# Patient Record
Sex: MALE | Race: WHITE | Employment: UNEMPLOYED | URBAN - METROPOLITAN AREA
[De-identification: names, ages, dates, MRNs, and addresses within clinical notes are randomized per-mention and may not be internally consistent; named-entity substitution may affect disease eponyms.]

---

## 2018-09-12 ENCOUNTER — HOSPITAL ENCOUNTER (INPATIENT)
Age: 53
LOS: 9 days | Discharge: HOME HEALTH | DRG: 140 | End: 2018-09-21
Attending: EMERGENCY MEDICINE | Admitting: FAMILY MEDICINE
Payer: MEDICARE

## 2018-09-12 ENCOUNTER — APPOINTMENT (OUTPATIENT)
Dept: GENERAL RADIOLOGY | Age: 53
DRG: 140 | End: 2018-09-12
Payer: MEDICARE

## 2018-09-12 DIAGNOSIS — J44.1 COPD EXACERBATION (HCC): Primary | ICD-10-CM

## 2018-09-12 PROBLEM — J96.90 RESPIRATORY FAILURE (HCC): Status: ACTIVE | Noted: 2018-09-12

## 2018-09-12 LAB
-: NORMAL
ABSOLUTE EOS #: 0.1 K/UL (ref 0–0.4)
ABSOLUTE IMMATURE GRANULOCYTE: ABNORMAL K/UL (ref 0–0.3)
ABSOLUTE LYMPH #: 1.9 K/UL (ref 1–4.8)
ABSOLUTE MONO #: 1.6 K/UL (ref 0.2–0.8)
ANION GAP SERPL CALCULATED.3IONS-SCNC: 17 MMOL/L (ref 9–17)
BASOPHILS # BLD: 0 % (ref 0–2)
BASOPHILS ABSOLUTE: 0 K/UL (ref 0–0.2)
BUN BLDV-MCNC: 10 MG/DL (ref 6–20)
BUN/CREAT BLD: 9 (ref 9–20)
CALCIUM SERPL-MCNC: 9.5 MG/DL (ref 8.6–10.4)
CHLORIDE BLD-SCNC: 96 MMOL/L (ref 98–107)
CO2: 25 MMOL/L (ref 20–31)
CREAT SERPL-MCNC: 1.07 MG/DL (ref 0.7–1.2)
D-DIMER QUANTITATIVE: 0.44 MG/L FEU
DIFFERENTIAL TYPE: ABNORMAL
EOSINOPHILS RELATIVE PERCENT: 1 % (ref 1–4)
GFR AFRICAN AMERICAN: >60 ML/MIN
GFR NON-AFRICAN AMERICAN: >60 ML/MIN
GFR SERPL CREATININE-BSD FRML MDRD: ABNORMAL ML/MIN/{1.73_M2}
GFR SERPL CREATININE-BSD FRML MDRD: ABNORMAL ML/MIN/{1.73_M2}
GLUCOSE BLD-MCNC: 143 MG/DL (ref 70–99)
HCT VFR BLD CALC: 44.9 % (ref 41–53)
HEMOGLOBIN: 15.2 G/DL (ref 13.5–17.5)
IMMATURE GRANULOCYTES: ABNORMAL %
LYMPHOCYTES # BLD: 16 % (ref 24–44)
MCH RBC QN AUTO: 31 PG (ref 26–34)
MCHC RBC AUTO-ENTMCNC: 33.8 G/DL (ref 31–37)
MCV RBC AUTO: 91.7 FL (ref 80–100)
MONOCYTES # BLD: 14 % (ref 1–7)
MYOGLOBIN: 45 NG/ML (ref 28–72)
NRBC AUTOMATED: ABNORMAL PER 100 WBC
PDW BLD-RTO: 13.8 % (ref 11.5–14.5)
PLATELET # BLD: 336 K/UL (ref 130–400)
PLATELET ESTIMATE: ABNORMAL
PMV BLD AUTO: 8.6 FL (ref 6–12)
POTASSIUM SERPL-SCNC: 3.4 MMOL/L (ref 3.7–5.3)
RBC # BLD: 4.89 M/UL (ref 4.5–5.9)
RBC # BLD: ABNORMAL 10*6/UL
REASON FOR REJECTION: NORMAL
SEG NEUTROPHILS: 69 % (ref 36–66)
SEGMENTED NEUTROPHILS ABSOLUTE COUNT: 8.3 K/UL (ref 1.8–7.7)
SODIUM BLD-SCNC: 138 MMOL/L (ref 135–144)
TROPONIN INTERP: NORMAL
TROPONIN T: <0.03 NG/ML
WBC # BLD: 12.1 K/UL (ref 3.5–11)
WBC # BLD: ABNORMAL 10*3/UL
ZZ NTE CLEAN UP: ORDERED TEST: NORMAL
ZZ NTE WITH NAME CLEAN UP: SPECIMEN SOURCE: NORMAL

## 2018-09-12 PROCEDURE — 85379 FIBRIN DEGRADATION QUANT: CPT

## 2018-09-12 PROCEDURE — 83874 ASSAY OF MYOGLOBIN: CPT

## 2018-09-12 PROCEDURE — 85025 COMPLETE CBC W/AUTO DIFF WBC: CPT

## 2018-09-12 PROCEDURE — 99285 EMERGENCY DEPT VISIT HI MDM: CPT

## 2018-09-12 PROCEDURE — 6370000000 HC RX 637 (ALT 250 FOR IP): Performed by: INTERNAL MEDICINE

## 2018-09-12 PROCEDURE — 96375 TX/PRO/DX INJ NEW DRUG ADDON: CPT

## 2018-09-12 PROCEDURE — 94640 AIRWAY INHALATION TREATMENT: CPT

## 2018-09-12 PROCEDURE — 6360000002 HC RX W HCPCS: Performed by: NURSE PRACTITIONER

## 2018-09-12 PROCEDURE — 71045 X-RAY EXAM CHEST 1 VIEW: CPT

## 2018-09-12 PROCEDURE — 93005 ELECTROCARDIOGRAM TRACING: CPT

## 2018-09-12 PROCEDURE — 2580000003 HC RX 258: Performed by: FAMILY MEDICINE

## 2018-09-12 PROCEDURE — 2000000000 HC ICU R&B

## 2018-09-12 PROCEDURE — 84484 ASSAY OF TROPONIN QUANT: CPT

## 2018-09-12 PROCEDURE — 80048 BASIC METABOLIC PNL TOTAL CA: CPT

## 2018-09-12 PROCEDURE — 94150 VITAL CAPACITY TEST: CPT

## 2018-09-12 PROCEDURE — 6360000002 HC RX W HCPCS: Performed by: FAMILY MEDICINE

## 2018-09-12 PROCEDURE — 94761 N-INVAS EAR/PLS OXIMETRY MLT: CPT

## 2018-09-12 PROCEDURE — 6370000000 HC RX 637 (ALT 250 FOR IP): Performed by: FAMILY MEDICINE

## 2018-09-12 PROCEDURE — 36415 COLL VENOUS BLD VENIPUNCTURE: CPT

## 2018-09-12 PROCEDURE — 96366 THER/PROPH/DIAG IV INF ADDON: CPT

## 2018-09-12 PROCEDURE — 94664 DEMO&/EVAL PT USE INHALER: CPT

## 2018-09-12 PROCEDURE — 96365 THER/PROPH/DIAG IV INF INIT: CPT

## 2018-09-12 PROCEDURE — 6360000002 HC RX W HCPCS: Performed by: INTERNAL MEDICINE

## 2018-09-12 PROCEDURE — 2580000003 HC RX 258: Performed by: NURSE PRACTITIONER

## 2018-09-12 PROCEDURE — 2700000000 HC OXYGEN THERAPY PER DAY

## 2018-09-12 RX ORDER — SODIUM CHLORIDE 0.9 % (FLUSH) 0.9 %
10 SYRINGE (ML) INJECTION EVERY 12 HOURS SCHEDULED
Status: DISCONTINUED | OUTPATIENT
Start: 2018-09-12 | End: 2018-09-21 | Stop reason: HOSPADM

## 2018-09-12 RX ORDER — AZITHROMYCIN 250 MG/1
250 TABLET, FILM COATED ORAL DAILY
Status: DISCONTINUED | OUTPATIENT
Start: 2018-09-13 | End: 2018-09-20

## 2018-09-12 RX ORDER — LEVETIRACETAM 500 MG/1
500 TABLET ORAL 2 TIMES DAILY
Status: ON HOLD | COMMUNITY
End: 2021-06-10

## 2018-09-12 RX ORDER — ONDANSETRON 2 MG/ML
4 INJECTION INTRAMUSCULAR; INTRAVENOUS EVERY 6 HOURS PRN
Status: DISCONTINUED | OUTPATIENT
Start: 2018-09-12 | End: 2018-09-21 | Stop reason: HOSPADM

## 2018-09-12 RX ORDER — ALBUTEROL SULFATE 90 UG/1
2 AEROSOL, METERED RESPIRATORY (INHALATION)
COMMUNITY

## 2018-09-12 RX ORDER — ZOLPIDEM TARTRATE 5 MG/1
5 TABLET ORAL ONCE
Status: COMPLETED | OUTPATIENT
Start: 2018-09-12 | End: 2018-09-12

## 2018-09-12 RX ORDER — MIRTAZAPINE 45 MG/1
45 TABLET, FILM COATED ORAL NIGHTLY
COMMUNITY
End: 2018-10-03 | Stop reason: ALTCHOICE

## 2018-09-12 RX ORDER — ALBUTEROL SULFATE 90 UG/1
2 AEROSOL, METERED RESPIRATORY (INHALATION)
Status: DISCONTINUED | OUTPATIENT
Start: 2018-09-12 | End: 2018-09-12

## 2018-09-12 RX ORDER — PANTOPRAZOLE SODIUM 40 MG/1
40 TABLET, DELAYED RELEASE ORAL DAILY
COMMUNITY

## 2018-09-12 RX ORDER — POTASSIUM CHLORIDE 20MEQ/15ML
40 LIQUID (ML) ORAL PRN
Status: DISCONTINUED | OUTPATIENT
Start: 2018-09-12 | End: 2018-09-21 | Stop reason: HOSPADM

## 2018-09-12 RX ORDER — BUDESONIDE AND FORMOTEROL FUMARATE DIHYDRATE 160; 4.5 UG/1; UG/1
2 AEROSOL RESPIRATORY (INHALATION) 2 TIMES DAILY
Status: ON HOLD | COMMUNITY
End: 2022-08-24

## 2018-09-12 RX ORDER — METHYLPREDNISOLONE SODIUM SUCCINATE 125 MG/2ML
60 INJECTION, POWDER, LYOPHILIZED, FOR SOLUTION INTRAMUSCULAR; INTRAVENOUS EVERY 6 HOURS
Status: DISCONTINUED | OUTPATIENT
Start: 2018-09-12 | End: 2018-09-14

## 2018-09-12 RX ORDER — THIAMINE MONONITRATE (VIT B1) 100 MG
100 TABLET ORAL DAILY
COMMUNITY

## 2018-09-12 RX ORDER — QUETIAPINE FUMARATE 400 MG/1
200 TABLET, FILM COATED ORAL NIGHTLY
COMMUNITY

## 2018-09-12 RX ORDER — LOSARTAN POTASSIUM AND HYDROCHLOROTHIAZIDE 25; 100 MG/1; MG/1
1 TABLET ORAL DAILY
Status: ON HOLD | COMMUNITY
End: 2019-03-11 | Stop reason: HOSPADM

## 2018-09-12 RX ORDER — ACETAMINOPHEN 160 MG
1 TABLET,DISINTEGRATING ORAL DAILY
COMMUNITY

## 2018-09-12 RX ORDER — HYDROXYZINE PAMOATE 50 MG/1
50 CAPSULE ORAL DAILY
COMMUNITY
End: 2021-07-12

## 2018-09-12 RX ORDER — SODIUM CHLORIDE 0.9 % (FLUSH) 0.9 %
10 SYRINGE (ML) INJECTION PRN
Status: DISCONTINUED | OUTPATIENT
Start: 2018-09-12 | End: 2018-09-21 | Stop reason: HOSPADM

## 2018-09-12 RX ORDER — POTASSIUM CHLORIDE 7.45 MG/ML
10 INJECTION INTRAVENOUS PRN
Status: DISCONTINUED | OUTPATIENT
Start: 2018-09-12 | End: 2018-09-21 | Stop reason: HOSPADM

## 2018-09-12 RX ORDER — MAGNESIUM SULFATE 1 G/100ML
1 INJECTION INTRAVENOUS
Status: DISPENSED | OUTPATIENT
Start: 2018-09-12 | End: 2018-09-12

## 2018-09-12 RX ORDER — IPRATROPIUM BROMIDE AND ALBUTEROL SULFATE 2.5; .5 MG/3ML; MG/3ML
1 SOLUTION RESPIRATORY (INHALATION) 4 TIMES DAILY
Status: ON HOLD | COMMUNITY
End: 2018-09-19 | Stop reason: HOSPADM

## 2018-09-12 RX ORDER — ONDANSETRON 2 MG/ML
4 INJECTION INTRAMUSCULAR; INTRAVENOUS EVERY 6 HOURS PRN
Status: DISCONTINUED | OUTPATIENT
Start: 2018-09-12 | End: 2018-09-12 | Stop reason: CLARIF

## 2018-09-12 RX ORDER — PREDNISONE 10 MG/1
10 TABLET ORAL DAILY
COMMUNITY
End: 2018-10-03 | Stop reason: ALTCHOICE

## 2018-09-12 RX ORDER — IPRATROPIUM BROMIDE AND ALBUTEROL SULFATE 2.5; .5 MG/3ML; MG/3ML
1 SOLUTION RESPIRATORY (INHALATION)
Status: DISCONTINUED | OUTPATIENT
Start: 2018-09-12 | End: 2018-09-21 | Stop reason: HOSPADM

## 2018-09-12 RX ORDER — AMLODIPINE BESYLATE 10 MG/1
10 TABLET ORAL DAILY
Status: ON HOLD | COMMUNITY
End: 2018-09-21 | Stop reason: HOSPADM

## 2018-09-12 RX ORDER — METOPROLOL SUCCINATE 50 MG/1
50 TABLET, EXTENDED RELEASE ORAL DAILY
COMMUNITY

## 2018-09-12 RX ORDER — IPRATROPIUM BROMIDE AND ALBUTEROL SULFATE 2.5; .5 MG/3ML; MG/3ML
1 SOLUTION RESPIRATORY (INHALATION)
Status: DISCONTINUED | OUTPATIENT
Start: 2018-09-12 | End: 2018-09-12

## 2018-09-12 RX ORDER — HYDRALAZINE HYDROCHLORIDE 50 MG/1
50 TABLET, FILM COATED ORAL NIGHTLY
Status: ON HOLD | COMMUNITY
End: 2018-09-21 | Stop reason: HOSPADM

## 2018-09-12 RX ORDER — METHYLPREDNISOLONE SODIUM SUCCINATE 125 MG/2ML
125 INJECTION, POWDER, LYOPHILIZED, FOR SOLUTION INTRAMUSCULAR; INTRAVENOUS ONCE
Status: COMPLETED | OUTPATIENT
Start: 2018-09-12 | End: 2018-09-12

## 2018-09-12 RX ORDER — ALBUTEROL SULFATE 2.5 MG/3ML
5 SOLUTION RESPIRATORY (INHALATION)
Status: DISCONTINUED | OUTPATIENT
Start: 2018-09-12 | End: 2018-09-12

## 2018-09-12 RX ORDER — POTASSIUM CHLORIDE 20 MEQ/1
40 TABLET, EXTENDED RELEASE ORAL PRN
Status: DISCONTINUED | OUTPATIENT
Start: 2018-09-12 | End: 2018-09-21 | Stop reason: HOSPADM

## 2018-09-12 RX ORDER — ONDANSETRON 4 MG/1
4 TABLET, ORALLY DISINTEGRATING ORAL EVERY 6 HOURS PRN
Status: DISCONTINUED | OUTPATIENT
Start: 2018-09-12 | End: 2018-09-21 | Stop reason: HOSPADM

## 2018-09-12 RX ORDER — FOLIC ACID 1 MG/1
1 TABLET ORAL DAILY
COMMUNITY

## 2018-09-12 RX ADMIN — POTASSIUM CHLORIDE 40 MEQ: 20 TABLET, EXTENDED RELEASE ORAL at 21:24

## 2018-09-12 RX ADMIN — METHYLPREDNISOLONE SODIUM SUCCINATE 125 MG: 125 INJECTION, POWDER, FOR SOLUTION INTRAMUSCULAR; INTRAVENOUS at 14:28

## 2018-09-12 RX ADMIN — Medication 10 ML: at 21:25

## 2018-09-12 RX ADMIN — CEFTRIAXONE 1 G: 1 INJECTION, POWDER, FOR SOLUTION INTRAMUSCULAR; INTRAVENOUS at 16:25

## 2018-09-12 RX ADMIN — IPRATROPIUM BROMIDE AND ALBUTEROL SULFATE 1 AMPULE: .5; 3 SOLUTION RESPIRATORY (INHALATION) at 19:57

## 2018-09-12 RX ADMIN — ENOXAPARIN SODIUM 40 MG: 40 INJECTION SUBCUTANEOUS at 18:43

## 2018-09-12 RX ADMIN — ALBUTEROL SULFATE 5 MG: 5 SOLUTION RESPIRATORY (INHALATION) at 14:42

## 2018-09-12 RX ADMIN — METHYLPREDNISOLONE SODIUM SUCCINATE 60 MG: 125 INJECTION, POWDER, FOR SOLUTION INTRAMUSCULAR; INTRAVENOUS at 22:03

## 2018-09-12 RX ADMIN — AZITHROMYCIN MONOHYDRATE 500 MG: 500 INJECTION, POWDER, LYOPHILIZED, FOR SOLUTION INTRAVENOUS at 16:59

## 2018-09-12 RX ADMIN — MAGNESIUM SULFATE IN DEXTROSE 1 G: 10 INJECTION, SOLUTION INTRAVENOUS at 14:29

## 2018-09-12 RX ADMIN — ZOLPIDEM TARTRATE 5 MG: 5 TABLET ORAL at 21:25

## 2018-09-12 RX ADMIN — ALBUTEROL SULFATE 5 MG: 5 SOLUTION RESPIRATORY (INHALATION) at 14:36

## 2018-09-12 ASSESSMENT — ENCOUNTER SYMPTOMS
NAUSEA: 0
CONSTIPATION: 0
WHEEZING: 0
COUGH: 1
VOMITING: 0
SINUS PRESSURE: 0
COLOR CHANGE: 0
RHINORRHEA: 0
DIARRHEA: 0
SORE THROAT: 0
ABDOMINAL PAIN: 0
SHORTNESS OF BREATH: 1

## 2018-09-12 ASSESSMENT — PAIN SCALES - GENERAL
PAINLEVEL_OUTOF10: 0
PAINLEVEL_OUTOF10: 0

## 2018-09-12 NOTE — ED PROVIDER NOTES
[address] [place] [address] ED  eMERGENCY dEPARTMENT eNCOUnter      Pt Name: Shahzad Kennedy  MRN: [id number]  [place] 1965  Date of evaluation: 9/12/2018  Provider: Clayton Ladd NP, GONZÁLEZ - Mercedes 6698       Chief Complaint   Patient presents with    Shortness of Breath         HISTORY OF PRESENT ILLNESS  (Location/Symptom, Timing/Onset, Context/Setting, Quality, Duration, Modifying Factors, Severity.)   Shahzad Kennedy is a 52 y.o. male who presents to the emergency department today by private automobile for evaluation of shortness of breath. The sheet he has had shortness of breath on Saturday. He does have history of COPD and wears a CPAP at night. He states his current been compliant with wearing his CPAP. He states he has been short of breath with rest and exertion. He has also had a dry harsh nonproductive cough. He was seen by his primary care physician today who sent him to the emergency room for evaluation and admission to the hospital.  Denies fevers or chills. Nursing Notes were reviewed. ALLERGIES     Patient has no known allergies.     CURRENT MEDICATIONS       Previous Medications    ALBUTEROL SULFATE  (90 BASE) MCG/ACT INHALER    Inhale 2 puffs into the lungs every 4-6 hours as needed for Wheezing    AMLODIPINE (NORVASC) 10 MG TABLET    Take 10 mg by mouth daily    ASPIRIN 81 MG TABLET    Take 81 mg by mouth daily    BUDESONIDE-FORMOTEROL (SYMBICORT) 160-4.5 MCG/ACT AERO    Inhale 2 puffs into the lungs 2 times daily    CHOLECALCIFEROL (VITAMIN D3) 2000 UNITS CAPS    Take 1 capsule by mouth daily    FOLIC ACID (FOLVITE) 1 MG TABLET    Take 1 mg by mouth daily    HYDRALAZINE (APRESOLINE) 50 MG TABLET    Take 50 mg by mouth nightly    HYDROXYZINE (VISTARIL) 50 MG CAPSULE    Take 50 mg by mouth daily    IPRATROPIUM-ALBUTEROL (DUONEB) 0.5-2.5 (3) MG/3ML SOLN NEBULIZER SOLUTION    Inhale 1 vial into the lungs 4 times daily    LEVETIRACETAM (KEPPRA) 500 MG TABLET    Take 500

## 2018-09-12 NOTE — CONSULTS
Intravenous Once     albuterol, ipratropium-albuterol  IV Drips/Infusions    Home Medications  Not in a hospital admission. Allergies    Patient has no known allergies. Social History     Social History   Substance Use Topics    Smoking status: Current Every Day Smoker    Smokeless tobacco: Never Used    Alcohol use Yes     Family History    History reviewed. No pertinent family history. ROS - 11 systems   General Denies any fever or chills  HEENT Denies any diplopia, tinnitus or vertigo  Resp positive for  dry cough and dyspnea  Cardiac Denies any chest pain, palpitations, claudication or edema  GI Denies any melena, hematochezia, hematemesis or pyrosis   Denies any frequency, urgency, hesitancy or incontinence  Heme Denies bruising or bleeding easily  Endocrine Denies any history of diabetes or thyroid disease  Neuro Denies any focal motor or sensory deficits  Psychiatric Denies anxiety, depression, suicidal ideation  Skin Denies rashes, itching, open sores    Vitals     height is 5' 5\" (1.651 m) and weight is 183 lb (83 kg). His temperature is 97.4 °F (36.3 °C). His blood pressure is 118/79 and his pulse is 100. His respiration is 25 and oxygen saturation is 92%. Body mass index is 30.45 kg/m². I/O    No intake or output data in the 24 hours ending 09/12/18 1730  No intake/output data recorded. Patient Vitals for the past 96 hrs (Last 3 readings):   Weight   09/12/18 1345 183 lb (83 kg)     Exam   General Appearance  Awake, alert, oriented, in no acute distress  HEENT - Head is normocephalic, atraumatic. Pupil reactive to light  Neck - Supple, symmetrical, trachea midline and Soft, trachea midline and straight  Lungs - positive findings: prolonged expiration, wheezing bibasilar  Cardiovascular - Heart sounds are normal.  Regular rhythm normal rate without murmur, gallop or rub. Abdomen - Soft, nontender, nondistended, no masses or organomegaly  Neurologic - CN II-XII are grossly intact.  There are no focal motor or sensory deficits  Skin - No bruising or bleeding  Extremities - No cyanosis, clubbing or edema    Labs  - Old records and notes have been reviewed in [facility]   CBC   Lab Results   Component Value Date    WBC 12.1 09/12/2018    RBC 4.89 09/12/2018    RBC 4.83 04/18/2012    HGB 15.2 09/12/2018    HCT 44.9 09/12/2018     09/12/2018     04/18/2012    MCV 91.7 09/12/2018    MCH 31.0 09/12/2018    MCHC 33.8 09/12/2018    RDW 13.8 09/12/2018    LYMPHOPCT 16 09/12/2018    MONOPCT 14 09/12/2018    BASOPCT 0 09/12/2018    MONOSABS 1.60 09/12/2018    LYMPHSABS 1.90 09/12/2018    EOSABS 0.10 09/12/2018    BASOSABS 0.00 09/12/2018    DIFFTYPE NOT REPORTED 09/12/2018     BMP Lab Results   Component Value Date     09/12/2018    K 3.4 09/12/2018    CL 96 09/12/2018    CO2 25 09/12/2018    BUN 10 09/12/2018    CREATININE 1.07 09/12/2018    GLUCOSE 143 09/12/2018    GLUCOSE 87 04/18/2012    CALCIUM 9.5 09/12/2018    MG 2.2 01/14/2016     LFTS  Lab Results   Component Value Date    ALKPHOS 77 05/14/2016    ALT 91 05/14/2016    AST 71 05/14/2016    PROT 7.9 05/14/2016    PROT 7.8 05/14/2016    BILITOT 0.54 05/14/2016    BILIDIR <0.08 05/14/2016    IBILI CANNOT BE CALCULATED 05/14/2016    LABALBU 4.5 05/14/2016    LABALBU 3.9 04/18/2012     Radiology    CXR  9/12/18     (See actual reports for details)    \"Thank you for asking us to see this patient\"    Case discussed with nurse and patient/family. Questions and concerns addressed.     Electronically signed by     Quinn Lee MD on 9/12/2018 at 5:30 PM  Pulmonary Critical Care and Sleep Medicine,  [facility]  Cell: [phone]  Office: [phone]

## 2018-09-12 NOTE — PROGRESS NOTES
Transitions of Care Pharmacy Service   Medication Review    The patient's list of current home medications has been reviewed. Source(s) of information: [facility]res[facility]pt, [facility]    Based on information provided by the above source(s), I have updated the patient's home med list as described below. Please review the ACTION REQUESTED BY PHYSICIAN section of this note for any discrepancies on current hospital orders. I changed or updated the following medications on the patient's home medication list:  Discontinued · Lisinopril 20mg (no longer on)  · Metoprolol XL 25mg (no longer on)   Added · Ventolin HFA 90mcg 2 puffs every 4-6 hours as needed  · Amlodipine 10mg once daily  · Aspirin 81mg once daily  · Symbicort 160-4.5mg 2 puffs twice a day  · Folic acid 1mg once daily  · Hydralazine 50mg once nightly  · Hydroxyzine pamoate once daily  · Duo-Neb 0.25-3 (3mL) solution 1 vial four times a day  · Keppra 500mg twice daily  · Losartan-HCTZ 100-25mg once daily  · Metoprolol succinate XL 50mg daily  · Pantoprazole 40mg daily  · Prednisone 10mg daily  · Quetiapine 200mg nightly  · Vitamin B1 100mg daily  · Vitamin D3 2000IU daily  · Spiriva respimat 2.5mg 2 puffs daily  · Mirtazapine 45mg nightly   Adjusted   · Lurasidone 40mg switched to 120mg daily per [address] REQUESTED  Discrepancies on current hospital orders that need to be addressed by a physician:    Medication Action Requested        Medication list ready for view by physician         Please feel free to call me with any questions about this encounter. Thank you. This note will be reviewed and co-signed by the Transitions of Care Pharmacist.    Vertell Heimlich Ramadan, Juan ManuelD student  Transitions of Care Pharmacy Service  Phone:  [phone]  Fax: [phone]      Electronically signed by Vertell Heimlich Ramadan on 9/12/2018 at 4:53 PM     Prior to Admission medications    Medication Sig Start Date End Date Taking? Provider, MD   mirtazapine (REMERON) 45 MG tablet Take 45 mg by mouth nightly   Yes Historical Provider, MD   omeprazole (PRILOSEC) 10 MG capsule Take 10 mg by mouth daily    Historical Provider, MD

## 2018-09-13 ENCOUNTER — APPOINTMENT (OUTPATIENT)
Dept: GENERAL RADIOLOGY | Age: 53
DRG: 140 | End: 2018-09-13
Payer: MEDICARE

## 2018-09-13 LAB
ABSOLUTE EOS #: 0 K/UL (ref 0–0.4)
ABSOLUTE IMMATURE GRANULOCYTE: ABNORMAL K/UL (ref 0–0.3)
ABSOLUTE LYMPH #: 0.8 K/UL (ref 1–4.8)
ABSOLUTE MONO #: 0.5 K/UL (ref 0.2–0.8)
ANION GAP SERPL CALCULATED.3IONS-SCNC: 15 MMOL/L (ref 9–17)
BASOPHILS # BLD: 0 % (ref 0–2)
BASOPHILS ABSOLUTE: 0 K/UL (ref 0–0.2)
BUN BLDV-MCNC: 15 MG/DL (ref 6–20)
BUN/CREAT BLD: 13 (ref 9–20)
CALCIUM SERPL-MCNC: 9.5 MG/DL (ref 8.6–10.4)
CHLORIDE BLD-SCNC: 99 MMOL/L (ref 98–107)
CO2: 23 MMOL/L (ref 20–31)
CREAT SERPL-MCNC: 1.13 MG/DL (ref 0.7–1.2)
DIFFERENTIAL TYPE: ABNORMAL
EKG ATRIAL RATE: 106 BPM
EKG ATRIAL RATE: 95 BPM
EKG ATRIAL RATE: 95 BPM
EKG P AXIS: 45 DEGREES
EKG P AXIS: 63 DEGREES
EKG P AXIS: 72 DEGREES
EKG P-R INTERVAL: 132 MS
EKG P-R INTERVAL: 142 MS
EKG P-R INTERVAL: 146 MS
EKG Q-T INTERVAL: 376 MS
EKG Q-T INTERVAL: 384 MS
EKG Q-T INTERVAL: 390 MS
EKG QRS DURATION: 132 MS
EKG QRS DURATION: 134 MS
EKG QRS DURATION: 138 MS
EKG QTC CALCULATION (BAZETT): 482 MS
EKG QTC CALCULATION (BAZETT): 490 MS
EKG QTC CALCULATION (BAZETT): 499 MS
EKG R AXIS: 79 DEGREES
EKG R AXIS: 84 DEGREES
EKG R AXIS: 99 DEGREES
EKG T AXIS: -2 DEGREES
EKG T AXIS: -4 DEGREES
EKG T AXIS: 49 DEGREES
EKG VENTRICULAR RATE: 106 BPM
EKG VENTRICULAR RATE: 95 BPM
EKG VENTRICULAR RATE: 95 BPM
EOSINOPHILS RELATIVE PERCENT: 0 % (ref 1–4)
GFR AFRICAN AMERICAN: >60 ML/MIN
GFR NON-AFRICAN AMERICAN: >60 ML/MIN
GFR SERPL CREATININE-BSD FRML MDRD: ABNORMAL ML/MIN/{1.73_M2}
GFR SERPL CREATININE-BSD FRML MDRD: ABNORMAL ML/MIN/{1.73_M2}
GLUCOSE BLD-MCNC: 144 MG/DL (ref 70–99)
HCT VFR BLD CALC: 44 % (ref 41–53)
HEMOGLOBIN: 14.8 G/DL (ref 13.5–17.5)
IMMATURE GRANULOCYTES: ABNORMAL %
LYMPHOCYTES # BLD: 7 % (ref 24–44)
MCH RBC QN AUTO: 31 PG (ref 26–34)
MCHC RBC AUTO-ENTMCNC: 33.7 G/DL (ref 31–37)
MCV RBC AUTO: 92.1 FL (ref 80–100)
MONOCYTES # BLD: 4 % (ref 1–7)
NRBC AUTOMATED: ABNORMAL PER 100 WBC
PDW BLD-RTO: 13.9 % (ref 11.5–14.5)
PLATELET # BLD: 343 K/UL (ref 130–400)
PLATELET ESTIMATE: ABNORMAL
PMV BLD AUTO: 8.7 FL (ref 6–12)
POTASSIUM SERPL-SCNC: 4.5 MMOL/L (ref 3.7–5.3)
PROCALCITONIN: 0.12 NG/ML
RBC # BLD: 4.78 M/UL (ref 4.5–5.9)
RBC # BLD: ABNORMAL 10*6/UL
SEG NEUTROPHILS: 89 % (ref 36–66)
SEGMENTED NEUTROPHILS ABSOLUTE COUNT: 10.4 K/UL (ref 1.8–7.7)
SODIUM BLD-SCNC: 137 MMOL/L (ref 135–144)
WBC # BLD: 11.7 K/UL (ref 3.5–11)
WBC # BLD: ABNORMAL 10*3/UL

## 2018-09-13 PROCEDURE — 2700000000 HC OXYGEN THERAPY PER DAY

## 2018-09-13 PROCEDURE — 6370000000 HC RX 637 (ALT 250 FOR IP): Performed by: FAMILY MEDICINE

## 2018-09-13 PROCEDURE — 94640 AIRWAY INHALATION TREATMENT: CPT

## 2018-09-13 PROCEDURE — 6360000002 HC RX W HCPCS: Performed by: FAMILY MEDICINE

## 2018-09-13 PROCEDURE — 93306 TTE W/DOPPLER COMPLETE: CPT

## 2018-09-13 PROCEDURE — 85025 COMPLETE CBC W/AUTO DIFF WBC: CPT

## 2018-09-13 PROCEDURE — 6360000002 HC RX W HCPCS: Performed by: INTERNAL MEDICINE

## 2018-09-13 PROCEDURE — 6370000000 HC RX 637 (ALT 250 FOR IP): Performed by: NURSE PRACTITIONER

## 2018-09-13 PROCEDURE — 36415 COLL VENOUS BLD VENIPUNCTURE: CPT

## 2018-09-13 PROCEDURE — 2580000003 HC RX 258: Performed by: FAMILY MEDICINE

## 2018-09-13 PROCEDURE — 2060000000 HC ICU INTERMEDIATE R&B

## 2018-09-13 PROCEDURE — 71045 X-RAY EXAM CHEST 1 VIEW: CPT

## 2018-09-13 PROCEDURE — 94660 CPAP INITIATION&MGMT: CPT

## 2018-09-13 PROCEDURE — 84145 PROCALCITONIN (PCT): CPT

## 2018-09-13 PROCEDURE — 80048 BASIC METABOLIC PNL TOTAL CA: CPT

## 2018-09-13 PROCEDURE — 6370000000 HC RX 637 (ALT 250 FOR IP): Performed by: INTERNAL MEDICINE

## 2018-09-13 PROCEDURE — 94761 N-INVAS EAR/PLS OXIMETRY MLT: CPT

## 2018-09-13 RX ORDER — METOPROLOL TARTRATE 50 MG/1
TABLET, FILM COATED ORAL
Status: ON HOLD | COMMUNITY
End: 2018-09-21 | Stop reason: HOSPADM

## 2018-09-13 RX ORDER — LOSARTAN POTASSIUM 100 MG/1
100 TABLET ORAL DAILY
Status: DISCONTINUED | OUTPATIENT
Start: 2018-09-14 | End: 2018-09-21 | Stop reason: HOSPADM

## 2018-09-13 RX ORDER — MIRTAZAPINE 15 MG/1
45 TABLET, FILM COATED ORAL NIGHTLY
Status: DISCONTINUED | OUTPATIENT
Start: 2018-09-13 | End: 2018-09-13

## 2018-09-13 RX ORDER — GUAIFENESIN 600 MG/1
600 TABLET, EXTENDED RELEASE ORAL 2 TIMES DAILY
Status: COMPLETED | OUTPATIENT
Start: 2018-09-13 | End: 2018-09-19

## 2018-09-13 RX ORDER — THIAMINE MONONITRATE (VIT B1) 100 MG
100 TABLET ORAL DAILY
Status: DISCONTINUED | OUTPATIENT
Start: 2018-09-14 | End: 2018-09-21 | Stop reason: HOSPADM

## 2018-09-13 RX ORDER — LEVETIRACETAM 500 MG/1
500 TABLET ORAL DAILY
Status: DISCONTINUED | OUTPATIENT
Start: 2018-09-14 | End: 2018-09-21 | Stop reason: HOSPADM

## 2018-09-13 RX ORDER — ASPIRIN 81 MG/1
81 TABLET, CHEWABLE ORAL DAILY
Status: DISCONTINUED | OUTPATIENT
Start: 2018-09-13 | End: 2018-09-13

## 2018-09-13 RX ORDER — METOPROLOL SUCCINATE 50 MG/1
50 TABLET, EXTENDED RELEASE ORAL DAILY
Status: DISCONTINUED | OUTPATIENT
Start: 2018-09-13 | End: 2018-09-21 | Stop reason: HOSPADM

## 2018-09-13 RX ORDER — HYDRALAZINE HYDROCHLORIDE 50 MG/1
50 TABLET, FILM COATED ORAL DAILY
Status: DISCONTINUED | OUTPATIENT
Start: 2018-09-13 | End: 2018-09-13

## 2018-09-13 RX ORDER — FOLIC ACID 1 MG/1
1 TABLET ORAL DAILY
Status: DISCONTINUED | OUTPATIENT
Start: 2018-09-13 | End: 2018-09-21 | Stop reason: HOSPADM

## 2018-09-13 RX ORDER — HYDROCHLOROTHIAZIDE 25 MG/1
25 TABLET ORAL DAILY
Status: DISCONTINUED | OUTPATIENT
Start: 2018-09-14 | End: 2018-09-21 | Stop reason: HOSPADM

## 2018-09-13 RX ORDER — QUETIAPINE FUMARATE 200 MG/1
200 TABLET, FILM COATED ORAL NIGHTLY
Status: DISCONTINUED | OUTPATIENT
Start: 2018-09-13 | End: 2018-09-21 | Stop reason: HOSPADM

## 2018-09-13 RX ORDER — BENZONATATE 100 MG/1
200 CAPSULE ORAL 3 TIMES DAILY PRN
Status: DISCONTINUED | OUTPATIENT
Start: 2018-09-13 | End: 2018-09-21 | Stop reason: HOSPADM

## 2018-09-13 RX ORDER — HYDRALAZINE HYDROCHLORIDE 50 MG/1
50 TABLET, FILM COATED ORAL DAILY
Status: DISCONTINUED | OUTPATIENT
Start: 2018-09-13 | End: 2018-09-21 | Stop reason: HOSPADM

## 2018-09-13 RX ORDER — LEVETIRACETAM 500 MG/1
500 TABLET ORAL 2 TIMES DAILY
Status: DISCONTINUED | OUTPATIENT
Start: 2018-09-13 | End: 2018-09-13

## 2018-09-13 RX ADMIN — HYDRALAZINE HYDROCHLORIDE 50 MG: 50 TABLET, FILM COATED ORAL at 20:04

## 2018-09-13 RX ADMIN — AZITHROMYCIN MONOHYDRATE 250 MG: 250 TABLET ORAL at 09:13

## 2018-09-13 RX ADMIN — METHYLPREDNISOLONE SODIUM SUCCINATE 60 MG: 125 INJECTION, POWDER, FOR SOLUTION INTRAMUSCULAR; INTRAVENOUS at 09:14

## 2018-09-13 RX ADMIN — IPRATROPIUM BROMIDE AND ALBUTEROL SULFATE 1 AMPULE: .5; 3 SOLUTION RESPIRATORY (INHALATION) at 11:28

## 2018-09-13 RX ADMIN — METHYLPREDNISOLONE SODIUM SUCCINATE 60 MG: 125 INJECTION, POWDER, FOR SOLUTION INTRAMUSCULAR; INTRAVENOUS at 21:16

## 2018-09-13 RX ADMIN — IPRATROPIUM BROMIDE AND ALBUTEROL SULFATE 1 AMPULE: .5; 3 SOLUTION RESPIRATORY (INHALATION) at 19:42

## 2018-09-13 RX ADMIN — IPRATROPIUM BROMIDE AND ALBUTEROL SULFATE 1 AMPULE: .5; 3 SOLUTION RESPIRATORY (INHALATION) at 15:21

## 2018-09-13 RX ADMIN — ENOXAPARIN SODIUM 40 MG: 40 INJECTION SUBCUTANEOUS at 09:13

## 2018-09-13 RX ADMIN — IPRATROPIUM BROMIDE AND ALBUTEROL SULFATE 1 AMPULE: .5; 3 SOLUTION RESPIRATORY (INHALATION) at 07:29

## 2018-09-13 RX ADMIN — METHYLPREDNISOLONE SODIUM SUCCINATE 60 MG: 125 INJECTION, POWDER, FOR SOLUTION INTRAMUSCULAR; INTRAVENOUS at 03:08

## 2018-09-13 RX ADMIN — GUAIFENESIN 600 MG: 600 TABLET, EXTENDED RELEASE ORAL at 20:01

## 2018-09-13 RX ADMIN — Medication 10 ML: at 09:13

## 2018-09-13 RX ADMIN — MOMETASONE FUROATE AND FORMOTEROL FUMARATE DIHYDRATE 2 PUFF: 200; 5 AEROSOL RESPIRATORY (INHALATION) at 07:33

## 2018-09-13 RX ADMIN — METHYLPREDNISOLONE SODIUM SUCCINATE 60 MG: 125 INJECTION, POWDER, FOR SOLUTION INTRAMUSCULAR; INTRAVENOUS at 17:11

## 2018-09-13 RX ADMIN — Medication 21 G: at 20:01

## 2018-09-13 RX ADMIN — GUAIFENESIN 600 MG: 600 TABLET, EXTENDED RELEASE ORAL at 14:18

## 2018-09-13 RX ADMIN — METOPROLOL SUCCINATE 50 MG: 50 TABLET, FILM COATED, EXTENDED RELEASE ORAL at 14:18

## 2018-09-13 RX ADMIN — FOLIC ACID 1 MG: 1 TABLET ORAL at 14:18

## 2018-09-13 RX ADMIN — Medication 10 ML: at 20:02

## 2018-09-13 RX ADMIN — LURASIDONE HYDROCHLORIDE 120 MG: 40 TABLET, FILM COATED ORAL at 20:01

## 2018-09-13 RX ADMIN — MOMETASONE FUROATE AND FORMOTEROL FUMARATE DIHYDRATE 2 PUFF: 200; 5 AEROSOL RESPIRATORY (INHALATION) at 19:42

## 2018-09-13 RX ADMIN — QUETIAPINE FUMARATE 200 MG: 200 TABLET ORAL at 20:01

## 2018-09-13 ASSESSMENT — PAIN SCALES - GENERAL
PAINLEVEL_OUTOF10: 0

## 2018-09-13 NOTE — H&P
negative  ALLERGIC/IMMUNOLOGIC:  negative  ENDOCRINE:  negative  MUSCULOSKELETAL:  negative  NEUROLOGICAL:  negative  BEHAVIOR/PSYCH:  negative    PHYSICAL EXAM:  Vitals:  BP (!) 101/56   Pulse 89   Temp 98.3 °F (36.8 °C) (Infrared)   Resp 26   Ht 5' 5\" (1.651 m)   Wt 183 lb (83 kg)   SpO2 93%   BMI 30.45 kg/m²     CONSTITUTIONAL:  awake, alert, cooperative, no apparent distress, and appears stated age  EYES:  Lids and lashes normal, pupils equal, round and reactive to light, extra ocular muscles intact, sclera clear, conjunctiva normal  ENT:  Normocephalic, without obvious abnormality, atraumatic, sinuses nontender on palpation, external ears without lesions, oral pharynx with moist mucus membranes, tonsils without erythema or exudates, gums normal and good dentition. NECK:  Supple, symmetrical, trachea midline, no adenopathy, thyroid symmetric, not enlarged and no tenderness, skin normal  HEMATOLOGIC/LYMPHATICS:  no cervical lymphadenopathy and no supraclavicular lymphadenopathy  BACK:  Symmetric, no curvature, spinous processes are non-tender on palpation, paraspinous muscles are non-tender on palpation, no costal vertebral tenderness  LUNGS:  Decrease air movements,bilaterally. CARDIOVASCULAR:  Normal apical impulse, regular rate and rhythm, normal S1 and S2, no S3 or S4, and no murmur noted  ABDOMEN:  No scars, normal bowel sounds, soft, non-distended, non-tender, no masses palpated, no hepatosplenomegally  CHEST/BREASTS:  deferred  GENITAL/URINARY:  deferred  MUSCULOSKELETAL:  There is no redness, warmth, or swelling of the joints. Full range of motion noted. Motor strength is 5 out of 5 all extremities bilaterally. Tone is normal.  NEUROLOGIC:  Awake, alert, oriented to name, place and time. Cranial nerves II-XII are grossly intact. Motor is 5 out of 5 bilaterally. Cerebellar finger to nose, heel to shin intact. Sensory is intact.   Babinski down going, Romberg negative, and gait is normal.  SKIN:  normal skin color, texture, turgor  RECTAL: deferred    Labs:  Hematology:  Recent Labs      09/12/18   1416  09/13/18   0438   WBC  12.1*  11.7*   HGB  15.2  14.8   HCT  44.9  44.0   PLT  336  343     Chemistry:  Recent Labs      09/12/18   1516  09/13/18   0438   NA  138  137   K  3.4*  4.5   CL  96*  99   CO2  25  23   GLUCOSE  143*  144*   BUN  10  15   CREATININE  1.07  1.13   CALCIUM  9.5  9.5     No results for input(s): PROT, LABALBU, LABA1C, S1AQJIG, R7OPTCF, FT4, TSH, AST, ALT, LDH, GGT, ALKPHOS, BILITOT, BILIDIR, AMMONIA, AMYLASE, LIPASE, LACTATE, CHOL, HDL, LDLCHOLESTEROL, CHOLHDLRATIO, TRIG, VLDL, BNP, TROPONINI, CKTOTAL, CKMB, CKMBINDEX, RF, PAIGE in the last 72 hours. ASSESSMENT:    Active Hospital Problems    Diagnosis Date Noted    [facility] [J96.90] 09/12/2018         COPD w/exacerbation        Tracheobronchitis         HORTENSIA          Hypokalemia         Active       PLAN:  1. Pulmonary consult,continue present management.       Electronically signed by Yinka Jones MD on 9/13/2018 at 8:40 AM     Copy sent to Jermaine Kim MD

## 2018-09-13 NOTE — PROGRESS NOTES
calcitonin  · Labs: CBC and BMP in am  · Smoking cessation education  · DVT prophylaxis with low molecular weight heparin  · Will follow with you    Electronically signed by     GONZÁLEZ Gilman CNP on 9/13/2018 at 11:08 AM  Patient was seen under the supervision of Dr. Juan Jose Stein and Sleep Medicine,    Patient seen and evaluated by me. Patient had uneventful night. He was on BiPAP last night. This morning he claims his shortness of breath is not much better. He still has occasional cough. Denies any chest pain. Lung exam reveals a increased air exchange bilaterally. I do not hear any wheezing at this time. Plan is IV Solu-Medrol, DuoNeb, Dulera. Continue BiPAP while asleep. No objection to move patient to progressive unit from pulmonary standpoint. Above was reviewed and discussed with Ha Pink CNP. And I agree with assessment and plan of care.   Electronically signed by     Darien Silverio MD on 9/13/2018 at 11:29 AM  Pulmonary Critical Care and Sleep Medicine,  [facility]  Cell: [phone]  Office: [phone]

## 2018-09-13 NOTE — FLOWSHEET NOTE
Patient sitting up in bed and eating breakfast. He engages briefly, and expresses no needs. Writer encourages patient o have staff page Spiritual Care, should he wish to speak with a . Patient expresses understanding and thanks.      09/13/18 0919   Encounter Summary   Services provided to: Patient   Referral/Consult From: Trevor   Continue Visiting (9/13/18)   Complexity of Encounter Low   Length of Encounter 15 minutes   Spiritual Assessment Completed Yes   Routine   Type Initial   Assessment Approachable   Intervention Active listening   Outcome Engaged in conversation

## 2018-09-14 LAB
ABSOLUTE EOS #: 0 K/UL (ref 0–0.4)
ABSOLUTE IMMATURE GRANULOCYTE: ABNORMAL K/UL (ref 0–0.3)
ABSOLUTE LYMPH #: 1.3 K/UL (ref 1–4.8)
ABSOLUTE MONO #: 0.32 K/UL (ref 0.2–0.8)
ANION GAP SERPL CALCULATED.3IONS-SCNC: 13 MMOL/L (ref 9–17)
BASOPHILS # BLD: 0 %
BASOPHILS ABSOLUTE: 0 K/UL (ref 0–0.2)
BUN BLDV-MCNC: 23 MG/DL (ref 6–20)
BUN/CREAT BLD: 24 (ref 9–20)
CALCIUM SERPL-MCNC: 9.4 MG/DL (ref 8.6–10.4)
CHLORIDE BLD-SCNC: 101 MMOL/L (ref 98–107)
CO2: 25 MMOL/L (ref 20–31)
CREAT SERPL-MCNC: 0.95 MG/DL (ref 0.7–1.2)
DIFFERENTIAL TYPE: ABNORMAL
EOSINOPHILS RELATIVE PERCENT: 0 % (ref 1–4)
GFR AFRICAN AMERICAN: >60 ML/MIN
GFR NON-AFRICAN AMERICAN: >60 ML/MIN
GFR SERPL CREATININE-BSD FRML MDRD: ABNORMAL ML/MIN/{1.73_M2}
GFR SERPL CREATININE-BSD FRML MDRD: ABNORMAL ML/MIN/{1.73_M2}
GLUCOSE BLD-MCNC: 143 MG/DL (ref 70–99)
HCT VFR BLD CALC: 40.4 % (ref 41–53)
HEMOGLOBIN: 13.9 G/DL (ref 13.5–17.5)
IMMATURE GRANULOCYTES: ABNORMAL %
LYMPHOCYTES # BLD: 8 % (ref 24–44)
MCH RBC QN AUTO: 31.5 PG (ref 26–34)
MCHC RBC AUTO-ENTMCNC: 34.3 G/DL (ref 31–37)
MCV RBC AUTO: 91.9 FL (ref 80–100)
MONOCYTES # BLD: 2 % (ref 1–7)
NRBC AUTOMATED: ABNORMAL PER 100 WBC
PDW BLD-RTO: 13.4 % (ref 11.5–14.5)
PLATELET # BLD: 348 K/UL (ref 130–400)
PLATELET ESTIMATE: ABNORMAL
PMV BLD AUTO: ABNORMAL FL (ref 6–12)
POTASSIUM SERPL-SCNC: 4.2 MMOL/L (ref 3.7–5.3)
RBC # BLD: 4.4 M/UL (ref 4.5–5.9)
RBC # BLD: ABNORMAL 10*6/UL
SEG NEUTROPHILS: 90 % (ref 36–66)
SEGMENTED NEUTROPHILS ABSOLUTE COUNT: 14.58 K/UL (ref 1.8–7.7)
SODIUM BLD-SCNC: 139 MMOL/L (ref 135–144)
WBC # BLD: 16.2 K/UL (ref 3.5–11)
WBC # BLD: ABNORMAL 10*3/UL

## 2018-09-14 PROCEDURE — 85025 COMPLETE CBC W/AUTO DIFF WBC: CPT

## 2018-09-14 PROCEDURE — 6360000002 HC RX W HCPCS: Performed by: FAMILY MEDICINE

## 2018-09-14 PROCEDURE — 2580000003 HC RX 258: Performed by: FAMILY MEDICINE

## 2018-09-14 PROCEDURE — 94660 CPAP INITIATION&MGMT: CPT

## 2018-09-14 PROCEDURE — 6360000002 HC RX W HCPCS: Performed by: INTERNAL MEDICINE

## 2018-09-14 PROCEDURE — 6370000000 HC RX 637 (ALT 250 FOR IP): Performed by: FAMILY MEDICINE

## 2018-09-14 PROCEDURE — 80048 BASIC METABOLIC PNL TOTAL CA: CPT

## 2018-09-14 PROCEDURE — 2700000000 HC OXYGEN THERAPY PER DAY

## 2018-09-14 PROCEDURE — 94640 AIRWAY INHALATION TREATMENT: CPT

## 2018-09-14 PROCEDURE — 6370000000 HC RX 637 (ALT 250 FOR IP): Performed by: NURSE PRACTITIONER

## 2018-09-14 PROCEDURE — 6370000000 HC RX 637 (ALT 250 FOR IP): Performed by: INTERNAL MEDICINE

## 2018-09-14 PROCEDURE — 94760 N-INVAS EAR/PLS OXIMETRY 1: CPT

## 2018-09-14 PROCEDURE — 2060000000 HC ICU INTERMEDIATE R&B

## 2018-09-14 PROCEDURE — 36415 COLL VENOUS BLD VENIPUNCTURE: CPT

## 2018-09-14 RX ORDER — DEXTROMETHORPHAN POLISTIREX 30 MG/5ML
60 SUSPENSION ORAL EVERY 12 HOURS SCHEDULED
Status: DISCONTINUED | OUTPATIENT
Start: 2018-09-14 | End: 2018-09-21 | Stop reason: HOSPADM

## 2018-09-14 RX ORDER — NICOTINE 21 MG/24HR
1 PATCH, TRANSDERMAL 24 HOURS TRANSDERMAL DAILY
Status: DISCONTINUED | OUTPATIENT
Start: 2018-09-14 | End: 2018-09-21 | Stop reason: HOSPADM

## 2018-09-14 RX ORDER — METHYLPREDNISOLONE SODIUM SUCCINATE 40 MG/ML
40 INJECTION, POWDER, LYOPHILIZED, FOR SOLUTION INTRAMUSCULAR; INTRAVENOUS EVERY 8 HOURS
Status: DISCONTINUED | OUTPATIENT
Start: 2018-09-14 | End: 2018-09-17

## 2018-09-14 RX ADMIN — BENZONATATE 200 MG: 100 CAPSULE ORAL at 02:15

## 2018-09-14 RX ADMIN — METOPROLOL SUCCINATE 50 MG: 50 TABLET, FILM COATED, EXTENDED RELEASE ORAL at 08:01

## 2018-09-14 RX ADMIN — HYDROCHLOROTHIAZIDE 25 MG: 25 TABLET ORAL at 08:01

## 2018-09-14 RX ADMIN — Medication 10 ML: at 20:47

## 2018-09-14 RX ADMIN — GUAIFENESIN 600 MG: 600 TABLET, EXTENDED RELEASE ORAL at 20:47

## 2018-09-14 RX ADMIN — IPRATROPIUM BROMIDE AND ALBUTEROL SULFATE 1 AMPULE: .5; 3 SOLUTION RESPIRATORY (INHALATION) at 15:56

## 2018-09-14 RX ADMIN — HYDRALAZINE HYDROCHLORIDE 50 MG: 50 TABLET, FILM COATED ORAL at 08:01

## 2018-09-14 RX ADMIN — FOLIC ACID 1 MG: 1 TABLET ORAL at 08:01

## 2018-09-14 RX ADMIN — ENOXAPARIN SODIUM 40 MG: 40 INJECTION SUBCUTANEOUS at 08:02

## 2018-09-14 RX ADMIN — Medication 100 MG: at 08:01

## 2018-09-14 RX ADMIN — Medication 60 MG: at 08:00

## 2018-09-14 RX ADMIN — AZITHROMYCIN MONOHYDRATE 250 MG: 250 TABLET ORAL at 08:01

## 2018-09-14 RX ADMIN — METHYLPREDNISOLONE SODIUM SUCCINATE 40 MG: 40 INJECTION, POWDER, FOR SOLUTION INTRAMUSCULAR; INTRAVENOUS at 17:59

## 2018-09-14 RX ADMIN — IPRATROPIUM BROMIDE AND ALBUTEROL SULFATE 1 AMPULE: .5; 3 SOLUTION RESPIRATORY (INHALATION) at 19:25

## 2018-09-14 RX ADMIN — Medication 10 ML: at 10:01

## 2018-09-14 RX ADMIN — BENZONATATE 200 MG: 100 CAPSULE ORAL at 16:31

## 2018-09-14 RX ADMIN — METHYLPREDNISOLONE SODIUM SUCCINATE 60 MG: 125 INJECTION, POWDER, FOR SOLUTION INTRAMUSCULAR; INTRAVENOUS at 09:59

## 2018-09-14 RX ADMIN — MOMETASONE FUROATE AND FORMOTEROL FUMARATE DIHYDRATE 2 PUFF: 200; 5 AEROSOL RESPIRATORY (INHALATION) at 07:50

## 2018-09-14 RX ADMIN — QUETIAPINE FUMARATE 200 MG: 200 TABLET ORAL at 20:47

## 2018-09-14 RX ADMIN — LOSARTAN POTASSIUM 100 MG: 100 TABLET, FILM COATED ORAL at 08:01

## 2018-09-14 RX ADMIN — IPRATROPIUM BROMIDE AND ALBUTEROL SULFATE 1 AMPULE: .5; 3 SOLUTION RESPIRATORY (INHALATION) at 11:30

## 2018-09-14 RX ADMIN — MOMETASONE FUROATE AND FORMOTEROL FUMARATE DIHYDRATE 2 PUFF: 200; 5 AEROSOL RESPIRATORY (INHALATION) at 19:25

## 2018-09-14 RX ADMIN — IPRATROPIUM BROMIDE AND ALBUTEROL SULFATE 1 AMPULE: .5; 3 SOLUTION RESPIRATORY (INHALATION) at 07:50

## 2018-09-14 RX ADMIN — LURASIDONE HYDROCHLORIDE 120 MG: 40 TABLET, FILM COATED ORAL at 20:47

## 2018-09-14 RX ADMIN — GUAIFENESIN 600 MG: 600 TABLET, EXTENDED RELEASE ORAL at 08:01

## 2018-09-14 RX ADMIN — Medication 60 MG: at 21:00

## 2018-09-14 RX ADMIN — LEVETIRACETAM 500 MG: 500 TABLET ORAL at 08:01

## 2018-09-14 RX ADMIN — METHYLPREDNISOLONE SODIUM SUCCINATE 60 MG: 125 INJECTION, POWDER, FOR SOLUTION INTRAMUSCULAR; INTRAVENOUS at 05:10

## 2018-09-14 RX ADMIN — BENZONATATE 200 MG: 100 CAPSULE ORAL at 09:59

## 2018-09-14 NOTE — PROGRESS NOTES
Pulmonary Critical Care Progress Note  Ángel Calero M.D. Patient seen for the follow up of Acute respiratory failure, COPD exacerbation, acute tracheobronchitis, active smoking    Subjective:  He does not note much improvement in his shortness of breath, But looks comfortable. He continues to have a frequent cough, nonproductive. He denies significant chest pain at this time. He is not compliant with BiPAP. He is tolerating orals fairly well. Examination:  Vitals: /71   Pulse 102   Temp 98.2 °F (36.8 °C) (Oral)   Resp 20   Ht 5' 5\" (1.651 m)   Wt 183 lb (83 kg)   SpO2 90%   BMI 30.45 kg/m²     General appearance: alert and cooperative with exam  Neck: No JVD  Lungs: Decreased air exchange + wheezing  Heart: regular rate and rhythm, S1, S2 normal, no gallop  Abdomen: Soft, non tender, + BS  Extremities: no cyanosis or clubbing.  No significant edema    LABs:  CBC:   Recent Labs      09/13/18   0438  09/14/18   0432   WBC  11.7*  16.2*   HGB  14.8  13.9   HCT  44.0  40.4*   PLT  343  348     BMP:   Recent Labs      09/13/18   0438  09/14/18   0432   NA  137  139   K  4.5  4.2   CO2  23  25   BUN  15  23*   CREATININE  1.13  0.95   LABGLOM  >60  >60   GLUCOSE  144*  143*   Pro calcitonin: 0.12  Echo EF: 55%     Radiology:  9/13/18      Impression:  · Acute respiratory failure  · Acute exacerbation of COPD  · Acute Tracheo-bronchitis  · Active smoking, 30-pack-year smoking history  · Obesity/Suspected Obstructive sleep apnea  · History of alcohol abuse, quit drinking 2 years ago  · HTN/HLD/CAD/depression    Recommendations:  · 2 liters/min via nasal cannula  · Home O2 evaluation prior to discharge  · Continue Zithromax  · Decrease IV solu-medrol 40 mg every 8 hours  · Albuterol and Ipratropium Q 4 hours and prn  · Dulera 200  · Mucinex  · Labs: CBC and BMP in am  · Smoking cessation education  · Sleep studies as an outpatient  · Increase ambulation  · Patient encouraged to increase use of

## 2018-09-14 NOTE — PROGRESS NOTES
TROPONINI, CKTOTAL, CKMB, CKMBINDEX, RF, PAIGE in the last 72 hours. Plan:same management.     Electronically signed by José Miguel Thibodeaux MD on 9/14/2018 at 9:58 AM

## 2018-09-15 ENCOUNTER — APPOINTMENT (OUTPATIENT)
Dept: GENERAL RADIOLOGY | Age: 53
DRG: 140 | End: 2018-09-15
Payer: MEDICARE

## 2018-09-15 LAB
ABSOLUTE EOS #: 0 K/UL (ref 0–0.4)
ABSOLUTE IMMATURE GRANULOCYTE: ABNORMAL K/UL (ref 0–0.3)
ABSOLUTE LYMPH #: 1.6 K/UL (ref 1–4.8)
ABSOLUTE MONO #: 1.2 K/UL (ref 0.2–0.8)
ANION GAP SERPL CALCULATED.3IONS-SCNC: 14 MMOL/L (ref 9–17)
BASOPHILS # BLD: 0 % (ref 0–2)
BASOPHILS ABSOLUTE: 0 K/UL (ref 0–0.2)
BUN BLDV-MCNC: 28 MG/DL (ref 6–20)
BUN/CREAT BLD: 25 (ref 9–20)
CALCIUM SERPL-MCNC: 9.8 MG/DL (ref 8.6–10.4)
CHLORIDE BLD-SCNC: 103 MMOL/L (ref 98–107)
CO2: 26 MMOL/L (ref 20–31)
CREAT SERPL-MCNC: 1.12 MG/DL (ref 0.7–1.2)
DIFFERENTIAL TYPE: ABNORMAL
EOSINOPHILS RELATIVE PERCENT: 0 % (ref 1–4)
GFR AFRICAN AMERICAN: >60 ML/MIN
GFR NON-AFRICAN AMERICAN: >60 ML/MIN
GFR SERPL CREATININE-BSD FRML MDRD: ABNORMAL ML/MIN/{1.73_M2}
GFR SERPL CREATININE-BSD FRML MDRD: ABNORMAL ML/MIN/{1.73_M2}
GLUCOSE BLD-MCNC: 126 MG/DL (ref 70–99)
HCT VFR BLD CALC: 45.5 % (ref 41–53)
HEMOGLOBIN: 15 G/DL (ref 13.5–17.5)
IMMATURE GRANULOCYTES: ABNORMAL %
LYMPHOCYTES # BLD: 7 % (ref 24–44)
MCH RBC QN AUTO: 30.6 PG (ref 26–34)
MCHC RBC AUTO-ENTMCNC: 33 G/DL (ref 31–37)
MCV RBC AUTO: 92.7 FL (ref 80–100)
MONOCYTES # BLD: 6 % (ref 1–7)
NRBC AUTOMATED: ABNORMAL PER 100 WBC
PDW BLD-RTO: 14.1 % (ref 11.5–14.5)
PLATELET # BLD: 438 K/UL (ref 130–400)
PLATELET ESTIMATE: ABNORMAL
PMV BLD AUTO: 8.3 FL (ref 6–12)
POTASSIUM SERPL-SCNC: 4.3 MMOL/L (ref 3.7–5.3)
RBC # BLD: 4.91 M/UL (ref 4.5–5.9)
RBC # BLD: ABNORMAL 10*6/UL
SEG NEUTROPHILS: 87 % (ref 36–66)
SEGMENTED NEUTROPHILS ABSOLUTE COUNT: 18.3 K/UL (ref 1.8–7.7)
SODIUM BLD-SCNC: 143 MMOL/L (ref 135–144)
WBC # BLD: 21.1 K/UL (ref 3.5–11)
WBC # BLD: ABNORMAL 10*3/UL

## 2018-09-15 PROCEDURE — 94660 CPAP INITIATION&MGMT: CPT

## 2018-09-15 PROCEDURE — 6360000002 HC RX W HCPCS: Performed by: FAMILY MEDICINE

## 2018-09-15 PROCEDURE — 6370000000 HC RX 637 (ALT 250 FOR IP): Performed by: INTERNAL MEDICINE

## 2018-09-15 PROCEDURE — 6370000000 HC RX 637 (ALT 250 FOR IP): Performed by: NURSE PRACTITIONER

## 2018-09-15 PROCEDURE — 2060000000 HC ICU INTERMEDIATE R&B

## 2018-09-15 PROCEDURE — 6360000002 HC RX W HCPCS: Performed by: INTERNAL MEDICINE

## 2018-09-15 PROCEDURE — 2700000000 HC OXYGEN THERAPY PER DAY

## 2018-09-15 PROCEDURE — 36415 COLL VENOUS BLD VENIPUNCTURE: CPT

## 2018-09-15 PROCEDURE — 94760 N-INVAS EAR/PLS OXIMETRY 1: CPT

## 2018-09-15 PROCEDURE — 94664 DEMO&/EVAL PT USE INHALER: CPT

## 2018-09-15 PROCEDURE — 85025 COMPLETE CBC W/AUTO DIFF WBC: CPT

## 2018-09-15 PROCEDURE — 6370000000 HC RX 637 (ALT 250 FOR IP): Performed by: FAMILY MEDICINE

## 2018-09-15 PROCEDURE — 94640 AIRWAY INHALATION TREATMENT: CPT

## 2018-09-15 PROCEDURE — 80048 BASIC METABOLIC PNL TOTAL CA: CPT

## 2018-09-15 PROCEDURE — 2580000003 HC RX 258: Performed by: FAMILY MEDICINE

## 2018-09-15 PROCEDURE — 71046 X-RAY EXAM CHEST 2 VIEWS: CPT

## 2018-09-15 RX ORDER — FORMOTEROL FUMARATE 20 UG/2ML
20 SOLUTION RESPIRATORY (INHALATION) 2 TIMES DAILY
Status: DISCONTINUED | OUTPATIENT
Start: 2018-09-15 | End: 2018-09-18

## 2018-09-15 RX ORDER — BUDESONIDE 0.5 MG/2ML
0.5 INHALANT ORAL 2 TIMES DAILY
Status: DISCONTINUED | OUTPATIENT
Start: 2018-09-15 | End: 2018-09-18

## 2018-09-15 RX ADMIN — LOSARTAN POTASSIUM 100 MG: 100 TABLET, FILM COATED ORAL at 08:09

## 2018-09-15 RX ADMIN — BUDESONIDE 500 MCG: 0.5 SUSPENSION RESPIRATORY (INHALATION) at 19:27

## 2018-09-15 RX ADMIN — METHYLPREDNISOLONE SODIUM SUCCINATE 40 MG: 40 INJECTION, POWDER, FOR SOLUTION INTRAMUSCULAR; INTRAVENOUS at 02:41

## 2018-09-15 RX ADMIN — METHYLPREDNISOLONE SODIUM SUCCINATE 40 MG: 40 INJECTION, POWDER, FOR SOLUTION INTRAMUSCULAR; INTRAVENOUS at 19:40

## 2018-09-15 RX ADMIN — QUETIAPINE FUMARATE 200 MG: 200 TABLET ORAL at 21:43

## 2018-09-15 RX ADMIN — IPRATROPIUM BROMIDE AND ALBUTEROL SULFATE 1 AMPULE: .5; 3 SOLUTION RESPIRATORY (INHALATION) at 19:27

## 2018-09-15 RX ADMIN — Medication 10 ML: at 22:33

## 2018-09-15 RX ADMIN — FORMOTEROL FUMARATE DIHYDRATE 20 MCG: 20 SOLUTION RESPIRATORY (INHALATION) at 19:27

## 2018-09-15 RX ADMIN — Medication 10 ML: at 02:41

## 2018-09-15 RX ADMIN — HYDRALAZINE HYDROCHLORIDE 50 MG: 50 TABLET, FILM COATED ORAL at 08:09

## 2018-09-15 RX ADMIN — LEVETIRACETAM 500 MG: 500 TABLET ORAL at 08:09

## 2018-09-15 RX ADMIN — AZITHROMYCIN MONOHYDRATE 250 MG: 250 TABLET ORAL at 08:09

## 2018-09-15 RX ADMIN — ENOXAPARIN SODIUM 40 MG: 40 INJECTION SUBCUTANEOUS at 08:10

## 2018-09-15 RX ADMIN — LURASIDONE HYDROCHLORIDE 120 MG: 40 TABLET, FILM COATED ORAL at 21:43

## 2018-09-15 RX ADMIN — IPRATROPIUM BROMIDE AND ALBUTEROL SULFATE 1 AMPULE: .5; 3 SOLUTION RESPIRATORY (INHALATION) at 17:20

## 2018-09-15 RX ADMIN — HYDROCHLOROTHIAZIDE 25 MG: 25 TABLET ORAL at 08:09

## 2018-09-15 RX ADMIN — IPRATROPIUM BROMIDE AND ALBUTEROL SULFATE 1 AMPULE: .5; 3 SOLUTION RESPIRATORY (INHALATION) at 09:47

## 2018-09-15 RX ADMIN — Medication 60 MG: at 08:10

## 2018-09-15 RX ADMIN — METHYLPREDNISOLONE SODIUM SUCCINATE 40 MG: 40 INJECTION, POWDER, FOR SOLUTION INTRAMUSCULAR; INTRAVENOUS at 12:04

## 2018-09-15 RX ADMIN — MOMETASONE FUROATE AND FORMOTEROL FUMARATE DIHYDRATE 2 PUFF: 200; 5 AEROSOL RESPIRATORY (INHALATION) at 09:48

## 2018-09-15 RX ADMIN — Medication 10 ML: at 08:09

## 2018-09-15 RX ADMIN — METOPROLOL SUCCINATE 50 MG: 50 TABLET, FILM COATED, EXTENDED RELEASE ORAL at 08:09

## 2018-09-15 RX ADMIN — Medication 60 MG: at 21:48

## 2018-09-15 RX ADMIN — IPRATROPIUM BROMIDE AND ALBUTEROL SULFATE 1 AMPULE: .5; 3 SOLUTION RESPIRATORY (INHALATION) at 12:08

## 2018-09-15 RX ADMIN — GUAIFENESIN 600 MG: 600 TABLET, EXTENDED RELEASE ORAL at 08:10

## 2018-09-15 RX ADMIN — Medication 100 MG: at 08:09

## 2018-09-15 RX ADMIN — GUAIFENESIN 600 MG: 600 TABLET, EXTENDED RELEASE ORAL at 21:43

## 2018-09-15 RX ADMIN — FOLIC ACID 1 MG: 1 TABLET ORAL at 08:10

## 2018-09-15 ASSESSMENT — PAIN SCALES - GENERAL: PAINLEVEL_OUTOF10: 0

## 2018-09-15 NOTE — PROGRESS NOTES
Pulmonary Critical Care Progress Note       Patient seen for the follow up of Acute respiratory failure, COPD exacerbation, acute tracheobronchitis, active smoking    Subjective:    He still has shortness of breath He continues to have a frequent dry cough. He denies significant chest pain . He is not compliant with BiPAP. He is tolerating orals fairly well. Examination:  Vitals: /74   Pulse 92   Temp 97.9 °F (36.6 °C) (Oral)   Resp 20   Ht 5' 5\" (1.651 m)   Wt 183 lb (83 kg)   SpO2 91%   BMI 30.45 kg/m²     General appearance: alert and cooperative with exam  Neck: No JVD  Lungs: Decreased air exchange + wheezing  Heart: regular rate and rhythm, S1, S2 normal, no gallop  Abdomen: Soft, non tender, + BS  Extremities: no cyanosis or clubbing. No significant edema    LABs:  CBC:   Recent Labs      09/14/18   0432  09/15/18   0635   WBC  16.2*  21.1*   HGB  13.9  15.0   HCT  40.4*  45.5   PLT  348  438*     BMP:   Recent Labs      09/14/18   0432  09/15/18   0635   NA  139  143   K  4.2  4.3   CO2  25  26   BUN  23*  28*   CREATININE  0.95  1.12   LABGLOM  >60  >60   GLUCOSE  143*  126*   Pro calcitonin: 0.12  Echo EF: 55%     Radiology:    9/13/18 CXR  Cardiac and mediastinal shadows are normal.  No infiltrates.  No effusions. No pneumothorax.  No free air below the diaphragm.              Impression:    · Acute respiratory failure  · Acute exacerbation of COPD  · Acute Tracheo-bronchitis  · Active smoking, 30-pack-year smoking history  · Obesity/Suspected Obstructive sleep apnea  · History of alcohol abuse, quit drinking 2 years ago  · HTN/HLD/CAD/depression    Recommendations:    · O2 2  liters/min via nasal cannula  · Advised used BiPAP for sleep /as needed  · Home O2 evaluation prior to discharge  · Continue Zithromax  ·  IV solu-medrol 40 mg every 8 hours  · Albuterol and Ipratropium Q 4 hours and prn  · Stop Dulera 200  · Pulmicort and performed every 12 hours  · Repeat chest x-ray  · Mucinex  · Smoking cessation education  · Sleep studies as an outpatient  · Increase ambulation  · Weight loss  · DVT prophylaxis with low molecular weight heparin        Elham Sol MD on 9/15/2018 at 2:35 PM  Pulmonary Critical Care and Sleep Medicine,  [facility] [place]: [phone]

## 2018-09-15 NOTE — PROGRESS NOTES
Progress Notes        PCP: María Elena Rojas MD  9/15/2018  12:01 PM    Admitting Diagnosis:  COPD w / exacerbation    Problem List:  Active Hospital Problems    Diagnosis Date Noted    Respiratory failure ([facility] [J96.90] 09/12/2018         COPD w / exacerbation        Tracheobronchitis         HORTENSIA         Tobacco dependency     Subjective: slowly improving,less cough.   Allergies:  No Known Allergies    Current Medications:    dextromethorphan (DELSYM) 30 MG/5ML extended release liquid 60 mg 2 times per day   methylPREDNISolone sodium (SOLU-MEDROL) injection 40 mg Q8H   nicotine (NICODERM CQ) 14 MG/24HR 1 patch Daily   guaiFENesin (MUCINEX) extended release tablet 600 mg BID   benzonatate (TESSALON) capsule 960 mg TID PRN   folic acid (FOLVITE) tablet 1 mg Daily   losartan (COZAAR) tablet 100 mg Daily   And    hydrochlorothiazide (HYDRODIURIL) tablet 25 mg Daily   metoprolol succinate (TOPROL XL) extended release tablet 50 mg Daily   vitamin B-1 (THIAMINE) tablet 100 mg Daily   QUEtiapine (SEROQUEL) tablet 200 mg Nightly   hydrALAZINE (APRESOLINE) tablet 50 mg Daily   lurasidone (LATUDA) tablet 120 mg Nightly   levETIRAcetam (KEPPRA) tablet 500 mg Daily   Fixodent Denture Adhesive Cream PRN   sodium chloride flush 0.9 % injection 10 mL 2 times per day   sodium chloride flush 0.9 % injection 10 mL PRN   magnesium hydroxide (MILK OF MAGNESIA) 400 MG/5ML suspension 30 mL Daily PRN   enoxaparin (LOVENOX) injection 40 mg Daily   ipratropium-albuterol (DUONEB) nebulizer solution 1 ampule Q4H WA   ondansetron (ZOFRAN-ODT) disintegrating tablet 4 mg Q6H PRN   Or    ondansetron (ZOFRAN) injection 4 mg Q6H PRN   potassium chloride (KLOR-CON M) extended release tablet 40 mEq PRN   Or    potassium chloride 20 MEQ/15ML (10%) oral solution 40 mEq PRN   Or    potassium chloride 10 mEq/100 mL IVPB (Peripheral Line) PRN   mometasone-formoterol (DULERA) 200-5 MCG/ACT inhaler 2 puff BID   azithromycin (ZITHROMAX) tablet 250 mg Daily Physical Examination:  /74   Pulse 92   Temp 97.9 °F (36.6 °C) (Oral)   Resp 20   Ht 5' 5\" (1.651 m)   Wt 183 lb (83 kg)   SpO2 91%   BMI 30.45 kg/m²     General appearance - alert, well appearing, and in no distress  Mental status - alert, oriented to person, place, and time  Eyes - pupils equal and reactive, extraocular eye movements intact  Nose - normal and patent, no erythema, discharge or polyps  Mouth - mucous membranes moist, pharynx normal without lesions  Neck - supple, no significant adenopathy  Lymphatics - no palpable lymphadenopathy, no hepatosplenomegaly  Chest - clear to auscultation, no wheezes, rales or rhonchi, symmetric air entry  Heart - normal rate, regular rhythm, normal S1, S2, no murmurs, rubs, clicks or gallops  Abdomen - soft, nontender, nondistended, no masses or organomegaly  Back exam - full range of motion, no tenderness, palpable spasm or pain on motion  Neurological - alert, oriented, normal speech, no focal findings or movement disorder noted  Musculoskeletal - no joint tenderness, deformity or swelling  Extremities - peripheral pulses normal, no pedal edema, no clubbing or cyanosis  Skin - normal coloration and turgor, no rashes, no suspicious skin lesions noted   Gu - deferred  Rectal - deferred    Labs:  Hematology:  Recent Labs      09/13/18   0438  09/14/18   0432  09/15/18   0635   WBC  11.7*  16.2*  21.1*   HGB  14.8  13.9  15.0   HCT  44.0  40.4*  45.5   PLT  343  348  438*     Chemistry:  Recent Labs      09/13/18 0438 09/14/18 0432  09/15/18   0635   NA  137  139  143   K  4.5  4.2  4.3   CL  99  101  103   CO2  23  25  26   GLUCOSE  144*  143*  126*   BUN  15  23*  28*   CREATININE  1.13  0.95  1.12   CALCIUM  9.5  9.4  9.8     No results for input(s): PROT, LABALBU, LABA1C, I9JXRTN, F4FEUKV, FT4, TSH, AST, ALT, LDH, GGT, ALKPHOS, BILITOT, BILIDIR, AMMONIA, AMYLASE, LIPASE, LACTATE, CHOL, HDL, LDLCHOLESTEROL, CHOLHDLRATIO, TRIG, VLDL, BNP, TROPONINI, CKTOTAL, CKMB, CKMBINDEX, RF, PAIGE in the last 72 hours.       Plan:same management for now      Electronically signed by Pricilla Payan MD on 9/15/2018 at 12:01 PM

## 2018-09-15 NOTE — PLAN OF CARE
Problem: Falls - Risk of:  Goal: Will remain free from falls  Will remain free from falls   Outcome: Ongoing  Pt fall risk, fall band present, falling star, safety alarm activated and in use as needed. Hourly rounding performed. Pt encouraged to use call light. See Piotr Notice fall risk assessment. Goal: Absence of physical injury  Absence of physical injury   Outcome: Ongoing      Problem:  Activity Intolerance:  Goal: Ability to tolerate increased activity will improve  Ability to tolerate increased activity will improve   Outcome: Ongoing      Problem: Breathing Pattern - Ineffective:  Goal: Ability to achieve and maintain a regular respiratory rate will improve  Ability to achieve and maintain a regular respiratory rate will improve   Outcome: Ongoing

## 2018-09-16 PROCEDURE — 6370000000 HC RX 637 (ALT 250 FOR IP): Performed by: FAMILY MEDICINE

## 2018-09-16 PROCEDURE — 2700000000 HC OXYGEN THERAPY PER DAY

## 2018-09-16 PROCEDURE — 2580000003 HC RX 258: Performed by: FAMILY MEDICINE

## 2018-09-16 PROCEDURE — 6370000000 HC RX 637 (ALT 250 FOR IP): Performed by: NURSE PRACTITIONER

## 2018-09-16 PROCEDURE — 2060000000 HC ICU INTERMEDIATE R&B

## 2018-09-16 PROCEDURE — 6370000000 HC RX 637 (ALT 250 FOR IP): Performed by: INTERNAL MEDICINE

## 2018-09-16 PROCEDURE — 6360000002 HC RX W HCPCS: Performed by: FAMILY MEDICINE

## 2018-09-16 PROCEDURE — 94760 N-INVAS EAR/PLS OXIMETRY 1: CPT

## 2018-09-16 PROCEDURE — 6360000002 HC RX W HCPCS: Performed by: INTERNAL MEDICINE

## 2018-09-16 PROCEDURE — 94640 AIRWAY INHALATION TREATMENT: CPT

## 2018-09-16 RX ADMIN — FORMOTEROL FUMARATE DIHYDRATE 20 MCG: 20 SOLUTION RESPIRATORY (INHALATION) at 07:34

## 2018-09-16 RX ADMIN — IPRATROPIUM BROMIDE AND ALBUTEROL SULFATE 1 AMPULE: .5; 3 SOLUTION RESPIRATORY (INHALATION) at 07:34

## 2018-09-16 RX ADMIN — LEVETIRACETAM 500 MG: 500 TABLET ORAL at 08:17

## 2018-09-16 RX ADMIN — AZITHROMYCIN MONOHYDRATE 250 MG: 250 TABLET ORAL at 08:17

## 2018-09-16 RX ADMIN — ENOXAPARIN SODIUM 40 MG: 40 INJECTION SUBCUTANEOUS at 08:16

## 2018-09-16 RX ADMIN — FOLIC ACID 1 MG: 1 TABLET ORAL at 08:17

## 2018-09-16 RX ADMIN — LURASIDONE HYDROCHLORIDE 120 MG: 40 TABLET, FILM COATED ORAL at 20:21

## 2018-09-16 RX ADMIN — Medication 10 ML: at 08:16

## 2018-09-16 RX ADMIN — FORMOTEROL FUMARATE DIHYDRATE 20 MCG: 20 SOLUTION RESPIRATORY (INHALATION) at 19:40

## 2018-09-16 RX ADMIN — METHYLPREDNISOLONE SODIUM SUCCINATE 40 MG: 40 INJECTION, POWDER, FOR SOLUTION INTRAMUSCULAR; INTRAVENOUS at 08:17

## 2018-09-16 RX ADMIN — Medication 60 MG: at 20:40

## 2018-09-16 RX ADMIN — GUAIFENESIN 600 MG: 600 TABLET, EXTENDED RELEASE ORAL at 20:21

## 2018-09-16 RX ADMIN — IPRATROPIUM BROMIDE AND ALBUTEROL SULFATE 1 AMPULE: .5; 3 SOLUTION RESPIRATORY (INHALATION) at 15:11

## 2018-09-16 RX ADMIN — HYDROCHLOROTHIAZIDE 25 MG: 25 TABLET ORAL at 08:17

## 2018-09-16 RX ADMIN — QUETIAPINE FUMARATE 200 MG: 200 TABLET ORAL at 20:21

## 2018-09-16 RX ADMIN — HYDRALAZINE HYDROCHLORIDE 50 MG: 50 TABLET, FILM COATED ORAL at 08:17

## 2018-09-16 RX ADMIN — METOPROLOL SUCCINATE 50 MG: 50 TABLET, FILM COATED, EXTENDED RELEASE ORAL at 08:17

## 2018-09-16 RX ADMIN — IPRATROPIUM BROMIDE AND ALBUTEROL SULFATE 1 AMPULE: .5; 3 SOLUTION RESPIRATORY (INHALATION) at 11:38

## 2018-09-16 RX ADMIN — IPRATROPIUM BROMIDE AND ALBUTEROL SULFATE 1 AMPULE: .5; 3 SOLUTION RESPIRATORY (INHALATION) at 19:40

## 2018-09-16 RX ADMIN — Medication 60 MG: at 08:16

## 2018-09-16 RX ADMIN — LOSARTAN POTASSIUM 100 MG: 100 TABLET, FILM COATED ORAL at 08:17

## 2018-09-16 RX ADMIN — METHYLPREDNISOLONE SODIUM SUCCINATE 40 MG: 40 INJECTION, POWDER, FOR SOLUTION INTRAMUSCULAR; INTRAVENOUS at 02:32

## 2018-09-16 RX ADMIN — BUDESONIDE 500 MCG: 0.5 SUSPENSION RESPIRATORY (INHALATION) at 07:34

## 2018-09-16 RX ADMIN — BUDESONIDE 500 MCG: 0.5 SUSPENSION RESPIRATORY (INHALATION) at 19:40

## 2018-09-16 RX ADMIN — Medication 10 ML: at 20:43

## 2018-09-16 RX ADMIN — GUAIFENESIN 600 MG: 600 TABLET, EXTENDED RELEASE ORAL at 08:17

## 2018-09-16 RX ADMIN — Medication 100 MG: at 08:17

## 2018-09-16 RX ADMIN — METHYLPREDNISOLONE SODIUM SUCCINATE 40 MG: 40 INJECTION, POWDER, FOR SOLUTION INTRAMUSCULAR; INTRAVENOUS at 18:06

## 2018-09-16 ASSESSMENT — PAIN SCALES - GENERAL
PAINLEVEL_OUTOF10: 0
PAINLEVEL_OUTOF10: 0

## 2018-09-16 NOTE — PROGRESS NOTES
Progress Notes        PCP: Aleida Cho MD  9/16/2018  11:07 AM    Admitting Diagnosis:  COPD w / exacerbation    Problem List:  Active Hospital Problems    Diagnosis Date Noted    Respiratory failure ([facility] [address] [J96.90] 09/12/2018         COPD w / exacerbation        Tracheobronchitis         HORTENSIA         Tobacco dependency     Subjective: still c/o SOB w/trivial efforts.   Allergies:  No Known Allergies    Current Medications:    budesonide (PULMICORT) nebulizer suspension 500 mcg BID   formoterol (PERFOROMIST) nebulizer solution 20 mcg BID   dextromethorphan (DELSYM) 30 MG/5ML extended release liquid 60 mg 2 times per day   methylPREDNISolone sodium (SOLU-MEDROL) injection 40 mg Q8H   nicotine (NICODERM CQ) 14 MG/24HR 1 patch Daily   guaiFENesin (MUCINEX) extended release tablet 600 mg BID   benzonatate (TESSALON) capsule 015 mg TID PRN   folic acid (FOLVITE) tablet 1 mg Daily   losartan (COZAAR) tablet 100 mg Daily   And    hydrochlorothiazide (HYDRODIURIL) tablet 25 mg Daily   metoprolol succinate (TOPROL XL) extended release tablet 50 mg Daily   vitamin B-1 (THIAMINE) tablet 100 mg Daily   QUEtiapine (SEROQUEL) tablet 200 mg Nightly   hydrALAZINE (APRESOLINE) tablet 50 mg Daily   lurasidone (LATUDA) tablet 120 mg Nightly   levETIRAcetam (KEPPRA) tablet 500 mg Daily   Fixodent Denture Adhesive Cream PRN   sodium chloride flush 0.9 % injection 10 mL 2 times per day   sodium chloride flush 0.9 % injection 10 mL PRN   magnesium hydroxide (MILK OF MAGNESIA) 400 MG/5ML suspension 30 mL Daily PRN   enoxaparin (LOVENOX) injection 40 mg Daily   ipratropium-albuterol (DUONEB) nebulizer solution 1 ampule Q4H WA   ondansetron (ZOFRAN-ODT) disintegrating tablet 4 mg Q6H PRN   Or    ondansetron (ZOFRAN) injection 4 mg Q6H PRN   potassium chloride (KLOR-CON M) extended release tablet 40 mEq PRN   Or    potassium chloride 20 MEQ/15ML (10%) oral solution 40 mEq PRN   Or    potassium chloride 10 mEq/100 mL IVPB (Peripheral Line) PRN   azithromycin (ZITHROMAX) tablet 250 mg Daily       Physical Examination:  BP (!) 148/91   Pulse 81   Temp 97.5 °F (36.4 °C) (Oral)   Resp 20   Ht 5' 5\" (1.651 m)   Wt 183 lb (83 kg)   SpO2 92%   BMI 30.45 kg/m²     General appearance - alert, well appearing, and in no distress  Mental status - alert, oriented to person, place, and time  Eyes - pupils equal and reactive, extraocular eye movements intact  Nose - normal and patent, no erythema, discharge or polyps  Mouth - mucous membranes moist, pharynx normal without lesions  Neck - supple, no significant adenopathy  Lymphatics - no palpable lymphadenopathy, no hepatosplenomegaly  Chest - clear to auscultation, no wheezes, rales or rhonchi, symmetric air entry  Heart - normal rate, regular rhythm, normal S1, S2, no murmurs, rubs, clicks or gallops  Abdomen - soft, nontender, nondistended, no masses or organomegaly  Back exam - full range of motion, no tenderness, palpable spasm or pain on motion  Neurological - alert, oriented, normal speech, no focal findings or movement disorder noted  Musculoskeletal - no joint tenderness, deformity or swelling  Extremities - peripheral pulses normal, no pedal edema, no clubbing or cyanosis  Skin - normal coloration and turgor, no rashes, no suspicious skin lesions noted   Gu - deferred  Rectal - deferred    Labs:  Hematology:  Recent Labs      09/14/18   0432  09/15/18   0635   WBC  16.2*  21.1*   HGB  13.9  15.0   HCT  40.4*  45.5   PLT  348  438*     Chemistry:  Recent Labs      09/14/18   0432  09/15/18   0635   NA  139  143   K  4.2  4.3   CL  101  103   CO2  25  26   GLUCOSE  143*  126*   BUN  23*  28*   CREATININE  0.95  1.12   CALCIUM  9.4  9.8     No results for input(s): PROT, LABALBU, LABA1C, J5ESIAN, W1SDLHB, FT4, TSH, AST, ALT, LDH, GGT, ALKPHOS, BILITOT, BILIDIR, AMMONIA, AMYLASE, LIPASE, LACTATE, CHOL, HDL, LDLCHOLESTEROL, CHOLHDLRATIO, TRIG, VLDL, BNP, TROPONINI, CKTOTAL, CKMB, CKMBINDEX, RF, PAIGE in

## 2018-09-16 NOTE — PROGRESS NOTES
education  · Sleep studies as an outpatient  · Increase ambulation  · Weight loss  · DVT prophylaxis with low molecular weight heparin        Uziel Kennedy MD on 9/16/2018 at 11:47 AM  Pulmonary Critical Care and Sleep Medicine,  [facility] AT [place]: [phone]

## 2018-09-17 PROCEDURE — 94640 AIRWAY INHALATION TREATMENT: CPT

## 2018-09-17 PROCEDURE — 6370000000 HC RX 637 (ALT 250 FOR IP): Performed by: INTERNAL MEDICINE

## 2018-09-17 PROCEDURE — 2060000000 HC ICU INTERMEDIATE R&B

## 2018-09-17 PROCEDURE — 2580000003 HC RX 258: Performed by: FAMILY MEDICINE

## 2018-09-17 PROCEDURE — 6370000000 HC RX 637 (ALT 250 FOR IP): Performed by: FAMILY MEDICINE

## 2018-09-17 PROCEDURE — 6360000002 HC RX W HCPCS: Performed by: FAMILY MEDICINE

## 2018-09-17 PROCEDURE — 2700000000 HC OXYGEN THERAPY PER DAY

## 2018-09-17 PROCEDURE — 6360000002 HC RX W HCPCS: Performed by: INTERNAL MEDICINE

## 2018-09-17 PROCEDURE — G0008 ADMIN INFLUENZA VIRUS VAC: HCPCS | Performed by: FAMILY MEDICINE

## 2018-09-17 PROCEDURE — 6370000000 HC RX 637 (ALT 250 FOR IP): Performed by: NURSE PRACTITIONER

## 2018-09-17 PROCEDURE — 90686 IIV4 VACC NO PRSV 0.5 ML IM: CPT | Performed by: FAMILY MEDICINE

## 2018-09-17 RX ORDER — PREDNISONE 20 MG/1
40 TABLET ORAL DAILY
Status: DISCONTINUED | OUTPATIENT
Start: 2018-09-17 | End: 2018-09-20

## 2018-09-17 RX ADMIN — FORMOTEROL FUMARATE DIHYDRATE 20 MCG: 20 SOLUTION RESPIRATORY (INHALATION) at 06:16

## 2018-09-17 RX ADMIN — Medication 100 MG: at 10:51

## 2018-09-17 RX ADMIN — METHYLPREDNISOLONE SODIUM SUCCINATE 40 MG: 40 INJECTION, POWDER, FOR SOLUTION INTRAMUSCULAR; INTRAVENOUS at 03:33

## 2018-09-17 RX ADMIN — LURASIDONE HYDROCHLORIDE 120 MG: 40 TABLET, FILM COATED ORAL at 20:38

## 2018-09-17 RX ADMIN — FOLIC ACID 1 MG: 1 TABLET ORAL at 10:51

## 2018-09-17 RX ADMIN — Medication 10 ML: at 10:53

## 2018-09-17 RX ADMIN — IPRATROPIUM BROMIDE AND ALBUTEROL SULFATE 1 AMPULE: .5; 3 SOLUTION RESPIRATORY (INHALATION) at 19:29

## 2018-09-17 RX ADMIN — GUAIFENESIN 600 MG: 600 TABLET, EXTENDED RELEASE ORAL at 20:38

## 2018-09-17 RX ADMIN — AZITHROMYCIN MONOHYDRATE 250 MG: 250 TABLET ORAL at 10:51

## 2018-09-17 RX ADMIN — IPRATROPIUM BROMIDE AND ALBUTEROL SULFATE 1 AMPULE: .5; 3 SOLUTION RESPIRATORY (INHALATION) at 06:16

## 2018-09-17 RX ADMIN — LEVETIRACETAM 500 MG: 500 TABLET ORAL at 10:51

## 2018-09-17 RX ADMIN — QUETIAPINE FUMARATE 200 MG: 200 TABLET ORAL at 20:38

## 2018-09-17 RX ADMIN — IPRATROPIUM BROMIDE AND ALBUTEROL SULFATE 1 AMPULE: .5; 3 SOLUTION RESPIRATORY (INHALATION) at 10:10

## 2018-09-17 RX ADMIN — METOPROLOL SUCCINATE 50 MG: 50 TABLET, FILM COATED, EXTENDED RELEASE ORAL at 10:51

## 2018-09-17 RX ADMIN — INFLUENZA A VIRUS A/MICHIGAN/45/2015 X-275 (H1N1) ANTIGEN (FORMALDEHYDE INACTIVATED), INFLUENZA A VIRUS A/SINGAPORE/INFIMH-16-0019/2016 IVR-186 (H3N2) ANTIGEN (FORMALDEHYDE INACTIVATED), INFLUENZA B VIRUS B/PHUKET/3073/2013 ANTIGEN (FORMALDEHYDE INACTIVATED), AND INFLUENZA B VIRUS B/MARYLAND/15/2016 BX-69A ANTIGEN (FORMALDEHYDE INACTIVATED) 0.5 ML: 15; 15; 15; 15 INJECTION, SUSPENSION INTRAMUSCULAR at 10:52

## 2018-09-17 RX ADMIN — HYDRALAZINE HYDROCHLORIDE 50 MG: 50 TABLET, FILM COATED ORAL at 10:51

## 2018-09-17 RX ADMIN — HYDROCHLOROTHIAZIDE 25 MG: 25 TABLET ORAL at 10:51

## 2018-09-17 RX ADMIN — Medication 10 ML: at 20:40

## 2018-09-17 RX ADMIN — GUAIFENESIN 600 MG: 600 TABLET, EXTENDED RELEASE ORAL at 10:51

## 2018-09-17 RX ADMIN — BUDESONIDE 500 MCG: 0.5 SUSPENSION RESPIRATORY (INHALATION) at 19:29

## 2018-09-17 RX ADMIN — BUDESONIDE 500 MCG: 0.5 SUSPENSION RESPIRATORY (INHALATION) at 06:16

## 2018-09-17 RX ADMIN — METHYLPREDNISOLONE SODIUM SUCCINATE 40 MG: 40 INJECTION, POWDER, FOR SOLUTION INTRAMUSCULAR; INTRAVENOUS at 10:53

## 2018-09-17 RX ADMIN — LOSARTAN POTASSIUM 100 MG: 100 TABLET, FILM COATED ORAL at 10:51

## 2018-09-17 RX ADMIN — IPRATROPIUM BROMIDE AND ALBUTEROL SULFATE 1 AMPULE: .5; 3 SOLUTION RESPIRATORY (INHALATION) at 15:04

## 2018-09-17 RX ADMIN — PREDNISONE 40 MG: 20 TABLET ORAL at 18:00

## 2018-09-17 RX ADMIN — ENOXAPARIN SODIUM 40 MG: 40 INJECTION SUBCUTANEOUS at 10:51

## 2018-09-17 RX ADMIN — FORMOTEROL FUMARATE DIHYDRATE 20 MCG: 20 SOLUTION RESPIRATORY (INHALATION) at 19:29

## 2018-09-17 RX ADMIN — Medication 60 MG: at 20:38

## 2018-09-17 ASSESSMENT — PAIN SCALES - GENERAL
PAINLEVEL_OUTOF10: 0
PAINLEVEL_OUTOF10: 0

## 2018-09-17 NOTE — PROGRESS NOTES
low molecular weight heparin        Mary Rodriguez MD on 9/17/2018 at 5:28 PM  Pulmonary Critical Care and Sleep Medicine,  [facility] AT [place]: [phone]

## 2018-09-17 NOTE — PLAN OF CARE
Problem: Falls - Risk of:  Goal: Absence of physical injury  Absence of physical injury   Outcome: Ongoing  Pt assessed for fall risk. Call bell within reach. Non skid socks on. Clutter removed . Bed level lowered . Bed rail used 2/4. Room lights and bed side table near the bed. Will continue to monitor     Problem: Activity Intolerance:  Goal: Ability to tolerate increased activity will improve  Ability to tolerate increased activity will improve   Outcome: Ongoing  Pt  Tolerance to activity will improve , pt receiving medications on time as prescribed. Pt pO2 sat checked and pt encouraged to get out of bed . Will continue to  Encourage an d monitor pt.

## 2018-09-17 NOTE — PROGRESS NOTES
Progress Notes        PCP: Jermaine Kim MD  9/17/2018  10:15 AM    Admitting Diagnosis:  COPD w / exacerbation    Problem List:  Active Hospital Problems    Diagnosis Date Noted    [facility] [J96.90] 09/12/2018         COPD w / exacerbation        Tracheobronchitis         HORTENSIA         Tobacco dependency     Subjective: still c/o SOB w/trivial efforts.   Allergies:  No Known Allergies    Current Medications:    influenza quadrivalent split vaccine (FLUZONE;FLUARIX;FLULAVAL;AFLURIA) injection 0.5 mL Once   budesonide (PULMICORT) nebulizer suspension 500 mcg BID   formoterol (PERFOROMIST) nebulizer solution 20 mcg BID   dextromethorphan (DELSYM) 30 MG/5ML extended release liquid 60 mg 2 times per day   methylPREDNISolone sodium (SOLU-MEDROL) injection 40 mg Q8H   nicotine (NICODERM CQ) 14 MG/24HR 1 patch Daily   guaiFENesin (MUCINEX) extended release tablet 600 mg BID   benzonatate (TESSALON) capsule 154 mg TID PRN   folic acid (FOLVITE) tablet 1 mg Daily   losartan (COZAAR) tablet 100 mg Daily   And    hydrochlorothiazide (HYDRODIURIL) tablet 25 mg Daily   metoprolol succinate (TOPROL XL) extended release tablet 50 mg Daily   vitamin B-1 (THIAMINE) tablet 100 mg Daily   QUEtiapine (SEROQUEL) tablet 200 mg Nightly   hydrALAZINE (APRESOLINE) tablet 50 mg Daily   lurasidone (LATUDA) tablet 120 mg Nightly   levETIRAcetam (KEPPRA) tablet 500 mg Daily   Fixodent Denture Adhesive Cream PRN   sodium chloride flush 0.9 % injection 10 mL 2 times per day   sodium chloride flush 0.9 % injection 10 mL PRN   magnesium hydroxide (MILK OF MAGNESIA) 400 MG/5ML suspension 30 mL Daily PRN   enoxaparin (LOVENOX) injection 40 mg Daily   ipratropium-albuterol (DUONEB) nebulizer solution 1 ampule Q4H WA   ondansetron (ZOFRAN-ODT) disintegrating tablet 4 mg Q6H PRN   Or    ondansetron (ZOFRAN) injection 4 mg Q6H PRN   potassium chloride (KLOR-CON M) extended release tablet 40 mEq PRN   Or    potassium chloride 20 MEQ/15ML (10%) oral solution 40 mEq PRN   Or    potassium chloride 10 mEq/100 mL IVPB (Peripheral Line) PRN   azithromycin (ZITHROMAX) tablet 250 mg Daily       Physical Examination:  BP (!) 149/87   Pulse 81   Temp 97.9 °F (36.6 °C) (Oral)   Resp 18   Ht 5' 5\" (1.651 m)   Wt 183 lb (83 kg)   SpO2 91%   BMI 30.45 kg/m²     General appearance - alert, well appearing, and in no distress  Mental status - alert, oriented to person, place, and time  Eyes - pupils equal and reactive, extraocular eye movements intact  Nose - normal and patent, no erythema, discharge or polyps  Mouth - mucous membranes moist, pharynx normal without lesions  Neck - supple, no significant adenopathy  Lymphatics - no palpable lymphadenopathy, no hepatosplenomegaly  Chest - minimal air movements. Heart - normal rate, regular rhythm, normal S1, S2, no murmurs, rubs, clicks or gallops  Abdomen - soft, nontender, nondistended, no masses or organomegaly  Back exam - full range of motion, no tenderness, palpable spasm or pain on motion  Neurological - alert, oriented, normal speech, no focal findings or movement disorder noted  Musculoskeletal - no joint tenderness, deformity or swelling  Extremities - peripheral pulses normal, no pedal edema, no clubbing or cyanosis  Skin - normal coloration and turgor, no rashes, no suspicious skin lesions noted   Gu - deferred  Rectal - deferred    Labs:  Hematology:  Recent Labs      09/15/18   0635   WBC  21.1*   HGB  15.0   HCT  45.5   PLT  438*     Chemistry:  Recent Labs      09/15/18   0635   NA  143   K  4.3   CL  103   CO2  26   GLUCOSE  126*   BUN  28*   CREATININE  1.12   CALCIUM  9.8     No results for input(s): PROT, LABALBU, LABA1C, T0WQNMB, J5QPLNC, FT4, TSH, AST, ALT, LDH, GGT, ALKPHOS, BILITOT, BILIDIR, AMMONIA, AMYLASE, LIPASE, LACTATE, CHOL, HDL, LDLCHOLESTEROL, CHOLHDLRATIO, TRIG, VLDL, BNP, TROPONINI, CKTOTAL, CKMB, CKMBINDEX, RF, PAIGE in the last 72 hours.       Plan:same management for

## 2018-09-18 PROCEDURE — 94640 AIRWAY INHALATION TREATMENT: CPT

## 2018-09-18 PROCEDURE — 6370000000 HC RX 637 (ALT 250 FOR IP): Performed by: INTERNAL MEDICINE

## 2018-09-18 PROCEDURE — 94760 N-INVAS EAR/PLS OXIMETRY 1: CPT

## 2018-09-18 PROCEDURE — 6370000000 HC RX 637 (ALT 250 FOR IP): Performed by: NURSE PRACTITIONER

## 2018-09-18 PROCEDURE — 6370000000 HC RX 637 (ALT 250 FOR IP): Performed by: FAMILY MEDICINE

## 2018-09-18 PROCEDURE — 6360000002 HC RX W HCPCS: Performed by: INTERNAL MEDICINE

## 2018-09-18 PROCEDURE — 94618 PULMONARY STRESS TESTING: CPT

## 2018-09-18 PROCEDURE — 2700000000 HC OXYGEN THERAPY PER DAY

## 2018-09-18 PROCEDURE — 2060000000 HC ICU INTERMEDIATE R&B

## 2018-09-18 PROCEDURE — 2580000003 HC RX 258: Performed by: FAMILY MEDICINE

## 2018-09-18 PROCEDURE — 6360000002 HC RX W HCPCS: Performed by: FAMILY MEDICINE

## 2018-09-18 RX ADMIN — ENOXAPARIN SODIUM 40 MG: 40 INJECTION SUBCUTANEOUS at 08:45

## 2018-09-18 RX ADMIN — QUETIAPINE FUMARATE 200 MG: 200 TABLET ORAL at 21:44

## 2018-09-18 RX ADMIN — Medication 100 MG: at 08:45

## 2018-09-18 RX ADMIN — FORMOTEROL FUMARATE DIHYDRATE 20 MCG: 20 SOLUTION RESPIRATORY (INHALATION) at 07:21

## 2018-09-18 RX ADMIN — HYDROCHLOROTHIAZIDE 25 MG: 25 TABLET ORAL at 08:44

## 2018-09-18 RX ADMIN — IPRATROPIUM BROMIDE AND ALBUTEROL SULFATE 1 AMPULE: .5; 3 SOLUTION RESPIRATORY (INHALATION) at 19:16

## 2018-09-18 RX ADMIN — Medication 60 MG: at 08:51

## 2018-09-18 RX ADMIN — Medication 60 MG: at 20:54

## 2018-09-18 RX ADMIN — BUDESONIDE 500 MCG: 0.5 SUSPENSION RESPIRATORY (INHALATION) at 07:21

## 2018-09-18 RX ADMIN — GUAIFENESIN 600 MG: 600 TABLET, EXTENDED RELEASE ORAL at 20:53

## 2018-09-18 RX ADMIN — LEVETIRACETAM 500 MG: 500 TABLET ORAL at 08:45

## 2018-09-18 RX ADMIN — METOPROLOL SUCCINATE 50 MG: 50 TABLET, FILM COATED, EXTENDED RELEASE ORAL at 08:44

## 2018-09-18 RX ADMIN — IPRATROPIUM BROMIDE AND ALBUTEROL SULFATE 1 AMPULE: .5; 3 SOLUTION RESPIRATORY (INHALATION) at 07:21

## 2018-09-18 RX ADMIN — IPRATROPIUM BROMIDE AND ALBUTEROL SULFATE 1 AMPULE: .5; 3 SOLUTION RESPIRATORY (INHALATION) at 15:01

## 2018-09-18 RX ADMIN — HYDRALAZINE HYDROCHLORIDE 50 MG: 50 TABLET, FILM COATED ORAL at 08:44

## 2018-09-18 RX ADMIN — GUAIFENESIN 600 MG: 600 TABLET, EXTENDED RELEASE ORAL at 11:51

## 2018-09-18 RX ADMIN — MOMETASONE FUROATE AND FORMOTEROL FUMARATE DIHYDRATE 2 PUFF: 200; 5 AEROSOL RESPIRATORY (INHALATION) at 19:16

## 2018-09-18 RX ADMIN — IPRATROPIUM BROMIDE AND ALBUTEROL SULFATE 1 AMPULE: .5; 3 SOLUTION RESPIRATORY (INHALATION) at 10:59

## 2018-09-18 RX ADMIN — AZITHROMYCIN MONOHYDRATE 250 MG: 250 TABLET ORAL at 08:44

## 2018-09-18 RX ADMIN — LURASIDONE HYDROCHLORIDE 120 MG: 40 TABLET, FILM COATED ORAL at 21:44

## 2018-09-18 RX ADMIN — LOSARTAN POTASSIUM 100 MG: 100 TABLET, FILM COATED ORAL at 08:44

## 2018-09-18 RX ADMIN — Medication 10 ML: at 08:51

## 2018-09-18 RX ADMIN — PREDNISONE 40 MG: 20 TABLET ORAL at 08:44

## 2018-09-18 RX ADMIN — FOLIC ACID 1 MG: 1 TABLET ORAL at 08:44

## 2018-09-18 NOTE — PROGRESS NOTES
(Peripheral Line) PRN   azithromycin (ZITHROMAX) tablet 250 mg Daily       Physical Examination:  BP (!) 150/85 Comment: right upper arm  Pulse 68   Temp 97.4 °F (36.3 °C) (Oral)   Resp 18   Ht 5' 5\" (1.651 m)   Wt 183 lb (83 kg)   SpO2 94%   BMI 30.45 kg/m²     General appearance - alert, well appearing, and in no distress  Mental status - alert, oriented to person, place, and time  Eyes - pupils equal and reactive, extraocular eye movements intact  Nose - normal and patent, no erythema, discharge or polyps  Mouth - mucous membranes moist, pharynx normal without lesions  Neck - supple, no significant adenopathy  Lymphatics - no palpable lymphadenopathy, no hepatosplenomegaly  Chest - minimal air movements. Heart - normal rate, regular rhythm, normal S1, S2, no murmurs, rubs, clicks or gallops  Abdomen - soft, nontender, nondistended, no masses or organomegaly  Back exam - full range of motion, no tenderness, palpable spasm or pain on motion  Neurological - alert, oriented, normal speech, no focal findings or movement disorder noted  Musculoskeletal - no joint tenderness, deformity or swelling  Extremities - peripheral pulses normal, no pedal edema, no clubbing or cyanosis  Skin - normal coloration and turgor, no rashes, no suspicious skin lesions noted   Gu - deferred  Rectal - deferred    Labs:  Hematology:  No results for input(s): WBC, HGB, HCT, PLT, SEDRATE, INR in the last 72 hours. Invalid input(s): PT  Chemistry:  No results for input(s): NA, K, CL, CO2, GLUCOSE, BUN, CREATININE, MG, CALCIUM, CAION, PHOS, PSA in the last 72 hours. No results for input(s): PROT, LABALBU, LABA1C, C8OKAQY, A6CCBJG, FT4, TSH, AST, ALT, LDH, GGT, ALKPHOS, BILITOT, BILIDIR, AMMONIA, AMYLASE, LIPASE, LACTATE, CHOL, HDL, LDLCHOLESTEROL, CHOLHDLRATIO, TRIG, VLDL, BNP, TROPONINI, CKTOTAL, CKMB, CKMBINDEX, RF, PAIGE in the last 72 hours.       Plan:same management for now;solumedrol is discontinued;he qualify for home

## 2018-09-18 NOTE — PROGRESS NOTES
Home Oxygen Evaluation    Home Oxygen Evaluation completed. Patient is on 2 liters per minute via Nasal cannula. Resting SpO2 = 92%  Resting SpO2 on room air = 87%    Nocturnal Oximetry with patient on room air is recommended is SpO2 is between 89% and 95% (requires additional order).     Wandy Flor  8:26 AM

## 2018-09-19 ENCOUNTER — APPOINTMENT (OUTPATIENT)
Dept: GENERAL RADIOLOGY | Age: 53
DRG: 140 | End: 2018-09-19
Payer: MEDICARE

## 2018-09-19 LAB
ANION GAP SERPL CALCULATED.3IONS-SCNC: 13 MMOL/L (ref 9–17)
BUN BLDV-MCNC: 28 MG/DL (ref 6–20)
BUN/CREAT BLD: 26 (ref 9–20)
CALCIUM SERPL-MCNC: 9.4 MG/DL (ref 8.6–10.4)
CHLORIDE BLD-SCNC: 95 MMOL/L (ref 98–107)
CO2: 28 MMOL/L (ref 20–31)
CREAT SERPL-MCNC: 1.07 MG/DL (ref 0.7–1.2)
GFR AFRICAN AMERICAN: >60 ML/MIN
GFR NON-AFRICAN AMERICAN: >60 ML/MIN
GFR SERPL CREATININE-BSD FRML MDRD: ABNORMAL ML/MIN/{1.73_M2}
GFR SERPL CREATININE-BSD FRML MDRD: ABNORMAL ML/MIN/{1.73_M2}
GLUCOSE BLD-MCNC: 172 MG/DL (ref 70–99)
MAGNESIUM: 2.1 MG/DL (ref 1.6–2.6)
MYOGLOBIN: 34 NG/ML (ref 28–72)
POTASSIUM SERPL-SCNC: 4.3 MMOL/L (ref 3.7–5.3)
SODIUM BLD-SCNC: 136 MMOL/L (ref 135–144)
TROPONIN INTERP: NORMAL
TROPONIN T: <0.03 NG/ML

## 2018-09-19 PROCEDURE — 94640 AIRWAY INHALATION TREATMENT: CPT

## 2018-09-19 PROCEDURE — 6370000000 HC RX 637 (ALT 250 FOR IP): Performed by: FAMILY MEDICINE

## 2018-09-19 PROCEDURE — 6360000002 HC RX W HCPCS: Performed by: FAMILY MEDICINE

## 2018-09-19 PROCEDURE — 83874 ASSAY OF MYOGLOBIN: CPT

## 2018-09-19 PROCEDURE — 6370000000 HC RX 637 (ALT 250 FOR IP): Performed by: INTERNAL MEDICINE

## 2018-09-19 PROCEDURE — 80048 BASIC METABOLIC PNL TOTAL CA: CPT

## 2018-09-19 PROCEDURE — 2700000000 HC OXYGEN THERAPY PER DAY

## 2018-09-19 PROCEDURE — 2060000000 HC ICU INTERMEDIATE R&B

## 2018-09-19 PROCEDURE — 94760 N-INVAS EAR/PLS OXIMETRY 1: CPT

## 2018-09-19 PROCEDURE — 36415 COLL VENOUS BLD VENIPUNCTURE: CPT

## 2018-09-19 PROCEDURE — 71046 X-RAY EXAM CHEST 2 VIEWS: CPT

## 2018-09-19 PROCEDURE — 83735 ASSAY OF MAGNESIUM: CPT

## 2018-09-19 PROCEDURE — 84484 ASSAY OF TROPONIN QUANT: CPT

## 2018-09-19 PROCEDURE — 93005 ELECTROCARDIOGRAM TRACING: CPT

## 2018-09-19 PROCEDURE — 6370000000 HC RX 637 (ALT 250 FOR IP): Performed by: NURSE PRACTITIONER

## 2018-09-19 RX ORDER — NICOTINE 21 MG/24HR
1 PATCH, TRANSDERMAL 24 HOURS TRANSDERMAL DAILY
Qty: 30 PATCH | Refills: 3 | Status: ON HOLD | OUTPATIENT
Start: 2018-09-20 | End: 2021-06-10

## 2018-09-19 RX ORDER — PREDNISONE 10 MG/1
TABLET ORAL
Qty: 40 TABLET | Refills: 0 | Status: SHIPPED | OUTPATIENT
Start: 2018-09-19 | End: 2018-10-03 | Stop reason: ALTCHOICE

## 2018-09-19 RX ADMIN — HYDROCHLOROTHIAZIDE 25 MG: 25 TABLET ORAL at 08:12

## 2018-09-19 RX ADMIN — AZITHROMYCIN MONOHYDRATE 250 MG: 250 TABLET ORAL at 08:19

## 2018-09-19 RX ADMIN — GUAIFENESIN 600 MG: 600 TABLET, EXTENDED RELEASE ORAL at 20:58

## 2018-09-19 RX ADMIN — Medication 60 MG: at 20:58

## 2018-09-19 RX ADMIN — IPRATROPIUM BROMIDE AND ALBUTEROL SULFATE 1 AMPULE: .5; 3 SOLUTION RESPIRATORY (INHALATION) at 07:28

## 2018-09-19 RX ADMIN — BENZONATATE 200 MG: 100 CAPSULE ORAL at 08:19

## 2018-09-19 RX ADMIN — METOPROLOL SUCCINATE 50 MG: 50 TABLET, FILM COATED, EXTENDED RELEASE ORAL at 08:12

## 2018-09-19 RX ADMIN — MOMETASONE FUROATE AND FORMOTEROL FUMARATE DIHYDRATE 2 PUFF: 200; 5 AEROSOL RESPIRATORY (INHALATION) at 19:17

## 2018-09-19 RX ADMIN — LEVETIRACETAM 500 MG: 500 TABLET ORAL at 08:12

## 2018-09-19 RX ADMIN — MOMETASONE FUROATE AND FORMOTEROL FUMARATE DIHYDRATE 2 PUFF: 200; 5 AEROSOL RESPIRATORY (INHALATION) at 07:28

## 2018-09-19 RX ADMIN — IPRATROPIUM BROMIDE AND ALBUTEROL SULFATE 1 AMPULE: .5; 3 SOLUTION RESPIRATORY (INHALATION) at 19:17

## 2018-09-19 RX ADMIN — IPRATROPIUM BROMIDE AND ALBUTEROL SULFATE 1 AMPULE: .5; 3 SOLUTION RESPIRATORY (INHALATION) at 15:18

## 2018-09-19 RX ADMIN — FOLIC ACID 1 MG: 1 TABLET ORAL at 08:12

## 2018-09-19 RX ADMIN — PREDNISONE 40 MG: 20 TABLET ORAL at 08:12

## 2018-09-19 RX ADMIN — Medication 60 MG: at 11:52

## 2018-09-19 RX ADMIN — HYDRALAZINE HYDROCHLORIDE 50 MG: 50 TABLET, FILM COATED ORAL at 08:19

## 2018-09-19 RX ADMIN — GUAIFENESIN 600 MG: 600 TABLET, EXTENDED RELEASE ORAL at 08:12

## 2018-09-19 RX ADMIN — IPRATROPIUM BROMIDE AND ALBUTEROL SULFATE 1 AMPULE: .5; 3 SOLUTION RESPIRATORY (INHALATION) at 10:57

## 2018-09-19 RX ADMIN — Medication 100 MG: at 08:12

## 2018-09-19 RX ADMIN — QUETIAPINE FUMARATE 200 MG: 200 TABLET ORAL at 20:58

## 2018-09-19 RX ADMIN — LOSARTAN POTASSIUM 100 MG: 100 TABLET, FILM COATED ORAL at 08:12

## 2018-09-19 RX ADMIN — LURASIDONE HYDROCHLORIDE 120 MG: 40 TABLET, FILM COATED ORAL at 20:58

## 2018-09-19 RX ADMIN — ENOXAPARIN SODIUM 40 MG: 40 INJECTION SUBCUTANEOUS at 08:11

## 2018-09-19 NOTE — PLAN OF CARE
Problem: Falls - Risk of:  Goal: Will remain free from falls  Will remain free from falls   Outcome: Ongoing  Pt instructed to call out for assistance prior to getting up. Call light reviewed and is in reach. Pt verbalized understanding. Bed low and brakes on. Fall precautions in place and hourly rounds continued.

## 2018-09-19 NOTE — PROGRESS NOTES
Pulmonary Critical Care Progress Note  Liana Keenan CNP / Dr. Digna Duval M.D. Patient seen for the follow up of Acute respiratory failure, COPD exacerbation, acute tracheobronchitis, active smoking    Subjective:  His heart rate was elevated periodically this morning, staying around 140. Cardiology has been consulted, awaiting their input. He remains quite short of breath, mostly upon exertion. He has a occasional nonproductive cough. He denies any chest pain. He did not wear BiPAP last night. Examination:  Vitals: /69   Pulse 80   Temp 97.3 °F (36.3 °C) (Oral)   Resp 18   Ht 5' 5\" (1.651 m)   Wt 183 lb (83 kg)   SpO2 92%   BMI 30.45 kg/m²     General appearance: alert and cooperative with exam, no distress  Neck: No JVD  Lungs: Decreased air exchange, scattered rhonchi  Heart: regular rate and rhythm, S1, S2 normal, no gallop  Abdomen: Soft, non tender, + BS  Extremities: no cyanosis or clubbing.  No significant edema    Labs:   Glucose 70 - 99 mg/dL 172     BUN 6 - 20 mg/dL 28     CREATININE 0.70 - 1.20 mg/dL 1.07    Bun/Cre Ratio 9 - 20 26     Calcium 8.6 - 10.4 mg/dL 9.4    Sodium 135 - 144 mmol/L 136    Potassium 3.7 - 5.3 mmol/L 4.3    Chloride 98 - 107 mmol/L 95     CO2 20 - 31 mmol/L 28    Anion Gap 9 - 17 mmol/L 13    GFR Non- >60 mL/min >60    GFR African American >60 mL/min >60      Radiology:      Impression:  · Acute respiratory failure  · Acute exacerbation of COPD  · Acute Tracheo-bronchitis  · Active smoking, 30-pack-year smoking history  · Obesity/Suspected Obstructive sleep apnea  · History of alcohol abuse, quit drinking 2 years ago  · HTN/HLD/CAD/depression     Recommendations:  · 2 liters/min via nasal cannula  · Arrange for oxygen at time of discharge, patient's oxygen saturation is 87% while resting on room air  · BiPAP for sleep and as needed  · Continue Zithromax  · Prednisone taper  · Albuterol and Ipratropium Q 4 hours and prn  · Continue

## 2018-09-19 NOTE — PROGRESS NOTES
Pt resting in bed with HR elevating into the 140's at times. He states he can feel his heart racing a bit. EKG ordered and is NSR with R BBB. Awaiting CXR. Pt drinks several coffees daily. Dr. Veronica Barron is on the unit and is notified. He orders pt to change to decaffeinated coffee.  Shai bourgeois

## 2018-09-19 NOTE — PROGRESS NOTES
Progress Notes        PCP: Andreina Nguyen MD  9/19/2018  10:23 AM    Admitting Diagnosis:  COPD w / exacerbation    Problem List:  Active Hospital Problems    Diagnosis Date Noted    Respiratory failure ([facility] [address] [J96.90] 09/12/2018         COPD w / exacerbation        Tracheobronchitis         HORTENSIA         Tobacco dependency     Subjective: improving very slowly;still SOB w/ very short walks.;cough is better. H/O drinking a lot of regular coffee and having a heart rate of 140/min.   Allergies:  No Known Allergies    Current Medications:    mometasone-formoterol (DULERA) 200-5 MCG/ACT inhaler 2 puff BID   predniSONE (DELTASONE) tablet 40 mg Daily   dextromethorphan (DELSYM) 30 MG/5ML extended release liquid 60 mg 2 times per day   nicotine (NICODERM CQ) 14 MG/24HR 1 patch Daily   guaiFENesin (MUCINEX) extended release tablet 600 mg BID   benzonatate (TESSALON) capsule 043 mg TID PRN   folic acid (FOLVITE) tablet 1 mg Daily   losartan (COZAAR) tablet 100 mg Daily   And    hydrochlorothiazide (HYDRODIURIL) tablet 25 mg Daily   metoprolol succinate (TOPROL XL) extended release tablet 50 mg Daily   vitamin B-1 (THIAMINE) tablet 100 mg Daily   QUEtiapine (SEROQUEL) tablet 200 mg Nightly   hydrALAZINE (APRESOLINE) tablet 50 mg Daily   lurasidone (LATUDA) tablet 120 mg Nightly   levETIRAcetam (KEPPRA) tablet 500 mg Daily   Fixodent Denture Adhesive Cream PRN   sodium chloride flush 0.9 % injection 10 mL 2 times per day   sodium chloride flush 0.9 % injection 10 mL PRN   magnesium hydroxide (MILK OF MAGNESIA) 400 MG/5ML suspension 30 mL Daily PRN   enoxaparin (LOVENOX) injection 40 mg Daily   ipratropium-albuterol (DUONEB) nebulizer solution 1 ampule Q4H WA   ondansetron (ZOFRAN-ODT) disintegrating tablet 4 mg Q6H PRN   Or    ondansetron (ZOFRAN) injection 4 mg Q6H PRN   potassium chloride (KLOR-CON M) extended release tablet 40 mEq PRN   Or    potassium chloride 20 MEQ/15ML (10%) oral solution 40 mEq PRN   Or    potassium chloride 10 mEq/100 mL IVPB (Peripheral Line) PRN   azithromycin (ZITHROMAX) tablet 250 mg Daily       Physical Examination:  /78   Pulse 69   Temp (!) 80 °F (26.7 °C)   Resp 18   Ht 5' 5\" (1.651 m)   Wt 183 lb (83 kg)   SpO2 92%   BMI 30.45 kg/m²     General appearance - alert, well appearing, and in no distress  Mental status - alert, oriented to person, place, and time  Eyes - pupils equal and reactive, extraocular eye movements intact  Nose - normal and patent, no erythema, discharge or polyps  Mouth - mucous membranes moist, pharynx normal without lesions  Neck - supple, no significant adenopathy  Lymphatics - no palpable lymphadenopathy, no hepatosplenomegaly  Chest - inspiratory rales over the left lower lung ,posteriorly. Heart - normal rate, regular rhythm, normal S1, S2, no murmurs, rubs, clicks or gallops  Abdomen - soft, nontender, nondistended, no masses or organomegaly  Back exam - full range of motion, no tenderness, palpable spasm or pain on motion  Neurological - alert, oriented, normal speech, no focal findings or movement disorder noted  Musculoskeletal - no joint tenderness, deformity or swelling  Extremities - peripheral pulses normal, no pedal edema, no clubbing or cyanosis  Skin - normal coloration and turgor, no rashes, no suspicious skin lesions noted   Gu - deferred  Rectal - deferred    Labs:  Hematology:  No results for input(s): WBC, HGB, HCT, PLT, SEDRATE, INR in the last 72 hours. Invalid input(s): PT  Chemistry:  No results for input(s): NA, K, CL, CO2, GLUCOSE, BUN, CREATININE, MG, CALCIUM, CAION, PHOS, PSA in the last 72 hours. No results for input(s): PROT, LABALBU, LABA1C, M5XLRYJ, C4UTCFP, FT4, TSH, AST, ALT, LDH, GGT, ALKPHOS, BILITOT, BILIDIR, AMMONIA, AMYLASE, LIPASE, LACTATE, CHOL, HDL, LDLCHOLESTEROL, CHOLHDLRATIO, TRIG, VLDL, BNP, TROPONINI, CKTOTAL, CKMB, CKMBINDEX, RF, PAIGE in the last 72 hours.       Plan:same management ;CXR AP and lateral,ECG,replace regular with decaff coffee.       Electronically signed by Verona Valdez MD on 9/19/2018 at 10:23 AM

## 2018-09-19 NOTE — PROGRESS NOTES
Dr. Robbie Dave notified of CXR, EKG results and that the pt continues to show ventricular abnormalities.  Orders recvd for labs and cardiology consult

## 2018-09-20 LAB
ALBUMIN SERPL-MCNC: 3.2 G/DL (ref 3.5–5.2)
ALBUMIN/GLOBULIN RATIO: ABNORMAL (ref 1–2.5)
ALP BLD-CCNC: 60 U/L (ref 40–129)
ALT SERPL-CCNC: 87 U/L (ref 5–41)
AST SERPL-CCNC: 34 U/L
BILIRUB SERPL-MCNC: 0.19 MG/DL (ref 0.3–1.2)
BILIRUBIN DIRECT: <0.08 MG/DL
BILIRUBIN, INDIRECT: ABNORMAL MG/DL (ref 0–1)
EKG ATRIAL RATE: 78 BPM
EKG P AXIS: 33 DEGREES
EKG P-R INTERVAL: 134 MS
EKG Q-T INTERVAL: 408 MS
EKG QRS DURATION: 138 MS
EKG QTC CALCULATION (BAZETT): 465 MS
EKG R AXIS: 76 DEGREES
EKG T AXIS: 50 DEGREES
EKG VENTRICULAR RATE: 78 BPM
GLOBULIN: ABNORMAL G/DL (ref 1.5–3.8)
TOTAL PROTEIN: 5.8 G/DL (ref 6.4–8.3)

## 2018-09-20 PROCEDURE — 36415 COLL VENOUS BLD VENIPUNCTURE: CPT

## 2018-09-20 PROCEDURE — 6370000000 HC RX 637 (ALT 250 FOR IP): Performed by: FAMILY MEDICINE

## 2018-09-20 PROCEDURE — 6370000000 HC RX 637 (ALT 250 FOR IP): Performed by: NURSE PRACTITIONER

## 2018-09-20 PROCEDURE — 2060000000 HC ICU INTERMEDIATE R&B

## 2018-09-20 PROCEDURE — 2700000000 HC OXYGEN THERAPY PER DAY

## 2018-09-20 PROCEDURE — 94760 N-INVAS EAR/PLS OXIMETRY 1: CPT

## 2018-09-20 PROCEDURE — 6360000002 HC RX W HCPCS: Performed by: FAMILY MEDICINE

## 2018-09-20 PROCEDURE — 6370000000 HC RX 637 (ALT 250 FOR IP): Performed by: INTERNAL MEDICINE

## 2018-09-20 PROCEDURE — 80076 HEPATIC FUNCTION PANEL: CPT

## 2018-09-20 PROCEDURE — 94640 AIRWAY INHALATION TREATMENT: CPT

## 2018-09-20 RX ORDER — DILTIAZEM HYDROCHLORIDE 120 MG/1
120 CAPSULE, COATED, EXTENDED RELEASE ORAL DAILY
Status: DISCONTINUED | OUTPATIENT
Start: 2018-09-20 | End: 2018-09-21 | Stop reason: HOSPADM

## 2018-09-20 RX ADMIN — Medication 60 MG: at 20:53

## 2018-09-20 RX ADMIN — IPRATROPIUM BROMIDE AND ALBUTEROL SULFATE 1 AMPULE: .5; 3 SOLUTION RESPIRATORY (INHALATION) at 11:05

## 2018-09-20 RX ADMIN — LOSARTAN POTASSIUM 100 MG: 100 TABLET, FILM COATED ORAL at 08:58

## 2018-09-20 RX ADMIN — LURASIDONE HYDROCHLORIDE 120 MG: 40 TABLET, FILM COATED ORAL at 20:52

## 2018-09-20 RX ADMIN — ENOXAPARIN SODIUM 40 MG: 40 INJECTION SUBCUTANEOUS at 08:57

## 2018-09-20 RX ADMIN — IPRATROPIUM BROMIDE AND ALBUTEROL SULFATE 1 AMPULE: .5; 3 SOLUTION RESPIRATORY (INHALATION) at 14:57

## 2018-09-20 RX ADMIN — HYDROCHLOROTHIAZIDE 25 MG: 25 TABLET ORAL at 08:58

## 2018-09-20 RX ADMIN — Medication 60 MG: at 08:59

## 2018-09-20 RX ADMIN — LEVETIRACETAM 500 MG: 500 TABLET ORAL at 08:58

## 2018-09-20 RX ADMIN — HYDRALAZINE HYDROCHLORIDE 50 MG: 50 TABLET, FILM COATED ORAL at 08:58

## 2018-09-20 RX ADMIN — FOLIC ACID 1 MG: 1 TABLET ORAL at 08:58

## 2018-09-20 RX ADMIN — DILTIAZEM HYDROCHLORIDE 120 MG: 120 CAPSULE, COATED, EXTENDED RELEASE ORAL at 16:18

## 2018-09-20 RX ADMIN — MOMETASONE FUROATE AND FORMOTEROL FUMARATE DIHYDRATE 2 PUFF: 200; 5 AEROSOL RESPIRATORY (INHALATION) at 19:17

## 2018-09-20 RX ADMIN — IPRATROPIUM BROMIDE AND ALBUTEROL SULFATE 1 AMPULE: .5; 3 SOLUTION RESPIRATORY (INHALATION) at 07:19

## 2018-09-20 RX ADMIN — Medication 100 MG: at 08:58

## 2018-09-20 RX ADMIN — PREDNISONE 30 MG: 20 TABLET ORAL at 08:57

## 2018-09-20 RX ADMIN — QUETIAPINE FUMARATE 200 MG: 200 TABLET ORAL at 20:53

## 2018-09-20 RX ADMIN — MOMETASONE FUROATE AND FORMOTEROL FUMARATE DIHYDRATE 2 PUFF: 200; 5 AEROSOL RESPIRATORY (INHALATION) at 07:21

## 2018-09-20 RX ADMIN — METOPROLOL SUCCINATE 50 MG: 50 TABLET, FILM COATED, EXTENDED RELEASE ORAL at 08:57

## 2018-09-20 RX ADMIN — IPRATROPIUM BROMIDE AND ALBUTEROL SULFATE 1 AMPULE: .5; 3 SOLUTION RESPIRATORY (INHALATION) at 19:16

## 2018-09-20 NOTE — PROGRESS NOTES
Progress Notes        PCP: Wu Hernandez MD  9/20/2018  8:07 AM    Admitting Diagnosis:  COPD w / exacerbation    Problem List:  Active Hospital Problems    Diagnosis Date Noted    Respiratory failure ([address] [J96.90] 09/12/2018         COPD w / exacerbation        Tracheobronchitis         HORTENSIA         Tobacco dependency     Subjective: he slept well but still SOB w/ taking a shower. CXR is unchanged. Troponin was nl. ESG-NSR.   Allergies:  No Known Allergies    Current Medications:    mometasone-formoterol (DULERA) 200-5 MCG/ACT inhaler 2 puff BID   predniSONE (DELTASONE) tablet 40 mg Daily   dextromethorphan (DELSYM) 30 MG/5ML extended release liquid 60 mg 2 times per day   nicotine (NICODERM CQ) 14 MG/24HR 1 patch Daily   benzonatate (TESSALON) capsule 958 mg TID PRN   folic acid (FOLVITE) tablet 1 mg Daily   losartan (COZAAR) tablet 100 mg Daily   And    hydrochlorothiazide (HYDRODIURIL) tablet 25 mg Daily   metoprolol succinate (TOPROL XL) extended release tablet 50 mg Daily   vitamin B-1 (THIAMINE) tablet 100 mg Daily   QUEtiapine (SEROQUEL) tablet 200 mg Nightly   hydrALAZINE (APRESOLINE) tablet 50 mg Daily   lurasidone (LATUDA) tablet 120 mg Nightly   levETIRAcetam (KEPPRA) tablet 500 mg Daily   Fixodent Denture Adhesive Cream PRN   sodium chloride flush 0.9 % injection 10 mL 2 times per day   sodium chloride flush 0.9 % injection 10 mL PRN   magnesium hydroxide (MILK OF MAGNESIA) 400 MG/5ML suspension 30 mL Daily PRN   enoxaparin (LOVENOX) injection 40 mg Daily   ipratropium-albuterol (DUONEB) nebulizer solution 1 ampule Q4H WA   ondansetron (ZOFRAN-ODT) disintegrating tablet 4 mg Q6H PRN   Or    ondansetron (ZOFRAN) injection 4 mg Q6H PRN   potassium chloride (KLOR-CON M) extended release tablet 40 mEq PRN   Or    potassium chloride 20 MEQ/15ML (10%) oral solution 40 mEq PRN   Or    potassium chloride 10 mEq/100 mL IVPB (Peripheral Line) PRN   azithromycin (ZITHROMAX) tablet 250 mg Daily       Physical

## 2018-09-20 NOTE — CARE COORDINATION
Discharge planning    Patient has RT testing for home 02 on 9/18 at 0830. Call to [facility] to inquire how long testing is good for. Spoke with pharmacy counter and testing is good for today.  If patient does not go home today will need retested in am.

## 2018-09-20 NOTE — PROGRESS NOTES
ANTIMICROBIAL STEWARDSHIP  Upon review of the patient's chart, the committee has the following recommendation for your consideration:     Patient is currently on azithromycin (Day 9) for AECOPD. The recommended duration is 5-7 days. Initial procalcitonin that is associated with the severity of disease was low (=0.12). Please discontinue azithromycin. Temp Readings from Last 3 Encounters:   09/20/18 97.5 °F (36.4 °C) (Oral)   01/14/16 97.2 °F (36.2 °C) (Oral)   06/07/14 97.5 °F (36.4 °C)      No results for input(s): WBC in the last 72 hours. Thank you. Vanda Damon, PharmD  9/20/2018  8:33 AM    The Antimicrobial Stewardship Committee at [facility] is led by  Custer Ganser, MD, Infectious Diseases Section Chair.

## 2018-09-20 NOTE — CONSULTS
daily  metoprolol succinate (TOPROL XL) 50 MG extended release tablet, Take 50 mg by mouth daily  pantoprazole (PROTONIX) 40 MG tablet, Take 40 mg by mouth daily  predniSONE (DELTASONE) 10 MG tablet, Take 10 mg by mouth daily  QUEtiapine (SEROQUEL) 200 MG tablet, Take 200 mg by mouth nightly  vitamin B-1 (THIAMINE) 100 MG tablet, Take 100 mg by mouth daily  Cholecalciferol (VITAMIN D3) 2000 units CAPS, Take 1 capsule by mouth daily  tiotropium (SPIRIVA RESPIMAT) 2.5 MCG/ACT AERS inhaler, Inhale 2 puffs into the lungs daily  mirtazapine (REMERON) 45 MG tablet, Take 45 mg by mouth nightly  metoprolol tartrate (LOPRESSOR) 50 MG tablet, metoprolol tartrate 50 mg tablet  [DISCONTINUED] ipratropium-albuterol (DUONEB) 0.5-2.5 (3) MG/3ML SOLN nebulizer solution, Inhale 1 vial into the lungs 4 times daily  omeprazole (PRILOSEC) 10 MG capsule, Take 10 mg by mouth daily  Allergies:    Patient has no known allergies. Social History:     reports that he has been smoking. He has never used smokeless tobacco. He reports that he drinks alcohol. He reports that he does not use drugs. Family History:   family history is not on file.     REVIEW OF SYSTEMS:    CONSTITUTIONAL:  negative for  fevers and chills  HEENT:  negative  RESPIRATORY:  positive for  dyspnea and wheezing  CARDIOVASCULAR:  negative for  chest pain, dyspnea  GASTROINTESTINAL:  negative for nausea, vomiting and diarrhea  ENDOCRINE:  negative  MUSCULOSKELETAL:  negative for  myalgias and arthralgias  NEUROLOGICAL:  negative for dizziness and weakness  BEHAVIOR/PSYCH:  negative    PHYSICAL EXAM:      Vitals:    /64   Pulse 82   Temp 98.1 °F (36.7 °C) (Oral)   Resp 18   Ht 5' 5\" (1.651 m)   Wt 183 lb (83 kg)   SpO2 90%   BMI 30.45 kg/m²   CONSTITUTIONAL:  awake, alert, cooperative, no apparent distress, and appears stated age  ENT:  normocepalic, without obvious abnormality  LUNGS:  Diminished throughout  CARDIOVASCULAR:  Normal apical impulse, regular

## 2018-09-21 VITALS
RESPIRATION RATE: 16 BRPM | HEART RATE: 75 BPM | BODY MASS INDEX: 30.49 KG/M2 | OXYGEN SATURATION: 88 % | HEIGHT: 65 IN | SYSTOLIC BLOOD PRESSURE: 104 MMHG | WEIGHT: 183 LBS | DIASTOLIC BLOOD PRESSURE: 58 MMHG | TEMPERATURE: 98.1 F

## 2018-09-21 PROCEDURE — 94640 AIRWAY INHALATION TREATMENT: CPT

## 2018-09-21 PROCEDURE — 6370000000 HC RX 637 (ALT 250 FOR IP): Performed by: FAMILY MEDICINE

## 2018-09-21 PROCEDURE — 2580000003 HC RX 258: Performed by: FAMILY MEDICINE

## 2018-09-21 PROCEDURE — 6370000000 HC RX 637 (ALT 250 FOR IP): Performed by: NURSE PRACTITIONER

## 2018-09-21 PROCEDURE — 94760 N-INVAS EAR/PLS OXIMETRY 1: CPT

## 2018-09-21 PROCEDURE — 6360000002 HC RX W HCPCS: Performed by: FAMILY MEDICINE

## 2018-09-21 PROCEDURE — 6370000000 HC RX 637 (ALT 250 FOR IP): Performed by: INTERNAL MEDICINE

## 2018-09-21 PROCEDURE — 2700000000 HC OXYGEN THERAPY PER DAY

## 2018-09-21 RX ORDER — DILTIAZEM HYDROCHLORIDE 120 MG/1
120 CAPSULE, COATED, EXTENDED RELEASE ORAL DAILY
Qty: 30 CAPSULE | Refills: 3 | Status: SHIPPED | OUTPATIENT
Start: 2018-09-22 | End: 2021-07-12 | Stop reason: ALTCHOICE

## 2018-09-21 RX ADMIN — IPRATROPIUM BROMIDE AND ALBUTEROL SULFATE 1 AMPULE: .5; 3 SOLUTION RESPIRATORY (INHALATION) at 11:13

## 2018-09-21 RX ADMIN — FOLIC ACID 1 MG: 1 TABLET ORAL at 08:14

## 2018-09-21 RX ADMIN — HYDRALAZINE HYDROCHLORIDE 50 MG: 50 TABLET, FILM COATED ORAL at 08:13

## 2018-09-21 RX ADMIN — LOSARTAN POTASSIUM 100 MG: 100 TABLET, FILM COATED ORAL at 08:13

## 2018-09-21 RX ADMIN — MOMETASONE FUROATE AND FORMOTEROL FUMARATE DIHYDRATE 2 PUFF: 200; 5 AEROSOL RESPIRATORY (INHALATION) at 07:32

## 2018-09-21 RX ADMIN — PREDNISONE 30 MG: 20 TABLET ORAL at 08:13

## 2018-09-21 RX ADMIN — Medication 60 MG: at 08:14

## 2018-09-21 RX ADMIN — HYDROCHLOROTHIAZIDE 25 MG: 25 TABLET ORAL at 08:13

## 2018-09-21 RX ADMIN — METOPROLOL SUCCINATE 50 MG: 50 TABLET, FILM COATED, EXTENDED RELEASE ORAL at 08:13

## 2018-09-21 RX ADMIN — DILTIAZEM HYDROCHLORIDE 120 MG: 120 CAPSULE, COATED, EXTENDED RELEASE ORAL at 08:13

## 2018-09-21 RX ADMIN — Medication 100 MG: at 08:13

## 2018-09-21 RX ADMIN — IPRATROPIUM BROMIDE AND ALBUTEROL SULFATE 1 AMPULE: .5; 3 SOLUTION RESPIRATORY (INHALATION) at 07:32

## 2018-09-21 RX ADMIN — ENOXAPARIN SODIUM 40 MG: 40 INJECTION SUBCUTANEOUS at 08:14

## 2018-09-21 RX ADMIN — LEVETIRACETAM 500 MG: 500 TABLET ORAL at 08:13

## 2018-09-21 NOTE — PROGRESS NOTES
Home Oxygen Evaluation    Home Oxygen Evaluation completed. Patient is on 2 liters per minute via NC. Resting SpO2 = 94%  Resting SpO2 on room air = 89%    SpO2 on room air with exercise = 86%  SpO2 on oxygen as above with exercise = 92%    Nocturnal Oximetry with patient on room air is recommended is SpO2 is between 89% and 95% (requires additional order).     Rosana Vásquez  9:59 AM

## 2018-09-21 NOTE — DISCHARGE SUMMARY
hydrOXYzine (VISTARIL) 50 MG capsule  Take 50 mg by mouth daily             levETIRAcetam (KEPPRA) 500 MG tablet  Take 500 mg by mouth 2 times daily             losartan-hydrochlorothiazide (HYZAAR) 100-25 MG per tablet  Take 1 tablet by mouth daily             lurasidone (LATUDA) 120 MG tablet  Take 120 mg by mouth daily             metoprolol succinate (TOPROL XL) 50 MG extended release tablet  Take 50 mg by mouth daily             metoprolol tartrate (LOPRESSOR) 50 MG tablet  metoprolol tartrate 50 mg tablet             mirtazapine (REMERON) 45 MG tablet  Take 45 mg by mouth nightly             nicotine (NICODERM CQ) 14 MG/24HR  Place 1 patch onto the skin daily             omeprazole (PRILOSEC) 10 MG capsule  Take 10 mg by mouth daily             pantoprazole (PROTONIX) 40 MG tablet  Take 40 mg by mouth daily             predniSONE (DELTASONE) 10 MG tablet  Take 10 mg by mouth daily             predniSONE (DELTASONE) 10 MG tablet  Prednisone 40 mg oral daily ×4 days, followed by 30 mg oral daily ×4 days, followed by 20 mg oral daily ×4 days, followed by 10 mg oral daily dose             QUEtiapine (SEROQUEL) 200 MG tablet  Take 200 mg by mouth nightly             tiotropium (SPIRIVA RESPIMAT) 2.5 MCG/ACT AERS inhaler  Inhale 2 puffs into the lungs daily             vitamin B-1 (THIAMINE) 100 MG tablet  Take 100 mg by mouth daily                  Activity: activity as tolerated    Diet: regular    Time Spent on discharge is more than 30 minutes in the examination, evaluation, counseling and review of medications and discharge plan. Electronically signed by Leonardo Jennings MD on 9/21/2018 at 9:13 AM     Thank you Whitney Rahman MD for the opportunity to be involved in this patient's care.

## 2018-10-03 ENCOUNTER — HOSPITAL ENCOUNTER (OUTPATIENT)
Dept: CARDIAC CATH/INVASIVE PROCEDURES | Age: 53
Discharge: HOME | End: 2018-10-03
Payer: MEDICARE

## 2018-10-03 VITALS
WEIGHT: 182 LBS | HEART RATE: 66 BPM | TEMPERATURE: 99.2 F | OXYGEN SATURATION: 95 % | BODY MASS INDEX: 30.32 KG/M2 | DIASTOLIC BLOOD PRESSURE: 87 MMHG | HEIGHT: 65 IN | RESPIRATION RATE: 18 BRPM | SYSTOLIC BLOOD PRESSURE: 129 MMHG

## 2018-10-03 PROCEDURE — 7100000011 HC PHASE II RECOVERY - ADDTL 15 MIN

## 2018-10-03 PROCEDURE — 33282 HC IMPANTABLE LOOP RECORDER: CPT | Performed by: INTERNAL MEDICINE

## 2018-10-03 PROCEDURE — 2709999900 HC NON-CHARGEABLE SUPPLY

## 2018-10-03 PROCEDURE — 7100000010 HC PHASE II RECOVERY - FIRST 15 MIN

## 2018-10-03 PROCEDURE — C1764 EVENT RECORDER, CARDIAC: HCPCS

## 2018-10-03 ASSESSMENT — PAIN SCALES - GENERAL: PAINLEVEL_OUTOF10: 0

## 2019-03-08 ENCOUNTER — HOSPITAL ENCOUNTER (INPATIENT)
Age: 54
LOS: 2 days | Discharge: HOME | DRG: 139 | End: 2019-03-11
Attending: EMERGENCY MEDICINE | Admitting: INTERNAL MEDICINE
Payer: MEDICARE

## 2019-03-08 ENCOUNTER — APPOINTMENT (OUTPATIENT)
Dept: GENERAL RADIOLOGY | Age: 54
DRG: 139 | End: 2019-03-08
Payer: MEDICARE

## 2019-03-08 DIAGNOSIS — J18.9 PNEUMONIA DUE TO ORGANISM: Primary | ICD-10-CM

## 2019-03-08 DIAGNOSIS — K85.90 ACUTE PANCREATITIS, UNSPECIFIED COMPLICATION STATUS, UNSPECIFIED PANCREATITIS TYPE: ICD-10-CM

## 2019-03-08 LAB
ABSOLUTE EOS #: 0.03 K/UL (ref 0–0.44)
ABSOLUTE IMMATURE GRANULOCYTE: 0.09 K/UL (ref 0–0.3)
ABSOLUTE LYMPH #: 1.57 K/UL (ref 1.1–3.7)
ABSOLUTE MONO #: 1.03 K/UL (ref 0.1–1.2)
ALBUMIN SERPL-MCNC: 4.2 G/DL (ref 3.5–5.2)
ALBUMIN/GLOBULIN RATIO: 1.1 (ref 1–2.5)
ALP BLD-CCNC: 95 U/L (ref 40–129)
ALT SERPL-CCNC: 90 U/L (ref 5–41)
ANION GAP SERPL CALCULATED.3IONS-SCNC: 13 MMOL/L (ref 9–17)
AST SERPL-CCNC: 30 U/L
BASOPHILS # BLD: 0 % (ref 0–2)
BASOPHILS ABSOLUTE: 0.03 K/UL (ref 0–0.2)
BILIRUB SERPL-MCNC: 0.25 MG/DL (ref 0.3–1.2)
BUN BLDV-MCNC: 16 MG/DL (ref 6–20)
BUN/CREAT BLD: ABNORMAL (ref 9–20)
CALCIUM SERPL-MCNC: 9.2 MG/DL (ref 8.6–10.4)
CHLORIDE BLD-SCNC: 101 MMOL/L (ref 98–107)
CO2: 25 MMOL/L (ref 20–31)
CREAT SERPL-MCNC: 1.09 MG/DL (ref 0.7–1.2)
DIFFERENTIAL TYPE: ABNORMAL
EOSINOPHILS RELATIVE PERCENT: 0 % (ref 1–4)
GFR AFRICAN AMERICAN: >60 ML/MIN
GFR NON-AFRICAN AMERICAN: >60 ML/MIN
GFR SERPL CREATININE-BSD FRML MDRD: ABNORMAL ML/MIN/{1.73_M2}
GFR SERPL CREATININE-BSD FRML MDRD: ABNORMAL ML/MIN/{1.73_M2}
GLUCOSE BLD-MCNC: 168 MG/DL (ref 70–99)
HCT VFR BLD CALC: 47.4 % (ref 40.7–50.3)
HEMOGLOBIN: 15.7 G/DL (ref 13–17)
IMMATURE GRANULOCYTES: 1 %
LIPASE: 202 U/L (ref 13–60)
LYMPHOCYTES # BLD: 16 % (ref 24–43)
MCH RBC QN AUTO: 31 PG (ref 25.2–33.5)
MCHC RBC AUTO-ENTMCNC: 33.1 G/DL (ref 28.4–34.8)
MCV RBC AUTO: 93.5 FL (ref 82.6–102.9)
MONOCYTES # BLD: 11 % (ref 3–12)
NRBC AUTOMATED: 0 PER 100 WBC
PDW BLD-RTO: 12.4 % (ref 11.8–14.4)
PLATELET # BLD: 238 K/UL (ref 138–453)
PLATELET ESTIMATE: ABNORMAL
PMV BLD AUTO: 10.9 FL (ref 8.1–13.5)
POTASSIUM SERPL-SCNC: 4 MMOL/L (ref 3.7–5.3)
RBC # BLD: 5.07 M/UL (ref 4.21–5.77)
RBC # BLD: ABNORMAL 10*6/UL
SEG NEUTROPHILS: 72 % (ref 36–65)
SEGMENTED NEUTROPHILS ABSOLUTE COUNT: 7.08 K/UL (ref 1.5–8.1)
SODIUM BLD-SCNC: 139 MMOL/L (ref 135–144)
TOTAL PROTEIN: 7.9 G/DL (ref 6.4–8.3)
TROPONIN INTERP: NORMAL
TROPONIN T: NORMAL NG/ML
TROPONIN, HIGH SENSITIVITY: 11 NG/L (ref 0–22)
WBC # BLD: 9.8 K/UL (ref 3.5–11.3)
WBC # BLD: ABNORMAL 10*3/UL

## 2019-03-08 PROCEDURE — 96367 TX/PROPH/DG ADDL SEQ IV INF: CPT

## 2019-03-08 PROCEDURE — 93005 ELECTROCARDIOGRAM TRACING: CPT

## 2019-03-08 PROCEDURE — 83690 ASSAY OF LIPASE: CPT

## 2019-03-08 PROCEDURE — 99285 EMERGENCY DEPT VISIT HI MDM: CPT

## 2019-03-08 PROCEDURE — 87040 BLOOD CULTURE FOR BACTERIA: CPT

## 2019-03-08 PROCEDURE — 85025 COMPLETE CBC W/AUTO DIFF WBC: CPT

## 2019-03-08 PROCEDURE — 96365 THER/PROPH/DIAG IV INF INIT: CPT

## 2019-03-08 PROCEDURE — 2580000003 HC RX 258: Performed by: STUDENT IN AN ORGANIZED HEALTH CARE EDUCATION/TRAINING PROGRAM

## 2019-03-08 PROCEDURE — 83036 HEMOGLOBIN GLYCOSYLATED A1C: CPT

## 2019-03-08 PROCEDURE — 71046 X-RAY EXAM CHEST 2 VIEWS: CPT

## 2019-03-08 PROCEDURE — 85730 THROMBOPLASTIN TIME PARTIAL: CPT

## 2019-03-08 PROCEDURE — 80053 COMPREHEN METABOLIC PANEL: CPT

## 2019-03-08 PROCEDURE — 84484 ASSAY OF TROPONIN QUANT: CPT

## 2019-03-08 PROCEDURE — 6360000002 HC RX W HCPCS: Performed by: STUDENT IN AN ORGANIZED HEALTH CARE EDUCATION/TRAINING PROGRAM

## 2019-03-08 PROCEDURE — 87086 URINE CULTURE/COLONY COUNT: CPT

## 2019-03-08 PROCEDURE — 96372 THER/PROPH/DIAG INJ SC/IM: CPT

## 2019-03-08 RX ORDER — FENTANYL CITRATE 50 UG/ML
25 INJECTION, SOLUTION INTRAMUSCULAR; INTRAVENOUS ONCE
Status: COMPLETED | OUTPATIENT
Start: 2019-03-09 | End: 2019-03-09

## 2019-03-08 RX ORDER — MORPHINE SULFATE 4 MG/ML
4 INJECTION, SOLUTION INTRAMUSCULAR; INTRAVENOUS ONCE
Status: COMPLETED | OUTPATIENT
Start: 2019-03-08 | End: 2019-03-08

## 2019-03-08 RX ADMIN — CEFTRIAXONE SODIUM 1 G: 1 INJECTION, POWDER, FOR SOLUTION INTRAMUSCULAR; INTRAVENOUS at 23:08

## 2019-03-08 RX ADMIN — DEXTROSE MONOHYDRATE 500 MG: 50 INJECTION, SOLUTION INTRAVENOUS at 23:49

## 2019-03-08 RX ADMIN — MORPHINE SULFATE 4 MG: 4 INJECTION INTRAVENOUS at 21:54

## 2019-03-08 ASSESSMENT — PAIN DESCRIPTION - DESCRIPTORS: DESCRIPTORS: PRESSURE

## 2019-03-08 ASSESSMENT — PAIN DESCRIPTION - PAIN TYPE: TYPE: ACUTE PAIN

## 2019-03-08 ASSESSMENT — PAIN DESCRIPTION - LOCATION: LOCATION: CHEST

## 2019-03-08 ASSESSMENT — PAIN SCALES - GENERAL: PAINLEVEL_OUTOF10: 8

## 2019-03-08 ASSESSMENT — PAIN DESCRIPTION - ORIENTATION: ORIENTATION: LEFT

## 2019-03-09 PROBLEM — J18.9 RLL PNEUMONIA: Status: ACTIVE | Noted: 2019-03-09

## 2019-03-09 PROBLEM — B18.2 HEP C W/O COMA, CHRONIC (HCC): Status: ACTIVE | Noted: 2019-03-09

## 2019-03-09 PROBLEM — F17.200 SMOKER: Status: ACTIVE | Noted: 2019-03-09

## 2019-03-09 PROBLEM — Z86.79 H/O PAROXYSMAL SUPRAVENTRICULAR TACHYCARDIA: Status: ACTIVE | Noted: 2019-03-09

## 2019-03-09 PROBLEM — J18.9 PNEUMONIA: Status: ACTIVE | Noted: 2019-03-09

## 2019-03-09 PROBLEM — J44.9 COPD (CHRONIC OBSTRUCTIVE PULMONARY DISEASE) (HCC): Status: ACTIVE | Noted: 2019-03-09

## 2019-03-09 PROBLEM — I10 ESSENTIAL HYPERTENSION: Status: ACTIVE | Noted: 2019-03-09

## 2019-03-09 PROBLEM — R19.7 DIARRHEA: Status: ACTIVE | Noted: 2019-03-09

## 2019-03-09 PROBLEM — Z95.818 STATUS POST PLACEMENT OF IMPLANTABLE LOOP RECORDER: Status: ACTIVE | Noted: 2019-03-09

## 2019-03-09 LAB
-: ABNORMAL
ABSOLUTE EOS #: <0.03 K/UL (ref 0–0.44)
ABSOLUTE IMMATURE GRANULOCYTE: <0.03 K/UL (ref 0–0.3)
ABSOLUTE LYMPH #: 1.83 K/UL (ref 1.1–3.7)
ABSOLUTE MONO #: 0.83 K/UL (ref 0.1–1.2)
ADENOVIRUS PCR: NOT DETECTED
AMORPHOUS: ABNORMAL
AMPHETAMINE SCREEN URINE: NEGATIVE
ANION GAP SERPL CALCULATED.3IONS-SCNC: 11 MMOL/L (ref 9–17)
BACTERIA: ABNORMAL
BARBITURATE SCREEN URINE: NEGATIVE
BASOPHILS # BLD: 0 % (ref 0–2)
BASOPHILS ABSOLUTE: <0.03 K/UL (ref 0–0.2)
BENZODIAZEPINE SCREEN, URINE: NEGATIVE
BILIRUBIN URINE: NEGATIVE
BORDETELLA PERTUSSIS PCR: NOT DETECTED
BUN BLDV-MCNC: 13 MG/DL (ref 6–20)
BUN/CREAT BLD: ABNORMAL (ref 9–20)
BUPRENORPHINE URINE: ABNORMAL
CALCIUM SERPL-MCNC: 8.8 MG/DL (ref 8.6–10.4)
CANNABINOID SCREEN URINE: NEGATIVE
CASTS UA: ABNORMAL /LPF (ref 0–8)
CHLAMYDIA PNEUMONIAE BY PCR: NOT DETECTED
CHLORIDE BLD-SCNC: 100 MMOL/L (ref 98–107)
CO2: 27 MMOL/L (ref 20–31)
COCAINE METABOLITE, URINE: NEGATIVE
COLOR: YELLOW
CORONAVIRUS 229E PCR: DETECTED
CORONAVIRUS HKU1 PCR: NOT DETECTED
CORONAVIRUS NL63 PCR: NOT DETECTED
CORONAVIRUS OC43 PCR: NOT DETECTED
CREAT SERPL-MCNC: 0.82 MG/DL (ref 0.7–1.2)
CRYSTALS, UA: ABNORMAL /HPF
DIFFERENTIAL TYPE: ABNORMAL
DIRECT EXAM: NORMAL
EOSINOPHILS RELATIVE PERCENT: 0 % (ref 1–4)
EPITHELIAL CELLS UA: ABNORMAL /HPF (ref 0–5)
ESTIMATED AVERAGE GLUCOSE: 105 MG/DL
GFR AFRICAN AMERICAN: >60 ML/MIN
GFR NON-AFRICAN AMERICAN: >60 ML/MIN
GFR SERPL CREATININE-BSD FRML MDRD: ABNORMAL ML/MIN/{1.73_M2}
GFR SERPL CREATININE-BSD FRML MDRD: ABNORMAL ML/MIN/{1.73_M2}
GLUCOSE BLD-MCNC: 117 MG/DL (ref 70–99)
GLUCOSE URINE: NEGATIVE
HBA1C MFR BLD: 5.3 % (ref 4–6)
HCT VFR BLD CALC: 46.5 % (ref 40.7–50.3)
HEMOGLOBIN: 14.6 G/DL (ref 13–17)
HUMAN METAPNEUMOVIRUS PCR: NOT DETECTED
IMMATURE GRANULOCYTES: 0 %
INFLUENZA A BY PCR: DETECTED
INFLUENZA A H1 (2009) PCR: DETECTED
INFLUENZA A H1 PCR: NOT DETECTED
INFLUENZA A H3 PCR: NOT DETECTED
INFLUENZA B BY PCR: NOT DETECTED
KETONES, URINE: NEGATIVE
LEUKOCYTE ESTERASE, URINE: NEGATIVE
LYMPHOCYTES # BLD: 24 % (ref 24–43)
Lab: NORMAL
MCH RBC QN AUTO: 30.3 PG (ref 25.2–33.5)
MCHC RBC AUTO-ENTMCNC: 31.4 G/DL (ref 28.4–34.8)
MCV RBC AUTO: 96.5 FL (ref 82.6–102.9)
MDMA URINE: ABNORMAL
METHADONE SCREEN, URINE: NEGATIVE
METHAMPHETAMINE, URINE: ABNORMAL
MONOCYTES # BLD: 11 % (ref 3–12)
MUCUS: ABNORMAL
MYCOPLASMA PNEUMONIAE PCR: NOT DETECTED
NITRITE, URINE: NEGATIVE
NRBC AUTOMATED: 0 PER 100 WBC
OPIATES, URINE: POSITIVE
OTHER OBSERVATIONS UA: ABNORMAL
OXYCODONE SCREEN URINE: NEGATIVE
PARAINFLUENZA 1 PCR: NOT DETECTED
PARAINFLUENZA 2 PCR: NOT DETECTED
PARAINFLUENZA 3 PCR: NOT DETECTED
PARAINFLUENZA 4 PCR: NOT DETECTED
PARTIAL THROMBOPLASTIN TIME: 24.4 SEC (ref 20.5–30.5)
PDW BLD-RTO: 12.5 % (ref 11.8–14.4)
PH UA: 5.5 (ref 5–8)
PHENCYCLIDINE, URINE: NEGATIVE
PLATELET # BLD: 208 K/UL (ref 138–453)
PLATELET ESTIMATE: ABNORMAL
PMV BLD AUTO: 10.8 FL (ref 8.1–13.5)
POTASSIUM SERPL-SCNC: 3.6 MMOL/L (ref 3.7–5.3)
PROPOXYPHENE, URINE: ABNORMAL
PROTEIN UA: ABNORMAL
RBC # BLD: 4.82 M/UL (ref 4.21–5.77)
RBC # BLD: ABNORMAL 10*6/UL
RBC UA: ABNORMAL /HPF (ref 0–4)
RENAL EPITHELIAL, UA: ABNORMAL /HPF
RESP SYNCYTIAL VIRUS PCR: NOT DETECTED
RHINO/ENTEROVIRUS PCR: NOT DETECTED
SEG NEUTROPHILS: 65 % (ref 36–65)
SEGMENTED NEUTROPHILS ABSOLUTE COUNT: 4.95 K/UL (ref 1.5–8.1)
SODIUM BLD-SCNC: 138 MMOL/L (ref 135–144)
SPECIFIC GRAVITY UA: 1.03 (ref 1–1.03)
SPECIMEN DESCRIPTION: ABNORMAL
SPECIMEN DESCRIPTION: NORMAL
TEST INFORMATION: ABNORMAL
TRICHOMONAS: ABNORMAL
TRICYCLIC ANTIDEPRESSANTS, UR: ABNORMAL
TURBIDITY: CLEAR
URINE HGB: NEGATIVE
UROBILINOGEN, URINE: NORMAL
WBC # BLD: 7.7 K/UL (ref 3.5–11.3)
WBC # BLD: ABNORMAL 10*3/UL
WBC UA: ABNORMAL /HPF (ref 0–5)
YEAST: ABNORMAL

## 2019-03-09 PROCEDURE — 87449 NOS EACH ORGANISM AG IA: CPT

## 2019-03-09 PROCEDURE — 36415 COLL VENOUS BLD VENIPUNCTURE: CPT

## 2019-03-09 PROCEDURE — 96372 THER/PROPH/DIAG INJ SC/IM: CPT

## 2019-03-09 PROCEDURE — 6360000002 HC RX W HCPCS: Performed by: INTERNAL MEDICINE

## 2019-03-09 PROCEDURE — 87633 RESP VIRUS 12-25 TARGETS: CPT

## 2019-03-09 PROCEDURE — 87798 DETECT AGENT NOS DNA AMP: CPT

## 2019-03-09 PROCEDURE — 6360000002 HC RX W HCPCS: Performed by: STUDENT IN AN ORGANIZED HEALTH CARE EDUCATION/TRAINING PROGRAM

## 2019-03-09 PROCEDURE — 2700000000 HC OXYGEN THERAPY PER DAY

## 2019-03-09 PROCEDURE — 94640 AIRWAY INHALATION TREATMENT: CPT

## 2019-03-09 PROCEDURE — 1200000000 HC SEMI PRIVATE

## 2019-03-09 PROCEDURE — 81001 URINALYSIS AUTO W/SCOPE: CPT

## 2019-03-09 PROCEDURE — 85025 COMPLETE CBC W/AUTO DIFF WBC: CPT

## 2019-03-09 PROCEDURE — 87070 CULTURE OTHR SPECIMN AEROBIC: CPT

## 2019-03-09 PROCEDURE — 80048 BASIC METABOLIC PNL TOTAL CA: CPT

## 2019-03-09 PROCEDURE — 2580000003 HC RX 258: Performed by: STUDENT IN AN ORGANIZED HEALTH CARE EDUCATION/TRAINING PROGRAM

## 2019-03-09 PROCEDURE — 97530 THERAPEUTIC ACTIVITIES: CPT

## 2019-03-09 PROCEDURE — 87324 CLOSTRIDIUM AG IA: CPT

## 2019-03-09 PROCEDURE — 93005 ELECTROCARDIOGRAM TRACING: CPT

## 2019-03-09 PROCEDURE — 6370000000 HC RX 637 (ALT 250 FOR IP): Performed by: STUDENT IN AN ORGANIZED HEALTH CARE EDUCATION/TRAINING PROGRAM

## 2019-03-09 PROCEDURE — 97161 PT EVAL LOW COMPLEX 20 MIN: CPT

## 2019-03-09 PROCEDURE — 99223 1ST HOSP IP/OBS HIGH 75: CPT | Performed by: INTERNAL MEDICINE

## 2019-03-09 PROCEDURE — 83630 LACTOFERRIN FECAL (QUAL): CPT

## 2019-03-09 PROCEDURE — 94762 N-INVAS EAR/PLS OXIMTRY CONT: CPT

## 2019-03-09 PROCEDURE — 87486 CHLMYD PNEUM DNA AMP PROBE: CPT

## 2019-03-09 PROCEDURE — 87581 M.PNEUMON DNA AMP PROBE: CPT

## 2019-03-09 PROCEDURE — 94664 DEMO&/EVAL PT USE INHALER: CPT

## 2019-03-09 PROCEDURE — 87205 SMEAR GRAM STAIN: CPT

## 2019-03-09 PROCEDURE — 80307 DRUG TEST PRSMV CHEM ANLYZR: CPT

## 2019-03-09 PROCEDURE — 87252 VIRUS INOCULATION TISSUE: CPT

## 2019-03-09 RX ORDER — MAGNESIUM SULFATE 1 G/100ML
1 INJECTION INTRAVENOUS PRN
Status: DISCONTINUED | OUTPATIENT
Start: 2019-03-09 | End: 2019-03-11 | Stop reason: HOSPADM

## 2019-03-09 RX ORDER — TRAMADOL HYDROCHLORIDE 50 MG/1
25 TABLET ORAL EVERY 6 HOURS PRN
Status: DISCONTINUED | OUTPATIENT
Start: 2019-03-09 | End: 2019-03-11 | Stop reason: HOSPADM

## 2019-03-09 RX ORDER — SODIUM CHLORIDE 0.9 % (FLUSH) 0.9 %
10 SYRINGE (ML) INJECTION EVERY 12 HOURS SCHEDULED
Status: DISCONTINUED | OUTPATIENT
Start: 2019-03-09 | End: 2019-03-11 | Stop reason: HOSPADM

## 2019-03-09 RX ORDER — DILTIAZEM HYDROCHLORIDE 120 MG/1
120 CAPSULE, COATED, EXTENDED RELEASE ORAL DAILY
Status: DISCONTINUED | OUTPATIENT
Start: 2019-03-09 | End: 2019-03-11 | Stop reason: HOSPADM

## 2019-03-09 RX ORDER — SODIUM CHLORIDE 9 MG/ML
INJECTION, SOLUTION INTRAVENOUS CONTINUOUS
Status: DISCONTINUED | OUTPATIENT
Start: 2019-03-09 | End: 2019-03-11 | Stop reason: HOSPADM

## 2019-03-09 RX ORDER — ACETAMINOPHEN 325 MG/1
650 TABLET ORAL EVERY 4 HOURS PRN
Status: DISCONTINUED | OUTPATIENT
Start: 2019-03-09 | End: 2019-03-09 | Stop reason: SDUPTHER

## 2019-03-09 RX ORDER — NICOTINE 21 MG/24HR
1 PATCH, TRANSDERMAL 24 HOURS TRANSDERMAL DAILY
Status: DISCONTINUED | OUTPATIENT
Start: 2019-03-09 | End: 2019-03-11 | Stop reason: HOSPADM

## 2019-03-09 RX ORDER — FOLIC ACID 1 MG/1
1 TABLET ORAL DAILY
Status: DISCONTINUED | OUTPATIENT
Start: 2019-03-09 | End: 2019-03-11 | Stop reason: HOSPADM

## 2019-03-09 RX ORDER — ASPIRIN 81 MG/1
81 TABLET ORAL DAILY
Status: DISCONTINUED | OUTPATIENT
Start: 2019-03-09 | End: 2019-03-11 | Stop reason: HOSPADM

## 2019-03-09 RX ORDER — OSELTAMIVIR PHOSPHATE 75 MG/1
75 CAPSULE ORAL 2 TIMES DAILY
Status: DISCONTINUED | OUTPATIENT
Start: 2019-03-09 | End: 2019-03-11 | Stop reason: HOSPADM

## 2019-03-09 RX ORDER — PANTOPRAZOLE SODIUM 40 MG/1
40 TABLET, DELAYED RELEASE ORAL DAILY
Status: DISCONTINUED | OUTPATIENT
Start: 2019-03-09 | End: 2019-03-11 | Stop reason: HOSPADM

## 2019-03-09 RX ORDER — POTASSIUM CHLORIDE 7.45 MG/ML
10 INJECTION INTRAVENOUS PRN
Status: DISCONTINUED | OUTPATIENT
Start: 2019-03-09 | End: 2019-03-11 | Stop reason: HOSPADM

## 2019-03-09 RX ORDER — SODIUM CHLORIDE 0.9 % (FLUSH) 0.9 %
10 SYRINGE (ML) INJECTION PRN
Status: DISCONTINUED | OUTPATIENT
Start: 2019-03-09 | End: 2019-03-11 | Stop reason: HOSPADM

## 2019-03-09 RX ORDER — LEVETIRACETAM 500 MG/1
500 TABLET ORAL 2 TIMES DAILY
Status: DISCONTINUED | OUTPATIENT
Start: 2019-03-09 | End: 2019-03-11 | Stop reason: HOSPADM

## 2019-03-09 RX ORDER — MORPHINE SULFATE 4 MG/ML
4 INJECTION, SOLUTION INTRAMUSCULAR; INTRAVENOUS
Status: DISCONTINUED | OUTPATIENT
Start: 2019-03-09 | End: 2019-03-10

## 2019-03-09 RX ORDER — METOPROLOL SUCCINATE 50 MG/1
50 TABLET, EXTENDED RELEASE ORAL DAILY
Status: DISCONTINUED | OUTPATIENT
Start: 2019-03-09 | End: 2019-03-11 | Stop reason: HOSPADM

## 2019-03-09 RX ORDER — POTASSIUM CHLORIDE 20MEQ/15ML
40 LIQUID (ML) ORAL PRN
Status: DISCONTINUED | OUTPATIENT
Start: 2019-03-09 | End: 2019-03-11 | Stop reason: HOSPADM

## 2019-03-09 RX ORDER — MORPHINE SULFATE 4 MG/ML
2 INJECTION, SOLUTION INTRAMUSCULAR; INTRAVENOUS
Status: DISCONTINUED | OUTPATIENT
Start: 2019-03-09 | End: 2019-03-10

## 2019-03-09 RX ORDER — LEVOFLOXACIN 750 MG/1
750 TABLET ORAL DAILY
Status: DISCONTINUED | OUTPATIENT
Start: 2019-03-09 | End: 2019-03-11 | Stop reason: HOSPADM

## 2019-03-09 RX ORDER — POTASSIUM CHLORIDE 20 MEQ/1
40 TABLET, EXTENDED RELEASE ORAL PRN
Status: DISCONTINUED | OUTPATIENT
Start: 2019-03-09 | End: 2019-03-11 | Stop reason: HOSPADM

## 2019-03-09 RX ORDER — ONDANSETRON 2 MG/ML
4 INJECTION INTRAMUSCULAR; INTRAVENOUS EVERY 6 HOURS PRN
Status: DISCONTINUED | OUTPATIENT
Start: 2019-03-09 | End: 2019-03-11 | Stop reason: HOSPADM

## 2019-03-09 RX ORDER — QUETIAPINE FUMARATE 200 MG/1
200 TABLET, FILM COATED ORAL NIGHTLY
Status: DISCONTINUED | OUTPATIENT
Start: 2019-03-09 | End: 2019-03-11 | Stop reason: HOSPADM

## 2019-03-09 RX ORDER — THIAMINE MONONITRATE (VIT B1) 100 MG
100 TABLET ORAL DAILY
Status: DISCONTINUED | OUTPATIENT
Start: 2019-03-09 | End: 2019-03-11 | Stop reason: HOSPADM

## 2019-03-09 RX ORDER — ALBUTEROL SULFATE 2.5 MG/3ML
2.5 SOLUTION RESPIRATORY (INHALATION) EVERY 6 HOURS PRN
Status: DISCONTINUED | OUTPATIENT
Start: 2019-03-09 | End: 2019-03-11 | Stop reason: HOSPADM

## 2019-03-09 RX ORDER — ACETAMINOPHEN 325 MG/1
650 TABLET ORAL EVERY 4 HOURS PRN
Status: DISCONTINUED | OUTPATIENT
Start: 2019-03-09 | End: 2019-03-11 | Stop reason: HOSPADM

## 2019-03-09 RX ADMIN — SODIUM CHLORIDE: 9 INJECTION, SOLUTION INTRAVENOUS at 03:00

## 2019-03-09 RX ADMIN — ONDANSETRON 4 MG: 2 INJECTION INTRAMUSCULAR; INTRAVENOUS at 22:36

## 2019-03-09 RX ADMIN — Medication 100 MG: at 07:53

## 2019-03-09 RX ADMIN — OSELTAMIVIR PHOSPHATE 75 MG: 75 CAPSULE ORAL at 22:38

## 2019-03-09 RX ADMIN — ENOXAPARIN SODIUM 40 MG: 40 INJECTION SUBCUTANEOUS at 07:53

## 2019-03-09 RX ADMIN — VITAMIN D, TAB 1000IU (100/BT) 2000 UNITS: 25 TAB at 07:53

## 2019-03-09 RX ADMIN — MORPHINE SULFATE 4 MG: 4 INJECTION INTRAVENOUS at 05:12

## 2019-03-09 RX ADMIN — FOLIC ACID 1 MG: 1 TABLET ORAL at 07:53

## 2019-03-09 RX ADMIN — LEVETIRACETAM 500 MG: 500 TABLET, FILM COATED ORAL at 07:53

## 2019-03-09 RX ADMIN — SODIUM CHLORIDE: 9 INJECTION, SOLUTION INTRAVENOUS at 22:31

## 2019-03-09 RX ADMIN — METOPROLOL SUCCINATE 50 MG: 50 TABLET, FILM COATED, EXTENDED RELEASE ORAL at 07:53

## 2019-03-09 RX ADMIN — MOMETASONE FUROATE AND FORMOTEROL FUMARATE DIHYDRATE 2 PUFF: 200; 5 AEROSOL RESPIRATORY (INHALATION) at 08:55

## 2019-03-09 RX ADMIN — DILTIAZEM HYDROCHLORIDE 120 MG: 120 CAPSULE, COATED, EXTENDED RELEASE ORAL at 07:53

## 2019-03-09 RX ADMIN — FENTANYL CITRATE 25 MCG: 50 INJECTION, SOLUTION INTRAMUSCULAR; INTRAVENOUS at 00:02

## 2019-03-09 RX ADMIN — PANTOPRAZOLE SODIUM 40 MG: 40 TABLET, DELAYED RELEASE ORAL at 07:53

## 2019-03-09 RX ADMIN — MORPHINE SULFATE 4 MG: 4 INJECTION INTRAVENOUS at 14:10

## 2019-03-09 RX ADMIN — MORPHINE SULFATE 4 MG: 4 INJECTION INTRAVENOUS at 11:56

## 2019-03-09 RX ADMIN — MORPHINE SULFATE 4 MG: 4 INJECTION INTRAVENOUS at 08:02

## 2019-03-09 RX ADMIN — MORPHINE SULFATE 4 MG: 4 INJECTION INTRAVENOUS at 22:31

## 2019-03-09 RX ADMIN — LURASIDONE HYDROCHLORIDE 120 MG: 60 TABLET, FILM COATED ORAL at 07:53

## 2019-03-09 RX ADMIN — ASPIRIN 81 MG: 81 TABLET ORAL at 07:53

## 2019-03-09 RX ADMIN — LEVETIRACETAM 500 MG: 500 TABLET, FILM COATED ORAL at 04:22

## 2019-03-09 RX ADMIN — LEVOFLOXACIN 750 MG: 750 TABLET, FILM COATED ORAL at 07:53

## 2019-03-09 RX ADMIN — MORPHINE SULFATE 4 MG: 4 INJECTION INTRAVENOUS at 16:07

## 2019-03-09 RX ADMIN — SODIUM CHLORIDE: 9 INJECTION, SOLUTION INTRAVENOUS at 11:56

## 2019-03-09 RX ADMIN — OSELTAMIVIR PHOSPHATE 75 MG: 75 CAPSULE ORAL at 14:10

## 2019-03-09 RX ADMIN — MORPHINE SULFATE 4 MG: 4 INJECTION INTRAVENOUS at 03:06

## 2019-03-09 RX ADMIN — MOMETASONE FUROATE AND FORMOTEROL FUMARATE DIHYDRATE 2 PUFF: 200; 5 AEROSOL RESPIRATORY (INHALATION) at 20:51

## 2019-03-09 RX ADMIN — Medication 10 ML: at 22:38

## 2019-03-09 RX ADMIN — QUETIAPINE FUMARATE 200 MG: 200 TABLET ORAL at 22:38

## 2019-03-09 RX ADMIN — TIOTROPIUM BROMIDE 18 MCG: 18 CAPSULE ORAL; RESPIRATORY (INHALATION) at 08:55

## 2019-03-09 RX ADMIN — LEVETIRACETAM 500 MG: 500 TABLET, FILM COATED ORAL at 22:38

## 2019-03-09 ASSESSMENT — PAIN DESCRIPTION - LOCATION
LOCATION: BACK
LOCATION: BACK

## 2019-03-09 ASSESSMENT — ENCOUNTER SYMPTOMS
WHEEZING: 0
ABDOMINAL PAIN: 0
TROUBLE SWALLOWING: 0
SHORTNESS OF BREATH: 1
VOMITING: 0
VOICE CHANGE: 0
BACK PAIN: 0
NAUSEA: 0
COUGH: 1

## 2019-03-09 ASSESSMENT — PAIN SCALES - GENERAL
PAINLEVEL_OUTOF10: 8
PAINLEVEL_OUTOF10: 7
PAINLEVEL_OUTOF10: 10
PAINLEVEL_OUTOF10: 8
PAINLEVEL_OUTOF10: 10
PAINLEVEL_OUTOF10: 8
PAINLEVEL_OUTOF10: 7
PAINLEVEL_OUTOF10: 7
PAINLEVEL_OUTOF10: 8
PAINLEVEL_OUTOF10: 8

## 2019-03-09 ASSESSMENT — PAIN DESCRIPTION - PAIN TYPE: TYPE: CHRONIC PAIN

## 2019-03-09 ASSESSMENT — PAIN DESCRIPTION - FREQUENCY: FREQUENCY: CONTINUOUS

## 2019-03-10 LAB
ABSOLUTE EOS #: 0 K/UL (ref 0–0.4)
ABSOLUTE IMMATURE GRANULOCYTE: 0 K/UL (ref 0–0.3)
ABSOLUTE LYMPH #: 1.68 K/UL (ref 1–4.8)
ABSOLUTE MONO #: 0.74 K/UL (ref 0.1–0.8)
ANION GAP SERPL CALCULATED.3IONS-SCNC: 11 MMOL/L (ref 9–17)
BASOPHILS # BLD: 0 % (ref 0–2)
BASOPHILS ABSOLUTE: 0 K/UL (ref 0–0.2)
BUN BLDV-MCNC: 11 MG/DL (ref 6–20)
BUN/CREAT BLD: ABNORMAL (ref 9–20)
CALCIUM SERPL-MCNC: 8.8 MG/DL (ref 8.6–10.4)
CHLORIDE BLD-SCNC: 106 MMOL/L (ref 98–107)
CO2: 27 MMOL/L (ref 20–31)
CREAT SERPL-MCNC: 0.62 MG/DL (ref 0.7–1.2)
CULTURE: ABNORMAL
CULTURE: NORMAL
DIFFERENTIAL TYPE: ABNORMAL
DIRECT EXAM: ABNORMAL
EOSINOPHILS RELATIVE PERCENT: 0 % (ref 1–4)
GFR AFRICAN AMERICAN: >60 ML/MIN
GFR NON-AFRICAN AMERICAN: >60 ML/MIN
GFR SERPL CREATININE-BSD FRML MDRD: ABNORMAL ML/MIN/{1.73_M2}
GFR SERPL CREATININE-BSD FRML MDRD: ABNORMAL ML/MIN/{1.73_M2}
GLUCOSE BLD-MCNC: 101 MG/DL (ref 70–99)
HCT VFR BLD CALC: 43.1 % (ref 40.7–50.3)
HEMOGLOBIN: 13.4 G/DL (ref 13–17)
IMMATURE GRANULOCYTES: 0 %
LIPASE: 15 U/L (ref 13–60)
LYMPHOCYTES # BLD: 25 % (ref 24–44)
Lab: ABNORMAL
Lab: NORMAL
MAGNESIUM: 2.1 MG/DL (ref 1.6–2.6)
MCH RBC QN AUTO: 30.7 PG (ref 25.2–33.5)
MCHC RBC AUTO-ENTMCNC: 31.1 G/DL (ref 28.4–34.8)
MCV RBC AUTO: 98.6 FL (ref 82.6–102.9)
MONOCYTES # BLD: 11 % (ref 1–7)
MORPHOLOGY: NORMAL
NRBC AUTOMATED: 0 PER 100 WBC
PDW BLD-RTO: 12.5 % (ref 11.8–14.4)
PLATELET # BLD: 201 K/UL (ref 138–453)
PLATELET ESTIMATE: ABNORMAL
PMV BLD AUTO: 10.9 FL (ref 8.1–13.5)
POTASSIUM SERPL-SCNC: 3.8 MMOL/L (ref 3.7–5.3)
RBC # BLD: 4.37 M/UL (ref 4.21–5.77)
RBC # BLD: ABNORMAL 10*6/UL
SEG NEUTROPHILS: 64 % (ref 36–66)
SEGMENTED NEUTROPHILS ABSOLUTE COUNT: 4.28 K/UL (ref 1.8–7.7)
SODIUM BLD-SCNC: 144 MMOL/L (ref 135–144)
SPECIMEN DESCRIPTION: ABNORMAL
SPECIMEN DESCRIPTION: NORMAL
WBC # BLD: 6.7 K/UL (ref 3.5–11.3)
WBC # BLD: ABNORMAL 10*3/UL

## 2019-03-10 PROCEDURE — 6370000000 HC RX 637 (ALT 250 FOR IP): Performed by: STUDENT IN AN ORGANIZED HEALTH CARE EDUCATION/TRAINING PROGRAM

## 2019-03-10 PROCEDURE — 85025 COMPLETE CBC W/AUTO DIFF WBC: CPT

## 2019-03-10 PROCEDURE — 1200000000 HC SEMI PRIVATE

## 2019-03-10 PROCEDURE — 36415 COLL VENOUS BLD VENIPUNCTURE: CPT

## 2019-03-10 PROCEDURE — 99233 SBSQ HOSP IP/OBS HIGH 50: CPT | Performed by: INTERNAL MEDICINE

## 2019-03-10 PROCEDURE — 80048 BASIC METABOLIC PNL TOTAL CA: CPT

## 2019-03-10 PROCEDURE — 6360000002 HC RX W HCPCS: Performed by: STUDENT IN AN ORGANIZED HEALTH CARE EDUCATION/TRAINING PROGRAM

## 2019-03-10 PROCEDURE — 6360000002 HC RX W HCPCS: Performed by: INTERNAL MEDICINE

## 2019-03-10 PROCEDURE — 94762 N-INVAS EAR/PLS OXIMTRY CONT: CPT

## 2019-03-10 PROCEDURE — 83690 ASSAY OF LIPASE: CPT

## 2019-03-10 PROCEDURE — 97166 OT EVAL MOD COMPLEX 45 MIN: CPT

## 2019-03-10 PROCEDURE — 97530 THERAPEUTIC ACTIVITIES: CPT

## 2019-03-10 PROCEDURE — 83735 ASSAY OF MAGNESIUM: CPT

## 2019-03-10 PROCEDURE — 94640 AIRWAY INHALATION TREATMENT: CPT

## 2019-03-10 PROCEDURE — 2700000000 HC OXYGEN THERAPY PER DAY

## 2019-03-10 RX ORDER — MORPHINE SULFATE 4 MG/ML
2 INJECTION, SOLUTION INTRAMUSCULAR; INTRAVENOUS EVERY 4 HOURS PRN
Status: DISCONTINUED | OUTPATIENT
Start: 2019-03-10 | End: 2019-03-11

## 2019-03-10 RX ADMIN — OSELTAMIVIR PHOSPHATE 75 MG: 75 CAPSULE ORAL at 09:30

## 2019-03-10 RX ADMIN — MORPHINE SULFATE 4 MG: 4 INJECTION INTRAVENOUS at 06:25

## 2019-03-10 RX ADMIN — VITAMIN D, TAB 1000IU (100/BT) 2000 UNITS: 25 TAB at 09:30

## 2019-03-10 RX ADMIN — Medication 100 MG: at 09:30

## 2019-03-10 RX ADMIN — LEVETIRACETAM 500 MG: 500 TABLET, FILM COATED ORAL at 20:04

## 2019-03-10 RX ADMIN — ASPIRIN 81 MG: 81 TABLET ORAL at 09:31

## 2019-03-10 RX ADMIN — MOMETASONE FUROATE AND FORMOTEROL FUMARATE DIHYDRATE 2 PUFF: 200; 5 AEROSOL RESPIRATORY (INHALATION) at 20:59

## 2019-03-10 RX ADMIN — LEVETIRACETAM 500 MG: 500 TABLET, FILM COATED ORAL at 09:30

## 2019-03-10 RX ADMIN — QUETIAPINE FUMARATE 200 MG: 200 TABLET ORAL at 20:04

## 2019-03-10 RX ADMIN — METOPROLOL SUCCINATE 50 MG: 50 TABLET, FILM COATED, EXTENDED RELEASE ORAL at 09:30

## 2019-03-10 RX ADMIN — FOLIC ACID 1 MG: 1 TABLET ORAL at 09:31

## 2019-03-10 RX ADMIN — PANTOPRAZOLE SODIUM 40 MG: 40 TABLET, DELAYED RELEASE ORAL at 09:30

## 2019-03-10 RX ADMIN — OSELTAMIVIR PHOSPHATE 75 MG: 75 CAPSULE ORAL at 20:04

## 2019-03-10 RX ADMIN — DILTIAZEM HYDROCHLORIDE 120 MG: 120 CAPSULE, COATED, EXTENDED RELEASE ORAL at 09:31

## 2019-03-10 RX ADMIN — ENOXAPARIN SODIUM 40 MG: 40 INJECTION SUBCUTANEOUS at 09:31

## 2019-03-10 RX ADMIN — MOMETASONE FUROATE AND FORMOTEROL FUMARATE DIHYDRATE 2 PUFF: 200; 5 AEROSOL RESPIRATORY (INHALATION) at 07:49

## 2019-03-10 RX ADMIN — LEVOFLOXACIN 750 MG: 750 TABLET, FILM COATED ORAL at 09:30

## 2019-03-10 RX ADMIN — LURASIDONE HYDROCHLORIDE 120 MG: 60 TABLET, FILM COATED ORAL at 09:30

## 2019-03-10 RX ADMIN — TIOTROPIUM BROMIDE 18 MCG: 18 CAPSULE ORAL; RESPIRATORY (INHALATION) at 07:49

## 2019-03-10 ASSESSMENT — ENCOUNTER SYMPTOMS
SORE THROAT: 0
PHOTOPHOBIA: 0
RHINORRHEA: 0
VOMITING: 0
WHEEZING: 0
SHORTNESS OF BREATH: 1
ABDOMINAL PAIN: 0
SINUS PAIN: 0
NAUSEA: 1
COUGH: 1

## 2019-03-10 ASSESSMENT — PAIN DESCRIPTION - PAIN TYPE: TYPE: CHRONIC PAIN;ACUTE PAIN

## 2019-03-10 ASSESSMENT — PAIN SCALES - GENERAL
PAINLEVEL_OUTOF10: 7
PAINLEVEL_OUTOF10: 7
PAINLEVEL_OUTOF10: 0

## 2019-03-10 ASSESSMENT — PAIN DESCRIPTION - LOCATION: LOCATION: BACK

## 2019-03-11 VITALS
WEIGHT: 193.34 LBS | HEIGHT: 65 IN | HEART RATE: 81 BPM | OXYGEN SATURATION: 92 % | SYSTOLIC BLOOD PRESSURE: 144 MMHG | BODY MASS INDEX: 32.21 KG/M2 | TEMPERATURE: 98.4 F | RESPIRATION RATE: 27 BRPM | DIASTOLIC BLOOD PRESSURE: 91 MMHG

## 2019-03-11 LAB
EKG ATRIAL RATE: 101 BPM
EKG P AXIS: 77 DEGREES
EKG P-R INTERVAL: 138 MS
EKG Q-T INTERVAL: 362 MS
EKG QRS DURATION: 130 MS
EKG QTC CALCULATION (BAZETT): 469 MS
EKG R AXIS: 86 DEGREES
EKG T AXIS: 19 DEGREES
EKG VENTRICULAR RATE: 101 BPM

## 2019-03-11 PROCEDURE — 94762 N-INVAS EAR/PLS OXIMTRY CONT: CPT

## 2019-03-11 PROCEDURE — 2700000000 HC OXYGEN THERAPY PER DAY

## 2019-03-11 PROCEDURE — 97116 GAIT TRAINING THERAPY: CPT

## 2019-03-11 PROCEDURE — 6370000000 HC RX 637 (ALT 250 FOR IP): Performed by: STUDENT IN AN ORGANIZED HEALTH CARE EDUCATION/TRAINING PROGRAM

## 2019-03-11 PROCEDURE — 97110 THERAPEUTIC EXERCISES: CPT

## 2019-03-11 PROCEDURE — 99239 HOSP IP/OBS DSCHRG MGMT >30: CPT | Performed by: INTERNAL MEDICINE

## 2019-03-11 PROCEDURE — 76937 US GUIDE VASCULAR ACCESS: CPT

## 2019-03-11 PROCEDURE — 94640 AIRWAY INHALATION TREATMENT: CPT

## 2019-03-11 RX ORDER — LEVOFLOXACIN 750 MG/1
750 TABLET ORAL DAILY
Qty: 4 TABLET | Refills: 0 | Status: SHIPPED | OUTPATIENT
Start: 2019-03-12 | End: 2019-03-16

## 2019-03-11 RX ORDER — OSELTAMIVIR PHOSPHATE 75 MG/1
75 CAPSULE ORAL 2 TIMES DAILY
Qty: 4 CAPSULE | Refills: 0 | Status: SHIPPED | OUTPATIENT
Start: 2019-03-11 | End: 2019-03-13

## 2019-03-11 RX ADMIN — VITAMIN D, TAB 1000IU (100/BT) 2000 UNITS: 25 TAB at 10:55

## 2019-03-11 RX ADMIN — LURASIDONE HYDROCHLORIDE 120 MG: 60 TABLET, FILM COATED ORAL at 10:56

## 2019-03-11 RX ADMIN — Medication 100 MG: at 10:55

## 2019-03-11 RX ADMIN — OSELTAMIVIR PHOSPHATE 75 MG: 75 CAPSULE ORAL at 10:57

## 2019-03-11 RX ADMIN — TIOTROPIUM BROMIDE 18 MCG: 18 CAPSULE ORAL; RESPIRATORY (INHALATION) at 08:35

## 2019-03-11 RX ADMIN — DILTIAZEM HYDROCHLORIDE 120 MG: 120 CAPSULE, COATED, EXTENDED RELEASE ORAL at 10:54

## 2019-03-11 RX ADMIN — LEVOFLOXACIN 750 MG: 750 TABLET, FILM COATED ORAL at 10:55

## 2019-03-11 RX ADMIN — FOLIC ACID 1 MG: 1 TABLET ORAL at 10:57

## 2019-03-11 RX ADMIN — LEVETIRACETAM 500 MG: 500 TABLET, FILM COATED ORAL at 10:56

## 2019-03-11 RX ADMIN — MOMETASONE FUROATE AND FORMOTEROL FUMARATE DIHYDRATE 2 PUFF: 200; 5 AEROSOL RESPIRATORY (INHALATION) at 08:35

## 2019-03-11 RX ADMIN — METOPROLOL SUCCINATE 50 MG: 50 TABLET, FILM COATED, EXTENDED RELEASE ORAL at 10:56

## 2019-03-11 RX ADMIN — ASPIRIN 81 MG: 81 TABLET ORAL at 10:56

## 2019-03-11 RX ADMIN — PANTOPRAZOLE SODIUM 40 MG: 40 TABLET, DELAYED RELEASE ORAL at 10:54

## 2019-03-11 ASSESSMENT — ENCOUNTER SYMPTOMS
SHORTNESS OF BREATH: 1
ABDOMINAL PAIN: 0
VOMITING: 0
RHINORRHEA: 0
COUGH: 1
NAUSEA: 0
SINUS PAIN: 0
WHEEZING: 0
SORE THROAT: 0
PHOTOPHOBIA: 0

## 2019-03-12 LAB
EKG ATRIAL RATE: 84 BPM
EKG P AXIS: 47 DEGREES
EKG P-R INTERVAL: 150 MS
EKG Q-T INTERVAL: 438 MS
EKG QRS DURATION: 132 MS
EKG QTC CALCULATION (BAZETT): 517 MS
EKG R AXIS: 83 DEGREES
EKG T AXIS: 35 DEGREES
EKG VENTRICULAR RATE: 84 BPM

## 2019-03-14 LAB
CULTURE: NORMAL
Lab: NORMAL
SPECIMEN DESCRIPTION: NORMAL

## 2019-03-15 LAB
CULTURE: NORMAL
Lab: NORMAL
SPECIMEN DESCRIPTION: NORMAL

## 2019-06-03 ENCOUNTER — HOSPITAL ENCOUNTER (OUTPATIENT)
Dept: CARDIAC CATH/INVASIVE PROCEDURES | Age: 54
Discharge: HOME | End: 2019-06-03
Payer: MEDICARE

## 2019-06-03 VITALS
RESPIRATION RATE: 18 BRPM | HEIGHT: 65 IN | BODY MASS INDEX: 30.32 KG/M2 | WEIGHT: 182 LBS | TEMPERATURE: 97.5 F | OXYGEN SATURATION: 97 % | SYSTOLIC BLOOD PRESSURE: 111 MMHG | HEART RATE: 73 BPM | DIASTOLIC BLOOD PRESSURE: 73 MMHG

## 2019-06-03 LAB
EKG ATRIAL RATE: 66 BPM
EKG P AXIS: 72 DEGREES
EKG P-R INTERVAL: 172 MS
EKG Q-T INTERVAL: 448 MS
EKG QRS DURATION: 138 MS
EKG QTC CALCULATION (BAZETT): 469 MS
EKG R AXIS: 83 DEGREES
EKG T AXIS: 48 DEGREES
EKG VENTRICULAR RATE: 66 BPM
GFR NON-AFRICAN AMERICAN: 59 ML/MIN
GFR SERPL CREATININE-BSD FRML MDRD: >60 ML/MIN
GFR SERPL CREATININE-BSD FRML MDRD: ABNORMAL ML/MIN/{1.73_M2}
GLUCOSE BLD-MCNC: 102 MG/DL (ref 74–100)
PLATELET # BLD: 218 K/UL (ref 138–453)
POC CHLORIDE: 102 MMOL/L (ref 98–107)
POC CREATININE: 1.26 MG/DL (ref 0.51–1.19)
POC HEMATOCRIT: 46 % (ref 41–53)
POC HEMOGLOBIN: 15.7 G/DL (ref 13.5–17.5)
POC IONIZED CALCIUM: 1.22 MMOL/L (ref 1.15–1.33)
POC POTASSIUM: 4 MMOL/L (ref 3.5–4.5)
POC SODIUM: 142 MMOL/L (ref 138–146)

## 2019-06-03 PROCEDURE — 84132 ASSAY OF SERUM POTASSIUM: CPT

## 2019-06-03 PROCEDURE — 85014 HEMATOCRIT: CPT

## 2019-06-03 PROCEDURE — 2709999900 HC NON-CHARGEABLE SUPPLY

## 2019-06-03 PROCEDURE — C1730 CATH, EP, 19 OR FEW ELECT: HCPCS

## 2019-06-03 PROCEDURE — 93005 ELECTROCARDIOGRAM TRACING: CPT | Performed by: INTERNAL MEDICINE

## 2019-06-03 PROCEDURE — 82947 ASSAY GLUCOSE BLOOD QUANT: CPT

## 2019-06-03 PROCEDURE — 82435 ASSAY OF BLOOD CHLORIDE: CPT

## 2019-06-03 PROCEDURE — 7100000011 HC PHASE II RECOVERY - ADDTL 15 MIN

## 2019-06-03 PROCEDURE — 84295 ASSAY OF SERUM SODIUM: CPT

## 2019-06-03 PROCEDURE — C1894 INTRO/SHEATH, NON-LASER: HCPCS

## 2019-06-03 PROCEDURE — 82565 ASSAY OF CREATININE: CPT

## 2019-06-03 PROCEDURE — 85049 AUTOMATED PLATELET COUNT: CPT

## 2019-06-03 PROCEDURE — 7100000010 HC PHASE II RECOVERY - FIRST 15 MIN

## 2019-06-03 PROCEDURE — 93620 COMP EP EVL R AT VEN PAC&REC: CPT | Performed by: INTERNAL MEDICINE

## 2019-06-03 PROCEDURE — 6360000002 HC RX W HCPCS

## 2019-06-03 PROCEDURE — 2500000003 HC RX 250 WO HCPCS

## 2019-06-03 PROCEDURE — 82330 ASSAY OF CALCIUM: CPT

## 2019-06-03 PROCEDURE — 93623 PRGRMD STIMJ&PACG IV RX NFS: CPT | Performed by: INTERNAL MEDICINE

## 2019-06-03 RX ORDER — LOSARTAN POTASSIUM 50 MG/1
50 TABLET ORAL
COMMUNITY
End: 2021-07-12

## 2019-06-03 NOTE — H&P
Vladislav Logan is an 62 y.o.  male. He presents for Elective svt ablation. He has a history of cad and copd. He is chronically short of breath. Past Medical History:   Diagnosis Date    Arthritis     CAD (coronary artery disease)     Cancer ([facility]     prostate    Cirrhosis with alcoholism ([facility]     [facility]     Depression     BOURGEOIS (dyspnea on exertion)     H/O prostate cancer     Hyperlipidemia     Hypertension     [facility] infarction) [facility]     Seizures ([facility] 2016    last one over 2 years ago     SVT (supraventricular tachycardia) ([facility]     Tobacco abuse        Allergies: No Known Allergies    Active Problems:    * No active hospital problems. *  Resolved Problems:    * No resolved hospital problems. *    Blood pressure 124/76, pulse 71, temperature 97.5 °F (36.4 °C), temperature source Oral, resp. rate 19, height 5' 5\" (1.651 m), weight 182 lb (82.6 kg), SpO2 96 %. Review of Systems  None except for palpitations and shortness of breath. Physical Exam   Constitutional: He is oriented to person, place, and time. He appears well-developed. HENT:   Head: Normocephalic and atraumatic. Eyes: Pupils are equal, round, and reactive to light. Cardiovascular: Normal rate, regular rhythm, normal heart sounds and intact distal pulses. Pulmonary/Chest: Breath sounds normal.   Abdominal: Soft. Bowel sounds are normal.   Musculoskeletal: Normal range of motion. Neurological: He is alert and oriented to person, place, and time. Assessment:  Paroxysmal SVT    Plan:  Patient has paroxysmal supraventricular tachycardia which has failed medical therapy. Will proceed with EP study and ablation. The indication, risks, benefits and alternatives were discussed and he agreed. All his questions were answered to his satisfaction.     Chantel Richmond MD  6/3/2019

## 2019-06-03 NOTE — PROGRESS NOTES
All discharge instructions reviewed, questions answered, paper signed and given copy. Patient discharged per MD orders with family and all belongings.

## 2019-06-04 NOTE — OP NOTE
[facility]                             OPERATIVE REPORT    PATIENT NAME: Edith Moyer                     :         1965  MED REC NO:   [id number]                             ROOM:  ACCOUNT NO:   [de-identified]                           ADMIT DATE:  2019  PROVIDER:     Amy López    ELECTROPHYSIOLOGY STUDY    DATE OF PROCEDURE:  2019INDICATION:  Recurrent palpitations  and SVT. HISTORY OF PRESENT ILLNESS:  The patient is a pleasant 66-year-old  gentleman with past medical history of recurrent chest pain and  palpitations. He had a loop recorder implanted to evaluate for the  palpitations, and he had episodes of SVT. He, therefore, presents  for EP study and ablation. PROCEDURE NOTE:  The patient was brought to the electrophysiology  lab in a fasting state and hooked onto continuous hemodynamic  monitoring. This included continuous pulse oximetry, telemetry,  and intermittent blood pressure recording. He was prepped and  draped in the usual sterile fashion. Using ultrasound and modified  Seldinger technique, the right femoral vein was accessed 4 times,  and 4 sheaths were placed in the vein. The right femoral artery  was accessed inadvertently, and a 6-[place] sheath was placed in the  vein. A decapolar catheter was advanced in the coronary sinus. A  quadripolar catheter was advanced to His-Purkinje region, and 2  Janet catheters were advanced to the right atrium and right  ventricle respectively. At the beginning of the procedure, the  patient was in sinus rhythm. His HI interval was 174 milliseconds,  QRS duration of 135 milliseconds, right bundle-branch block  morphology, QT of 378 milliseconds, cycle length of 800  milliseconds, AH was 104 milliseconds, HV was 50 milliseconds. Electrophysiology study was started by pacing from the right  ventricle.   VA conduction was concentric and detrimental with VA  block at 460 milliseconds. There was no evidence of accessory  pathway conduction. Pacing from the atrium via the coronary sinus  showed an AV Wenckebach cycle length of 482 milliseconds. AV node  ERP was 370 milliseconds at the 600-millisecond drive train. There  was no evidence of dual AV node physiology. Tachycardia could not  be induced. Isuprel was then given up to 10 mcg, and aggressive pacing was made  to try and induce tachycardia; however, despite aggressive pacing,  tachycardia could not be induced. Isuprel was then discontinued  and allowed to wean off, and aggressive pacing was again done. No  tachycardia could be induced. Since no tachycardia could be  induced, the procedure was completed at this time. The Carto  mapping system was used to locate catheter positions during the  procedure. IMPRESSION:  1. Comprehensive electrophysiology study with left atrial pacing. 2.  Drug testing with isoproterenol. PLAN:  The patient to be observed for 2 hours and discharged home. He will follow up with me as an outpatient.         Deonte Camacho    D: 06/03/2019 12:58:03       T: 06/03/2019 13:37:45      [email or web address]  Job#: [id number]     Doc#: [id number]    CC:

## 2020-11-03 PROBLEM — J18.9 RLL PNEUMONIA: Status: RESOLVED | Noted: 2019-03-09 | Resolved: 2020-11-03

## 2021-03-22 ENCOUNTER — HOSPITAL ENCOUNTER (EMERGENCY)
Age: 56
Discharge: HOME | End: 2021-03-22
Attending: EMERGENCY MEDICINE
Payer: MEDICARE

## 2021-03-22 ENCOUNTER — APPOINTMENT (OUTPATIENT)
Dept: CT IMAGING | Age: 56
End: 2021-03-22
Payer: MEDICARE

## 2021-03-22 VITALS
HEART RATE: 100 BPM | OXYGEN SATURATION: 97 % | WEIGHT: 186 LBS | SYSTOLIC BLOOD PRESSURE: 155 MMHG | DIASTOLIC BLOOD PRESSURE: 99 MMHG | RESPIRATION RATE: 16 BRPM | BODY MASS INDEX: 30.99 KG/M2 | HEIGHT: 65 IN | TEMPERATURE: 97.9 F

## 2021-03-22 DIAGNOSIS — N62 GYNECOMASTIA: Primary | ICD-10-CM

## 2021-03-22 DIAGNOSIS — R16.0 HYPODENSE MASS OF LIVER: ICD-10-CM

## 2021-03-22 LAB
ABSOLUTE EOS #: 0.09 K/UL (ref 0–0.44)
ABSOLUTE IMMATURE GRANULOCYTE: 0.02 K/UL (ref 0–0.3)
ABSOLUTE LYMPH #: 2.05 K/UL (ref 1.1–3.7)
ABSOLUTE MONO #: 0.67 K/UL (ref 0.1–1.2)
ANION GAP SERPL CALCULATED.3IONS-SCNC: 13 MMOL/L (ref 9–17)
BASOPHILS # BLD: 1 % (ref 0–2)
BASOPHILS ABSOLUTE: 0.05 K/UL (ref 0–0.2)
BUN BLDV-MCNC: 12 MG/DL (ref 6–20)
BUN/CREAT BLD: 12 (ref 9–20)
CALCIUM SERPL-MCNC: 9.4 MG/DL (ref 8.6–10.4)
CHLORIDE BLD-SCNC: 97 MMOL/L (ref 98–107)
CO2: 28 MMOL/L (ref 20–31)
CREAT SERPL-MCNC: 0.97 MG/DL (ref 0.7–1.2)
DIFFERENTIAL TYPE: NORMAL
EOSINOPHILS RELATIVE PERCENT: 1 % (ref 1–4)
GFR AFRICAN AMERICAN: >60 ML/MIN
GFR NON-AFRICAN AMERICAN: >60 ML/MIN
GFR SERPL CREATININE-BSD FRML MDRD: ABNORMAL ML/MIN/{1.73_M2}
GFR SERPL CREATININE-BSD FRML MDRD: ABNORMAL ML/MIN/{1.73_M2}
GLUCOSE BLD-MCNC: 109 MG/DL (ref 70–99)
HCT VFR BLD CALC: 46.2 % (ref 40.7–50.3)
HEMOGLOBIN: 15.1 G/DL (ref 13–17)
IMMATURE GRANULOCYTES: 0 %
LYMPHOCYTES # BLD: 28 % (ref 24–43)
MCH RBC QN AUTO: 31.7 PG (ref 25.2–33.5)
MCHC RBC AUTO-ENTMCNC: 32.7 G/DL (ref 28.4–34.8)
MCV RBC AUTO: 96.9 FL (ref 82.6–102.9)
MONOCYTES # BLD: 9 % (ref 3–12)
NRBC AUTOMATED: 0 PER 100 WBC
PDW BLD-RTO: 12.9 % (ref 11.8–14.4)
PLATELET # BLD: 259 K/UL (ref 138–453)
PLATELET ESTIMATE: NORMAL
PMV BLD AUTO: 9.5 FL (ref 8.1–13.5)
POTASSIUM SERPL-SCNC: 3.8 MMOL/L (ref 3.7–5.3)
RBC # BLD: 4.77 M/UL (ref 4.21–5.77)
RBC # BLD: NORMAL 10*6/UL
SEG NEUTROPHILS: 61 % (ref 36–65)
SEGMENTED NEUTROPHILS ABSOLUTE COUNT: 4.45 K/UL (ref 1.5–8.1)
SODIUM BLD-SCNC: 138 MMOL/L (ref 135–144)
WBC # BLD: 7.3 K/UL (ref 3.5–11.3)
WBC # BLD: NORMAL 10*3/UL

## 2021-03-22 PROCEDURE — 93005 ELECTROCARDIOGRAM TRACING: CPT | Performed by: EMERGENCY MEDICINE

## 2021-03-22 PROCEDURE — 85025 COMPLETE CBC W/AUTO DIFF WBC: CPT

## 2021-03-22 PROCEDURE — 6360000004 HC RX CONTRAST MEDICATION: Performed by: EMERGENCY MEDICINE

## 2021-03-22 PROCEDURE — 80048 BASIC METABOLIC PNL TOTAL CA: CPT

## 2021-03-22 PROCEDURE — 6370000000 HC RX 637 (ALT 250 FOR IP): Performed by: EMERGENCY MEDICINE

## 2021-03-22 PROCEDURE — 99283 EMERGENCY DEPT VISIT LOW MDM: CPT

## 2021-03-22 PROCEDURE — 2580000003 HC RX 258: Performed by: EMERGENCY MEDICINE

## 2021-03-22 PROCEDURE — 71260 CT THORAX DX C+: CPT

## 2021-03-22 RX ORDER — OXYCODONE HYDROCHLORIDE AND ACETAMINOPHEN 5; 325 MG/1; MG/1
1 TABLET ORAL EVERY 6 HOURS PRN
Qty: 8 TABLET | Refills: 0 | Status: SHIPPED | OUTPATIENT
Start: 2021-03-22 | End: 2021-03-25

## 2021-03-22 RX ORDER — SODIUM CHLORIDE 0.9 % (FLUSH) 0.9 %
10 SYRINGE (ML) INJECTION PRN
Status: DISCONTINUED | OUTPATIENT
Start: 2021-03-22 | End: 2021-03-22 | Stop reason: HOSPADM

## 2021-03-22 RX ORDER — OXYCODONE HYDROCHLORIDE AND ACETAMINOPHEN 5; 325 MG/1; MG/1
1 TABLET ORAL ONCE
Status: COMPLETED | OUTPATIENT
Start: 2021-03-22 | End: 2021-03-22

## 2021-03-22 RX ORDER — 0.9 % SODIUM CHLORIDE 0.9 %
80 INTRAVENOUS SOLUTION INTRAVENOUS ONCE
Status: COMPLETED | OUTPATIENT
Start: 2021-03-22 | End: 2021-03-22

## 2021-03-22 RX ORDER — CEPHALEXIN 500 MG/1
500 CAPSULE ORAL 2 TIMES DAILY
Qty: 14 CAPSULE | Refills: 0 | Status: SHIPPED | OUTPATIENT
Start: 2021-03-22 | End: 2021-03-29

## 2021-03-22 RX ADMIN — IOPAMIDOL 75 ML: 755 INJECTION, SOLUTION INTRAVENOUS at 13:12

## 2021-03-22 RX ADMIN — SODIUM CHLORIDE 80 ML: 9 INJECTION, SOLUTION INTRAVENOUS at 13:12

## 2021-03-22 RX ADMIN — OXYCODONE AND ACETAMINOPHEN 1 TABLET: 5; 325 TABLET ORAL at 12:35

## 2021-03-22 RX ADMIN — Medication 10 ML: at 13:12

## 2021-03-22 ASSESSMENT — ENCOUNTER SYMPTOMS
EYE REDNESS: 0
TROUBLE SWALLOWING: 0
SHORTNESS OF BREATH: 0
VOMITING: 0

## 2021-03-22 NOTE — ED PROVIDER NOTES
EMERGENCY DEPARTMENT ENCOUNTER    Pt Name: Remigio Smith  MRN: [id number]  [place] 1965  Date of evaluation: 3/22/21  CHIEF COMPLAINT       Chief Complaint   Patient presents with    Breast Pain     left x 1 month     HISTORY OF PRESENT ILLNESS   Patient is a 69-year-old male here with swelling tenderness to the left breast.  He states he had it intermittently for about a month. He states its been getting larger. Denies history of any breast mass or cancer. He states he had a precancerous lesion removed from his tongue recently. He is a smoker. He is on oxygen at home up to 4 L. Denies any worsening chest pain or shortness of breath. No fevers or vomiting. Denies any injury. REVIEW OF SYSTEMS     Review of Systems   Constitutional: Negative for fever. HENT: Negative for trouble swallowing. Eyes: Negative for redness. Respiratory: Negative for shortness of breath. Cardiovascular: Positive for chest pain. Gastrointestinal: Negative for vomiting. Genitourinary: Negative for difficulty urinating. Musculoskeletal: Negative for neck stiffness. Skin: Negative for rash.        + left breast pain   Neurological: Negative for seizures. Psychiatric/Behavioral: Negative for confusion.      PASTMEDICAL HISTORY     Past Medical History:   Diagnosis Date    Arthritis     CAD (coronary artery disease)     Cancer ([address]     prostate    Cirrhosis with alcoholism ([address]     COPD (chronic obstructive pulmonary disease) ([address]     Depression     BOURGEOIS (dyspnea on exertion)     H/O prostate cancer     Hyperlipidemia     Hypertension     MI (myocardial infarction) ([address]     Seizures ([address] 2016    last one over 2 years ago     SVT (supraventricular tachycardia) ([address]     Tobacco abuse      SURGICAL HISTORY       Past Surgical History:   Procedure Laterality Date    CARDIAC CATHETERIZATION      INSERTABLE CARDIAC MONITOR  10/03/2018    INTERNAL LOOP RECORDER    PROSTATECTOMY      SINUS SURGERY      TONGUE SURGERY       CURRENT MEDICATIONS       Previous Medications    ALBUTEROL SULFATE  (90 BASE) MCG/ACT INHALER    Inhale 2 puffs into the lungs every 4-6 hours as needed for Wheezing    ASPIRIN 81 MG TABLET    Take 81 mg by mouth daily    BUDESONIDE-FORMOTEROL (SYMBICORT) 160-4.5 MCG/ACT AERO    Inhale 2 puffs into the lungs 2 times daily    CHOLECALCIFEROL (VITAMIN D3) 2000 UNITS CAPS    Take 1 capsule by mouth daily    DILTIAZEM (CARDIZEM CD) 120 MG EXTENDED RELEASE CAPSULE    Take 1 capsule by mouth daily    FOLIC ACID (FOLVITE) 1 MG TABLET    Take 1 mg by mouth daily    HYDROXYZINE (VISTARIL) 50 MG CAPSULE    Take 50 mg by mouth daily    LEVETIRACETAM (KEPPRA) 500 MG TABLET    Take 500 mg by mouth 2 times daily    LOSARTAN (COZAAR) 50 MG TABLET    Take 50 mg by mouth    LURASIDONE (LATUDA) 120 MG TABLET    Take 120 mg by mouth daily    METOPROLOL SUCCINATE (TOPROL XL) 50 MG EXTENDED RELEASE TABLET    Take 50 mg by mouth daily    NICOTINE (NICODERM CQ) 14 MG/24HR    Place 1 patch onto the skin daily    PANTOPRAZOLE (PROTONIX) 40 MG TABLET    Take 40 mg by mouth daily    QUETIAPINE (SEROQUEL) 200 MG TABLET    Take 200 mg by mouth nightly    TIOTROPIUM (SPIRIVA RESPIMAT) 2.5 MCG/ACT AERS INHALER    Inhale 2 puffs into the lungs daily    VITAMIN B-1 (THIAMINE) 100 MG TABLET    Take 100 mg by mouth daily     ALLERGIES     has No Known Allergies. FAMILY HISTORY     has no family status information on file. SOCIAL HISTORY       Social History     Tobacco Use    Smoking status: Current Every Day Smoker     Packs/day: 0.10     Types: Cigarettes    Smokeless tobacco: Never Used   Substance Use Topics    Alcohol use: Yes    Drug use: No     PHYSICAL EXAM     INITIAL VITALS: BP (!) 155/99   Pulse 100   Temp 97.9 °F (36.6 °C) (Oral)   Resp 16   Ht 5' 5\" (1.651 m)   Wt 186 lb (84.4 kg)   SpO2 97%   BMI 30.95 kg/m²    Physical Exam  Vitals signs and nursing note reviewed. Constitutional:       General: He is not in acute distress. Appearance: Normal appearance. He is not ill-appearing, toxic-appearing or diaphoretic. HENT:      Head: Normocephalic and atraumatic. Eyes:      General: No scleral icterus. Extraocular Movements: Extraocular movements intact. Pupils: Pupils are equal, round, and reactive to light. Neck:      Musculoskeletal: Normal range of motion and neck supple. No neck rigidity. Cardiovascular:      Rate and Rhythm: Normal rate and regular rhythm. Pulses: Normal pulses. Heart sounds: Normal heart sounds. Pulmonary:      Effort: Pulmonary effort is normal. No respiratory distress. Chest:       Abdominal:      Palpations: Abdomen is soft. Tenderness: There is no abdominal tenderness. Musculoskeletal: Normal range of motion. General: No deformity. Right lower leg: No edema. Left lower leg: No edema. Skin:     General: Skin is warm. Capillary Refill: Capillary refill takes less than 2 seconds. Neurological:      General: No focal deficit present. Mental Status: He is alert and oriented to person, place, and time. Psychiatric:         Thought Content: Thought content normal.         MEDICAL DECISION MAKING:          Please see ED Course below for MDM/ED course. DDx: Mass, abscess    All patient's question's and concerns were answered prior to disposition and patient and/or family expressed understanding and agreement of treatment plan.        NIH STROKE SCALE:            PROCEDURES:    Procedures    DIAGNOSTIC RESULTS   EKG:All EKG's are interpreted by the Emergency Department Physician who either signs or Co-signs this chart in the absence of a cardiologist.    Sinus rhythm with occasional PVCs rate of 91 IA is 156 QRS is prolonged at 142 right bundle branch block  normal axis no ST elevations or depressions nonspecific T wave changes, abnormal EKG; compared to prior on 6/3/2019 no following medications while in the emergency department:  Orders Placed This Encounter   Medications    oxyCODONE-acetaminophen (PERCOCET) 5-325 MG per tablet 1 tablet    0.9 % sodium chloride bolus    sodium chloride flush 0.9 % injection 10 mL    iopamidol (ISOVUE-370) 76 % injection 75 mL    cephALEXin (KEFLEX) 500 MG capsule     Sig: Take 1 capsule by mouth 2 times daily for 7 days     Dispense:  14 capsule     Refill:  0    oxyCODONE-acetaminophen (PERCOCET) 5-325 MG per tablet     Sig: Take 1 tablet by mouth every 6 hours as needed for Pain for up to 3 days. WARNING:  May cause drowsiness. May impair ability to operate vehicles or machinery. Do not use in combination with alcohol. Dispense:  8 tablet     Refill:  0     CONSULTS:  None    FINAL IMPRESSION      1. Gynecomastia    2. Hypodense mass of liver          DISPOSITION/PLAN   DISPOSITION Decision To Discharge 03/22/2021 01:59:23 PM      PATIENT REFERRED TO:  Margie Jasso MD  [address] [place] [address]  [phone]    Schedule an appointment as soon as possible for a visit       [facility] ED  [address] [facility]  [phone]    If symptoms worsen    DISCHARGE MEDICATIONS:  New Prescriptions    CEPHALEXIN (KEFLEX) 500 MG CAPSULE    Take 1 capsule by mouth 2 times daily for 7 days    OXYCODONE-ACETAMINOPHEN (PERCOCET) 5-325 MG PER TABLET    Take 1 tablet by mouth every 6 hours as needed for Pain for up to 3 days. WARNING:  May cause drowsiness. May impair ability to operate vehicles or machinery. Do not use in combination with alcohol. Tab Puente MD  Attending Emergency Physician    This note was created with the assistance of a speech-recognition program. While intending to generate a document that actually reflects the content of the visit, no guarantees can be provided that every mistake has been identified and corrected by editing.                     Tab Puente MD  03/22/21 1400

## 2021-03-23 LAB
EKG ATRIAL RATE: 91 BPM
EKG P AXIS: 41 DEGREES
EKG P-R INTERVAL: 156 MS
EKG Q-T INTERVAL: 384 MS
EKG QRS DURATION: 142 MS
EKG QTC CALCULATION (BAZETT): 472 MS
EKG R AXIS: 80 DEGREES
EKG T AXIS: 44 DEGREES
EKG VENTRICULAR RATE: 91 BPM

## 2021-03-23 PROCEDURE — 93010 ELECTROCARDIOGRAM REPORT: CPT | Performed by: INTERNAL MEDICINE

## 2021-06-06 ENCOUNTER — HOSPITAL ENCOUNTER (OUTPATIENT)
Dept: LAB | Age: 56
Setting detail: SPECIMEN
Discharge: HOME | End: 2021-06-06
Payer: MEDICARE

## 2021-06-06 DIAGNOSIS — Z01.818 PREOP TESTING: Primary | ICD-10-CM

## 2021-06-06 PROCEDURE — U0003 INFECTIOUS AGENT DETECTION BY NUCLEIC ACID (DNA OR RNA); SEVERE ACUTE RESPIRATORY SYNDROME CORONAVIRUS 2 (SARS-COV-2) (CORONAVIRUS DISEASE [COVID-19]), AMPLIFIED PROBE TECHNIQUE, MAKING USE OF HIGH THROUGHPUT TECHNOLOGIES AS DESCRIBED BY CMS-2020-01-R: HCPCS

## 2021-06-06 PROCEDURE — U0005 INFEC AGEN DETEC AMPLI PROBE: HCPCS

## 2021-06-07 LAB
SARS-COV-2: NORMAL
SARS-COV-2: NOT DETECTED
SOURCE: NORMAL

## 2021-06-10 ENCOUNTER — ANESTHESIA (OUTPATIENT)
Dept: OPERATING ROOM | Age: 56
End: 2021-06-10
Payer: MEDICARE

## 2021-06-10 ENCOUNTER — ANESTHESIA EVENT (OUTPATIENT)
Dept: OPERATING ROOM | Age: 56
End: 2021-06-10
Payer: MEDICARE

## 2021-06-10 ENCOUNTER — HOSPITAL ENCOUNTER (OUTPATIENT)
Age: 56
Setting detail: OUTPATIENT SURGERY
Discharge: HOME | End: 2021-06-10
Attending: INTERNAL MEDICINE | Admitting: INTERNAL MEDICINE
Payer: MEDICARE

## 2021-06-10 VITALS
TEMPERATURE: 97.5 F | WEIGHT: 183 LBS | HEIGHT: 65 IN | RESPIRATION RATE: 18 BRPM | SYSTOLIC BLOOD PRESSURE: 102 MMHG | OXYGEN SATURATION: 99 % | HEART RATE: 92 BPM | BODY MASS INDEX: 30.49 KG/M2 | DIASTOLIC BLOOD PRESSURE: 75 MMHG

## 2021-06-10 VITALS
DIASTOLIC BLOOD PRESSURE: 72 MMHG | RESPIRATION RATE: 30 BRPM | SYSTOLIC BLOOD PRESSURE: 115 MMHG | OXYGEN SATURATION: 96 %

## 2021-06-10 PROCEDURE — 6360000002 HC RX W HCPCS: Performed by: NURSE ANESTHETIST, CERTIFIED REGISTERED

## 2021-06-10 PROCEDURE — 3609012400 HC EGD TRANSORAL BIOPSY SINGLE/MULTIPLE: Performed by: INTERNAL MEDICINE

## 2021-06-10 PROCEDURE — 88312 SPECIAL STAINS GROUP 1: CPT

## 2021-06-10 PROCEDURE — 3700000000 HC ANESTHESIA ATTENDED CARE: Performed by: INTERNAL MEDICINE

## 2021-06-10 PROCEDURE — 7100000010 HC PHASE II RECOVERY - FIRST 15 MIN: Performed by: INTERNAL MEDICINE

## 2021-06-10 PROCEDURE — 7100000011 HC PHASE II RECOVERY - ADDTL 15 MIN: Performed by: INTERNAL MEDICINE

## 2021-06-10 PROCEDURE — 2709999900 HC NON-CHARGEABLE SUPPLY: Performed by: INTERNAL MEDICINE

## 2021-06-10 PROCEDURE — 88305 TISSUE EXAM BY PATHOLOGIST: CPT

## 2021-06-10 PROCEDURE — 88342 IMHCHEM/IMCYTCHM 1ST ANTB: CPT

## 2021-06-10 PROCEDURE — 2500000003 HC RX 250 WO HCPCS: Performed by: NURSE ANESTHETIST, CERTIFIED REGISTERED

## 2021-06-10 PROCEDURE — 3700000001 HC ADD 15 MINUTES (ANESTHESIA): Performed by: INTERNAL MEDICINE

## 2021-06-10 PROCEDURE — 2580000003 HC RX 258: Performed by: ANESTHESIOLOGY

## 2021-06-10 RX ORDER — SODIUM CHLORIDE 0.9 % (FLUSH) 0.9 %
10 SYRINGE (ML) INJECTION EVERY 12 HOURS SCHEDULED
Status: DISCONTINUED | OUTPATIENT
Start: 2021-06-10 | End: 2021-06-10 | Stop reason: HOSPADM

## 2021-06-10 RX ORDER — MIDAZOLAM HYDROCHLORIDE 1 MG/ML
INJECTION INTRAMUSCULAR; INTRAVENOUS PRN
Status: DISCONTINUED | OUTPATIENT
Start: 2021-06-10 | End: 2021-06-10 | Stop reason: SDUPTHER

## 2021-06-10 RX ORDER — SODIUM CHLORIDE 9 MG/ML
INJECTION, SOLUTION INTRAVENOUS CONTINUOUS
Status: DISCONTINUED | OUTPATIENT
Start: 2021-06-10 | End: 2021-06-10

## 2021-06-10 RX ORDER — SODIUM CHLORIDE, SODIUM LACTATE, POTASSIUM CHLORIDE, CALCIUM CHLORIDE 600; 310; 30; 20 MG/100ML; MG/100ML; MG/100ML; MG/100ML
INJECTION, SOLUTION INTRAVENOUS CONTINUOUS
Status: DISCONTINUED | OUTPATIENT
Start: 2021-06-10 | End: 2021-06-10 | Stop reason: HOSPADM

## 2021-06-10 RX ORDER — PROPOFOL 10 MG/ML
INJECTION, EMULSION INTRAVENOUS PRN
Status: DISCONTINUED | OUTPATIENT
Start: 2021-06-10 | End: 2021-06-10 | Stop reason: SDUPTHER

## 2021-06-10 RX ORDER — SODIUM CHLORIDE 0.9 % (FLUSH) 0.9 %
10 SYRINGE (ML) INJECTION PRN
Status: DISCONTINUED | OUTPATIENT
Start: 2021-06-10 | End: 2021-06-10 | Stop reason: HOSPADM

## 2021-06-10 RX ORDER — LIDOCAINE HYDROCHLORIDE 10 MG/ML
1 INJECTION, SOLUTION EPIDURAL; INFILTRATION; INTRACAUDAL; PERINEURAL
Status: DISCONTINUED | OUTPATIENT
Start: 2021-06-10 | End: 2021-06-10 | Stop reason: HOSPADM

## 2021-06-10 RX ORDER — ONDANSETRON 2 MG/ML
4 INJECTION INTRAMUSCULAR; INTRAVENOUS
Status: DISCONTINUED | OUTPATIENT
Start: 2021-06-10 | End: 2021-06-10 | Stop reason: HOSPADM

## 2021-06-10 RX ORDER — LIDOCAINE HYDROCHLORIDE 20 MG/ML
INJECTION, SOLUTION EPIDURAL; INFILTRATION; INTRACAUDAL; PERINEURAL PRN
Status: DISCONTINUED | OUTPATIENT
Start: 2021-06-10 | End: 2021-06-10 | Stop reason: SDUPTHER

## 2021-06-10 RX ORDER — SODIUM CHLORIDE 9 MG/ML
25 INJECTION, SOLUTION INTRAVENOUS PRN
Status: DISCONTINUED | OUTPATIENT
Start: 2021-06-10 | End: 2021-06-10 | Stop reason: HOSPADM

## 2021-06-10 RX ADMIN — MIDAZOLAM 0.5 MG: 1 INJECTION INTRAMUSCULAR; INTRAVENOUS at 07:15

## 2021-06-10 RX ADMIN — SODIUM CHLORIDE, POTASSIUM CHLORIDE, SODIUM LACTATE AND CALCIUM CHLORIDE: 600; 310; 30; 20 INJECTION, SOLUTION INTRAVENOUS at 06:38

## 2021-06-10 RX ADMIN — PROPOFOL 50 MG: 10 INJECTION, EMULSION INTRAVENOUS at 07:20

## 2021-06-10 RX ADMIN — PROPOFOL 50 MG: 10 INJECTION, EMULSION INTRAVENOUS at 07:23

## 2021-06-10 RX ADMIN — PROPOFOL 20 MG: 10 INJECTION, EMULSION INTRAVENOUS at 07:34

## 2021-06-10 RX ADMIN — PROPOFOL 50 MG: 10 INJECTION, EMULSION INTRAVENOUS at 07:25

## 2021-06-10 RX ADMIN — LIDOCAINE HYDROCHLORIDE 100 MG: 20 INJECTION, SOLUTION EPIDURAL; INFILTRATION; INTRACAUDAL; PERINEURAL at 07:21

## 2021-06-10 RX ADMIN — MIDAZOLAM 0.5 MG: 1 INJECTION INTRAMUSCULAR; INTRAVENOUS at 07:27

## 2021-06-10 RX ADMIN — PROPOFOL 50 MG: 10 INJECTION, EMULSION INTRAVENOUS at 07:29

## 2021-06-10 RX ADMIN — PROPOFOL 50 MG: 10 INJECTION, EMULSION INTRAVENOUS at 07:24

## 2021-06-10 RX ADMIN — PROPOFOL 50 MG: 10 INJECTION, EMULSION INTRAVENOUS at 07:30

## 2021-06-10 ASSESSMENT — PULMONARY FUNCTION TESTS
PIF_VALUE: 0
PIF_VALUE: 1
PIF_VALUE: 0
PIF_VALUE: 1
PIF_VALUE: 0

## 2021-06-10 ASSESSMENT — PAIN SCALES - GENERAL: PAINLEVEL_OUTOF10: 0

## 2021-06-10 ASSESSMENT — PAIN - FUNCTIONAL ASSESSMENT: PAIN_FUNCTIONAL_ASSESSMENT: 0-10

## 2021-06-10 ASSESSMENT — ENCOUNTER SYMPTOMS: SHORTNESS OF BREATH: 1

## 2021-06-10 NOTE — ANESTHESIA PRE PROCEDURE
Department of Anesthesiology  Preprocedure Note       Name:  Huy Perez   Age:  60 y.o.  :  1965                                          MRN:  [id number]         Date:  6/10/2021      Surgeon: Venice Alvarado): Sharon Dietrich MD    Procedure: Procedure(s):  EGD BIOPSY    Medications prior to admission:   Prior to Admission medications    Medication Sig Start Date End Date Taking?  Authorizing Provider   losartan (COZAAR) 50 MG tablet Take 50 mg by mouth   Yes Historical Provider, MD   albuterol sulfate  (90 Base) MCG/ACT inhaler Inhale 2 puffs into the lungs every 4-6 hours as needed for Wheezing   Yes Historical Provider, MD   budesonide-formoterol (SYMBICORT) 160-4.5 MCG/ACT AERO Inhale 2 puffs into the lungs 2 times daily   Yes Historical Provider, MD   metoprolol succinate (TOPROL XL) 50 MG extended release tablet Take 50 mg by mouth daily   Yes Historical Provider, MD   pantoprazole (PROTONIX) 40 MG tablet Take 40 mg by mouth daily   Yes Historical Provider, MD   QUEtiapine (SEROQUEL) 200 MG tablet Take 200 mg by mouth nightly   Yes Historical Provider, MD   vitamin B-1 (THIAMINE) 100 MG tablet Take 100 mg by mouth daily   Yes Historical Provider, MD   Cholecalciferol (VITAMIN D3) 2000 units CAPS Take 1 capsule by mouth daily   Yes Historical Provider, MD   tiotropium (SPIRIVA RESPIMAT) 2.5 MCG/ACT AERS inhaler Inhale 2 puffs into the lungs daily   Yes Historical Provider, MD   diltiazem (CARDIZEM CD) 120 MG extended release capsule Take 1 capsule by mouth daily 18   Nancy Ash MD   lurasidone (LATUDA) 120 MG tablet Take 120 mg by mouth daily    Historical Provider, MD   aspirin 81 MG tablet Take 81 mg by mouth daily    Historical Provider, MD   folic acid (FOLVITE) 1 MG tablet Take 1 mg by mouth daily    Historical Provider, MD   hydrOXYzine (VISTARIL) 50 MG capsule Take 50 mg by mouth daily    Historical Provider, MD       Current medications:    No current facility-administered medications for this encounter.      Current Outpatient Medications   Medication Sig Dispense Refill    losartan (COZAAR) 50 MG tablet Take 50 mg by mouth      albuterol sulfate  (90 Base) MCG/ACT inhaler Inhale 2 puffs into the lungs every 4-6 hours as needed for Wheezing      budesonide-formoterol (SYMBICORT) 160-4.5 MCG/ACT AERO Inhale 2 puffs into the lungs 2 times daily      metoprolol succinate (TOPROL XL) 50 MG extended release tablet Take 50 mg by mouth daily      pantoprazole (PROTONIX) 40 MG tablet Take 40 mg by mouth daily      QUEtiapine (SEROQUEL) 200 MG tablet Take 200 mg by mouth nightly      vitamin B-1 (THIAMINE) 100 MG tablet Take 100 mg by mouth daily      Cholecalciferol (VITAMIN D3) 2000 units CAPS Take 1 capsule by mouth daily      tiotropium (SPIRIVA RESPIMAT) 2.5 MCG/ACT AERS inhaler Inhale 2 puffs into the lungs daily      diltiazem (CARDIZEM CD) 120 MG extended release capsule Take 1 capsule by mouth daily 30 capsule 3    lurasidone (LATUDA) 120 MG tablet Take 120 mg by mouth daily      aspirin 81 MG tablet Take 81 mg by mouth daily      folic acid (FOLVITE) 1 MG tablet Take 1 mg by mouth daily      hydrOXYzine (VISTARIL) 50 MG capsule Take 50 mg by mouth daily         Allergies:  No Known Allergies    Problem List:    Patient Active Problem List   Diagnosis Code    Res[facility] failure [facility] J96.90    Pneumonia J18.9    Essential hypertension I10    Diarrhea R19.7    H/O paroxysmal supraventricular tachycardia Z86.79    Status post placement of implantable loop recorder Z95.818    Smoker F17.200    COPD (chronic obstructive pulmonary disease) ([facility] J44.9    Hep C w/o coma, chronic ([facility]) B18.2       Past Medical History:        Diagnosis Date    Arthritis     CAD (coronary artery disease)     Cancer ([facility])     prostate    [facility]     COPD (ch[facility] disease) [facility]     Depression     BOURGEOIS (dyspnea on exertion)     H/O prostate cancer     Hyperlipidemia     Hypertension     [facility]     Seizures ([facility] 2016    last one over 2 years ago     [facility]     Tobacco abuse        Past Surgical History:        Procedure Laterality Date    CARDIAC CATHETERIZATION      ablation    INSERTABLE CARDIAC MONITOR  10/03/2018    INTERNAL LOOP RECORDER    PROSTATECTOMY      SINUS SURGERY      TONGUE SURGERY      UPPER GASTROINTESTINAL ENDOSCOPY N/A 6/10/2021    EGD BIOPSY performed by Davidson Griffin MD at [place] [address] History:    Social History     Tobacco Use    Smoking status: Former Smoker     Packs/day: 0.10     Types: Cigarettes    Smokeless tobacco: Never Used    Tobacco comment: 5/2021 quit    Substance Use Topics    Alcohol use: Not Currently     Comment: rarely                                 Counseling given: Not Answered  Comment: 5/2021 quit       Vital Signs (Current):   Vitals:    06/10/21 0618 06/10/21 0736 06/10/21 0745 06/10/21 0800   BP: (!) 152/80 117/76 116/83 102/75   Pulse: 106 98 93 92   Resp: 18 23 19 18   Temp: 97.5 °F (36.4 °C) 97.5 °F (36.4 °C)  97.5 °F (36.4 °C)   TempSrc: Temporal Temporal  Temporal   SpO2: 94% 98%  99%   Weight:       Height:                                                  BP Readings from Last 3 Encounters:   06/10/21 102/75   06/10/21 115/72   03/22/21 (!) 155/99       NPO Status: Time of last liquid consumption: 2000                        Time of last solid consumption: 2000                        Date of last liquid consumption: 06/09/21                        Date of last solid food consumption: 06/09/21    BMI:   Wt Readings from Last 3 Encounters:   06/10/21 183 lb (83 kg)   03/22/21 186 lb (84.4 kg)   06/03/19 182 lb (82.6 kg)     Body mass index is 30.45 kg/m².     CBC:   Lab Results   Component Value Date    WBC 7.3 03/22/2021    RBC 4.77 03/22/2021    RBC 4.83 04/18/2012    HGB 15.1 03/22/2021    HCT 46.2 03/22/2021    MCV 96.9 03/22/2021    RDW 12.9 03/22/2021     03/22/2021     04/18/2012       CMP:   Lab Results   Component Value Date     03/22/2021    K 3.8 03/22/2021    CL 97 03/22/2021    CO2 28 03/22/2021    BUN 12 03/22/2021    CREATININE 0.97 03/22/2021    GFRAA >60 03/22/2021    LABGLOM >60 03/22/2021    GLUCOSE 109 03/22/2021    GLUCOSE 87 04/18/2012    PROT 7.9 03/08/2019    CALCIUM 9.4 03/22/2021    BILITOT 0.25 03/08/2019    ALKPHOS 95 03/08/2019    AST 30 03/08/2019    ALT 90 03/08/2019       POC Tests: No results for input(s): POCGLU, POCNA, POCK, POCCL, POCBUN, POCHEMO, POCHCT in the last 72 hours. Coags:   Lab Results   Component Value Date    APTT 24.4 03/08/2019       HCG (If Applicable): No results found for: PREGTESTUR, PREGSERUM, HCG, HCGQUANT     ABGs: No results found for: PHART, PO2ART, ZGI2EWH, PUQ2FFU, BEART, W7RASURW     Type & Screen (If Applicable):  No results found for: LABABO, LABRH    Drug/Infectious Status (If Applicable):  Lab Results   Component Value Date    HEPCAB REACTIVE 05/14/2016       COVID-19 Screening (If Applicable):   Lab Results   Component Value Date    COVID19 Not Detected 06/06/2021           Anesthesia Evaluation    Airway: Mallampati: I  TM distance: >3 FB   Neck ROM: full  Mouth opening: > = 3 FB Dental:          Pulmonary:   (+) COPD:  shortness of breath: chronic,                             Cardiovascular:    (+) CAD:,     (-)  angina                Neuro/Psych:               GI/Hepatic/Renal:             Endo/Other:                     Abdominal:           Vascular:                                        Anesthesia Plan      general     ASA 4             Anesthetic plan and risks discussed with patient.                       Dariusz Pugh MD   6/10/2021

## 2021-06-10 NOTE — OP NOTE
Operative Note      Patient: Duncan Goel  YOB: 1965  MRN: [id number]    Date of Procedure: 6/10/2021    Pre-Op Diagnosis: DX ESOPHAGITIS    Indication for the procedure: Patient is a pleasant 60years old male who recently had EGD and he was found to have reflux esophagitis and Candida esophagitis. Is scheduled for repeat EGD for further evaluation. Post-Op Diagnosis: Irregular Z-line, patchy hyperemia of the stomach. Questionable white plaque of the proximal middle esophagus. Biopsies were taken to rule out Candida. Procedure(s):  EGD ESOPHAGOGASTRODUODENOSCOPY    Surgeon(s): Haylee Roth MD    Assistant:   * No surgical staff found *    Informed consent: Risks, benefits, and alternatives of the procedure were explained to the patient prior to the procedure. Risks include but not limited to bleeding, perforation, aspiration, allergic reaction to medication or death. Patient was also informed about the risk of missing a lesion. Anesthesia: Monitor Anesthesia Care    Estimated Blood Loss (mL): Minimal    Complications: None    Specimens:   ID Type Source Tests Collected by Time Destination   A : stomach  Tissue Stomach SURGICAL PATHOLOGY Haylee Roth MD 6/10/2021 0781    B : distal esophagus Tissue Stomach SURGICAL PATHOLOGY Haylee Roth MD 6/10/2021 2838    C : promial esophagus r/o candida Tissue Stomach SURGICAL PATHOLOGY Haylee Roth MD 6/10/2021 0730        Implants:  * No implants in log *      Drains: * No LDAs found *    Findings:Irregular Z-line, patchy hyperemia of the stomach. Questionable white plaque of the proximal middle esophagus. Biopsies were taken to rule out Candida. Detailed Description of Procedure:   Patient was placed in the left lateral decubitus position. The well-lubricated Olympus EGD scope was passed through the bite-block was advanced into the esophagus. In the proximal and middle esophagus there was questionable whitish plaques.   Biopsies were taken to rule out Candida. The scope was then advanced into the distal esophagus. The Z-line was irregular. Biopsies were taken to rule out Zabala's esophagus. The scope was then advanced into the stomach. There was patchy hyperemia of the stomach. Biopsies were taken to rule out H. pylori. The scope was then advanced into the duodenum. Duodenal bulb and second portion duodenum were seen and appeared normal.  The scope was then brought back into the stomach. Retroflexion was done. The cardia from the stomach were seen and appeared normal.  The scope was then withdrawn outside the patient. Plan: 1. Follow up on results of pathology  2-follow-up in the office in 2 weeks.     Electronically signed by Pepe Vallejo MD on 6/10/2021 at 7:33 AM

## 2021-06-10 NOTE — ANESTHESIA POSTPROCEDURE EVALUATION
Department of Anesthesiology  Postprocedure Note    Patient: Anthony Nelson  MRN: [id number]  YOB: 1965  Date of evaluation: 6/10/2021  Time:  12:20 PM     Procedure Summary     Date: 06/10/21 Room / Location: [place] [address]    Anesthesia Start: 4374 Anesthesia Stop: 2228    Procedure: EGD BIOPSY (N/A ) Diagnosis: (DX ESOPHAGITIS)    Surgeons: Fadia Sanders MD Responsible Provider: Charlene Osuna MD    Anesthesia Type: Not recorded ASA Status: Not recorded          Anesthesia Type: No value filed. Donna Phase I:      Donna Phase II:      Last vitals: Reviewed and per EMR flowsheets.        Anesthesia Post Evaluation    Complications: no

## 2021-06-10 NOTE — ANESTHESIA PRE PROCEDURE
Department of Anesthesiology  Preprocedure Note       Name:  Juvenal Cuellar   Age:  60 y.o.  :  1965                                          MRN:  [id number]         Date:  6/10/2021      Surgeon: Joyce Alonso): Christian Montoya MD    Procedure: Procedure(s):  EGD ESOPHAGOGASTRODUODENOSCOPY    Medications prior to admission:   Prior to Admission medications    Medication Sig Start Date End Date Taking?  Authorizing Provider   losartan (COZAAR) 50 MG tablet Take 50 mg by mouth   Yes Historical Provider, MD   albuterol sulfate  (90 Base) MCG/ACT inhaler Inhale 2 puffs into the lungs every 4-6 hours as needed for Wheezing   Yes Historical Provider, MD   budesonide-formoterol (SYMBICORT) 160-4.5 MCG/ACT AERO Inhale 2 puffs into the lungs 2 times daily   Yes Historical Provider, MD   metoprolol succinate (TOPROL XL) 50 MG extended release tablet Take 50 mg by mouth daily   Yes Historical Provider, MD   pantoprazole (PROTONIX) 40 MG tablet Take 40 mg by mouth daily   Yes Historical Provider, MD   QUEtiapine (SEROQUEL) 200 MG tablet Take 200 mg by mouth nightly   Yes Historical Provider, MD   vitamin B-1 (THIAMINE) 100 MG tablet Take 100 mg by mouth daily   Yes Historical Provider, MD   Cholecalciferol (VITAMIN D3) 2000 units CAPS Take 1 capsule by mouth daily   Yes Historical Provider, MD   tiotropium (SPIRIVA RESPIMAT) 2.5 MCG/ACT AERS inhaler Inhale 2 puffs into the lungs daily   Yes Historical Provider, MD   diltiazem (CARDIZEM CD) 120 MG extended release capsule Take 1 capsule by mouth daily 18   Harry Mcgee MD   lurasidone (LATUDA) 120 MG tablet Take 120 mg by mouth daily    Historical Provider, MD   aspirin 81 MG tablet Take 81 mg by mouth daily    Historical Provider, MD   folic acid (FOLVITE) 1 MG tablet Take 1 mg by mouth daily    Historical Provider, MD   hydrOXYzine (VISTARIL) 50 MG capsule Take 50 mg by mouth daily    Historical Provider, MD       Current medications:    Current Facility-Administered Medications   Medication Dose Route Frequency Provider Last Rate Last Admin    lactated ringers infusion   Intravenous Continuous Fausto Loyola  mL/hr at 06/10/21 0638 New Bag at 06/10/21 2179    sodium chloride flush 0.9 % injection 10 mL  10 mL Intravenous 2 times per day Fausto Loyola MD        sodium chloride flush 0.9 % injection 10 mL  10 mL Intravenous PRN Fausto Loyola MD        0.9 % sodium chloride infusion  25 mL Intravenous PRN Fausto Loyola MD        lidocaine PF 1 % injection 1 mL  1 mL Intradermal Once PRN Fausto Loyola MD           Allergies:  No Known Allergies    Problem List:    Patient Active Problem List   Diagnosis Code    Respiratory failure ([facility] J96.90    Pneumonia J18.9    Essential hypertension I10    Diarrhea R19.7    H/O paroxysmal supraventricular tachycardia Z86.79    Status post placement of implantable loop recorder Z95.818    Smoker F17.200    COPD (chronic obstructive pulmonary disease) (HCC) J44.9    Hep C w/o coma, chronic ([facility] B18.2       Past Medical History:        Diagnosis Date    Arthritis     CAD (coronary artery disease)     Cancer ([facility]     prostate    Cirrhosis with alcoholism ([facility]     COPD (chronic obstructive pulmonary disease) ([facility]     Depression     BOURGEOIS (dyspnea on exertion)     H/O prostate cancer     Hyperlipidemia     Hypertension     MI (myocardial infarction) ([facility]     Seizures ([facility] 2016    last one over 2 years ago     SVT (supraventricular tachycardia) ([facility]     Tobacco abuse        Past Surgical History:        Procedure Laterality Date    CARDIAC CATHETERIZATION      ablation    INSERTABLE CARDIAC MONITOR  10/03/2018    INTERNAL LOOP RECORDER    PROSTATECTOMY      SINUS SURGERY      TONGUE SURGERY         Social History:    Social History     Tobacco Use    Smoking status: Former Smoker     Packs/day: 0.10     Types: Cigarettes    Smokeless tobacco: Never Used    Tobacco comment: 5/2021 quit    Substance Use Topics    Alcohol use: Not Currently     Comment: rarely                                 Counseling given: Not Answered  Comment: 5/2021 quit       Vital Signs (Current):   Vitals:    06/10/21 0614 06/10/21 0618   BP:  (!) 152/80   Pulse:  106   Resp:  18   Temp:  97.5 °F (36.4 °C)   TempSrc:  Temporal   SpO2:  94%   Weight: 183 lb (83 kg)    Height: 5' 5\" (1.651 m)                                               BP Readings from Last 3 Encounters:   06/10/21 (!) 152/80   03/22/21 (!) 155/99   06/03/19 111/73       NPO Status: Time of last liquid consumption: 2000                        Time of last solid consumption: 2000                        Date of last liquid consumption: 06/09/21                        Date of last solid food consumption: 06/09/21    BMI:   Wt Readings from Last 3 Encounters:   06/10/21 183 lb (83 kg)   03/22/21 186 lb (84.4 kg)   06/03/19 182 lb (82.6 kg)     Body mass index is 30.45 kg/m². CBC:   Lab Results   Component Value Date    WBC 7.3 03/22/2021    RBC 4.77 03/22/2021    RBC 4.83 04/18/2012    HGB 15.1 03/22/2021    HCT 46.2 03/22/2021    MCV 96.9 03/22/2021    RDW 12.9 03/22/2021     03/22/2021     04/18/2012       CMP:   Lab Results   Component Value Date     03/22/2021    K 3.8 03/22/2021    CL 97 03/22/2021    CO2 28 03/22/2021    BUN 12 03/22/2021    CREATININE 0.97 03/22/2021    GFRAA >60 03/22/2021    LABGLOM >60 03/22/2021    GLUCOSE 109 03/22/2021    GLUCOSE 87 04/18/2012    PROT 7.9 03/08/2019    CALCIUM 9.4 03/22/2021    BILITOT 0.25 03/08/2019    ALKPHOS 95 03/08/2019    AST 30 03/08/2019    ALT 90 03/08/2019       POC Tests: No results for input(s): POCGLU, POCNA, POCK, POCCL, POCBUN, POCHEMO, POCHCT in the last 72 hours.     Coags:   Lab Results   Component Value Date    APTT 24.4 03/08/2019       HCG (If Applicable): No results found for: PREGTESTUR, PREGSERUM, HCG, HCGQUANT     ABGs: No results found for: PHART, PO2ART,

## 2021-06-10 NOTE — H&P
Date    CARDIAC CATHETERIZATION      ablation    INSERTABLE CARDIAC MONITOR  10/03/2018    INTERNAL LOOP RECORDER    PROSTATECTOMY      SINUS SURGERY      TONGUE SURGERY          Medications Prior to Admission:     Prior to Admission medications    Medication Sig Start Date End Date Taking? Authorizing Provider   losartan (COZAAR) 50 MG tablet Take 50 mg by mouth   Yes Historical Provider, MD   albuterol sulfate  (90 Base) MCG/ACT inhaler Inhale 2 puffs into the lungs every 4-6 hours as needed for Wheezing   Yes Historical Provider, MD   budesonide-formoterol (SYMBICORT) 160-4.5 MCG/ACT AERO Inhale 2 puffs into the lungs 2 times daily   Yes Historical Provider, MD   metoprolol succinate (TOPROL XL) 50 MG extended release tablet Take 50 mg by mouth daily   Yes Historical Provider, MD   pantoprazole (PROTONIX) 40 MG tablet Take 40 mg by mouth daily   Yes Historical Provider, MD   QUEtiapine (SEROQUEL) 200 MG tablet Take 200 mg by mouth nightly   Yes Historical Provider, MD   vitamin B-1 (THIAMINE) 100 MG tablet Take 100 mg by mouth daily   Yes Historical Provider, MD   Cholecalciferol (VITAMIN D3) 2000 units CAPS Take 1 capsule by mouth daily   Yes Historical Provider, MD   tiotropium (SPIRIVA RESPIMAT) 2.5 MCG/ACT AERS inhaler Inhale 2 puffs into the lungs daily   Yes Historical Provider, MD   diltiazem (CARDIZEM CD) 120 MG extended release capsule Take 1 capsule by mouth daily 9/22/18   Chun Gary MD   lurasidone (LATUDA) 120 MG tablet Take 120 mg by mouth daily    Historical Provider, MD   aspirin 81 MG tablet Take 81 mg by mouth daily    Historical Provider, MD   folic acid (FOLVITE) 1 MG tablet Take 1 mg by mouth daily    Historical Provider, MD   hydrOXYzine (VISTARIL) 50 MG capsule Take 50 mg by mouth daily    Historical Provider, MD        Allergies:     Patient has no known allergies. Social History:     Tobacco:    reports that he has quit smoking. His smoking use included cigarettes.  He smoked 0.10 packs per day. He has never used smokeless tobacco.  Alcohol:      reports previous alcohol use. Drug Use:  reports no history of drug use. Family History:     No family history on file. Review of Systems:     Positive and Negative as described in HPI. CONSTITUTIONAL:  negative for fevers, chills, sweats, fatigue, weight loss  HEENT:  negative for vision, hearing changes, runny nose, throat pain  RESPIRATORY: +COPD on O2 @ 4L/nc +BOURGEOIS, cough and intermittent wheezing. negative forcongestion  CARDIOVASCULAR: See HPI  negative for chest pain, palpitations. GASTROINTESTINAL: See HPI  negative fordiarrhea, constipation, change in bowel habits, abdominal pain   GENITOURINARY:  negative for difficulty of urination, burning with urination, frequency   INTEGUMENT:  negative for rash, skin lesions, easy bruising   HEMATOLOGIC/LYMPHATIC:  negative for swelling/edema   ALLERGIC/IMMUNOLOGIC:  negative for urticaria , itching  ENDOCRINE:  negative increase in drinking, increase in urination, hot or cold intolerance  MUSCULOSKELETAL:  negative joint pains, muscle aches, swelling of joints  NEUROLOGICAL: + Hx seizures Has fine tremors  negative for headaches, dizziness, lightheadedness, numbness, pain, tingling extremities  BEHAVIOR/PSYCH:  negative for depression, anxiety    Physical Exam:   BP (!) 152/80   Pulse 106   Temp 97.5 °F (36.4 °C) (Temporal)   Resp 18   Ht 5' 5\" (1.651 m)   Wt 183 lb (83 kg)   SpO2 94%   BMI 30.45 kg/m²   No results for input(s): POCGLU in the last 72 hours. General Appearance:  alert, well appearing, and in no acute distress  Mental status: oriented to person, place, and time with normal affect  Head:  normocephalic, atraumatic.   Eye: no icterus, redness, pupils equal and reactive, extraocular eye movements intact, conjunctiva clear  Ear: normal external ear, no discharge, hearing intact  Nose:  no drainage noted  Mouth: mucous membranes moist  Neck: supple, no carotid bruits, thyroid not palpable  Lungs: SpO2 94% on room air  Wear O2 @ 4L/nc Diminished breath sounds throughout No course rales Scattered late expiratory wheeze Bilateral equal air entry, clear to ausculation, no wheezing, rales or rhonchi, normal effort  Cardiovascular:  tachycardic rate and regular rhythm, no murmur, gallop, rub. Abdomen: Soft, round  nontender, nondistended, normal bowel sounds  Neurologic:fine facial tremors  There are no new focal motor or sensory deficits, normal muscle tone and bulk, no abnormal sensation, normal speech, cranial nerves II through XII grossly intact  Skin: No gross lesions, rashes, bruising or bleeding on exposed skin area  Extremities: mild ankle edema bilaterally  peripheral pulses palpable, no pedal edema or calf pain with palpation  Psych: normal affect     Investigations:      Laboratory Testing:  No results found for this or any previous visit (from the past 24 hour(s)). No results for input(s): HGB, HCT, WBC, MCV, PLATELET, NA, K, CL, CO2, BUN, CREATININE, GLUCOSE, INR, PROTIME, APTT, AST, ALT, LABALBU, HCG in the last 720 hours. Recent Labs     06/06/21  1020   COVID19       Not Detected     Imaging/Diagnostics:    No results found. Diagnosis:      1. esophagitis    Plans:     1.  EGD      GONZÁLEZ Fraser CNP  6/10/2021  6:52 AM

## 2021-06-14 LAB — SURGICAL PATHOLOGY REPORT: NORMAL

## 2021-07-12 ENCOUNTER — HOSPITAL ENCOUNTER (INPATIENT)
Age: 56
LOS: 3 days | Discharge: HOME | DRG: 140 | End: 2021-07-15
Attending: EMERGENCY MEDICINE | Admitting: FAMILY MEDICINE
Payer: MEDICARE

## 2021-07-12 ENCOUNTER — APPOINTMENT (OUTPATIENT)
Dept: GENERAL RADIOLOGY | Age: 56
DRG: 140 | End: 2021-07-12
Payer: MEDICARE

## 2021-07-12 DIAGNOSIS — J44.1 COPD EXACERBATION (HCC): Primary | ICD-10-CM

## 2021-07-12 LAB
ABSOLUTE EOS #: 0 K/UL (ref 0–0.44)
ABSOLUTE IMMATURE GRANULOCYTE: 0.17 K/UL (ref 0–0.3)
ABSOLUTE LYMPH #: 1.9 K/UL (ref 1.1–3.7)
ABSOLUTE MONO #: 1.73 K/UL (ref 0.1–1.2)
ALBUMIN SERPL-MCNC: 4.1 G/DL (ref 3.5–5.2)
ALBUMIN/GLOBULIN RATIO: ABNORMAL (ref 1–2.5)
ALP BLD-CCNC: 78 U/L (ref 40–129)
ALT SERPL-CCNC: 11 U/L (ref 5–41)
ANION GAP SERPL CALCULATED.3IONS-SCNC: 11 MMOL/L (ref 9–17)
AST SERPL-CCNC: 10 U/L
BASOPHILS # BLD: 0 % (ref 0–2)
BASOPHILS ABSOLUTE: 0 K/UL (ref 0–0.2)
BILIRUB SERPL-MCNC: 0.23 MG/DL (ref 0.3–1.2)
BUN BLDV-MCNC: 9 MG/DL (ref 6–20)
BUN/CREAT BLD: 13 (ref 9–20)
CALCIUM SERPL-MCNC: 9.4 MG/DL (ref 8.6–10.4)
CHLORIDE BLD-SCNC: 87 MMOL/L (ref 98–107)
CO2: 29 MMOL/L (ref 20–31)
CREAT SERPL-MCNC: 0.72 MG/DL (ref 0.7–1.2)
DIFFERENTIAL TYPE: ABNORMAL
EKG ATRIAL RATE: 88 BPM
EKG P AXIS: 79 DEGREES
EKG P-R INTERVAL: 150 MS
EKG Q-T INTERVAL: 416 MS
EKG QRS DURATION: 148 MS
EKG QTC CALCULATION (BAZETT): 503 MS
EKG R AXIS: 85 DEGREES
EKG T AXIS: 49 DEGREES
EKG VENTRICULAR RATE: 88 BPM
EOSINOPHILS RELATIVE PERCENT: 0 % (ref 1–4)
GFR AFRICAN AMERICAN: >60 ML/MIN
GFR NON-AFRICAN AMERICAN: >60 ML/MIN
GFR SERPL CREATININE-BSD FRML MDRD: ABNORMAL ML/MIN/{1.73_M2}
GFR SERPL CREATININE-BSD FRML MDRD: ABNORMAL ML/MIN/{1.73_M2}
GLUCOSE BLD-MCNC: 147 MG/DL (ref 70–99)
HCT VFR BLD CALC: 41.7 % (ref 40.7–50.3)
HEMOGLOBIN: 14.1 G/DL (ref 13–17)
IMMATURE GRANULOCYTES: 1 %
LV EF: 65 %
LVEF MODALITY: NORMAL
LYMPHOCYTES # BLD: 11 % (ref 24–43)
MCH RBC QN AUTO: 31 PG (ref 25.2–33.5)
MCHC RBC AUTO-ENTMCNC: 33.8 G/DL (ref 28.4–34.8)
MCV RBC AUTO: 91.6 FL (ref 82.6–102.9)
MONOCYTES # BLD: 10 % (ref 3–12)
NRBC AUTOMATED: 0 PER 100 WBC
PDW BLD-RTO: 12.3 % (ref 11.8–14.4)
PLATELET # BLD: 335 K/UL (ref 138–453)
PLATELET ESTIMATE: ABNORMAL
PMV BLD AUTO: 9.6 FL (ref 8.1–13.5)
POTASSIUM SERPL-SCNC: 4 MMOL/L (ref 3.7–5.3)
RBC # BLD: 4.55 M/UL (ref 4.21–5.77)
RBC # BLD: ABNORMAL 10*6/UL
SARS-COV-2, RAPID: NOT DETECTED
SEG NEUTROPHILS: 78 % (ref 36–65)
SEGMENTED NEUTROPHILS ABSOLUTE COUNT: 13.5 K/UL (ref 1.5–8.1)
SODIUM BLD-SCNC: 127 MMOL/L (ref 135–144)
SPECIMEN DESCRIPTION: NORMAL
TOTAL PROTEIN: 7.6 G/DL (ref 6.4–8.3)
TROPONIN INTERP: NORMAL
TROPONIN INTERP: NORMAL
TROPONIN T: NORMAL NG/ML
TROPONIN T: NORMAL NG/ML
TROPONIN, HIGH SENSITIVITY: 11 NG/L (ref 0–22)
TROPONIN, HIGH SENSITIVITY: 12 NG/L (ref 0–22)
WBC # BLD: 17.3 K/UL (ref 3.5–11.3)
WBC # BLD: ABNORMAL 10*3/UL

## 2021-07-12 PROCEDURE — 2580000003 HC RX 258: Performed by: EMERGENCY MEDICINE

## 2021-07-12 PROCEDURE — 6370000000 HC RX 637 (ALT 250 FOR IP): Performed by: FAMILY MEDICINE

## 2021-07-12 PROCEDURE — 93005 ELECTROCARDIOGRAM TRACING: CPT | Performed by: EMERGENCY MEDICINE

## 2021-07-12 PROCEDURE — 96375 TX/PRO/DX INJ NEW DRUG ADDON: CPT

## 2021-07-12 PROCEDURE — 6370000000 HC RX 637 (ALT 250 FOR IP): Performed by: EMERGENCY MEDICINE

## 2021-07-12 PROCEDURE — 84484 ASSAY OF TROPONIN QUANT: CPT

## 2021-07-12 PROCEDURE — 99284 EMERGENCY DEPT VISIT MOD MDM: CPT

## 2021-07-12 PROCEDURE — 85025 COMPLETE CBC W/AUTO DIFF WBC: CPT

## 2021-07-12 PROCEDURE — 87040 BLOOD CULTURE FOR BACTERIA: CPT

## 2021-07-12 PROCEDURE — 80053 COMPREHEN METABOLIC PANEL: CPT

## 2021-07-12 PROCEDURE — 6360000002 HC RX W HCPCS: Performed by: EMERGENCY MEDICINE

## 2021-07-12 PROCEDURE — 36415 COLL VENOUS BLD VENIPUNCTURE: CPT

## 2021-07-12 PROCEDURE — 71045 X-RAY EXAM CHEST 1 VIEW: CPT

## 2021-07-12 PROCEDURE — 96365 THER/PROPH/DIAG IV INF INIT: CPT

## 2021-07-12 PROCEDURE — 6370000000 HC RX 637 (ALT 250 FOR IP): Performed by: NURSE PRACTITIONER

## 2021-07-12 PROCEDURE — 87635 SARS-COV-2 COVID-19 AMP PRB: CPT

## 2021-07-12 PROCEDURE — 93306 TTE W/DOPPLER COMPLETE: CPT

## 2021-07-12 PROCEDURE — 2580000003 HC RX 258: Performed by: FAMILY MEDICINE

## 2021-07-12 PROCEDURE — 2700000000 HC OXYGEN THERAPY PER DAY

## 2021-07-12 PROCEDURE — 6360000002 HC RX W HCPCS: Performed by: FAMILY MEDICINE

## 2021-07-12 PROCEDURE — 94761 N-INVAS EAR/PLS OXIMETRY MLT: CPT

## 2021-07-12 PROCEDURE — 1200000000 HC SEMI PRIVATE

## 2021-07-12 PROCEDURE — 94640 AIRWAY INHALATION TREATMENT: CPT

## 2021-07-12 PROCEDURE — 93010 ELECTROCARDIOGRAM REPORT: CPT | Performed by: INTERNAL MEDICINE

## 2021-07-12 RX ORDER — LOSARTAN POTASSIUM 100 MG/1
100 TABLET ORAL DAILY
Status: DISCONTINUED | OUTPATIENT
Start: 2021-07-12 | End: 2021-07-15 | Stop reason: HOSPADM

## 2021-07-12 RX ORDER — METOPROLOL SUCCINATE 50 MG/1
50 TABLET, EXTENDED RELEASE ORAL DAILY
Status: DISCONTINUED | OUTPATIENT
Start: 2021-07-12 | End: 2021-07-15 | Stop reason: HOSPADM

## 2021-07-12 RX ORDER — AMLODIPINE BESYLATE 5 MG/1
5 TABLET ORAL DAILY
Status: DISCONTINUED | OUTPATIENT
Start: 2021-07-12 | End: 2021-07-15 | Stop reason: HOSPADM

## 2021-07-12 RX ORDER — FAMOTIDINE 40 MG/1
40 TABLET, FILM COATED ORAL 2 TIMES DAILY PRN
COMMUNITY

## 2021-07-12 RX ORDER — ACETAMINOPHEN 325 MG/1
650 TABLET ORAL EVERY 6 HOURS PRN
Status: DISCONTINUED | OUTPATIENT
Start: 2021-07-12 | End: 2021-07-15 | Stop reason: HOSPADM

## 2021-07-12 RX ORDER — BUDESONIDE AND FORMOTEROL FUMARATE DIHYDRATE 160; 4.5 UG/1; UG/1
2 AEROSOL RESPIRATORY (INHALATION) 2 TIMES DAILY
Status: DISCONTINUED | OUTPATIENT
Start: 2021-07-12 | End: 2021-07-15 | Stop reason: HOSPADM

## 2021-07-12 RX ORDER — IPRATROPIUM BROMIDE AND ALBUTEROL SULFATE 2.5; .5 MG/3ML; MG/3ML
1 SOLUTION RESPIRATORY (INHALATION) ONCE
Status: DISCONTINUED | OUTPATIENT
Start: 2021-07-12 | End: 2021-07-12

## 2021-07-12 RX ORDER — MORPHINE SULFATE 4 MG/ML
4 INJECTION, SOLUTION INTRAMUSCULAR; INTRAVENOUS ONCE
Status: COMPLETED | OUTPATIENT
Start: 2021-07-12 | End: 2021-07-12

## 2021-07-12 RX ORDER — MONTELUKAST SODIUM 10 MG/1
10 TABLET ORAL NIGHTLY
Status: DISCONTINUED | OUTPATIENT
Start: 2021-07-12 | End: 2021-07-15 | Stop reason: HOSPADM

## 2021-07-12 RX ORDER — ESCITALOPRAM OXALATE 10 MG/1
10 TABLET ORAL DAILY
Status: ON HOLD | COMMUNITY
End: 2022-08-24

## 2021-07-12 RX ORDER — POTASSIUM CHLORIDE 20 MEQ/1
40 TABLET, EXTENDED RELEASE ORAL PRN
Status: DISCONTINUED | OUTPATIENT
Start: 2021-07-12 | End: 2021-07-15 | Stop reason: HOSPADM

## 2021-07-12 RX ORDER — ONDANSETRON 2 MG/ML
4 INJECTION INTRAMUSCULAR; INTRAVENOUS EVERY 6 HOURS PRN
Status: DISCONTINUED | OUTPATIENT
Start: 2021-07-12 | End: 2021-07-15 | Stop reason: HOSPADM

## 2021-07-12 RX ORDER — LOSARTAN POTASSIUM AND HYDROCHLOROTHIAZIDE 25; 100 MG/1; MG/1
1 TABLET ORAL DAILY
Status: DISCONTINUED | OUTPATIENT
Start: 2021-07-12 | End: 2021-07-12

## 2021-07-12 RX ORDER — IPRATROPIUM BROMIDE AND ALBUTEROL SULFATE 2.5; .5 MG/3ML; MG/3ML
1 SOLUTION RESPIRATORY (INHALATION) ONCE
Status: COMPLETED | OUTPATIENT
Start: 2021-07-12 | End: 2021-07-12

## 2021-07-12 RX ORDER — ACETAMINOPHEN 650 MG/1
650 SUPPOSITORY RECTAL EVERY 6 HOURS PRN
Status: DISCONTINUED | OUTPATIENT
Start: 2021-07-12 | End: 2021-07-15 | Stop reason: HOSPADM

## 2021-07-12 RX ORDER — LOSARTAN POTASSIUM AND HYDROCHLOROTHIAZIDE 25; 100 MG/1; MG/1
1 TABLET ORAL DAILY
Status: ON HOLD | COMMUNITY
End: 2021-07-15 | Stop reason: HOSPADM

## 2021-07-12 RX ORDER — MAGNESIUM SULFATE 1 G/100ML
1000 INJECTION INTRAVENOUS PRN
Status: DISCONTINUED | OUTPATIENT
Start: 2021-07-12 | End: 2021-07-15 | Stop reason: HOSPADM

## 2021-07-12 RX ORDER — QUETIAPINE FUMARATE 200 MG/1
400 TABLET, FILM COATED ORAL NIGHTLY
Status: DISCONTINUED | OUTPATIENT
Start: 2021-07-12 | End: 2021-07-15 | Stop reason: HOSPADM

## 2021-07-12 RX ORDER — VITAMIN B COMPLEX
2000 TABLET ORAL DAILY
Status: DISCONTINUED | OUTPATIENT
Start: 2021-07-12 | End: 2021-07-15 | Stop reason: HOSPADM

## 2021-07-12 RX ORDER — AMLODIPINE BESYLATE 10 MG/1
10 TABLET ORAL DAILY
COMMUNITY

## 2021-07-12 RX ORDER — DOXYCYCLINE HYCLATE 50 MG/1
324 CAPSULE, GELATIN COATED ORAL
COMMUNITY

## 2021-07-12 RX ORDER — KETOROLAC TROMETHAMINE 15 MG/ML
15 INJECTION, SOLUTION INTRAMUSCULAR; INTRAVENOUS ONCE
Status: COMPLETED | OUTPATIENT
Start: 2021-07-12 | End: 2021-07-12

## 2021-07-12 RX ORDER — ONDANSETRON 4 MG/1
4 TABLET, ORALLY DISINTEGRATING ORAL EVERY 8 HOURS PRN
Status: DISCONTINUED | OUTPATIENT
Start: 2021-07-12 | End: 2021-07-15 | Stop reason: HOSPADM

## 2021-07-12 RX ORDER — LIDOCAINE 4 G/G
1 PATCH TOPICAL DAILY
Status: DISCONTINUED | OUTPATIENT
Start: 2021-07-12 | End: 2021-07-15 | Stop reason: HOSPADM

## 2021-07-12 RX ORDER — POTASSIUM CHLORIDE 7.45 MG/ML
10 INJECTION INTRAVENOUS PRN
Status: DISCONTINUED | OUTPATIENT
Start: 2021-07-12 | End: 2021-07-15 | Stop reason: HOSPADM

## 2021-07-12 RX ORDER — SODIUM CHLORIDE 0.9 % (FLUSH) 0.9 %
10 SYRINGE (ML) INJECTION PRN
Status: DISCONTINUED | OUTPATIENT
Start: 2021-07-12 | End: 2021-07-15 | Stop reason: HOSPADM

## 2021-07-12 RX ORDER — MORPHINE SULFATE 2 MG/ML
2 INJECTION, SOLUTION INTRAMUSCULAR; INTRAVENOUS EVERY 4 HOURS PRN
Status: DISCONTINUED | OUTPATIENT
Start: 2021-07-12 | End: 2021-07-15 | Stop reason: HOSPADM

## 2021-07-12 RX ORDER — ASPIRIN 81 MG/1
81 TABLET ORAL DAILY
Status: DISCONTINUED | OUTPATIENT
Start: 2021-07-12 | End: 2021-07-15 | Stop reason: HOSPADM

## 2021-07-12 RX ORDER — 0.9 % SODIUM CHLORIDE 0.9 %
1000 INTRAVENOUS SOLUTION INTRAVENOUS ONCE
Status: COMPLETED | OUTPATIENT
Start: 2021-07-12 | End: 2021-07-12

## 2021-07-12 RX ORDER — METHYLPREDNISOLONE SODIUM SUCCINATE 40 MG/ML
40 INJECTION, POWDER, LYOPHILIZED, FOR SOLUTION INTRAMUSCULAR; INTRAVENOUS EVERY 6 HOURS
Status: DISCONTINUED | OUTPATIENT
Start: 2021-07-12 | End: 2021-07-14

## 2021-07-12 RX ORDER — FOLIC ACID 1 MG/1
1 TABLET ORAL DAILY
Status: DISCONTINUED | OUTPATIENT
Start: 2021-07-12 | End: 2021-07-15 | Stop reason: HOSPADM

## 2021-07-12 RX ORDER — SODIUM CHLORIDE 9 MG/ML
25 INJECTION, SOLUTION INTRAVENOUS PRN
Status: DISCONTINUED | OUTPATIENT
Start: 2021-07-12 | End: 2021-07-15 | Stop reason: HOSPADM

## 2021-07-12 RX ORDER — SODIUM CHLORIDE 0.9 % (FLUSH) 0.9 %
5-40 SYRINGE (ML) INJECTION EVERY 12 HOURS SCHEDULED
Status: DISCONTINUED | OUTPATIENT
Start: 2021-07-12 | End: 2021-07-15 | Stop reason: HOSPADM

## 2021-07-12 RX ORDER — IPRATROPIUM BROMIDE AND ALBUTEROL SULFATE 2.5; .5 MG/3ML; MG/3ML
1 SOLUTION RESPIRATORY (INHALATION)
Status: DISCONTINUED | OUTPATIENT
Start: 2021-07-12 | End: 2021-07-14

## 2021-07-12 RX ORDER — LANOLIN ALCOHOL/MO/W.PET/CERES
325 CREAM (GRAM) TOPICAL
Status: DISCONTINUED | OUTPATIENT
Start: 2021-07-13 | End: 2021-07-15 | Stop reason: HOSPADM

## 2021-07-12 RX ORDER — ALBUTEROL SULFATE 2.5 MG/3ML
2.5 SOLUTION RESPIRATORY (INHALATION) EVERY 6 HOURS PRN
COMMUNITY

## 2021-07-12 RX ORDER — ESCITALOPRAM OXALATE 10 MG/1
10 TABLET ORAL DAILY
Status: DISCONTINUED | OUTPATIENT
Start: 2021-07-12 | End: 2021-07-15 | Stop reason: HOSPADM

## 2021-07-12 RX ORDER — HYDROCHLOROTHIAZIDE 25 MG/1
25 TABLET ORAL DAILY
Status: DISCONTINUED | OUTPATIENT
Start: 2021-07-12 | End: 2021-07-15 | Stop reason: HOSPADM

## 2021-07-12 RX ADMIN — ROFLUMILAST 250 MCG: 500 TABLET ORAL at 12:59

## 2021-07-12 RX ADMIN — Medication 2000 UNITS: at 18:28

## 2021-07-12 RX ADMIN — QUETIAPINE FUMARATE 400 MG: 200 TABLET ORAL at 22:50

## 2021-07-12 RX ADMIN — AZITHROMYCIN MONOHYDRATE 500 MG: 500 INJECTION, POWDER, LYOPHILIZED, FOR SOLUTION INTRAVENOUS at 07:13

## 2021-07-12 RX ADMIN — MORPHINE SULFATE 2 MG: 2 INJECTION, SOLUTION INTRAMUSCULAR; INTRAVENOUS at 23:50

## 2021-07-12 RX ADMIN — KETOROLAC TROMETHAMINE 15 MG: 15 INJECTION, SOLUTION INTRAMUSCULAR; INTRAVENOUS at 06:27

## 2021-07-12 RX ADMIN — BUDESONIDE AND FORMOTEROL FUMARATE DIHYDRATE 2 PUFF: 160; 4.5 AEROSOL RESPIRATORY (INHALATION) at 21:08

## 2021-07-12 RX ADMIN — ENOXAPARIN SODIUM 40 MG: 40 INJECTION SUBCUTANEOUS at 12:05

## 2021-07-12 RX ADMIN — ESCITALOPRAM OXALATE 10 MG: 10 TABLET ORAL at 18:25

## 2021-07-12 RX ADMIN — MONTELUKAST 10 MG: 10 TABLET, FILM COATED ORAL at 22:50

## 2021-07-12 RX ADMIN — METHYLPREDNISOLONE SODIUM SUCCINATE 40 MG: 40 INJECTION, POWDER, FOR SOLUTION INTRAMUSCULAR; INTRAVENOUS at 22:53

## 2021-07-12 RX ADMIN — HYDROCHLOROTHIAZIDE 25 MG: 25 TABLET ORAL at 18:25

## 2021-07-12 RX ADMIN — AZITHROMYCIN MONOHYDRATE 500 MG: 500 INJECTION, POWDER, LYOPHILIZED, FOR SOLUTION INTRAVENOUS at 23:51

## 2021-07-12 RX ADMIN — MORPHINE SULFATE 2 MG: 2 INJECTION, SOLUTION INTRAMUSCULAR; INTRAVENOUS at 19:40

## 2021-07-12 RX ADMIN — METHYLPREDNISOLONE SODIUM SUCCINATE 40 MG: 40 INJECTION, POWDER, FOR SOLUTION INTRAMUSCULAR; INTRAVENOUS at 12:05

## 2021-07-12 RX ADMIN — IPRATROPIUM BROMIDE AND ALBUTEROL SULFATE 1 AMPULE: .5; 2.5 SOLUTION RESPIRATORY (INHALATION) at 21:08

## 2021-07-12 RX ADMIN — LOSARTAN POTASSIUM 100 MG: 100 TABLET, FILM COATED ORAL at 18:28

## 2021-07-12 RX ADMIN — SODIUM CHLORIDE, PRESERVATIVE FREE 10 ML: 5 INJECTION INTRAVENOUS at 22:53

## 2021-07-12 RX ADMIN — FOLIC ACID 1 MG: 1 TABLET ORAL at 18:25

## 2021-07-12 RX ADMIN — ASPIRIN 81 MG: 81 TABLET, COATED ORAL at 18:25

## 2021-07-12 RX ADMIN — BUDESONIDE AND FORMOTEROL FUMARATE DIHYDRATE 2 PUFF: 160; 4.5 AEROSOL RESPIRATORY (INHALATION) at 15:30

## 2021-07-12 RX ADMIN — IPRATROPIUM BROMIDE AND ALBUTEROL SULFATE 1 AMPULE: .5; 2.5 SOLUTION RESPIRATORY (INHALATION) at 11:50

## 2021-07-12 RX ADMIN — ACETAMINOPHEN 650 MG: 325 TABLET ORAL at 12:04

## 2021-07-12 RX ADMIN — SODIUM CHLORIDE 1000 ML: 9 INJECTION, SOLUTION INTRAVENOUS at 06:28

## 2021-07-12 RX ADMIN — IPRATROPIUM BROMIDE AND ALBUTEROL SULFATE 1 AMPULE: .5; 2.5 SOLUTION RESPIRATORY (INHALATION) at 15:30

## 2021-07-12 RX ADMIN — MORPHINE SULFATE 2 MG: 2 INJECTION, SOLUTION INTRAMUSCULAR; INTRAVENOUS at 15:20

## 2021-07-12 RX ADMIN — METHYLPREDNISOLONE SODIUM SUCCINATE 40 MG: 40 INJECTION, POWDER, FOR SOLUTION INTRAMUSCULAR; INTRAVENOUS at 16:48

## 2021-07-12 RX ADMIN — METOPROLOL SUCCINATE 50 MG: 50 TABLET, EXTENDED RELEASE ORAL at 18:28

## 2021-07-12 RX ADMIN — MORPHINE SULFATE 4 MG: 4 INJECTION, SOLUTION INTRAMUSCULAR; INTRAVENOUS at 07:52

## 2021-07-12 RX ADMIN — IPRATROPIUM BROMIDE AND ALBUTEROL SULFATE 1 AMPULE: .5; 2.5 SOLUTION RESPIRATORY (INHALATION) at 06:15

## 2021-07-12 RX ADMIN — AMLODIPINE BESYLATE 5 MG: 5 TABLET ORAL at 18:25

## 2021-07-12 ASSESSMENT — PAIN SCALES - GENERAL
PAINLEVEL_OUTOF10: 7
PAINLEVEL_OUTOF10: 7
PAINLEVEL_OUTOF10: 9
PAINLEVEL_OUTOF10: 7
PAINLEVEL_OUTOF10: 6
PAINLEVEL_OUTOF10: 5

## 2021-07-12 NOTE — ED PROVIDER NOTES
EMERGENCY DEPARTMENT ENCOUNTER    Pt Name: Naty Tee  MRN: [id number]  [place] 1965  Date of evaluation: 7/12/21  CHIEF COMPLAINT       Chief Complaint   Patient presents with    Shortness of Breath     HISTORY OF PRESENT ILLNESS   Patient is a 55-year-old male with PMH of CAD, COPD on 4L home O2, hypertension and hyperlipidemia who is brought in by EMS for shortness of breath. He states symptoms started gradually 5 days ago but became severe last night. He also reports persistent nonproductive cough causing chest pain. No fevers, abdominal pain, nausea, vomiting, changes in urine or stool. In route EMS gave DuoNeb x2, Solu-Medrol and mag. REVIEW OF SYSTEMS     Review of Systems   All other systems reviewed and are negative.     PASTMEDICAL HISTORY     Past Medical History:   Diagnosis Date    Arthritis     CAD (coronary artery disease)     Cancer ([address]     prostate    Cirrhosis with alcoholism ([address]     COPD (chronic obstructive pulmonary disease) ([address]     Depression     BOURGEOIS (dyspnea on exertion)     H/O prostate cancer     Hyperlipidemia     Hypertension     MI (myocardial infarction) ([address]     Seizures ([address] 2016    last one over 2 years ago     SVT (supraventricular tachycardia) ([address]     Tobacco abuse      SURGICAL HISTORY       Past Surgical History:   Procedure Laterality Date    CARDIAC CATHETERIZATION      ablation    INSERTABLE CARDIAC MONITOR  10/03/2018    INTERNAL LOOP RECORDER    PROSTATECTOMY      SINUS SURGERY      TONGUE SURGERY      UPPER GASTROINTESTINAL ENDOSCOPY N/A 6/10/2021    EGD BIOPSY performed by Petr Moya MD at [place] [address]       Previous Medications    ALBUTEROL SULFATE  (90 BASE) MCG/ACT INHALER    Inhale 2 puffs into the lungs every 4-6 hours as needed for Wheezing    AMLODIPINE (NORVASC) 5 MG TABLET    Take 5 mg by mouth daily    ASPIRIN 81 MG TABLET    Take 81 mg by mouth daily    BUDESONIDE-FORMOTEROL (SYMBICORT) 160-4.5 MCG/ACT AERO    Inhale 2 puffs into the lungs 2 times daily    CHOLECALCIFEROL (VITAMIN D3) 2000 UNITS CAPS    Take 1 capsule by mouth daily    DILTIAZEM (CARDIZEM CD) 120 MG EXTENDED RELEASE CAPSULE    Take 1 capsule by mouth daily    ESCITALOPRAM (LEXAPRO) 10 MG TABLET    Take 10 mg by mouth daily    FAMOTIDINE (PEPCID) 40 MG TABLET    Take 40 mg by mouth daily    FERROUS GLUCONATE (FERGON) 324 (38 FE) MG TABLET    Take 324 mg by mouth daily (with breakfast)    FOLIC ACID (FOLVITE) 1 MG TABLET    Take 1 mg by mouth daily    HYDROXYZINE (VISTARIL) 50 MG CAPSULE    Take 50 mg by mouth daily    LOSARTAN (COZAAR) 50 MG TABLET    Take 50 mg by mouth    LURASIDONE (LATUDA) 120 MG TABLET    Take 120 mg by mouth daily    METOPROLOL SUCCINATE (TOPROL XL) 50 MG EXTENDED RELEASE TABLET    Take 50 mg by mouth daily    PANTOPRAZOLE (PROTONIX) 40 MG TABLET    Take 40 mg by mouth daily    QUETIAPINE (SEROQUEL) 200 MG TABLET    Take 200 mg by mouth nightly    TIOTROPIUM (SPIRIVA RESPIMAT) 2.5 MCG/ACT AERS INHALER    Inhale 2 puffs into the lungs daily    VITAMIN B-1 (THIAMINE) 100 MG TABLET    Take 100 mg by mouth daily     ALLERGIES     has No Known Allergies. FAMILY HISTORY     has no family status information on file. SOCIAL HISTORY       Social History     Tobacco Use    Smoking status: Former Smoker     Packs/day: 0.10     Types: Cigarettes    Smokeless tobacco: Never Used    Tobacco comment: 5/2021 quit    Substance Use Topics    Alcohol use: Not Currently     Comment: rarely     Drug use: No     PHYSICAL EXAM     INITIAL VITALS: BP (!) 143/94   Pulse 86   Resp 24   Ht 5' 4\" (1.626 m)   Wt 185 lb (83.9 kg)   SpO2 97%   BMI 31.76 kg/m²    Physical Exam  HENT:      Head: Normocephalic. Right Ear: External ear normal.      Left Ear: External ear normal.      Nose: Nose normal.   Eyes:      Conjunctiva/sclera: Conjunctivae normal.   Cardiovascular:      Rate and Rhythm: Normal rate.    Pulmonary: Effort: Pulmonary effort is normal.      Breath sounds: Wheezing present. Abdominal:      General: Abdomen is flat. Skin:     General: Skin is dry. Neurological:      Mental Status: He is alert. Mental status is at baseline. Psychiatric:         Mood and Affect: Mood normal.         Behavior: Behavior normal.         MEDICAL DECISION MAKING:   The patient is hemodynamically stable, afebrile, nontoxic-appearing. Physical exam notable for wheezes bilaterally. Based on history and exam likely COPD exacerbation. ED plan for basic labs, chest x-ray, reassess. CRITICAL CARE:   Due to the high probability of sudden and clinically significant deterioration in the patient's condition, the patient required the highest level of my preparedness to intervene urgently. I provided critical care time including documentation time, medication orders and management, reevaluation, vital sign assessment, ordering and reviewing of lab tests, ordering and reviewing of x-ray studies, and admission orders. Aggregate critical care time is 30 minutes including only time during which I was engaged in work directly related to patient's care and did not include time spent treating of the patient simultaneously. PROCEDURES:    Procedures    DIAGNOSTIC RESULTS   EKG:All EKG's are interpreted by the Emergency Department Physician who either signs or Co-signs this chart in the absence of a cardiologist.        RADIOLOGY:All plain film, CT, MRI, and formal ultrasound images (except ED bedside ultrasound) are read by the radiologist, see reports below, unless otherwisenoted in MDM or here. XR CHEST PORTABLE    (Results Pending)     LABS: All lab results were reviewed by myself, and all abnormals are listed below.   Labs Reviewed   CULTURE, BLOOD 1   CULTURE, BLOOD 1   CBC WITH AUTO DIFFERENTIAL   COMPREHENSIVE METABOLIC PANEL W/ REFLEX TO MG FOR LOW K   TROPONIN   TROPONIN       EMERGENCY DEPARTMENTCOURSE:   Signout to  Nonda Luz, refer to his note for diagnosis and disposition. Vitals:    Vitals:    07/12/21 0612 07/12/21 0615 07/12/21 0616 07/12/21 0629   BP: (!) 154/93 (!) 143/94     Pulse:  93  86   Resp:   17 24   SpO2:   96% 97%   Weight:       Height:           The patient was given the following medications while in the emergency department:  Orders Placed This Encounter   Medications    0.9 % sodium chloride bolus    ketorolac (TORADOL) injection 15 mg    DISCONTD: ipratropium-albuterol (DUONEB) nebulizer solution 1 ampule     Order Specific Question:   Initiate RT Bronchodilator Protocol     Answer: Yes    ipratropium-albuterol (DUONEB) nebulizer solution 1 ampule     Order Specific Question:   Initiate RT Bronchodilator Protocol     Answer:   No    azithromycin (ZITHROMAX) 500 mg in D5W 250ml addavial     Order Specific Question:   Antimicrobial Indications     Answer:   Aspiration Pneumonia     CONSULTS:  None    FINAL IMPRESSION      1. COPD exacerbation ([facility]          DISPOSITION/PLAN   DISPOSITION        PATIENT REFERRED TO:  No follow-up provider specified.   DISCHARGE MEDICATIONS:  New Prescriptions    No medications on file     Erwin Dixon MD  Attending Emergency Physician                      Jorge Luis Cisneros MD  07/12/21 4922

## 2021-07-12 NOTE — CONSULTS
fever or chills. He is actively smoking approximately 0.5 packs of cigarettes per day. He tells me he is compliant with oxygen and his inhalers at home.     PMHx   Past Medical History      Diagnosis Date    Arthritis     CAD (coronary artery disease)     Cancer ([facility]     prostate    Cirrhosis with alcoholism ([facility]     COPD (chronic obstructive pulmonary disease) ([facility]     Depression     BOURGEOIS (dyspnea on exertion)     H/O prostate cancer     Hyperlipidemia     Hypertension     MI [facility]myocardial infarction) ([facility]     Seizures ([facility] 2016    last one over 2 years ago     SVT (supraventricular tachycardia) ([facility]     Tobacco abuse       Past Surgical History        Procedure Laterality Date    CARDIAC CATHETERIZATION      ablation    INSERTABLE CARDIAC MONITOR  10/03/2018    INTERNAL LOOP RECORDER    PROSTATECTOMY      SINUS SURGERY      TONGUE SURGERY      UPPER GASTROINTESTINAL ENDOSCOPY N/A 6/10/2021    EGD BIOPSY performed by Lulu Liu MD at [address] [facility]    Current Medications    sodium chloride flush  5-40 mL Intravenous 2 times per day    enoxaparin  40 mg Subcutaneous Daily    ipratropium-albuterol  1 ampule Inhalation Q4H WA    [START ON 7/13/2021] azithromycin  500 mg Intravenous Q24H    methylPREDNISolone  40 mg Intravenous Q6H     sodium chloride flush, sodium chloride, potassium chloride **OR** potassium alternative oral replacement **OR** potassium chloride, magnesium sulfate, ondansetron **OR** ondansetron, magnesium hydroxide, acetaminophen **OR** acetaminophen  IV Drips/Infusions   sodium chloride       Home Medications  Medications Prior to Admission: escitalopram (LEXAPRO) 10 MG tablet, Take 10 mg by mouth daily  ferrous gluconate (FERGON) 324 (38 Fe) MG tablet, Take 324 mg by mouth daily (with breakfast)  famotidine (PEPCID) 40 MG tablet, Take 40 mg by mouth 2 times daily as needed (heartburn)   amLODIPine (NORVASC) 5 MG tablet, Take 5 mg by mouth daily  losartan-hydroCHLOROthiazide (HYZAAR) 100-25 MG per tablet, Take 1 tablet by mouth daily  albuterol (PROVENTIL) (2.5 MG/3ML) 0.083% nebulizer solution, Take 2.5 mg by nebulization every 6 hours as needed for Wheezing  albuterol sulfate  (90 Base) MCG/ACT inhaler, Inhale 2 puffs into the lungs every 4-6 hours as needed for Wheezing  aspirin 81 MG tablet, Take 81 mg by mouth daily  budesonide-formoterol (SYMBICORT) 160-4.5 MCG/ACT AERO, Inhale 2 puffs into the lungs 2 times daily  folic acid (FOLVITE) 1 MG tablet, Take 1 mg by mouth daily  metoprolol succinate (TOPROL XL) 50 MG extended release tablet, Take 50 mg by mouth daily  pantoprazole (PROTONIX) 40 MG tablet, Take 40 mg by mouth daily  QUEtiapine (SEROQUEL) 400 MG tablet, Take 400 mg by mouth nightly   vitamin B-1 (THIAMINE) 100 MG tablet, Take 100 mg by mouth daily  Cholecalciferol (VITAMIN D3) 2000 units CAPS, Take 1 capsule by mouth daily  tiotropium (SPIRIVA RESPIMAT) 2.5 MCG/ACT AERS inhaler, Inhale 2 puffs into the lungs daily    Allergies    Patient has no known allergies.   Social History     Social History     Socioeconomic History    Marital status:      Spouse name: Not on file    Number of children: Not on file    Years of education: Not on file    Highest education level: Not on file   Occupational History    Not on file   Tobacco Use    Smoking status: Former Smoker     Packs/day: 0.10     Types: Cigarettes    Smokeless tobacco: Never Used    Tobacco comment: 5/2021 quit    Substance and Sexual Activity    Alcohol use: Not Currently     Comment: rarely     Drug use: No    Sexual activity: Not on file   Other Topics Concern    Not on file   Social History Narrative    Not on file     Social Determinants of Health     Financial Resource Strain:     Difficulty of Paying Living Expenses:    Food Insecurity:     Worried About Running Out of Food in the Last Year:     [address] in the Last Year: Transportation Needs:     Lack of Transportation (Medical):  Lack of Transportation (Non-Medical):    Physical Activity:     Days of Exercise per Week:     Minutes of Exercise per Session:    Stress:     Feeling of Stress :    Social Connections:     Frequency of Communication with Friends and Family:     Frequency of Social Gatherings with Friends and Family:     Attends [employer] Services:     Active Member of Clubs or Organizations:     Attends Club or Organization Meetings:     Marital Status:    Intimate Partner Violence:     Fear of Current or Ex-Partner:     Emotionally Abused:     Physically Abused:     Sexually Abused:      Family History    History reviewed. No pertinent family history. ROS - 11 systems   General Denies any fever or chills  HEENT Denies any diplopia, tinnitus or vertigo  Resp positive for  cough with sputum and dyspnea  Cardiac Denies any chest pain, palpitations, claudication or edema  GI Denies any melena, hematochezia, hematemesis or pyrosis   Denies any frequency, urgency, hesitancy or incontinence  Heme Denies bruising or bleeding easily  Endocrine Denies any history of diabetes or thyroid disease  Neuro Denies any focal motor or sensory deficits  Psychiatric Denies anxiety, depression, suicidal ideation  Skin Denies rashes, itching, open sores    Vitals    BP (!) 155/105   Pulse 91   Temp 97.7 °F (36.5 °C) (Oral)   Resp 20   Ht 5' 4\" (1.626 m)   Wt 185 lb (83.9 kg)   SpO2 92%   BMI 31.76 kg/m²   Body mass index is 31.76 kg/m². I/O    No intake or output data in the 24 hours ending 07/12/21 1154  No intake/output data recorded. Patient Vitals for the past 96 hrs (Last 3 readings):   Weight   07/12/21 0608 185 lb (83.9 kg)     Exam   General Appearance  Awake, alert, oriented  HEENT - Head is normocephalic, atraumatic.  Pupil reactive to light  Neck - Supple, symmetrical, trachea midline and Soft, trachea midline and straight  Lungs -decreased air Elise Lugo CNP on 7/12/2021 at 11:54 AM  Patient was seen under the supervision of Dr. Xu Ocampo  Above assessment and plan will be reviewed with Dr. Marvel Dancer.   Patient plan will bee finalized following review by Dr. Celeste Alexander and [id number] [address]  Office: [phone]

## 2021-07-12 NOTE — PROGRESS NOTES
Physical Therapy  DATE: 2021    NAME: Karen Hale  MRN: [id number]   : 1965    Patient not seen this date for Physical Therapy due to:  [] Blood transfusion in progress  [] Cancel by RN  [] Hemodialysis  []  Refusal by Patient   [] Spine Precautions   [] Strict Bedrest  [] Surgery  [x] Testing      [] Other        [] PT being discontinued at this time. Patient independent. No further needs. [] PT being discontinued at this time as the patient has been transferred to hospice care. No further needs.     [address], PT

## 2021-07-12 NOTE — H&P
History & Physical  [facility]    Adult Hospitalist      Name: Sofia Guerrier  MRN: [id number]     Acct: [de-identified]  Room: [address] [place], [place] [address]    Admit Date: 7/12/2021  6:07 AM  PCP: Kayla Elliott MD    Primary Problem  Active Problems:    COPD exacerbation [facility])  Resolved Problems:    * No resolved hospital problems.  *        Assesment:   Make respiratory failure  Acute COPD admission  Essential hypertension  Mixed hyperlipidemia  Coronary artery disease  Depression with anxiety  Tobacco abuse  Obesity, BMI 31.76      Plan:   Admit patient to progressive unit  Oxygen to keep SPO2 more than 90%  Telemetry  Check vital signs closely  CBC BMP daily  Sputum culture  Echocardiogram  IV Solu-Medrol  DuoNeb breathing treatment  Oral azithromycin  Continue amlodipine, losartan and hydrochlorothiazide, metoprolol  Continue aspirin  Continue Celexa and Seroquel  Pulmonology consult, patient started on Daliresp  Continue the medication as below      Scheduled Meds:   sodium chloride flush  5-40 mL Intravenous 2 times per day    enoxaparin  40 mg Subcutaneous Daily    ipratropium-albuterol  1 ampule Inhalation Q4H WA    [START ON 7/13/2021] azithromycin  500 mg Intravenous Q24H    methylPREDNISolone  40 mg Intravenous Q6H    budesonide-formoterol  2 puff Inhalation BID    montelukast  10 mg Oral Nightly    Roflumilast  250 mcg Oral Daily    lidocaine  1 patch Transdermal Daily    amLODIPine  5 mg Oral Daily    aspirin  81 mg Oral Daily    Vitamin D3  1 capsule Oral Daily    escitalopram  10 mg Oral Daily    [START ON 7/13/2021] ferrous gluconate  324 mg Oral Daily with breakfast    folic acid  1 mg Oral Daily    losartan-hydroCHLOROthiazide  1 tablet Oral Daily    metoprolol succinate  50 mg Oral Daily    QUEtiapine  400 mg Oral Nightly     Continuous Infusions:   sodium chloride       PRN Meds:  sodium chloride flush, 10 mL, PRN  sodium chloride, 25 mL, PRN  potassium chloride, 40 mEq, PRN Or  potassium alternative oral replacement, 40 mEq, PRN   Or  potassium chloride, 10 mEq, PRN  magnesium sulfate, 1,000 mg, PRN  ondansetron, 4 mg, Q8H PRN   Or  ondansetron, 4 mg, Q6H PRN  magnesium hydroxide, 30 mL, Daily PRN  acetaminophen, 650 mg, Q6H PRN   Or  acetaminophen, 650 mg, Q6H PRN  morphine, 2 mg, Q4H PRN        Chief Complaint:     Chief Complaint   Patient presents with    Shortness of Breath         History of Present Illness:      Genesis Grijalva is a 60 y.o.  male who presents with Shortness of Breath  This is a 52-year-old gentleman has been admitted via emergency room, patient came to the ER with a complaint of having shortness of breath, patient has past medical history significant for COPD on home oxygen for dinner, patient also history of hypertension hyperlipidemia, patient is been having shortness of breath for past 5 days, which has progressively gotten worse, is associated with persistent nonproductive cough, patient denies any fever or chills, denies any chest pain, patient initial testing in the emergency room is unremarkable, noticed to have COPD exacerbation admitted for further management    I have personally reviewed the past medical history, past surgical history, medications, social history, and family history, and summarized in the note. Review of Systems:     All 10 point system is reviewed and negative otherwise mentioned in HPI.       Past Medical History:     Past Medical History:   Diagnosis Date    Arthritis     CAD (coronary artery disease)     [facility]     prostate    Cirrhosis with alcoholism ([address]     COPD (chronic obstructive pulmonary disease) ([address]     Depression     BOURGEOIS (dyspnea on exertion)     H/O prostate cancer     Hyperlipidemia     Hypertension     MI (myocardial infarction) ([address]     Seizures ([address] 2016    last one over 2 years ago     SVT (supraventricular tachycardia) ([address]     Tobacco abuse         Past Surgical History:     Past Surgical History:   Procedure Laterality Date    CARDIAC CATHETERIZATION      ablation    INSERTABLE CARDIAC MONITOR  10/03/2018    INTERNAL LOOP RECORDER    PROSTATECTOMY      SINUS SURGERY      TONGUE SURGERY      UPPER GASTROINTESTINAL ENDOSCOPY N/A 6/10/2021    EGD BIOPSY performed by Bala Cochran MD at [address] [place] [address]        Medications Prior to Admission:       Prior to Admission medications    Medication Sig Start Date End Date Taking?  Authorizing Provider   escitalopram (LEXAPRO) 10 MG tablet Take 10 mg by mouth daily   Yes Historical Provider, MD   ferrous gluconate (FERGON) 324 (38 Fe) MG tablet Take 324 mg by mouth daily (with breakfast)   Yes Historical Provider, MD   famotidine (PEPCID) 40 MG tablet Take 40 mg by mouth 2 times daily as needed (heartburn)    Yes Historical Provider, MD   amLODIPine (NORVASC) 5 MG tablet Take 5 mg by mouth daily   Yes Historical Provider, MD   losartan-hydroCHLOROthiazide (HYZAAR) 100-25 MG per tablet Take 1 tablet by mouth daily   Yes Historical Provider, MD   albuterol (PROVENTIL) (2.5 MG/3ML) 0.083% nebulizer solution Take 2.5 mg by nebulization every 6 hours as needed for Wheezing   Yes Historical Provider, MD   albuterol sulfate  (90 Base) MCG/ACT inhaler Inhale 2 puffs into the lungs every 4-6 hours as needed for Wheezing   Yes Historical Provider, MD   aspirin 81 MG tablet Take 81 mg by mouth daily   Yes Historical Provider, MD   budesonide-formoterol (SYMBICORT) 160-4.5 MCG/ACT AERO Inhale 2 puffs into the lungs 2 times daily   Yes Historical Provider, MD   folic acid (FOLVITE) 1 MG tablet Take 1 mg by mouth daily   Yes Historical Provider, MD   metoprolol succinate (TOPROL XL) 50 MG extended release tablet Take 50 mg by mouth daily   Yes Historical Provider, MD   pantoprazole (PROTONIX) 40 MG tablet Take 40 mg by mouth daily   Yes Historical Provider, MD   QUEtiapine (SEROQUEL) 400 MG tablet Take 400 mg by mouth nightly    Yes Historical Provider, MD vitamin B-1 (THIAMINE) 100 MG tablet Take 100 mg by mouth daily   Yes Historical Provider, MD   Cholecalciferol (VITAMIN D3) 2000 units CAPS Take 1 capsule by mouth daily   Yes Historical Provider, MD   tiotropium (SPIRIVA RESPIMAT) 2.5 MCG/ACT AERS inhaler Inhale 2 puffs into the lungs daily   Yes Historical Provider, MD        Allergies:       Patient has no known allergies. Social History:     Tobacco:    reports that he has quit smoking. His smoking use included cigarettes. He smoked 0.10 packs per day. He has never used smokeless tobacco.  Alcohol:      reports previous alcohol use. Drug Use:  reports no history of drug use. Family History:     History reviewed. No pertinent family history.       Physical Exam:     Vitals:  BP (!) 147/90   Pulse 95   Temp 98.1 °F (36.7 °C) (Oral)   Resp 18   Ht 5' 4\" (1.626 m)   Wt 185 lb (83.9 kg)   SpO2 93%   BMI 31.76 kg/m²   Temp (24hrs), Av °F (36.7 °C), Min:97.7 °F (36.5 °C), Max:98.1 °F (36.7 °C)          General appearance - alert, well appearing, and in no acute distress  Mental status - oriented to person, place, and time with normal affect  Head - normocephalic and atraumatic  Eyes - pupils equal and reactive, extraocular eye movements intact, conjunctiva clear  Ears - hearing appears to be intact  Nose - no drainage noted  Mouth - mucous membranes moist  Neck - supple, no carotid bruits, thyroid not palpable  Chest -wheezing to auscultation, normal effort  Heart - normal rate, regular rhythm, no murmur  Abdomen - soft, nontender, nondistended, bowel sounds present all four quadrants, no masses, hepatomegaly or splenomegaly  Neurological - normal speech, no focal findings or movement disorder noted, cranial nerves II through XII grossly intact  Extremities - peripheral pulses palpable, no pedal edema or calf pain with palpation  Skin - no gross lesions, rashes, or induration noted        Data:     Labs:    Hematology:  Recent Labs 07/12/21 0625   WBC 17.3*   RBC 4.55   HGB 14.1   HCT 41.7   MCV 91.6   MCH 31.0   MCHC 33.8   RDW 12.3      MPV 9.6     Chemistry:  Recent Labs     07/12/21 0625 07/12/21  0813   *  --    K 4.0  --    CL 87*  --    CO2 29  --    GLUCOSE 147*  --    BUN 9  --    CREATININE 0.72  --    ANIONGAP 11  --    LABGLOM >60  --    GFRAA >60  --    CALCIUM 9.4  --    TROPHS 12 11     Recent Labs     07/12/21 0625   PROT 7.6   LABALBU 4.1   AST 10   ALT 11   ALKPHOS 78   BILITOT 0.23*       No results found for: INR, PROTIME    Lab Results   Component Value Date/Time    SPECIAL LAC 07/12/2021 06:53 AM     Lab Results   Component Value Date/Time    CULTURE NO GROWTH 8 HOURS 07/12/2021 06:53 AM       No results found for: POCPH, PHART, PH, POCPCO2, IDS5DYA, PCO2, POCPO2, PO2ART, PO2, POCHCO3, TDE9WOU, HCO3, NBEA, PBEA, BEART, BE, THGBART, THB, DPK6OVV, PAPB2FHP, K0KXRADF, O2SAT, FIO2    Radiology:    XR CHEST PORTABLE    Result Date: 7/12/2021  Changes of COPD but otherwise unremarkable exam with cardiac event recorder in place. All radiological studies reviewed                Code Status:  Full Code    Electronically signed by Sudhakar Edwards MD on 7/12/2021 at 5:01 PM     Copy sent to Dr. Orlando Albert MD    This note was created with the assistance of a speech-recognition program.  Although the intention is to generate a document that actually reflects the content of the visit, no guarantees can be provided that every mistake has been identified and corrected by editing. Note was updated later by me after  physical examination and  completion of the assessment.

## 2021-07-12 NOTE — CARE COORDINATION
Case Management Initial Discharge Plan  Leia Camarenaad,         Readmission Risk              Risk of Unplanned Readmission:  12             Met with:patient to discuss discharge plans. Information verified: address, contacts, phone number, , insurance Yes  PCP: Sheela Garcia MD  Date of last visit: last week    Insurance Provider: [employer]    Discharge Planning  Current Residence:   house  Living Arrangements:   daughter   Home has 1 stories/no stairs to climb  Support Systems:   daughter and sister  Supplier:  4L O2 via [facility]  Patient able to perform ADL's:Independent  DME used to aid ambulation prior to admission: 4L O2  During admission: none    Potential Assistance Needed:   tbd    Pharmacy: [facility] Medications:   no  Does patient want to participate in local refill/ meds to beds program?   no    Patient agreeable to home care: tbd  [place] of choice provided:  yes      Type of Home Care Services:     Patient expects to be discharged to:   home    Prior SNF/Rehab Placement and Facility: no  Agreeable to SNF/Rehab: No  [place] of choice provided: yes   Evaluation: yes    Expected Discharge date:   21  Follow Up Appointment: Best Day/ Time:      Transportation provider: family  Transportation arrangements needed for discharge: tbd    Discharge Plan:   Patient lives at home with daughter in 1 story home. Patient independent. Patient denied any drug or alcohol use. SBIRT completed. Patient stated either daughter or sister Dominik Cao [phone] are main contacts. Patient is admitted for COPD. Patient has Pulmonology consult. Discharge needs tbd.         Electronically signed by EPI Hammonds on 21 at 10:47 AM EDT

## 2021-07-12 NOTE — PROGRESS NOTES
Transitions of Care Pharmacy Service   Medication Review    The patient's list of current home medications was reviewed earlier today while in the ED. [facility] also reports filling Keppra 500mg for him; pt confirms he does not take this anymore but it still gets auto-refilled by the pharmacy. Source(s) of information: patient, [facility]      Please feel free to call me with any questions about this encounter. Thank you.     [facility] castaclip, [address] [place] [address]   Transitions  Care Pharmacy Service  Phone:  [phone]  Fax: [phone]      Electronically signed by Corry , [address] [place] [address] on 7/12/2021 at 5:54 PM           Medications Prior to Admission:   escitalopram (LEXAPRO) 10 MG tablet, Take 10 mg by mouth daily  ferrous gluconate (FERGON) 324 (38 Fe) MG tablet, Take 324 mg by mouth daily (with breakfast)  famotidine (PEPCID) 40 MG tablet, Take 40 mg by mouth 2 times daily as needed (heartburn)   amLODIPine (NORVASC) 5 MG tablet, Take 5 mg by mouth daily  losartan-hydroCHLOROthiazide (HYZAAR) 100-25 MG per tablet, Take 1 tablet by mouth daily  albuterol (PROVENTIL) (2.5 MG/3ML) 0.083% nebulizer solution, Take 2.5 mg by nebulization every 6 hours as needed for Wheezing  albuterol sulfate  (90 Base) MCG/ACT inhaler, Inhale 2 puffs into the lungs every 4-6 hours as needed for Wheezing  aspirin 81 MG tablet, Take 81 mg by mouth daily  budesonide-formoterol (SYMBICORT) 160-4.5 MCG/ACT AERO, Inhale 2 puffs into the lungs 2 times daily  folic acid (FOLVITE) 1 MG tablet, Take 1 mg by mouth daily  metoprolol succinate (TOPROL XL) 50 MG extended release tablet, Take 50 mg by mouth daily  pantoprazole (PROTONIX) 40 MG tablet, Take 40 mg by mouth daily  QUEtiapine (SEROQUEL) 400 MG tablet, Take 400 mg by mouth nightly   vitamin B-1 (THIAMINE) 100 MG tablet, Take 100 mg by mouth daily  Cholecalciferol (VITAMIN D3) 2000 units CAPS, Take 1 capsule by mouth daily  tiotropium (SPIRIVA RESPIMAT) 2.5 MCG/ACT AERS inhaler, Inhale 2 puffs into the lungs daily

## 2021-07-12 NOTE — RT PROTOCOL NOTE
RT Inhaler-Nebulizer Bronchodilator Protocol Note    There is a bronchodilator order in the chart from a provider indicating to follow the RT Bronchodilator Protocol and there is an Initiate RT Bronchodilator Protocol order as well (see protocol at bottom of note). The findings from the last RT Protocol Assessment were as follows:  Smoking: >15 Pack years  Surgical Status: No surgery  Xray: Chronic changes  Respiratory Pattern: RR <12 or 21-30  Mental Status: Alert and Oriented  Breath Sounds: Severe wheezing or poor air exchange  Cough: Weak, non-productive  Activity Level: Walking unassisted  Oxygen Requirement: Room Air - 2LNC/28% or home setting  Indication for Bronchodilator Therapy: Decreased or absent breath sounds, On home bronchodilators  Bronchodilator Assessment Score: 11    Aerosolized bronchodilator medication orders have been revised according to the RT Bronchodilator Protocol. RT Inhaler-Nebulizer Bronchodilator Protocol:    Respiratory Therapist to perform RT Therapy Protocol Assessment then follow the protocol. No Indications - adjust the frequency to every 6 hours PRN wheezing or bronchospasm, if no treatments needed after 48 hours then discontinue using Per Protocol order mode. If indication present, adjust the RT bronchodilator orders based on the Bronchodilator Assessment Score as follows:    0-6 - enter or revise RT bronchodilator order to Albuterol Inhaler order with frequency of every 2 hours PRN for wheezing or increased work of breathing using Per Protocol order mode. If Albuterol Inhaler not tolerated or not effective, then discontinue the Albuterol Inhaler order and enter Albuterol Nebulizer order with same frequency and PRN reasons. Repeat RT Therapy Protocol Assessment as needed.     7-10 - discontinue any other Inpatient aerosolized bronchodilator medication orders and enter or revise two Albuterol Inhaler orders, one with BID frequency and one with frequency of every 2 hours PRN wheezing or increased work of breathing using Per Protocol order mode. Repeat RT Therapy Protocol Assessment with second treatment then BID and as needed. If Albuterol Inhaler not tolerated or not effective, then discontinue the Albuterol Inhaler orders and enter two Albuterol Nebulizer orders with same frequencies and PRN reasons. 11-13 - discontinue any other Inpatient aerosolized bronchodilator medication orders and enter DuoNeb Nebulizer orders QID frequency and an Albuterol Nebulizer order every 2 hours PRN wheezing or increased work of breathing using Per Protocol order mode. Repeat RT Therapy Protocol Assessment with second treatment then QID and as needed. Greater than 13 - discontinue any other Inpatient bronchodilator aerosolized medication orders and enter DuoNeb Nebulizer order every 4 hours frequency and Albuterol Nebulizer every 2 hours PRN wheezing or increased work of breathing using Per Protocol order mode. Repeat RT Therapy Protocol Assessment with second treatment then every 4 hours and as needed. RT to enter RT Home Evaluation for COPD & MDI Assessment order using Per Protocol order mode.     Electronically signed by Mark Pfeiffer RCP on 7/12/2021 at 12:00 PM

## 2021-07-12 NOTE — ED PROVIDER NOTES
Medical Decision Making: The patient was initially seen by Dr. Annamaria Castillo and signed out to me after discussing the case with him thoroughly. The patient has not improved significantly and is being admitted. Treatment diagnosis and disposition were discussed with the patient. CONSULTS:  None    PROCEDURES:  None    FINAL IMPRESSION      1. COPD exacerbation (HCC)         DISPOSITION/PLAN   DISPOSITION Decision To Admit 07/12/2021 07:40:44 AM       PATIENT REFERRED TO:   No follow-up provider specified.     DISCHARGE MEDICATIONS:     New Prescriptions    No medications on file         (Please note that portions of this note were completed with a voice recognition program.  Efforts were made to edit the dictations but occasionally words are mis-transcribed.)    Verona Lima MD  Attending Emergency Physician         Verona Lima MD  07/12/21 3571

## 2021-07-12 NOTE — PROGRESS NOTES
Pt admitted from ER per cart, pt oriented to room and bed controls, pt placed on telemetry, assessment and vitals complete, pt complains of chest wall pain from coughing, tylenol ordered and pt requesting something stronger, Dr. Kellie Mack paged

## 2021-07-12 NOTE — FLOWSHEET NOTE
Pt sleeping upon attempted visit. Writer says prayer at door. Chaplains will remain available to offer spiritual and emotional support as needed.        07/12/21 1635   Encounter Summary   Services provided to: Patient   Referral/Consult From: Trevor   Continue Visiting   (7/12/21 sleeping)   Complexity of Encounter Low   Routine   Type Initial   Assessment Sleeping     Electronically signed by Annalise Espana on 7/12/2021 at 4:35 PM.  Alex  [phone]

## 2021-07-12 NOTE — ED NOTES
Pt presents to ED via EMS with c/o SOB, onset 5 days ago. Per EMS pt was given albuterol treatment and 2 grams of mag prior to arrival to ED. Pt wears 4L of home O2. Pt has productive cough with green mucus x7 days. Pt alert and oriented x4. Pt is a smoker. Pt afebrile, vitals stable. Skin PWD.  MM Hieu Salas, RN  07/12/21 [id number]

## 2021-07-13 LAB
ABSOLUTE EOS #: 0.16 K/UL (ref 0–0.44)
ABSOLUTE IMMATURE GRANULOCYTE: 0.32 K/UL (ref 0–0.3)
ABSOLUTE LYMPH #: 1.16 K/UL (ref 1.1–3.7)
ABSOLUTE MONO #: 0.71 K/UL (ref 0.1–1.2)
ANION GAP SERPL CALCULATED.3IONS-SCNC: 9 MMOL/L (ref 9–17)
BASOPHILS # BLD: 0 % (ref 0–2)
BASOPHILS ABSOLUTE: 0.04 K/UL (ref 0–0.2)
BUN BLDV-MCNC: 8 MG/DL (ref 6–20)
BUN/CREAT BLD: 11 (ref 9–20)
CALCIUM SERPL-MCNC: 9.5 MG/DL (ref 8.6–10.4)
CHLORIDE BLD-SCNC: 90 MMOL/L (ref 98–107)
CO2: 31 MMOL/L (ref 20–31)
CREAT SERPL-MCNC: 0.74 MG/DL (ref 0.7–1.2)
DIFFERENTIAL TYPE: ABNORMAL
EOSINOPHILS RELATIVE PERCENT: 1 % (ref 1–4)
GFR AFRICAN AMERICAN: >60 ML/MIN
GFR NON-AFRICAN AMERICAN: >60 ML/MIN
GFR SERPL CREATININE-BSD FRML MDRD: ABNORMAL ML/MIN/{1.73_M2}
GFR SERPL CREATININE-BSD FRML MDRD: ABNORMAL ML/MIN/{1.73_M2}
GLUCOSE BLD-MCNC: 150 MG/DL (ref 70–99)
HCT VFR BLD CALC: 40.7 % (ref 40.7–50.3)
HEMOGLOBIN: 13.5 G/DL (ref 13–17)
IMMATURE GRANULOCYTES: 2 %
INR BLD: 1.1
LYMPHOCYTES # BLD: 7 % (ref 24–43)
MCH RBC QN AUTO: 31.1 PG (ref 25.2–33.5)
MCHC RBC AUTO-ENTMCNC: 33.2 G/DL (ref 28.4–34.8)
MCV RBC AUTO: 93.8 FL (ref 82.6–102.9)
MONOCYTES # BLD: 4 % (ref 3–12)
NRBC AUTOMATED: 0 PER 100 WBC
PDW BLD-RTO: 12.3 % (ref 11.8–14.4)
PLATELET # BLD: 272 K/UL (ref 138–453)
PLATELET ESTIMATE: ABNORMAL
PMV BLD AUTO: 9.7 FL (ref 8.1–13.5)
POTASSIUM SERPL-SCNC: 4.2 MMOL/L (ref 3.7–5.3)
PROTHROMBIN TIME: 13.5 SEC (ref 11.5–14.2)
RBC # BLD: 4.34 M/UL (ref 4.21–5.77)
RBC # BLD: ABNORMAL 10*6/UL
SEG NEUTROPHILS: 87 % (ref 36–65)
SEGMENTED NEUTROPHILS ABSOLUTE COUNT: 15.46 K/UL (ref 1.5–8.1)
SODIUM BLD-SCNC: 130 MMOL/L (ref 135–144)
WBC # BLD: 17.9 K/UL (ref 3.5–11.3)
WBC # BLD: ABNORMAL 10*3/UL

## 2021-07-13 PROCEDURE — 1200000000 HC SEMI PRIVATE

## 2021-07-13 PROCEDURE — 6360000002 HC RX W HCPCS: Performed by: FAMILY MEDICINE

## 2021-07-13 PROCEDURE — 6370000000 HC RX 637 (ALT 250 FOR IP): Performed by: FAMILY MEDICINE

## 2021-07-13 PROCEDURE — 80048 BASIC METABOLIC PNL TOTAL CA: CPT

## 2021-07-13 PROCEDURE — 97530 THERAPEUTIC ACTIVITIES: CPT

## 2021-07-13 PROCEDURE — 6370000000 HC RX 637 (ALT 250 FOR IP): Performed by: NURSE PRACTITIONER

## 2021-07-13 PROCEDURE — 2580000003 HC RX 258: Performed by: FAMILY MEDICINE

## 2021-07-13 PROCEDURE — 97535 SELF CARE MNGMENT TRAINING: CPT

## 2021-07-13 PROCEDURE — 94640 AIRWAY INHALATION TREATMENT: CPT

## 2021-07-13 PROCEDURE — 85610 PROTHROMBIN TIME: CPT

## 2021-07-13 PROCEDURE — 85025 COMPLETE CBC W/AUTO DIFF WBC: CPT

## 2021-07-13 PROCEDURE — 36415 COLL VENOUS BLD VENIPUNCTURE: CPT

## 2021-07-13 PROCEDURE — 97166 OT EVAL MOD COMPLEX 45 MIN: CPT

## 2021-07-13 PROCEDURE — 94761 N-INVAS EAR/PLS OXIMETRY MLT: CPT

## 2021-07-13 RX ORDER — CEFUROXIME AXETIL 500 MG/1
500 TABLET ORAL EVERY 12 HOURS SCHEDULED
Status: DISCONTINUED | OUTPATIENT
Start: 2021-07-13 | End: 2021-07-15 | Stop reason: HOSPADM

## 2021-07-13 RX ADMIN — FOLIC ACID 1 MG: 1 TABLET ORAL at 08:16

## 2021-07-13 RX ADMIN — IPRATROPIUM BROMIDE AND ALBUTEROL SULFATE 1 AMPULE: .5; 2.5 SOLUTION RESPIRATORY (INHALATION) at 19:24

## 2021-07-13 RX ADMIN — IPRATROPIUM BROMIDE AND ALBUTEROL SULFATE 1 AMPULE: .5; 2.5 SOLUTION RESPIRATORY (INHALATION) at 06:01

## 2021-07-13 RX ADMIN — BUDESONIDE AND FORMOTEROL FUMARATE DIHYDRATE 2 PUFF: 160; 4.5 AEROSOL RESPIRATORY (INHALATION) at 19:24

## 2021-07-13 RX ADMIN — METHYLPREDNISOLONE SODIUM SUCCINATE 40 MG: 40 INJECTION, POWDER, FOR SOLUTION INTRAMUSCULAR; INTRAVENOUS at 03:46

## 2021-07-13 RX ADMIN — FERROUS SULFATE TAB EC 325 MG (65 MG FE EQUIVALENT) 325 MG: 325 (65 FE) TABLET DELAYED RESPONSE at 08:16

## 2021-07-13 RX ADMIN — MORPHINE SULFATE 2 MG: 2 INJECTION, SOLUTION INTRAMUSCULAR; INTRAVENOUS at 21:15

## 2021-07-13 RX ADMIN — METHYLPREDNISOLONE SODIUM SUCCINATE 40 MG: 40 INJECTION, POWDER, FOR SOLUTION INTRAMUSCULAR; INTRAVENOUS at 15:49

## 2021-07-13 RX ADMIN — MORPHINE SULFATE 2 MG: 2 INJECTION, SOLUTION INTRAMUSCULAR; INTRAVENOUS at 12:28

## 2021-07-13 RX ADMIN — MORPHINE SULFATE 2 MG: 2 INJECTION, SOLUTION INTRAMUSCULAR; INTRAVENOUS at 17:11

## 2021-07-13 RX ADMIN — MORPHINE SULFATE 2 MG: 2 INJECTION, SOLUTION INTRAMUSCULAR; INTRAVENOUS at 03:51

## 2021-07-13 RX ADMIN — METHYLPREDNISOLONE SODIUM SUCCINATE 40 MG: 40 INJECTION, POWDER, FOR SOLUTION INTRAMUSCULAR; INTRAVENOUS at 08:29

## 2021-07-13 RX ADMIN — AMLODIPINE BESYLATE 5 MG: 5 TABLET ORAL at 08:16

## 2021-07-13 RX ADMIN — ESCITALOPRAM OXALATE 10 MG: 10 TABLET ORAL at 08:16

## 2021-07-13 RX ADMIN — SODIUM CHLORIDE, PRESERVATIVE FREE 10 ML: 5 INJECTION INTRAVENOUS at 21:16

## 2021-07-13 RX ADMIN — BUDESONIDE AND FORMOTEROL FUMARATE DIHYDRATE 2 PUFF: 160; 4.5 AEROSOL RESPIRATORY (INHALATION) at 06:01

## 2021-07-13 RX ADMIN — MORPHINE SULFATE 2 MG: 2 INJECTION, SOLUTION INTRAMUSCULAR; INTRAVENOUS at 08:18

## 2021-07-13 RX ADMIN — QUETIAPINE FUMARATE 400 MG: 200 TABLET ORAL at 20:27

## 2021-07-13 RX ADMIN — SODIUM CHLORIDE, PRESERVATIVE FREE 10 ML: 5 INJECTION INTRAVENOUS at 20:28

## 2021-07-13 RX ADMIN — ASPIRIN 81 MG: 81 TABLET, COATED ORAL at 08:17

## 2021-07-13 RX ADMIN — CEFUROXIME AXETIL 500 MG: 500 TABLET ORAL at 11:51

## 2021-07-13 RX ADMIN — MONTELUKAST 10 MG: 10 TABLET, FILM COATED ORAL at 20:27

## 2021-07-13 RX ADMIN — SODIUM CHLORIDE, PRESERVATIVE FREE 10 ML: 5 INJECTION INTRAVENOUS at 08:17

## 2021-07-13 RX ADMIN — LOSARTAN POTASSIUM 100 MG: 100 TABLET, FILM COATED ORAL at 08:16

## 2021-07-13 RX ADMIN — IPRATROPIUM BROMIDE AND ALBUTEROL SULFATE 1 AMPULE: .5; 2.5 SOLUTION RESPIRATORY (INHALATION) at 14:25

## 2021-07-13 RX ADMIN — IPRATROPIUM BROMIDE AND ALBUTEROL SULFATE 1 AMPULE: .5; 2.5 SOLUTION RESPIRATORY (INHALATION) at 11:04

## 2021-07-13 RX ADMIN — HYDROCHLOROTHIAZIDE 25 MG: 25 TABLET ORAL at 08:16

## 2021-07-13 RX ADMIN — AZITHROMYCIN MONOHYDRATE 500 MG: 500 INJECTION, POWDER, LYOPHILIZED, FOR SOLUTION INTRAVENOUS at 23:45

## 2021-07-13 RX ADMIN — Medication 2000 UNITS: at 08:16

## 2021-07-13 RX ADMIN — ROFLUMILAST 250 MCG: 500 TABLET ORAL at 08:17

## 2021-07-13 RX ADMIN — CEFUROXIME AXETIL 500 MG: 500 TABLET ORAL at 20:27

## 2021-07-13 RX ADMIN — METHYLPREDNISOLONE SODIUM SUCCINATE 40 MG: 40 INJECTION, POWDER, FOR SOLUTION INTRAMUSCULAR; INTRAVENOUS at 20:28

## 2021-07-13 RX ADMIN — SODIUM CHLORIDE, PRESERVATIVE FREE 10 ML: 5 INJECTION INTRAVENOUS at 23:45

## 2021-07-13 RX ADMIN — ENOXAPARIN SODIUM 40 MG: 40 INJECTION SUBCUTANEOUS at 08:18

## 2021-07-13 RX ADMIN — METOPROLOL SUCCINATE 50 MG: 50 TABLET, EXTENDED RELEASE ORAL at 08:16

## 2021-07-13 ASSESSMENT — PAIN DESCRIPTION - PAIN TYPE: TYPE: ACUTE PAIN

## 2021-07-13 ASSESSMENT — PAIN DESCRIPTION - ORIENTATION: ORIENTATION: MID

## 2021-07-13 ASSESSMENT — PAIN SCALES - GENERAL
PAINLEVEL_OUTOF10: 7
PAINLEVEL_OUTOF10: 6
PAINLEVEL_OUTOF10: 4
PAINLEVEL_OUTOF10: 4
PAINLEVEL_OUTOF10: 7
PAINLEVEL_OUTOF10: 6
PAINLEVEL_OUTOF10: 6
PAINLEVEL_OUTOF10: 7
PAINLEVEL_OUTOF10: 0
PAINLEVEL_OUTOF10: 7

## 2021-07-13 ASSESSMENT — PAIN DESCRIPTION - PROGRESSION: CLINICAL_PROGRESSION: NOT CHANGED

## 2021-07-13 ASSESSMENT — PAIN DESCRIPTION - FREQUENCY: FREQUENCY: CONTINUOUS

## 2021-07-13 ASSESSMENT — PAIN DESCRIPTION - LOCATION: LOCATION: CHEST

## 2021-07-13 ASSESSMENT — PAIN DESCRIPTION - ONSET: ONSET: ON-GOING

## 2021-07-13 ASSESSMENT — PAIN DESCRIPTION - DESCRIPTORS: DESCRIPTORS: PRESSURE

## 2021-07-13 NOTE — PROGRESS NOTES
Occupational Therapy   Occupational Therapy Initial Assessment  Date: 2021   Patient Name: Alpa Cavazos  MRN: [id number]     : 1965    Date of Service: 2021    RAY Broussard reports patient is medically stable for therapy treatment this date. Chart reviewed prior to treatment and patient is agreeable for therapy. All lines intact and patient positioned comfortably at end of treatment. All patient needs addressed prior to ending therapy session. Assessment   Performance deficits / Impairments: Decreased functional mobility ; Decreased safe awareness;Decreased ADL status; Decreased strength;Decreased endurance;Decreased high-level IADLs;Decreased fine motor control;Decreased posture;Decreased balance  Assessment: Pt would benefit from continued skilled OT services to increase safety, endurance and I during functional tasks to return home at prior level of function as able. Prognosis: Good  Decision Making: Medium Complexity  OT Education: OT Role;Transfer Training;Energy Conservation;Plan of Care;ADL Adaptive Strategies  Patient Education: pursed lip breathing, safety in function, fall prevention/call light use, recommendations for continued therapy  REQUIRES OT FOLLOW UP: Yes  Activity Tolerance  Activity Tolerance: Patient Tolerated treatment well;Patient limited by fatigue  Activity Tolerance: fair -  Safety Devices  Safety Devices in place: Yes  Type of devices: Nurse notified;Gait belt;Bed alarm in place;Call light within reach; Patient at risk for falls; Left in bed; All fall risk precautions in place           Patient Diagnosis(es): The encounter diagnosis was COPD exacerbation ([facility] [address]      has a past medical history of Arthritis, CAD (coronary artery disease), Cancer ([facility] [address] Cirrhosis with alcoholism ([address] COPD (chronic obstructive pulmonary disease) ([address] Depression, BOURGEOIS (dyspnea on exertion), H/O prostate cancer, Hyperlipidemia, Hypertension, MI (myocardial infarction) ([address] Seizures ([address] SVT (supraventricular tachycardia) ([facility] [address] and Tobacco abuse.   has a past surgical history that includes Prostatectomy; Insertable Cardiac Monitor (10/03/2018); sinus surgery; Tongue surgery; Cardiac catheterization; and Upper gastrointestinal endoscopy (N/A, 6/10/2021). PER H&P: Patient is a 58-year-old male with PMH of CAD, COPD on 4L home O2, hypertension and hyperlipidemia who is brought in by EMS for shortness of breath. He states symptoms started gradually 5 days ago but became severe last night. He also reports persistent nonproductive cough causing chest pain. No fevers, abdominal pain, nausea, vomiting, changes in urine or stool. In route EMS gave DuoNeb x2, Solu-Medrol and mag. Restrictions  Restrictions/Precautions  Restrictions/Precautions: General Precautions, Fall Risk  Position Activity Restriction  Other position/activity restrictions: up with assist, telemetry, 4L O2 per nc, RUE IV    Subjective   General  Chart Reviewed: Yes  Patient assessed for rehabilitation services?: Yes  Family / Caregiver Present: No  Pt reports 6/10 Pt reports 6/10 pain in chest from coughingpain in chest from coughing    Social/Functional History  Social/Functional History  Lives With: Friend(s) (Pt reports he has been staying with friends)  Type of Home: House  Home Layout: Two level, Able to Live on Main level with bedroom/bathroom  Home Access: Level entry  Bathroom Shower/Tub: Tub/Shower unit  Bathroom Toilet: Standard  Home Equipment: Oxygen (4L O2 continously, Nebulizer)  ADL Assistance: Independent  Homemaking Assistance: Independent  Homemaking Responsibilities: Yes  Ambulation Assistance: Independent  Transfer Assistance: Independent  Active : No  Patient's  Info: friends can drive him  Occupation: On disability  Leisure & Hobbies: Watching TV  Additional Comments: Pt reports 3-4 falls in the last few days.        Objective   Vision: Impaired (Pt denies any recent visual changes)  Vision (No AD)  Stand to sit: Stand by assistance  Transfer Comments: Pt required Min verbal cues/tactile assist for upright posture, pacing self, line mgmt and controlled stand to sit all to increase safety and reduce risk of falls. Cognition  Overall Cognitive Status: Exceptions  Arousal/Alertness: Appropriate responses to stimuli  Following Commands: Follows multistep commands with increased time; Follows multistep commands with repitition  Attention Span: Appears intact  Memory: Appears intact  Safety Judgement: Decreased awareness of need for safety;Decreased awareness of need for assistance  Problem Solving: Decreased awareness of errors  Insights: Decreased awareness of deficits  Initiation: Does not require cues  Sequencing: Does not require cues        Sensation  Overall Sensation Status: Impaired (Pt reports intermittent numbness/tingling in B hands and feet)        LUE AROM (degrees)  LUE AROM : WFL  RUE AROM (degrees)  RUE AROM : WFL  LUE Strength  Gross LUE Strength: WFL  LUE Strength Comment: 4+/5  RUE Strength  Gross RUE Strength: WFL  RUE Strength Comment: 4+/5                   Plan   Plan  Times per week: 4-5x/wk 1x/day as lulú  Current Treatment Recommendations: Balance Training, Functional Mobility Training, Endurance Training, Safety Education & Training, Home Management Training, Self-Care / ADL, Equipment Evaluation, Education, & procurement             AM-PAC Score        AM-[facility] Inpatient Daily Activity Raw Score: 19 (07/13/21 1304)  AM-PAC Inpatient ADL T-Scale Score : 40.22 (07/13/21 1304)  ADL Inpatient CMS 0-100% Score: 42.8 (07/13/21 1304)  ADL Inpatient CMS G-Code Modifier : CK (07/13/21 1304)      Goals  Short term goals  Time Frame for Short term goals: By discharge, pt to demo  Short term goal 1: ADL transfers and functional mobility to Mod I with use of AD as needed. Short term goal 2: toileting to Mod I with use of AD/grab bars as needed.   Short term goal 3: UB/LB ADLs to Mod I with use of AD/AE as needed. Short term goal 4: increased standing lulú > 8 min with SBA using AD as needed to reduce risk during functional tasks. Short term goal 5: bed mobility to Mod I with use of bedrails as needed. Long term goals  Long term goal 1: Pt to be I with EC/WS tech, fall prevention education and healthy lifestyle mgmt education with use of handouts as needed. Patient Goals   Patient goals : To go home!        Therapy Time   Individual Concurrent Group Co-treatment   Time In 3275         Time Out 1054         Minutes 39          tx time: [facility], OT

## 2021-07-13 NOTE — PROGRESS NOTES
Progress note  [facility]    Adult Hospitalist      Name: Porsha Ryan  MRN: [id number]     Acct: [de-identified]  Room: [address] [place], [place] [address]    Admit Date: 7/12/2021  6:07 AM  PCP: Orlando Albert MD    Primary Problem  Active Problems:    COPD exacerbation [facility])  Resolved Problems:    * No resolved hospital problems.  *        Assesment:   Acute hypoxic respiratory failure  Acute COPD exacerbation  Essential hypertension  Mixed hyperlipidemia  Coronary artery disease  Depression with anxiety  Tobacco abuse  Obesity, BMI 31.76      Plan:   Admit patient to progressive unit  Oxygen to keep SPO2 more than 90%  Telemetry  Check vital signs closely  CBC BMP daily  Sputum culture  Echocardiogram shows ejection fraction 65%, no obvious wall motion abnormalities  IV Solu-Medrol  DuoNeb breathing treatment  Oral azithromycin  Continue amlodipine, losartan and hydrochlorothiazide, metoprolol  Continue aspirin  Continue Celexa and Seroquel  Pulmonology consult, patient started on Daliresp  Continue the medication as below      Scheduled Meds:   cefUROXime  500 mg Oral 2 times per day    sodium chloride flush  5-40 mL Intravenous 2 times per day    enoxaparin  40 mg Subcutaneous Daily    ipratropium-albuterol  1 ampule Inhalation Q4H WA    azithromycin  500 mg Intravenous Q24H    methylPREDNISolone  40 mg Intravenous Q6H    budesonide-formoterol  2 puff Inhalation BID    montelukast  10 mg Oral Nightly    Roflumilast  250 mcg Oral Daily    lidocaine  1 patch Transdermal Daily    amLODIPine  5 mg Oral Daily    aspirin  81 mg Oral Daily    Vitamin D  2,000 Units Oral Daily    escitalopram  10 mg Oral Daily    ferrous sulfate  325 mg Oral Daily with breakfast    folic acid  1 mg Oral Daily    metoprolol succinate  50 mg Oral Daily    QUEtiapine  400 mg Oral Nightly    losartan  100 mg Oral Daily    hydroCHLOROthiazide  25 mg Oral Daily     Continuous Infusions:   sodium chloride       PRN Meds:  sodium chloride flush, 10 mL, PRN  sodium chloride, 25 mL, PRN  potassium chloride, 40 mEq, PRN   Or  potassium alternative oral replacement, 40 mEq, PRN   Or  potassium chloride, 10 mEq, PRN  magnesium sulfate, 1,000 mg, PRN  ondansetron, 4 mg, Q8H PRN   Or  ondansetron, 4 mg, Q6H PRN  magnesium hydroxide, 30 mL, Daily PRN  acetaminophen, 650 mg, Q6H PRN   Or  acetaminophen, 650 mg, Q6H PRN  morphine, 2 mg, Q4H PRN        Chief Complaint:     Chief Complaint   Patient presents with    Shortness of Breath         History of Present Illness:      Gnee Guerrero is a 60 y.o.  male who presents with Shortness of Breath  Patient seen and examined at bedside and reviewed  last 24 hrs events with nursing staff  No acute events overnight. Patient denies any acute complaints. Afebrile  Patient denies any chest pain, palpitation, headache, dizziness, cough, nausea, vomiting, abdominal pain, changes in urination or bowel habit or rash. Patient continues to have some shortness of breath          HPI  This is a 68-year-old gentleman has been admitted via emergency room, patient came to the ER with a complaint of having shortness of breath, patient has past medical history significant for COPD on home oxygen for dinner, patient also history of hypertension hyperlipidemia, patient is been having shortness of breath for past 5 days, which has progressively gotten worse, is associated with persistent nonproductive cough, patient denies any fever or chills, denies any chest pain, patient initial testing in the emergency room is unremarkable, noticed to have COPD exacerbation admitted for further management    I have personally reviewed the past medical history, past surgical history, medications, social history, and family history, and summarized in the note. Review of Systems:     All 10 point system is reviewed and negative otherwise mentioned in HPI.       Past Medical History:     Past Medical History:   Diagnosis Date    Arthritis     CAD (coronary artery disease)     Cancer (HCC)     prostate    [facility]     COPD (chronic obstructive pulmonary disease) (HCC)     Depression     BOURGEOIS (dyspnea on exertion)     H/O prostate cancer     Hyperlipidemia     Hypertension     MI (myocardial infarction) ([facility] [facility]     Se[facility]ures ([facility] 2016    last one over 2 years ago     SVT ([facility]raventricular tachycardia) ([facility]     Tobacco abuse         Past Surgical History:     Past Surgical History:   Procedure Laterality Date    CARDIAC CATHETERIZATION      ablation    INSERTABLE CARDIAC MONITOR  10/03/2018    INTERNAL LOOP RECORDER    PROSTATECTOMY      SINUS SURGERY      TONGUE SURGERY      UPPER GASTROINTESTINAL ENDOSCOPY N/A 6/10/2021    EGD BIOPSY performed by Dayami Parra MD at [facility]        Medications Prior to Admission:       Prior to Admission medications    Medication Sig Start Date End Date Taking?  Authorizing Provider   escitalopram (LEXAPRO) 10 MG tablet Take 10 mg by mouth daily   Yes Historical Provider, MD   ferrous gluconate (FERGON) 324 (38 Fe) MG tablet Take 324 mg by mouth daily (with breakfast)   Yes Historical Provider, MD   famotidine (PEPCID) 40 MG tablet Take 40 mg by mouth 2 times daily as needed (heartburn)    Yes Historical Provider, MD   amLODIPine (NORVASC) 5 MG tablet Take 5 mg by mouth daily   Yes Historical Provider, MD   losartan-hydroCHLOROthiazide (HYZAAR) 100-25 MG per tablet Take 1 tablet by mouth daily   Yes Historical Provider, MD   albuterol (PROVENTIL) (2.5 MG/3ML) 0.083% nebulizer solution Take 2.5 mg by nebulization every 6 hours as needed for Wheezing   Yes Historical Provider, MD   albuterol sulfate  (90 Base) MCG/ACT inhaler Inhale 2 puffs into the lungs every 4-6 hours as needed for Wheezing   Yes Historical Provider, MD   aspirin 81 MG tablet Take 81 mg by mouth daily   Yes Historical Provider, MD   budesonide-formoterol (SYMBICORT) 160-4.5 MCG/ACT AERO Inhale 2 puffs into the lungs 2 times daily   Yes Historical Provider, MD   folic acid (FOLVITE) 1 MG tablet Take 1 mg by mouth daily   Yes Historical Provider, MD   metoprolol succinate (TOPROL XL) 50 MG extended release tablet Take 50 mg by mouth daily   Yes Historical Provider, MD   pantoprazole (PROTONIX) 40 MG tablet Take 40 mg by mouth daily   Yes Historical Provider, MD   QUEtiapine (SEROQUEL) 400 MG tablet Take 400 mg by mouth nightly    Yes Historical Provider, MD   vitamin B-1 (THIAMINE) 100 MG tablet Take 100 mg by mouth daily   Yes Historical Provider, MD   Cholecalciferol (VITAMIN D3) 2000 units CAPS Take 1 capsule by mouth daily   Yes Historical Provider, MD   tiotropium (SPIRIVA RESPIMAT) 2.5 MCG/ACT AERS inhaler Inhale 2 puffs into the lungs daily   Yes Historical Provider, MD        Allergies:       Patient has no known allergies. Social History:     Tobacco:    reports that he has quit smoking. His smoking use included cigarettes. He smoked 0.10 packs per day. He has never used smokeless tobacco.  Alcohol:      reports previous alcohol use. Drug Use:  reports no history of drug use. Family History:     History reviewed. No pertinent family history.       Physical Exam:     Vitals:  BP (!) 142/102   Pulse 78   Temp 97.9 °F (36.6 °C) (Oral)   Resp 18   Ht 5' 4\" (1.626 m)   Wt 185 lb 11.2 oz (84.2 kg)   SpO2 94%   BMI 31.88 kg/m²   Temp (24hrs), Av °F (36.7 °C), Min:97.7 °F (36.5 °C), Max:98.2 °F (36.8 °C)          General appearance - alert, well appearing, and in no acute distress  Mental status - oriented to person, place, and time with normal affect  Head - normocephalic and atraumatic  Eyes - pupils equal and reactive, extraocular eye movements intact, conjunctiva clear  Ears - hearing appears to be intact  Nose - no drainage noted  Mouth - mucous membranes moist  Neck - supple, no carotid bruits, thyroid not palpable  Chest -wheezing to auscultation, normal effort  Heart - normal rate, regular rhythm, no murmur  Abdomen - soft, nontender, nondistended, bowel sounds present all four quadrants, no masses, hepatomegaly or splenomegaly  Neurological - normal speech, no focal findings or movement disorder noted, cranial nerves II through XII grossly intact  Extremities - peripheral pulses palpable, no pedal edema or calf pain with palpation  Skin - no gross lesions, rashes, or induration noted        Data:     Labs:    Hematology:  Recent Labs     07/12/21  0625 07/13/21  0543   WBC 17.3* 17.9*   RBC 4.55 4.34   HGB 14.1 13.5   HCT 41.7 40.7   MCV 91.6 93.8   MCH 31.0 31.1   MCHC 33.8 33.2   RDW 12.3 12.3    272   MPV 9.6 9.7   INR  --  1.1     Chemistry:  Recent Labs     07/12/21  0625 07/12/21  0813 07/13/21  0543   *  --  130*   K 4.0  --  4.2   CL 87*  --  90*   CO2 29  --  31   GLUCOSE 147*  --  150*   BUN 9  --  8   CREATININE 0.72  --  0.74   ANIONGAP 11  --  9   LABGLOM >60  --  >60   GFRAA >60  --  >60   CALCIUM 9.4  --  9.5   TROPHS 12 11  --      Recent Labs     07/12/21 0625   PROT 7.6   LABALBU 4.1   AST 10   ALT 11   ALKPHOS 78   BILITOT 0.23*       Lab Results   Component Value Date    INR 1.1 07/13/2021    PROTIME 13.5 07/13/2021       Lab Results   Component Value Date/Time    SPECIAL LAC 07/12/2021 06:53 AM     Lab Results   Component Value Date/Time    CULTURE NO GROWTH 1 DAY 07/12/2021 06:53 AM       No results found for: POCPH, PHART, PH, POCPCO2, MJE7OBL, PCO2, POCPO2, PO2ART, PO2, POCHCO3, NSK2BZR, HCO3, NBEA, PBEA, BEART, BE, THGBART, THB, AAV2ILV, UCHH1QFS, W6UJXPXR, O2SAT, FIO2    Radiology:    XR CHEST PORTABLE    Result Date: 7/12/2021  Changes of COPD but otherwise unremarkable exam with cardiac event recorder in place.          All radiological studies reviewed                Code Status:  Full Code    Electronically signed by Fay Branham MD on 7/13/2021 at 4:19 PM     Copy sent to Dr. Sheela Garcia MD    This note was created with the assistance of a

## 2021-07-13 NOTE — RT PROTOCOL NOTE
RT Inhaler-Nebulizer Bronchodilator Protocol Note    There is a bronchodilator order in the chart from a provider indicating to follow the RT Bronchodilator Protocol and there is an Initiate RT Bronchodilator Protocol order as well (see protocol at bottom of note). The findings from the last RT Protocol Assessment were as follows:  Smoking: <15 Pack years  Surgical Status: No surgery  Xray: Chronic changes  Respiratory Pattern: Dyspnea with exertion  Mental Status: Alert and Oriented  Breath Sounds: Wheezing and/or rhonchi  Cough: Strong, productive  Activity Level: Walking unassisted  Oxygen Requirement: 29% or 3LNC - 5LNC/40%  Indication for Bronchodilator Therapy: On home bronchodilators, Decreased or absent breath sounds  Bronchodilator Assessment Score: 8    Aerosolized bronchodilator medication orders have been revised according to the RT Bronchodilator Protocol. RT Inhaler-Nebulizer Bronchodilator Protocol:    Respiratory Therapist to perform RT Therapy Protocol Assessment then follow the protocol. No Indications - adjust the frequency to every 6 hours PRN wheezing or bronchospasm, if no treatments needed after 48 hours then discontinue using Per Protocol order mode. If indication present, adjust the RT bronchodilator orders based on the Bronchodilator Assessment Score as follows:    0-6 - enter or revise RT bronchodilator order to Albuterol Inhaler order with frequency of every 2 hours PRN for wheezing or increased work of breathing using Per Protocol order mode. If Albuterol Inhaler not tolerated or not effective, then discontinue the Albuterol Inhaler order and enter Albuterol Nebulizer order with same frequency and PRN reasons. Repeat RT Therapy Protocol Assessment as needed.     7-10 - discontinue any other Inpatient aerosolized bronchodilator medication orders and enter or revise two Albuterol Inhaler orders, one with BID frequency and one with frequency of every 2 hours PRN wheezing or increased work of breathing using Per Protocol order mode. Repeat RT Therapy Protocol Assessment with second treatment then BID and as needed. If Albuterol Inhaler not tolerated or not effective, then discontinue the Albuterol Inhaler orders and enter two Albuterol Nebulizer orders with same frequencies and PRN reasons. 11-13 - discontinue any other Inpatient aerosolized bronchodilator medication orders and enter DuoNeb Nebulizer orders QID frequency and an Albuterol Nebulizer order every 2 hours PRN wheezing or increased work of breathing using Per Protocol order mode. Repeat RT Therapy Protocol Assessment with second treatment then QID and as needed. Greater than 13 - discontinue any other Inpatient bronchodilator aerosolized medication orders and enter DuoNeb Nebulizer order every 4 hours frequency and Albuterol Nebulizer every 2 hours PRN wheezing or increased work of breathing using Per Protocol order mode. Repeat RT Therapy Protocol Assessment with second treatment then every 4 hours and as needed. RT to enter RT Home Evaluation for COPD & MDI Assessment order using Per Protocol order mode.     Electronically signed by Kim Rosenbaum RCP on 7/13/2021 at 2:28 PM

## 2021-07-13 NOTE — CARE COORDINATION
Discharge planning    Patient chart reviewed. Appreciate prior ED SS initial  assessment. Per SS patient lives at home with daughter, independent. He has 02 at 4 liters thru [facility] for copd. Will need to f/u to see if has neb. Home medication list supports that he does as he is on proventil UD prn. Patient admitted with acute resp failure, OCPD> pulmonary is following. He is on IV atb and solumedrol. Will follow. Patient seen by Jane Shanks NP for pulmonary and requesting to have writer call in [address] [place] [address] 500 mcg daily po . Call to RA at [phone] and rx called in to assess if PA needed. Number for PA is for [phone]    Call to [facility] to obtain PA. Jared Beasley await form to be faxed over.

## 2021-07-13 NOTE — PLAN OF CARE
Problem: Falls - Risk of:  Goal: Will remain free from falls  Description: Will remain free from falls. Fall risk assessment completed. Patient instructed to use call light. Bed locked and in lowest position, side rails up 2/4, call light and bedside table within reach, clutter removed, and non-skid footwear on when pt out of bed. Hourly rounds will continue. 7/13/2021 0851 by Ethel Ghosh RN  Outcome: Ongoing     Problem: Activity Intolerance:  Goal: Ability to tolerate increased activity will improve  Description: Ability to tolerate increased activity will improve  7/13/2021 0851 by Ethel Ghosh RN  Outcome: Ongoing     Problem: Airway Clearance - Ineffective:  Goal: Ability to maintain a clear airway will improve  Description: Ability to maintain a clear airway will improve  7/13/2021 0851 by Ethel Ghosh RN  Outcome: Ongoing     Problem: Breathing Pattern - Ineffective:  Goal: Ability to achieve and maintain a regular respiratory rate will improve  Description: Ability to achieve and maintain a regular respiratory rate will improve  7/13/2021 0851 by Ethel Ghosh RN  Outcome: Ongoing     Problem: Gas Exchange - Impaired:  Goal: Levels of oxygenation will improve  Description: Levels of oxygenation will improve  7/13/2021 0851 by Ethel Ghosh RN  Outcome: Ongoing     Problem: Pain:  Goal: Control of acute pain  Description: Control of acute pain. Pain assessment completed. Patient demonstrates understanding of pain rating scale and interventions. At this time patient states pain is well controlled with current interventions.  Will continue to monitor and reassess with each rounding and PRN.     7/13/2021 0851 by Ethel Ghosh RN  Outcome: Ongoing

## 2021-07-13 NOTE — PLAN OF CARE
Problem: Falls - Risk of:  Goal: Will remain free from falls  Description: Will remain free from falls  Outcome: Ongoing  Note: Pt fall risk, fall band present, falling star, safety alarm activated and in use as needed. Hourly rounding performed. Pt encouraged to use call light. See Denise Alcantara fall risk assessment. Goal: Absence of physical injury  Description: Absence of physical injury  Outcome: Ongoing  Note: Non-skid socks in place, up with assistance, bed in lowest position, bed exit & alarm as needed, provide toileting every 2 hours an d as needed. Problem: Discharge Planning:  Goal: Discharged to appropriate level of care  Description: Discharged to appropriate level of care  Outcome: Ongoing  Note: Discharge teaching and instructions for diagnosis/procedure explained with patient using teachback method. Patient voiced understanding regarding prescriptions, follow up appointments, and care of self at home. Problem: Activity Intolerance:  Goal: Ability to tolerate increased activity will improve  Description: Ability to tolerate increased activity will improve  Outcome: Ongoing  Note: Assessed musculoskeletal functional level. Assessed ROM & ability to care for self. Problem: Airway Clearance - Ineffective:  Goal: Ability to maintain a clear airway will improve  Description: Ability to maintain a clear airway will improve  Outcome: Ongoing  Note: Assessed ability to cough & breathe without laboring. Assessed need for suctioning if needed. Encouraged cough & deep breathing. Problem: Breathing Pattern - Ineffective:  Goal: Ability to achieve and maintain a regular respiratory rate will improve  Description: Ability to achieve and maintain a regular respiratory rate will improve  Outcome: Ongoing  Note: Assess for adventitious breath sounds. Monitored SaO2 > 90%. Applied 02 per nasal cannula as needed. Elevated HOB to improve breathing as needed.         Problem: Gas Exchange - Impaired:  Goal: Levels of oxygenation will improve  Description: Levels of oxygenation will improve  Outcome: Ongoing  Note: Assess breath sounds every shift and as needed. Assess oxygenation level & respiration rate. Encourage coughing & deep breathing. Encourage use of incentive spirometer. Assess cough & sputum. Administer oxygen as needed. Problem: Pain:  Goal: Pain level will decrease  Description: Pain level will decrease  Outcome: Ongoing  Note: Monitoring pain with each assessment and prn. ANN 0-10 pain scale utilized. Non-pharmacological measures to be encouraged prior to pharmacological measures.     Goal: Control of acute pain  Description: Control of acute pain  Outcome: Ongoing  Goal: Control of chronic pain  Description: Control of chronic pain  Outcome: Ongoing

## 2021-07-14 LAB
ABSOLUTE EOS #: <0.03 K/UL (ref 0–0.44)
ABSOLUTE IMMATURE GRANULOCYTE: 0.42 K/UL (ref 0–0.3)
ABSOLUTE LYMPH #: 1.52 K/UL (ref 1.1–3.7)
ABSOLUTE MONO #: 0.96 K/UL (ref 0.1–1.2)
ANION GAP SERPL CALCULATED.3IONS-SCNC: 11 MMOL/L (ref 9–17)
BASOPHILS # BLD: 0 % (ref 0–2)
BASOPHILS ABSOLUTE: 0.06 K/UL (ref 0–0.2)
BUN BLDV-MCNC: 10 MG/DL (ref 6–20)
BUN/CREAT BLD: 14 (ref 9–20)
CALCIUM SERPL-MCNC: 9.3 MG/DL (ref 8.6–10.4)
CHLORIDE BLD-SCNC: 89 MMOL/L (ref 98–107)
CO2: 30 MMOL/L (ref 20–31)
CREAT SERPL-MCNC: 0.72 MG/DL (ref 0.7–1.2)
DIFFERENTIAL TYPE: ABNORMAL
EOSINOPHILS RELATIVE PERCENT: 0 % (ref 1–4)
GFR AFRICAN AMERICAN: >60 ML/MIN
GFR NON-AFRICAN AMERICAN: >60 ML/MIN
GFR SERPL CREATININE-BSD FRML MDRD: ABNORMAL ML/MIN/{1.73_M2}
GFR SERPL CREATININE-BSD FRML MDRD: ABNORMAL ML/MIN/{1.73_M2}
GLUCOSE BLD-MCNC: 135 MG/DL (ref 70–99)
HCT VFR BLD CALC: 43 % (ref 40.7–50.3)
HEMOGLOBIN: 14.4 G/DL (ref 13–17)
IMMATURE GRANULOCYTES: 2 %
LYMPHOCYTES # BLD: 7 % (ref 24–43)
MCH RBC QN AUTO: 31 PG (ref 25.2–33.5)
MCHC RBC AUTO-ENTMCNC: 33.5 G/DL (ref 28.4–34.8)
MCV RBC AUTO: 92.7 FL (ref 82.6–102.9)
MONOCYTES # BLD: 5 % (ref 3–12)
NRBC AUTOMATED: 0 PER 100 WBC
PDW BLD-RTO: 12.7 % (ref 11.8–14.4)
PLATELET # BLD: 322 K/UL (ref 138–453)
PLATELET ESTIMATE: ABNORMAL
PMV BLD AUTO: 9.3 FL (ref 8.1–13.5)
POTASSIUM SERPL-SCNC: 3.8 MMOL/L (ref 3.7–5.3)
RBC # BLD: 4.64 M/UL (ref 4.21–5.77)
RBC # BLD: ABNORMAL 10*6/UL
SEG NEUTROPHILS: 86 % (ref 36–65)
SEGMENTED NEUTROPHILS ABSOLUTE COUNT: 17.63 K/UL (ref 1.5–8.1)
SODIUM BLD-SCNC: 130 MMOL/L (ref 135–144)
WBC # BLD: 20.6 K/UL (ref 3.5–11.3)
WBC # BLD: ABNORMAL 10*3/UL

## 2021-07-14 PROCEDURE — 6370000000 HC RX 637 (ALT 250 FOR IP): Performed by: FAMILY MEDICINE

## 2021-07-14 PROCEDURE — 6360000002 HC RX W HCPCS: Performed by: NURSE PRACTITIONER

## 2021-07-14 PROCEDURE — 6370000000 HC RX 637 (ALT 250 FOR IP): Performed by: NURSE PRACTITIONER

## 2021-07-14 PROCEDURE — 85025 COMPLETE CBC W/AUTO DIFF WBC: CPT

## 2021-07-14 PROCEDURE — 2580000003 HC RX 258: Performed by: FAMILY MEDICINE

## 2021-07-14 PROCEDURE — 94640 AIRWAY INHALATION TREATMENT: CPT

## 2021-07-14 PROCEDURE — 6360000002 HC RX W HCPCS: Performed by: FAMILY MEDICINE

## 2021-07-14 PROCEDURE — 2700000000 HC OXYGEN THERAPY PER DAY

## 2021-07-14 PROCEDURE — 97530 THERAPEUTIC ACTIVITIES: CPT

## 2021-07-14 PROCEDURE — 94761 N-INVAS EAR/PLS OXIMETRY MLT: CPT

## 2021-07-14 PROCEDURE — 36415 COLL VENOUS BLD VENIPUNCTURE: CPT

## 2021-07-14 PROCEDURE — 97162 PT EVAL MOD COMPLEX 30 MIN: CPT

## 2021-07-14 PROCEDURE — 80048 BASIC METABOLIC PNL TOTAL CA: CPT

## 2021-07-14 PROCEDURE — 1200000000 HC SEMI PRIVATE

## 2021-07-14 RX ORDER — IPRATROPIUM BROMIDE AND ALBUTEROL SULFATE 2.5; .5 MG/3ML; MG/3ML
1 SOLUTION RESPIRATORY (INHALATION) EVERY 4 HOURS PRN
Status: DISCONTINUED | OUTPATIENT
Start: 2021-07-14 | End: 2021-07-14

## 2021-07-14 RX ORDER — ALBUTEROL SULFATE 2.5 MG/3ML
2.5 SOLUTION RESPIRATORY (INHALATION)
Status: DISCONTINUED | OUTPATIENT
Start: 2021-07-14 | End: 2021-07-15 | Stop reason: HOSPADM

## 2021-07-14 RX ORDER — METHYLPREDNISOLONE SODIUM SUCCINATE 40 MG/ML
30 INJECTION, POWDER, LYOPHILIZED, FOR SOLUTION INTRAMUSCULAR; INTRAVENOUS EVERY 8 HOURS
Status: DISCONTINUED | OUTPATIENT
Start: 2021-07-14 | End: 2021-07-15

## 2021-07-14 RX ORDER — IPRATROPIUM BROMIDE AND ALBUTEROL SULFATE 2.5; .5 MG/3ML; MG/3ML
1 SOLUTION RESPIRATORY (INHALATION) 4 TIMES DAILY
Status: DISCONTINUED | OUTPATIENT
Start: 2021-07-15 | End: 2021-07-15 | Stop reason: HOSPADM

## 2021-07-14 RX ADMIN — MORPHINE SULFATE 2 MG: 2 INJECTION, SOLUTION INTRAMUSCULAR; INTRAVENOUS at 10:23

## 2021-07-14 RX ADMIN — Medication 2000 UNITS: at 08:49

## 2021-07-14 RX ADMIN — ESCITALOPRAM OXALATE 10 MG: 10 TABLET ORAL at 08:49

## 2021-07-14 RX ADMIN — METHYLPREDNISOLONE SODIUM SUCCINATE 40 MG: 40 INJECTION, POWDER, FOR SOLUTION INTRAMUSCULAR; INTRAVENOUS at 08:49

## 2021-07-14 RX ADMIN — IPRATROPIUM BROMIDE AND ALBUTEROL SULFATE 1 AMPULE: .5; 2.5 SOLUTION RESPIRATORY (INHALATION) at 07:31

## 2021-07-14 RX ADMIN — MAGNESIUM HYDROXIDE 30 ML: 400 SUSPENSION ORAL at 08:48

## 2021-07-14 RX ADMIN — MORPHINE SULFATE 2 MG: 2 INJECTION, SOLUTION INTRAMUSCULAR; INTRAVENOUS at 18:52

## 2021-07-14 RX ADMIN — FOLIC ACID 1 MG: 1 TABLET ORAL at 08:49

## 2021-07-14 RX ADMIN — QUETIAPINE FUMARATE 400 MG: 200 TABLET ORAL at 21:09

## 2021-07-14 RX ADMIN — METHYLPREDNISOLONE SODIUM SUCCINATE 30 MG: 40 INJECTION, POWDER, FOR SOLUTION INTRAMUSCULAR; INTRAVENOUS at 18:52

## 2021-07-14 RX ADMIN — IPRATROPIUM BROMIDE AND ALBUTEROL SULFATE 1 AMPULE: .5; 2.5 SOLUTION RESPIRATORY (INHALATION) at 20:24

## 2021-07-14 RX ADMIN — MORPHINE SULFATE 2 MG: 2 INJECTION, SOLUTION INTRAMUSCULAR; INTRAVENOUS at 14:48

## 2021-07-14 RX ADMIN — AMLODIPINE BESYLATE 5 MG: 5 TABLET ORAL at 08:49

## 2021-07-14 RX ADMIN — BUDESONIDE AND FORMOTEROL FUMARATE DIHYDRATE 2 PUFF: 160; 4.5 AEROSOL RESPIRATORY (INHALATION) at 07:31

## 2021-07-14 RX ADMIN — SODIUM CHLORIDE, PRESERVATIVE FREE 10 ML: 5 INJECTION INTRAVENOUS at 06:19

## 2021-07-14 RX ADMIN — ASPIRIN 81 MG: 81 TABLET, COATED ORAL at 08:49

## 2021-07-14 RX ADMIN — LOSARTAN POTASSIUM 100 MG: 100 TABLET, FILM COATED ORAL at 08:49

## 2021-07-14 RX ADMIN — MORPHINE SULFATE 2 MG: 2 INJECTION, SOLUTION INTRAMUSCULAR; INTRAVENOUS at 01:54

## 2021-07-14 RX ADMIN — IPRATROPIUM BROMIDE AND ALBUTEROL SULFATE 1 AMPULE: .5; 2.5 SOLUTION RESPIRATORY (INHALATION) at 14:52

## 2021-07-14 RX ADMIN — BUDESONIDE AND FORMOTEROL FUMARATE DIHYDRATE 2 PUFF: 160; 4.5 AEROSOL RESPIRATORY (INHALATION) at 20:23

## 2021-07-14 RX ADMIN — ENOXAPARIN SODIUM 40 MG: 40 INJECTION SUBCUTANEOUS at 08:47

## 2021-07-14 RX ADMIN — CEFUROXIME AXETIL 500 MG: 500 TABLET ORAL at 08:49

## 2021-07-14 RX ADMIN — IPRATROPIUM BROMIDE AND ALBUTEROL SULFATE 1 AMPULE: .5; 2.5 SOLUTION RESPIRATORY (INHALATION) at 11:07

## 2021-07-14 RX ADMIN — MONTELUKAST 10 MG: 10 TABLET, FILM COATED ORAL at 21:09

## 2021-07-14 RX ADMIN — ROFLUMILAST 250 MCG: 500 TABLET ORAL at 08:48

## 2021-07-14 RX ADMIN — SODIUM CHLORIDE, PRESERVATIVE FREE 10 ML: 5 INJECTION INTRAVENOUS at 08:49

## 2021-07-14 RX ADMIN — METOPROLOL SUCCINATE 50 MG: 50 TABLET, EXTENDED RELEASE ORAL at 08:49

## 2021-07-14 RX ADMIN — METHYLPREDNISOLONE SODIUM SUCCINATE 40 MG: 40 INJECTION, POWDER, FOR SOLUTION INTRAMUSCULAR; INTRAVENOUS at 02:19

## 2021-07-14 RX ADMIN — IPRATROPIUM BROMIDE AND ALBUTEROL SULFATE 1 AMPULE: .5; 3 SOLUTION RESPIRATORY (INHALATION) at 02:38

## 2021-07-14 RX ADMIN — MORPHINE SULFATE 2 MG: 2 INJECTION, SOLUTION INTRAMUSCULAR; INTRAVENOUS at 06:19

## 2021-07-14 RX ADMIN — SODIUM CHLORIDE, PRESERVATIVE FREE 10 ML: 5 INJECTION INTRAVENOUS at 21:14

## 2021-07-14 RX ADMIN — FERROUS SULFATE TAB EC 325 MG (65 MG FE EQUIVALENT) 325 MG: 325 (65 FE) TABLET DELAYED RESPONSE at 08:49

## 2021-07-14 RX ADMIN — CEFUROXIME AXETIL 500 MG: 500 TABLET ORAL at 21:09

## 2021-07-14 RX ADMIN — SODIUM CHLORIDE, PRESERVATIVE FREE 10 ML: 5 INJECTION INTRAVENOUS at 02:20

## 2021-07-14 RX ADMIN — HYDROCHLOROTHIAZIDE 25 MG: 25 TABLET ORAL at 08:49

## 2021-07-14 ASSESSMENT — PAIN DESCRIPTION - PAIN TYPE: TYPE: ACUTE PAIN

## 2021-07-14 ASSESSMENT — PAIN DESCRIPTION - FREQUENCY: FREQUENCY: CONTINUOUS

## 2021-07-14 ASSESSMENT — PAIN SCALES - GENERAL
PAINLEVEL_OUTOF10: 6
PAINLEVEL_OUTOF10: 7

## 2021-07-14 ASSESSMENT — PAIN DESCRIPTION - DESCRIPTORS
DESCRIPTORS: DISCOMFORT
DESCRIPTORS: PRESSURE

## 2021-07-14 ASSESSMENT — PAIN - FUNCTIONAL ASSESSMENT: PAIN_FUNCTIONAL_ASSESSMENT: ACTIVITIES ARE NOT PREVENTED

## 2021-07-14 ASSESSMENT — PAIN DESCRIPTION - ONSET: ONSET: ON-GOING

## 2021-07-14 ASSESSMENT — PAIN DESCRIPTION - LOCATION: LOCATION: CHEST

## 2021-07-14 ASSESSMENT — PAIN DESCRIPTION - ORIENTATION: ORIENTATION: MID

## 2021-07-14 NOTE — CARE COORDINATION
DISCHARGE PLANNING    [place] paperwork for daliresp completed, signed per pulmonary and faxed to ignacio morales CM

## 2021-07-14 NOTE — PROGRESS NOTES
Pulmonary Critical Care Progress Note  Fidelina Sánchez CNP/Amanda Nice M.D. Patient seen for the follow up of Chronic hypoxic respiratory failure, Acute exacerbation of COPD    Subjective:  He is sitting up in the bedside chair, in no distress. He still has shortness of breath however it is better than yesterday. He continues to have a cough and difficulty producing sputum. He has mild chest discomfort with coughing. Examination:  Vitals: /77   Pulse 81   Temp 97.9 °F (36.6 °C) (Oral)   Resp 18   Ht 5' 4\" (1.626 m)   Wt 185 lb (83.9 kg)   SpO2 92%   BMI 31.76 kg/m²     General appearance: alert and cooperative with exam, up in chair  Neck: No JVD  Lungs: Decreased air exchange, no wheezing, rales or rhonchi  Heart: regular rate and rhythm, S1, S2 normal, no gallop  Abdomen: Soft, non tender, + BS  Extremities: no cyanosis or clubbing.  No significant edema    LABs:  CBC:   Recent Labs     07/13/21  0543 07/14/21  0708   WBC 17.9* 20.6*   HGB 13.5 14.4   HCT 40.7 43.0    322     BMP:   Recent Labs     07/13/21  0543 07/14/21  0708   * 130*   K 4.2 3.8   CO2 31 30   BUN 8 10   CREATININE 0.74 0.72   LABGLOM >60 >60   GLUCOSE 150* 135*     PT/INR:   Recent Labs     07/13/21  0543   PROTIME 13.5   INR 1.1     LIVER PROFILE:  Recent Labs     07/12/21  0625   AST 10   ALT 11   LABALBU 4.1     Impression:  · Chronic hypoxic respiratory failure  · Acute exacerbation of COPD  · Active smoking/30-pack-year smoking history  · Suspected obstructive sleep apnea/obesity  · Mild pulmonary hypertension RVSP 37mmHg, most likely secondary   · Mild MR, Mild TR  · HTN, HLD, CAD, depression   · Hyponatremia    Recommendations:  · Oxygen by nasal cannula, keep saturation > 92%  · BiPAP for sleep and as needed  · Continue IV Zithromax/Ceftin  · Albuterol and Ipratropium Q 4 hours and prn  · IV solu-medrol 40 mg every 6 hours, decrease dose and frequency  · Symbicort 160, 2 puffs twice daily  · Advance Daliresp to 500mcg if patient tolerates. Monitor for diarrhea.   · Singular 10 mg nightly  · Echo: EF 65%, RVSP 37 mmHg  · Smoking cessation education  · Incentive spirometry every hour while awake  · DVT prophylaxis with low molecular weight heparin  · Discussed with RN  · Will follow with you    Electronically signed by     GONZÁLEZ Wheeler CNP on 7/14/2021 at 2:11 PM  Patient was seen under the supervision of Dr. Che Vargas and [address] [place] [address]  Office: [phone]

## 2021-07-14 NOTE — PROGRESS NOTES
Patient transferred to room 2024 from 1008 via wheelchair with belongings. Patient oriented to room and call light. Bed alarm on and call light in reach.

## 2021-07-14 NOTE — PROGRESS NOTES
Progress note  [facility].,    Adult Hospitalist      Name: Ten Jarrett  MRN: [id number]     Acct: [de-identified]  Room: 2024/2024-01    Admit Date: 7/12/2021  6:07 AM  PCP: Rita Rowe MD    Primary Problem  Active Problems:    COPD exacerbation [facility])  Resolved Problems:    * No resolved hospital problems.  *        Assesment:   Acute hypoxic respiratory failure  Acute COPD exacerbation  Essential hypertension  Mixed hyperlipidemia  Coronary artery disease  Depression with anxiety  Tobacco abuse  Obesity, BMI 31.76      Plan:   Admit patient to progressive unit  Oxygen to keep SPO2 more than 90%  Telemetry  Check vital signs closely  CBC BMP daily  Sputum culture  Echocardiogram shows ejection fraction 65%, no obvious wall motion abnormalities  IV Solu-Medrol  DuoNeb breathing treatment  Oral azithromycin  Continue amlodipine, losartan and hydrochlorothiazide, metoprolol  Continue aspirin  Continue Celexa and Seroquel  Pulmonology consult, patient started on Daliresp  Continue the medication as below      Scheduled Meds:   [START ON 7/15/2021] Roflumilast  500 mcg Oral Daily    methylPREDNISolone  30 mg Intravenous Q8H    cefUROXime  500 mg Oral 2 times per day    sodium chloride flush  5-40 mL Intravenous 2 times per day    enoxaparin  40 mg Subcutaneous Daily    ipratropium-albuterol  1 ampule Inhalation Q4H WA    azithromycin  500 mg Intravenous Q24H    budesonide-formoterol  2 puff Inhalation BID    montelukast  10 mg Oral Nightly    lidocaine  1 patch Transdermal Daily    amLODIPine  5 mg Oral Daily    aspirin  81 mg Oral Daily    Vitamin D  2,000 Units Oral Daily    escitalopram  10 mg Oral Daily    ferrous sulfate  325 mg Oral Daily with breakfast    folic acid  1 mg Oral Daily    metoprolol succinate  50 mg Oral Daily    QUEtiapine  400 mg Oral Nightly    losartan  100 mg Oral Daily    hydroCHLOROthiazide  25 mg Oral Daily     Continuous Infusions:   sodium chloride       PRN Meds:  ipratropium-albuterol, 1 ampule, Q4H PRN  sodium chloride flush, 10 mL, PRN  sodium chloride, 25 mL, PRN  potassium chloride, 40 mEq, PRN   Or  potassium alternative oral replacement, 40 mEq, PRN   Or  potassium chloride, 10 mEq, PRN  magnesium sulfate, 1,000 mg, PRN  ondansetron, 4 mg, Q8H PRN   Or  ondansetron, 4 mg, Q6H PRN  magnesium hydroxide, 30 mL, Daily PRN  acetaminophen, 650 mg, Q6H PRN   Or  acetaminophen, 650 mg, Q6H PRN  morphine, 2 mg, Q4H PRN        Chief Complaint:     Chief Complaint   Patient presents with    Shortness of Breath         History of Present Illness:      Briseida Vang is a 60 y.o.  male who presents with Shortness of Breath  I have seen and examined patient today, patient last 24 hours events were reviewed also discussed with nursing staff  No new complaints  Overnight patient did not had any acute issues  No nausea vomiting diarrhea  Afebrile  Pt. Denies any CP, palpitation, HA, dizziness, chills, cold, changes in urination, BM or skin changes . Patient continues to have some shortness of breath, still has some cough        HPI  This is a 15-year-old gentleman has been admitted via emergency room, patient came to the ER with a complaint of having shortness of breath, patient has past medical history significant for COPD on home oxygen for dinner, patient also history of hypertension hyperlipidemia, patient is been having shortness of breath for past 5 days, which has progressively gotten worse, is associated with persistent nonproductive cough, patient denies any fever or chills, denies any chest pain, patient initial testing in the emergency room is unremarkable, noticed to have COPD exacerbation admitted for further management    I have personally reviewed the past medical history, past surgical history, medications, social history, and family history, and summarized in the note.     Review of Systems:     All 10 point system is reviewed and negative otherwise mentioned in HPI. Past Medical History:     Past Medical History:   Diagnosis Date    Arthritis     CAD (coronary artery disease)     Cancer ([facility]     prostate    Cirrhosis with alcoholism ([facility]     COPD (chronic obstructive pulmonary disease) ([facility]     Depression     BOURGEOIS (dyspnea on exertion)     H/O prostate cancer     Hyperlipidemia     Hypertension     [facility]     Seizures ([facility] 2016    last one over 2 years ago     SVT (supraventricular tachycardia) ([facility]     Tobacco abuse         Past Surgical History:     Past Surgical History:   Procedure Laterality Date    CARDIAC CATHETERIZATION      ablation    INSERTABLE CARDIAC MONITOR  10/03/2018    INTERNAL LOOP RECORDER    PROSTATECTOMY      SINUS SURGERY      TONGUE SURGERY      UPPER GASTROINTESTINAL ENDOSCOPY N/A 6/10/2021    EGD BIOPSY performed by Thais Good MD at [address] [place] [address]        Medications Prior to Admission:       Prior to Admission medications    Medication Sig Start Date End Date Taking?  Authorizing Provider   escitalopram (LEXAPRO) 10 MG tablet Take 10 mg by mouth daily   Yes Historical Provider, MD   ferrous gluconate (FERGON) 324 (38 Fe) MG tablet Take 324 mg by mouth daily (with breakfast)   Yes Historical Provider, MD   famotidine (PEPCID) 40 MG tablet Take 40 mg by mouth 2 times daily as needed (heartburn)    Yes Historical Provider, MD   amLODIPine (NORVASC) 5 MG tablet Take 5 mg by mouth daily   Yes Historical Provider, MD   losartan-hydroCHLOROthiazide (HYZAAR) 100-25 MG per tablet Take 1 tablet by mouth daily   Yes Historical Provider, MD   albuterol (PROVENTIL) (2.5 MG/3ML) 0.083% nebulizer solution Take 2.5 mg by nebulization every 6 hours as needed for Wheezing   Yes Historical Provider, MD   albuterol sulfate  (90 Base) MCG/ACT inhaler Inhale 2 puffs into the lungs every 4-6 hours as needed for Wheezing   Yes Historical Provider, MD   aspirin 81 MG tablet Take 81 mg by mouth daily   Yes Historical Provider, MD   budesonide-formoterol (SYMBICORT) 160-4.5 MCG/ACT AERO Inhale 2 puffs into the lungs 2 times daily   Yes Historical Provider, MD   folic acid (FOLVITE) 1 MG tablet Take 1 mg by mouth daily   Yes Historical Provider, MD   metoprolol succinate (TOPROL XL) 50 MG extended release tablet Take 50 mg by mouth daily   Yes Historical Provider, MD   pantoprazole (PROTONIX) 40 MG tablet Take 40 mg by mouth daily   Yes Historical Provider, MD   QUEtiapine (SEROQUEL) 400 MG tablet Take 400 mg by mouth nightly    Yes Historical Provider, MD   vitamin B-1 (THIAMINE) 100 MG tablet Take 100 mg by mouth daily   Yes Historical Provider, MD   Cholecalciferol (VITAMIN D3) 2000 units CAPS Take 1 capsule by mouth daily   Yes Historical Provider, MD   tiotropium (SPIRIVA RESPIMAT) 2.5 MCG/ACT AERS inhaler Inhale 2 puffs into the lungs daily   Yes Historical Provider, MD        Allergies:       Patient has no known allergies. Social History:     Tobacco:    reports that he has quit smoking. His smoking use included cigarettes. He smoked 0.10 packs per day. He has never used smokeless tobacco.  Alcohol:      reports previous alcohol use. Drug Use:  reports no history of drug use. Family History:     History reviewed. No pertinent family history.       Physical Exam:     Vitals:  /77   Pulse 81   Temp 97.9 °F (36.6 °C) (Oral)   Resp 18   Ht 5' 4\" (1.626 m)   Wt 185 lb (83.9 kg)   SpO2 92%   BMI 31.76 kg/m²   Temp (24hrs), Av.9 °F (36.6 °C), Min:97.2 °F (36.2 °C), Max:98.4 °F (36.9 °C)          General appearance - alert, well appearing, and in no acute distress  Mental status - oriented to person, place, and time with normal affect  Head - normocephalic and atraumatic  Eyes - pupils equal and reactive, extraocular eye movements intact, conjunctiva clear  Ears - hearing appears to be intact  Nose - no drainage noted  Mouth - mucous membranes moist  Neck - supple, no carotid bruits, thyroid not palpable  Chest -wheezing to auscultation, normal effort  Heart - normal rate, regular rhythm, no murmur  Abdomen - soft, nontender, nondistended, bowel sounds present all four quadrants, no masses, hepatomegaly or splenomegaly  Neurological - normal speech, no focal findings or movement disorder noted, cranial nerves II through XII grossly intact  Extremities - peripheral pulses palpable, no pedal edema or calf pain with palpation  Skin - no gross lesions, rashes, or induration noted        Data:     Labs:    Hematology:  Recent Labs     07/12/21  0625 07/13/21  0543 07/14/21  0708   WBC 17.3* 17.9* 20.6*   RBC 4.55 4.34 4.64   HGB 14.1 13.5 14.4   HCT 41.7 40.7 43.0   MCV 91.6 93.8 92.7   MCH 31.0 31.1 31.0   MCHC 33.8 33.2 33.5   RDW 12.3 12.3 12.7    272 322   MPV 9.6 9.7 9.3   INR  --  1.1  --      Chemistry:  Recent Labs     07/12/21  0625 07/12/21  0813 07/13/21  0543 07/14/21  0708   *  --  130* 130*   K 4.0  --  4.2 3.8   CL 87*  --  90* 89*   CO2 29  --  31 30   GLUCOSE 147*  --  150* 135*   BUN 9  --  8 10   CREATININE 0.72  --  0.74 0.72   ANIONGAP 11  --  9 11   LABGLOM >60  --  >60 >60   GFRAA >60  --  >60 >60   CALCIUM 9.4  --  9.5 9.3   TROPHS 12 11  --   --      Recent Labs     07/12/21 0625   PROT 7.6   LABALBU 4.1   AST 10   ALT 11   ALKPHOS 78   BILITOT 0.23*       Lab Results   Component Value Date    INR 1.1 07/13/2021    PROTIME 13.5 07/13/2021       Lab Results   Component Value Date/Time    SPECIAL LAC 07/12/2021 06:53 AM     Lab Results   Component Value Date/Time    CULTURE NO GROWTH 2 DAYS 07/12/2021 06:53 AM       No results found for: POCPH, PHART, PH, POCPCO2, OZU5UBP, PCO2, POCPO2, PO2ART, PO2, POCHCO3, ZJP5HQL, HCO3, NBEA, PBEA, BEART, BE, THGBART, THB, ABS2EOO, LSJB5BHA, [id number], O2SAT, FIO2    Radiology:    XR CHEST PORTABLE    Result Date: 7/12/2021  Changes of COPD but otherwise unremarkable exam with cardiac event recorder in place.          All radiological studies reviewed                Code Status:  Full Code    Electronically signed by Michi Cho MD on 7/14/2021 at 2:43 PM     Copy sent to Dr. Ahsan Conner MD    This note was created with the assistance of a speech-recognition program.  Although the intention is to generate a document that actually reflects the content of the visit, no guarantees can be provided that every mistake has been identified and corrected by editing. Note was updated later by me after  physical examination and  completion of the assessment.

## 2021-07-14 NOTE — PLAN OF CARE
Problem: Falls - Risk of:  Goal: Will remain free from falls  Description: Will remain free from falls  7/13/2021 2052 by Rhonda Correa RN  Outcome: Ongoing  Note: Pt fall risk, fall band present, falling star, safety alarm activated and in use as needed. Hourly rounding performed. Pt encouraged to use call light. See Benjamin Mead fall risk assessment. 7/13/2021 0851 by Chrystal Lozano RN  Outcome: Ongoing  Goal: Absence of physical injury  Description: Absence of physical injury  Outcome: Ongoing  Note: Non-skid socks in place, up with assistance, bed in lowest position, bed exit & alarm as needed, provide toileting every 2 hours an d as needed. Problem: Discharge Planning:  Goal: Discharged to appropriate level of care  Description: Discharged to appropriate level of care  Outcome: Ongoing  Note: Discharge teaching and instructions for diagnosis/procedure explained with patient using teachback method. Patient voiced understanding regarding prescriptions, follow up appointments, and care of self at home. Problem: Activity Intolerance:  Goal: Ability to tolerate increased activity will improve  Description: Ability to tolerate increased activity will improve  7/13/2021 2052 by Rhonda Correa RN  Outcome: Ongoing  Note: Assessed musculoskeletal functional level. Assessed ROM & ability to care for self. 7/13/2021 0851 by Chrystal Lozano RN  Outcome: Ongoing     Problem: Airway Clearance - Ineffective:  Goal: Ability to maintain a clear airway will improve  Description: Ability to maintain a clear airway will improve  7/13/2021 2052 by Rhonda Correa RN  Outcome: Ongoing  Note: Assessed ability to cough & breathe without laboring. Assessed need for suctioning if needed. Encouraged cough & deep breathing.    7/13/2021 0851 by Chrystal Lozano RN  Outcome: Ongoing     Problem: Breathing Pattern - Ineffective:  Goal: Ability to achieve and maintain a regular respiratory rate will improve  Description: Ability to achieve and maintain a regular respiratory rate will improve  7/13/2021 2052 by Samm Conteh RN  Outcome: Ongoing  Note: Assess for adventitious breath sounds. Monitored SaO2 > 90%. Applied 02 per nasal cannula as needed. Elevated HOB to improve breathing as needed. 7/13/2021 0851 by Hermilo Winston RN  Outcome: Ongoing     Problem: Gas Exchange - Impaired:  Goal: Levels of oxygenation will improve  Description: Levels of oxygenation will improve  7/13/2021 2052 by Samm Conteh RN  Outcome: Ongoing  Note: Assess breath sounds every shift and as needed. Assess oxygenation level & respiration rate. Encourage coughing & deep breathing. Encourage use of incentive spirometer. Assess cough & sputum. Administer oxygen as needed. 7/13/2021 0851 by Hermilo Winston RN  Outcome: Ongoing     Problem: Pain:  Goal: Pain level will decrease  Description: Pain level will decrease  Outcome: Ongoing  Note: Monitoring pain with each assessment and prn. ANN 0-10 pain scale utilized. Non-pharmacological measures to be encouraged prior to pharmacological measures. Goal: Control of acute pain  Description: Control of acute pain  7/13/2021 2052 by Samm Conteh RN  Outcome: Ongoing  7/13/2021 0851 by Hermilo Winston RN  Outcome: Ongoing  Goal: Control of chronic pain  Description: Control of chronic pain  Outcome: Ongoing     Problem: IP BALANCE  Goal: LTG - patient will maintain standing balance to allow for completion of daily activities  Outcome: Ongoing  Note: Pt able to utilize fine body movements, including, but not limited to dressing without assistance. Goal: BALANCE EDUCATION  Description: Educate patients on maintaining dynamic/static standing/sitting balance, with/without upper extremity support.   Outcome: Ongoing

## 2021-07-14 NOTE — PROGRESS NOTES
Physical Therapy    Facility/Department: [facility]  Initial Assessment    NAME: Edward Peralta  : 1965  MRN: [id number]    Date of Service: 2021    Discharge Recommendations:  Home with assist PRN        Assessment   Body structures, Functions, Activity limitations: Decreased functional mobility ; Decreased strength;Decreased endurance;Decreased balance  Assessment: Patient demo decreased gait, balance, and activity tolerance. Paitent will benefit from skilled PT to improve gait, transfers,balance, and aerobic capacity. Prognosis: Good  Decision Making: Medium Complexity  PT Education: Goals;PT Role;Plan of Care;Home Exercise Program;General Safety;Gait Training;Disease Specific Education  Patient Education: Patient provided with COPD action pland handout and Breathing technique handout. Patient verbalized understanding. REQUIRES PT FOLLOW UP: Yes  Activity Tolerance  Activity Tolerance: Patient Tolerated treatment well       Patient Diagnosis(es): The encounter diagnosis was COPD exacerbation ([facility] [address] has a past medical history of Arthritis, CAD (coronary artery disease), Cancer ([address] Cirrhosis with alcoholism ([address] COPD (chronic obstructive pulmonary disease) ([address] Depression, BOURGEOIS (dyspnea on exertion), H/O prostate cancer, Hyperlipidemia, Hypertension, MI (myocardial infarction) ([address] Seizures ([address] SVT (supraventricular tachycardia) ([address] and Tobacco abuse.   has a past surgical history that includes Prostatectomy; Insertable Cardiac Monitor (10/03/2018); sinus surgery; Tongue surgery; Cardiac catheterization; and Upper gastrointestinal endoscopy (N/A, 6/10/2021).     Restrictions  Restrictions/Precautions  Restrictions/Precautions: General Precautions, Fall Risk  Position Activity Restriction  Other position/activity restrictions: up with assist, telemetry, 4L O2 per nc, RUE IV  Vision/Hearing        Subjective  General  Chart Reviewed: Yes  Patient assessed for rehabilitation services?: Yes  Additional Pertinent Hx: CAD, COPD on 4L home O2, hypertension and hyperlipidemia, MI, seizures, depression / anxiety, SVT  Response To Previous Treatment: Not applicable  Family / Caregiver Present: No  Diagnosis: COPD exacerbation  Follows Commands: Within Functional Limits  General Comment  Comments: Jose Thomas RN reports patient medically appropriate for PT. Subjective  Subjective: Patient pleasant and agreeable to PT. Patient reports SOB with minimal ambulation. Pre Treatment Pain Screening  Intervention List: Patient able to continue with treatment    Orientation  Orientation  Overall Orientation Status: Within Normal Limits  Social/Functional History  Social/Functional History  Lives With: Friend(s) (Pt reports he has been staying with friends)  Type of Home: House  Home Layout: Two level, Able to Live on Main level with bedroom/bathroom  Home Access: Level entry  Bathroom Shower/Tub: Tub/Shower unit  Bathroom Toilet: Standard  Home Equipment: Oxygen (4L O2 continously, Nebulizer)  ADL Assistance: Independent  Homemaking Assistance: Independent  Homemaking Responsibilities: Yes  Ambulation Assistance: Independent  Transfer Assistance: Independent  Active : No  Patient's  Info: friends can drive him  Occupation: On disability  Leisure & Hobbies: Watching TV  Additional Comments: Pt reports 3-4 falls in the last few days.   Cognition   Cognition  Overall Cognitive Status: WFL    Objective     Observation/Palpation  Posture: Good  Observation: 4L O2 per nc    AROM RLE (degrees)  RLE AROM: WNL  AROM LLE (degrees)  LLE AROM : WNL  Strength RLE  Comment: 4+/5  Strength LLE  Comment: 4+/5  Tone RLE  RLE Tone: Normotonic  Tone LLE  LLE Tone: Normotonic  Motor Control  Gross Motor?: WNL     Bed mobility  Rolling to Left: Independent  Rolling to Right: Independent  Supine to Sit: Independent  Sit to Supine: Independent  Scooting: Independent  Comment: Patient demo good safety  Transfers  Sit to Stand: Independent  Stand to sit: Independent  Bed to Chair: Supervision  Stand Pivot Transfers: Supervision  Comment: Patient demo good safety with transfers, therapist assist with 02 line  Ambulation  Ambulation?: Yes  Ambulation 1  Surface: level tile  Device: No Device  Assistance: Stand by assistance  Quality of Gait: Steady gait, but wide MARQUEZ. Patient reports feeling SOB by end of ambulation and needed to sit and rest. Therapist assist with managing 02 line,  Gait Deviations: Slow Lori; Increased MARQUEZ; Decreased head and trunk rotation  Distance: 60 feet     Balance  Sitting - Static: Good  Sitting - Dynamic: Good  Standing - Static: Good;-  Standing - Dynamic: Fair;+        Plan   Plan  Times per week: 1-2 x/d, 5-6 d/wk  Current Treatment Recommendations: Strengthening, Balance Training, Functional Mobility Training, Transfer Training, Gait Training, Endurance Training, Safety Education & Training, Home Exercise Program, Patient/Caregiver Education & Training  Safety Devices  Type of devices: All fall risk precautions in place, Gait belt, Nurse notified, Call light within reach, Left in bed    G-Code       OutComes Score    AM-PAC Score  AM-PAC Inpatient Mobility Raw Score : 20 (07/14/21 1146)  AM-PAC Inpatient T-Scale Score : 47.67 (07/14/21 1146)  Mobility Inpatient CMS 0-100% Score: 35.83 (07/14/21 1146)  Mobility Inpatient CMS G-Code Modifier : CJ (07/14/21 1146)          Goals  Short term goals  Time Frame for Short term goals: 12 visits  Short term goal 1: Patient will be indep with transfers. Short term goal 2: Patient will amb 200 feet indep. Short term goal 3: Patient will have good dyn standing balance. Short term goal 4: Patient will be indep with HEP and deep breathing techniques. Short term goal 5: Patient will tolerate 30 minutes of ther-ex and ther-act.   Patient Goals   Patient goals : IMprove breathing       Therapy Time   Individual Concurrent Group Co-treatment   Time In 0848         Time Out [address]         Timed Code Treatment Minutes: [address], PT

## 2021-07-15 VITALS
SYSTOLIC BLOOD PRESSURE: 114 MMHG | WEIGHT: 185.2 LBS | RESPIRATION RATE: 19 BRPM | BODY MASS INDEX: 31.62 KG/M2 | HEART RATE: 80 BPM | HEIGHT: 64 IN | OXYGEN SATURATION: 94 % | DIASTOLIC BLOOD PRESSURE: 73 MMHG | TEMPERATURE: 97.9 F

## 2021-07-15 LAB
ABSOLUTE EOS #: <0.03 K/UL (ref 0–0.44)
ABSOLUTE IMMATURE GRANULOCYTE: 0.43 K/UL (ref 0–0.3)
ABSOLUTE LYMPH #: 1.18 K/UL (ref 1.1–3.7)
ABSOLUTE MONO #: 1.24 K/UL (ref 0.1–1.2)
ANION GAP SERPL CALCULATED.3IONS-SCNC: 9 MMOL/L (ref 9–17)
BASOPHILS # BLD: 0 % (ref 0–2)
BASOPHILS ABSOLUTE: 0.03 K/UL (ref 0–0.2)
BUN BLDV-MCNC: 16 MG/DL (ref 6–20)
BUN/CREAT BLD: 18 (ref 9–20)
CALCIUM SERPL-MCNC: 9.5 MG/DL (ref 8.6–10.4)
CHLORIDE BLD-SCNC: 89 MMOL/L (ref 98–107)
CO2: 32 MMOL/L (ref 20–31)
CREAT SERPL-MCNC: 0.87 MG/DL (ref 0.7–1.2)
DIFFERENTIAL TYPE: ABNORMAL
EOSINOPHILS RELATIVE PERCENT: 0 % (ref 1–4)
GFR AFRICAN AMERICAN: >60 ML/MIN
GFR NON-AFRICAN AMERICAN: >60 ML/MIN
GFR SERPL CREATININE-BSD FRML MDRD: ABNORMAL ML/MIN/{1.73_M2}
GFR SERPL CREATININE-BSD FRML MDRD: ABNORMAL ML/MIN/{1.73_M2}
GLUCOSE BLD-MCNC: 136 MG/DL (ref 70–99)
HCT VFR BLD CALC: 43.6 % (ref 40.7–50.3)
HEMOGLOBIN: 14.2 G/DL (ref 13–17)
IMMATURE GRANULOCYTES: 3 %
LYMPHOCYTES # BLD: 8 % (ref 24–43)
MCH RBC QN AUTO: 30.9 PG (ref 25.2–33.5)
MCHC RBC AUTO-ENTMCNC: 32.6 G/DL (ref 28.4–34.8)
MCV RBC AUTO: 95 FL (ref 82.6–102.9)
MONOCYTES # BLD: 8 % (ref 3–12)
NRBC AUTOMATED: 0 PER 100 WBC
PDW BLD-RTO: 12.6 % (ref 11.8–14.4)
PLATELET # BLD: 309 K/UL (ref 138–453)
PLATELET ESTIMATE: ABNORMAL
PMV BLD AUTO: 9.3 FL (ref 8.1–13.5)
POTASSIUM SERPL-SCNC: 4.3 MMOL/L (ref 3.7–5.3)
RBC # BLD: 4.59 M/UL (ref 4.21–5.77)
RBC # BLD: ABNORMAL 10*6/UL
SEG NEUTROPHILS: 81 % (ref 36–65)
SEGMENTED NEUTROPHILS ABSOLUTE COUNT: 12.77 K/UL (ref 1.5–8.1)
SODIUM BLD-SCNC: 130 MMOL/L (ref 135–144)
WBC # BLD: 15.7 K/UL (ref 3.5–11.3)
WBC # BLD: ABNORMAL 10*3/UL

## 2021-07-15 PROCEDURE — 6360000002 HC RX W HCPCS: Performed by: NURSE PRACTITIONER

## 2021-07-15 PROCEDURE — 6370000000 HC RX 637 (ALT 250 FOR IP): Performed by: NURSE PRACTITIONER

## 2021-07-15 PROCEDURE — 97530 THERAPEUTIC ACTIVITIES: CPT

## 2021-07-15 PROCEDURE — 2580000003 HC RX 258: Performed by: FAMILY MEDICINE

## 2021-07-15 PROCEDURE — 94640 AIRWAY INHALATION TREATMENT: CPT

## 2021-07-15 PROCEDURE — 6360000002 HC RX W HCPCS: Performed by: FAMILY MEDICINE

## 2021-07-15 PROCEDURE — 80048 BASIC METABOLIC PNL TOTAL CA: CPT

## 2021-07-15 PROCEDURE — 97116 GAIT TRAINING THERAPY: CPT

## 2021-07-15 PROCEDURE — 85025 COMPLETE CBC W/AUTO DIFF WBC: CPT

## 2021-07-15 PROCEDURE — 6370000000 HC RX 637 (ALT 250 FOR IP): Performed by: FAMILY MEDICINE

## 2021-07-15 PROCEDURE — 2700000000 HC OXYGEN THERAPY PER DAY

## 2021-07-15 PROCEDURE — 94761 N-INVAS EAR/PLS OXIMETRY MLT: CPT

## 2021-07-15 PROCEDURE — 36415 COLL VENOUS BLD VENIPUNCTURE: CPT

## 2021-07-15 RX ORDER — HYDROCHLOROTHIAZIDE 25 MG/1
25 TABLET ORAL DAILY
Qty: 30 TABLET | Refills: 3 | Status: ON HOLD | OUTPATIENT
Start: 2021-07-16 | End: 2022-08-24

## 2021-07-15 RX ORDER — CEFUROXIME AXETIL 500 MG/1
500 TABLET ORAL EVERY 12 HOURS SCHEDULED
Qty: 10 TABLET | Refills: 0 | Status: SHIPPED | OUTPATIENT
Start: 2021-07-15 | End: 2021-07-20

## 2021-07-15 RX ORDER — AZITHROMYCIN 250 MG/1
250 TABLET, FILM COATED ORAL DAILY
Status: DISCONTINUED | OUTPATIENT
Start: 2021-07-16 | End: 2021-07-15 | Stop reason: HOSPADM

## 2021-07-15 RX ORDER — PREDNISONE 20 MG/1
20 TABLET ORAL 2 TIMES DAILY
Status: DISCONTINUED | OUTPATIENT
Start: 2021-07-15 | End: 2021-07-15 | Stop reason: HOSPADM

## 2021-07-15 RX ORDER — PREDNISONE 20 MG/1
TABLET ORAL
Qty: 30 TABLET | Refills: 0 | Status: ON HOLD | OUTPATIENT
Start: 2021-07-15 | End: 2022-01-04 | Stop reason: HOSPADM

## 2021-07-15 RX ORDER — LOSARTAN POTASSIUM 100 MG/1
100 TABLET ORAL DAILY
Qty: 30 TABLET | Refills: 3 | Status: ON HOLD | OUTPATIENT
Start: 2021-07-16 | End: 2022-08-24

## 2021-07-15 RX ORDER — AZITHROMYCIN 250 MG/1
250 TABLET, FILM COATED ORAL DAILY
Qty: 2 TABLET | Refills: 0 | Status: SHIPPED | OUTPATIENT
Start: 2021-07-16 | End: 2021-07-18

## 2021-07-15 RX ORDER — MONTELUKAST SODIUM 10 MG/1
10 TABLET ORAL NIGHTLY
Qty: 30 TABLET | Refills: 3 | Status: ON HOLD | OUTPATIENT
Start: 2021-07-15 | End: 2022-08-24 | Stop reason: RX

## 2021-07-15 RX ADMIN — SODIUM CHLORIDE, PRESERVATIVE FREE 10 ML: 5 INJECTION INTRAVENOUS at 09:26

## 2021-07-15 RX ADMIN — IPRATROPIUM BROMIDE AND ALBUTEROL SULFATE 1 AMPULE: .5; 2.5 SOLUTION RESPIRATORY (INHALATION) at 07:41

## 2021-07-15 RX ADMIN — FOLIC ACID 1 MG: 1 TABLET ORAL at 09:25

## 2021-07-15 RX ADMIN — METHYLPREDNISOLONE SODIUM SUCCINATE 30 MG: 40 INJECTION, POWDER, FOR SOLUTION INTRAMUSCULAR; INTRAVENOUS at 09:25

## 2021-07-15 RX ADMIN — PREDNISONE 20 MG: 20 TABLET ORAL at 13:40

## 2021-07-15 RX ADMIN — MORPHINE SULFATE 2 MG: 2 INJECTION, SOLUTION INTRAMUSCULAR; INTRAVENOUS at 05:15

## 2021-07-15 RX ADMIN — MORPHINE SULFATE 2 MG: 2 INJECTION, SOLUTION INTRAMUSCULAR; INTRAVENOUS at 13:40

## 2021-07-15 RX ADMIN — ENOXAPARIN SODIUM 40 MG: 40 INJECTION SUBCUTANEOUS at 09:25

## 2021-07-15 RX ADMIN — BUDESONIDE AND FORMOTEROL FUMARATE DIHYDRATE 2 PUFF: 160; 4.5 AEROSOL RESPIRATORY (INHALATION) at 07:41

## 2021-07-15 RX ADMIN — Medication 2000 UNITS: at 09:25

## 2021-07-15 RX ADMIN — ROFLUMILAST 500 MCG: 500 TABLET ORAL at 09:25

## 2021-07-15 RX ADMIN — IPRATROPIUM BROMIDE AND ALBUTEROL SULFATE 1 AMPULE: .5; 2.5 SOLUTION RESPIRATORY (INHALATION) at 14:49

## 2021-07-15 RX ADMIN — MORPHINE SULFATE 2 MG: 2 INJECTION, SOLUTION INTRAMUSCULAR; INTRAVENOUS at 00:18

## 2021-07-15 RX ADMIN — ESCITALOPRAM OXALATE 10 MG: 10 TABLET ORAL at 09:25

## 2021-07-15 RX ADMIN — HYDROCHLOROTHIAZIDE 25 MG: 25 TABLET ORAL at 09:25

## 2021-07-15 RX ADMIN — CEFUROXIME AXETIL 500 MG: 500 TABLET ORAL at 09:25

## 2021-07-15 RX ADMIN — METHYLPREDNISOLONE SODIUM SUCCINATE 30 MG: 40 INJECTION, POWDER, FOR SOLUTION INTRAMUSCULAR; INTRAVENOUS at 00:17

## 2021-07-15 RX ADMIN — AMLODIPINE BESYLATE 5 MG: 5 TABLET ORAL at 09:25

## 2021-07-15 RX ADMIN — ASPIRIN 81 MG: 81 TABLET, COATED ORAL at 09:25

## 2021-07-15 RX ADMIN — METOPROLOL SUCCINATE 50 MG: 50 TABLET, EXTENDED RELEASE ORAL at 09:25

## 2021-07-15 RX ADMIN — IPRATROPIUM BROMIDE AND ALBUTEROL SULFATE 1 AMPULE: .5; 2.5 SOLUTION RESPIRATORY (INHALATION) at 11:12

## 2021-07-15 RX ADMIN — MORPHINE SULFATE 2 MG: 2 INJECTION, SOLUTION INTRAMUSCULAR; INTRAVENOUS at 09:36

## 2021-07-15 RX ADMIN — LOSARTAN POTASSIUM 100 MG: 100 TABLET, FILM COATED ORAL at 09:25

## 2021-07-15 RX ADMIN — AZITHROMYCIN MONOHYDRATE 500 MG: 500 INJECTION, POWDER, LYOPHILIZED, FOR SOLUTION INTRAVENOUS at 00:17

## 2021-07-15 ASSESSMENT — PAIN DESCRIPTION - FREQUENCY
FREQUENCY: CONTINUOUS
FREQUENCY: CONTINUOUS

## 2021-07-15 ASSESSMENT — PAIN DESCRIPTION - DESCRIPTORS
DESCRIPTORS: PRESSURE
DESCRIPTORS: PRESSURE

## 2021-07-15 ASSESSMENT — PAIN SCALES - GENERAL
PAINLEVEL_OUTOF10: 7

## 2021-07-15 ASSESSMENT — PAIN DESCRIPTION - LOCATION
LOCATION: CHEST
LOCATION: CHEST

## 2021-07-15 ASSESSMENT — PAIN DESCRIPTION - ORIENTATION: ORIENTATION: MID

## 2021-07-15 ASSESSMENT — PAIN DESCRIPTION - ONSET
ONSET: ON-GOING
ONSET: ON-GOING

## 2021-07-15 ASSESSMENT — PAIN DESCRIPTION - PAIN TYPE
TYPE: ACUTE PAIN
TYPE: ACUTE PAIN

## 2021-07-15 ASSESSMENT — PAIN DESCRIPTION - PROGRESSION
CLINICAL_PROGRESSION: NOT CHANGED
CLINICAL_PROGRESSION: NOT CHANGED

## 2021-07-15 ASSESSMENT — PAIN - FUNCTIONAL ASSESSMENT
PAIN_FUNCTIONAL_ASSESSMENT: ACTIVITIES ARE NOT PREVENTED
PAIN_FUNCTIONAL_ASSESSMENT: ACTIVITIES ARE NOT PREVENTED

## 2021-07-15 NOTE — PROGRESS NOTES
Pt discharged to home in good condition with belongings  Discharge instructions given  \"Meds To Beds\" medication at bedside  Pt denies having any further questions at this time  Locked up home medication(s)/personal items given to patient at discharge  Patient/family state they have everything they were admitted with.   Patient instructed that his Daliresp prescription was sent to his home pharmacy at [facility]

## 2021-07-15 NOTE — PROGRESS NOTES
Pulmonary Critical Care Progress Note  Aye Clements M.D. Patient seen for the follow up of Chronic hypoxic respiratory failure, Acute exacerbation of COPD    Subjective:  He is sitting up in the bedside chair, in no distress. His shortness of breath is getting better every day. He continues to have a cough and difficulty producing sputum. He has mild chest discomfort with coughing. Examination:  Vitals: /73   Pulse 80   Temp 97.9 °F (36.6 °C) (Oral)   Resp 19   Ht 5' 4\" (1.626 m)   Wt 185 lb 3.2 oz (84 kg)   SpO2 94%   BMI 31.79 kg/m²     General appearance: alert and cooperative with exam, up in chair  Neck: No JVD  Lungs: Decreased air exchange, no wheezing, rales or rhonchi  Heart: regular rate and rhythm, S1, S2 normal, no gallop  Abdomen: Soft, non tender, + BS  Extremities: no cyanosis or clubbing. No significant edema    LABs:  CBC:   Recent Labs     07/14/21  0708 07/15/21  0536   WBC 20.6* 15.7*   HGB 14.4 14.2   HCT 43.0 43.6    309     BMP:   Recent Labs     07/14/21  0708 07/15/21  0536   * 130*   K 3.8 4.3   CO2 30 32*   BUN 10 16   CREATININE 0.72 0.87   LABGLOM >60 >60   GLUCOSE 135* 136*     PT/INR:   Recent Labs     07/13/21  0543   PROTIME 13.5   INR 1.1     Impression:  · Chronic hypoxic respiratory failure  · Acute exacerbation of COPD  · Active smoking/30-pack-year smoking history  · Suspected obstructive sleep apnea/obesity  · Mild pulmonary hypertension RVSP 37mmHg, most likely secondary   · Mild MR, Mild TR  · HTN, HLD, CAD, depression   · Hyponatremia    Recommendations:  · Oxygen by nasal cannula, keep saturation > 92%  · BiPAP for sleep and as needed  · Continue po Zithromax/Ceftin  · Albuterol and Ipratropium Q 4 hours and prn  · Discontinue IV solu-medrol   · Start prednisone taper  · Symbicort 160, 2 puffs twice daily  · Advance Daliresp to 500mcg if patient tolerates. Monitor for diarrhea.   · Singular 10 mg nightly  · Echo: EF 65%, RVSP 37 mmHg  · Smoking cessation education  · Incentive spirometry every hour while awake  · DVT prophylaxis with low molecular weight heparin  · No objection to discharge from pulmonary standpoint with outpatient follow-up in 1 to 2 weeks.       Electronically signed by     Andriy Viveros MD on 7/15/2021 at 12:17 PM  Pulmonary Critical Care and Sleep Medicine,  [facility]  Cell: [phone]  Office: [phone]

## 2021-07-15 NOTE — PLAN OF CARE
Problem: Falls - Risk of:  Goal: Will remain free from falls  Description: Will remain free from falls  7/15/2021 1748 by Kemi Roberts RN  Outcome: Completed  7/15/2021 0536 by Jorge Merrill RN  Outcome: Ongoing  Note: Siderails up x 2  Hourly rounding  Call light in reach  Instructed to call for assist before attempting out of bed. Remains free from falls and accidental injury at this time   Floor free from obstacles  Bed is locked and in lowest position  Adequate lighting provided  Bed alarm on, [place] star and Stay with Me signs posted      Goal: Absence of physical injury  Description: Absence of physical injury  7/15/2021 1748 by Kemi Roberts RN  Outcome: Completed  7/15/2021 0536 by Jorge Merrill RN  Outcome: Ongoing     Problem: Discharge Planning:  Goal: Discharged to appropriate level of care  Description: Discharged to appropriate level of care  7/15/2021 1748 by Kemi Roberts RN  Outcome: Completed  7/15/2021 0536 by Jorge Merrill RN  Outcome: Ongoing     Problem:  Activity Intolerance:  Goal: Ability to tolerate increased activity will improve  Description: Ability to tolerate increased activity will improve  7/15/2021 1748 by Kemi Roberts RN  Outcome: Completed  7/15/2021 0536 by Jorge Merrill RN  Outcome: Ongoing     Problem: Airway Clearance - Ineffective:  Goal: Ability to maintain a clear airway will improve  Description: Ability to maintain a clear airway will improve  7/15/2021 1748 by Kemi Roberts RN  Outcome: Completed  7/15/2021 0536 by Jorge Merrill RN  Outcome: Ongoing     Problem: Breathing Pattern - Ineffective:  Goal: Ability to achieve and maintain a regular respiratory rate will improve  Description: Ability to achieve and maintain a regular respiratory rate will improve  7/15/2021 1748 by Kemi Roberts RN  Outcome: Completed  7/15/2021 0536 by Jorge Merrill RN  Outcome: Ongoing     Problem: Gas Exchange - Impaired:  Goal: Levels of oxygenation will improve  Description: Levels of oxygenation will improve  7/15/2021 1748 by Evans Wright RN  Outcome: Completed  7/15/2021 0536 by Jillian Rahman RN  Outcome: Ongoing     Problem: Pain:  Goal: Pain level will decrease  Description: Pain level will decrease  7/15/2021 1748 by Evans Wright RN  Outcome: Completed  7/15/2021 0536 by Jillian Rahman RN  Outcome: Ongoing  Note: Pain level assessment complete. Patient educated on pain scale and control interventions  PRN pain medication given per patient request  Patient instructed to call out with new onset of pain or unrelieved pain    Goal: Control of acute pain  Description: Control of acute pain  7/15/2021 1748 by Evans Wright RN  Outcome: Completed  7/15/2021 0536 by Jillian Rahman RN  Outcome: Ongoing  Goal: Control of chronic pain  Description: Control of chronic pain  7/15/2021 1748 by Evans Wright RN  Outcome: Completed  7/15/2021 0536 by Jillian Rahman RN  Outcome: Ongoing     Problem: IP BALANCE  Goal: LTG - patient will maintain standing balance to allow for completion of daily activities  7/15/2021 1748 by Evans Wright RN  Outcome: Completed  7/15/2021 0536 by Jillian Rahman RN  Outcome: Ongoing  Goal: BALANCE EDUCATION  Description: Educate patients on maintaining dynamic/static standing/sitting balance, with/without upper extremity support.   7/15/2021 1748 by Evans Wright RN  Outcome: Completed  7/15/2021 0536 by Jillian Rahman RN  Outcome: Ongoing     Problem: IP BALANCE  Goal: LTG - Patient will maintain balance to allow for safe/functional mobility  7/15/2021 1748 by Evans Wright RN  Outcome: Completed  7/15/2021 0536 by Jillian Rahman RN  Outcome: Ongoing

## 2021-07-15 NOTE — PROGRESS NOTES
hypertension and hyperlipidemia, MI, seizures, depression / anxiety, SVT  Response To Previous Treatment: Not applicable  Subjective  Subjective: Patient pleasant and agreeable to PT. Patient reports SOB with minimal ambulation. General Comment  Comments: Porsha Quiroz RN reports patient medically appropriate for PT. Orientation  Orientation  Overall Orientation Status: Within Normal Limits  Cognition      Objective   Bed mobility  Rolling to Left: Independent  Rolling to Right: Independent  Supine to Sit: Independent  Sit to Supine: Independent  Scooting: Independent  Comment: Patient with good demo and safety awareness. Patient with good technique and progression noted. Patient with no defictits in Bed Mobilityat this time  Transfers  Sit to Stand: Independent  Stand to sit: Independent  Bed to Chair: Modified independent  Stand Pivot Transfers: Modified independent  Comment: Patient requires vc's for safety with O2 line. Patient with good transfer technique. Ambulation  Ambulation?: Yes  Ambulation 1  Surface: level tile  Assistance: Supervision  Quality of Gait: Steady gait, but wide MARQUEZ. Patient reports feeling SOB by end of ambulation and needed to sit and rest. Therapist assist with managing 02 line,  Gait Deviations: Slow Lori; Increased MARQUEZ; Decreased head and trunk rotation  Distance: 50 feet x2 in room with seated rest break in between attempts. Comments: Patient requires seated rest break for recovery and vc's for breathing techniques to maximize endurance and respiratory exchange. Stairs/Curb  Stairs?: No  Neuromuscular Education  NDT Treatment: Gait ;Lower extremity; Sitting;Standing  Balance  Posture: Good  Sitting - Static: Good  Sitting - Dynamic: Good  Standing - Static: Good  Standing - Dynamic: Good;-      AM-PAC Score  AM-PAC Inpatient Mobility Raw Score : 20 (07/15/21 1148)  AM-PAC Inpatient T-Scale Score : 47.67 (07/15/21 1148)  Mobility Inpatient CMS 0-100% Score: 35.83 (07/15/21 1148)  Mobility Inpatient CMS G-Code Modifier : CJ (07/15/21 1148)          Goals  Short term goals  Time Frame for Short term goals: 12 visits  Short term goal 1: Patient will be indep with transfers. Short term goal 2: Patient will amb 200 feet indep. Short term goal 3: Patient will have good dyn standing balance. Short term goal 4: Patient will be indep with HEP and deep breathing techniques. Short term goal 5: Patient will tolerate 30 minutes of ther-ex and ther-act. Patient Goals   Patient goals : IMprove breathing    Plan    Plan  Times per week: 1-2 x/d, 5-6 d/wk  Current Treatment Recommendations: Strengthening, Balance Training, Functional Mobility Training, Transfer Training, Gait Training, Endurance Training, Safety Education & Training, Home Exercise Program, Patient/Caregiver Education & Training  Safety Devices  Type of devices:  All fall risk precautions in place, Gait belt, Nurse notified, Call light within reach, Left in chair  Restraints  Initially in place: No     Therapy Time   Individual Concurrent Group Co-treatment   Time In 1035         Time Out 1100         Minutes [address] [place]

## 2021-07-15 NOTE — PROGRESS NOTES
Occupational Therapy    DATE: 7/15/2021    NAME: Alpa Cavazos  MRN: [id number]   : 1965      [facility]  Occupational Therapy Not Seen Note    Patient not available for Occupational Therapy due to:    [] Testing:    [] Hemodialysis    [] Cancelled by RN:    []Refusal by Patient:    [] Surgery:     [] Intubation:     [] Pain Medication:    [] Sedation:     [] Spine Precautions :    [] Medical Instability:    [x] Other: Pt sitting in bedside chair and very pleasant. Pt stated he is independent and has no further therapy needs at this time. Pt worked with PT earlier and note states pt is independent. Pt stated he is waiting to be discharged home today.     TRISTIAN Rodriguez/MILAGROS

## 2021-07-15 NOTE — PROGRESS NOTES
CLINICAL PHARMACY NOTE: MEDS TO BEDS    Total # of Prescriptions Filled: 6   The following medications were delivered to the patient:  · AZITHROMYCIN 250MG  · LOARTATAN POT 100MG  · HYDROCHLOROTHIAZIDE 25MG  · CEFUROXIME AXE 500MG  · MONTEKULAST 10MG  · PREDNISONE 20MG    Additional Documentation:  DALIRESP IS BEING TRANSFERRED AND FILLED AT [address] [facility] PT WILL  TOMORROW 7/16/21

## 2021-07-15 NOTE — PLAN OF CARE
Problem: Pain:  Goal: Pain level will decrease  Description: Pain level will decrease  Outcome: Ongoing  Note: Pain level assessment complete. Patient educated on pain scale and control interventions  PRN pain medication given per patient request  Patient instructed to call out with new onset of pain or unrelieved pain       Problem: Pain:  Goal: Control of acute pain  Description: Control of acute pain  Outcome: Ongoing     Problem: Pain:  Goal: Control of chronic pain  Description: Control of chronic pain  Outcome: Ongoing     Problem: Breathing Pattern - Ineffective:  Goal: Ability to achieve and maintain a regular respiratory rate will improve  Description: Ability to achieve and maintain a regular respiratory rate will improve  Outcome: Ongoing     Problem: Gas Exchange - Impaired:  Goal: Levels of oxygenation will improve  Description: Levels of oxygenation will improve  Outcome: Ongoing     Problem: Airway Clearance - Ineffective:  Goal: Ability to maintain a clear airway will improve  Description: Ability to maintain a clear airway will improve  Outcome: Ongoing     Problem:  Activity Intolerance:  Goal: Ability to tolerate increased activity will improve  Description: Ability to tolerate increased activity will improve  Outcome: Ongoing

## 2021-07-15 NOTE — DISCHARGE SUMMARY
[address] [place] [address].,    Adult Hospitalist      Patient ID: Ariadne Hoover  MRN: [id number]     Acct:  [de-identified]       Patient's PCP: Lito Hickman MD    Admit Date: 7/12/2021     Discharge Date:   7/15/21    Admitting Physician: Marky Quinonez MD    Discharge Physician: Davide Mae MD     CONSULTANTS: Patient Care Team:  Lito Hickman MD as PCP - General (Family Medicine)      Active Discharge Diagnoses:  Acute hypoxic respiratory failure  Acute COPD exacerbation  Essential hypertension  Mixed hyperlipidemia  Coronary artery disease  Depression with anxiety  Tobacco abuse  Obesity, BMI 31.76        Hospital Course: This is a 33-year-old gentleman has been admitted via emergency room, patient came to the ER with a complaint of having shortness of breath, patient has past medical history significant for COPD on home oxygen for dinner, patient also history of hypertension hyperlipidemia, patient is been having shortness of breath for past 5 days, which has progressively gotten worse, is associated with persistent nonproductive cough, patient denies any fever or chills, denies any chest pain, patient initial testing in the emergency room is unremarkable, noticed to have COPD exacerbation admitted for further management, admitted for acute hypoxemic respiratory failure secondary to acute COPD exacerbation, patient was treated with IV fluid, broad-spectrum IV antibiotic was involved, further patient underwent an echocardiogram which showed ejection fraction 65% no obvious wall motion abnormalities, patient responded well to the treatment, patient is discharged on tapering dose of steroids along with 2 more dose of azithromycin and 5 more days of Ceftin, discharge from the hospital in stable condition. The plan was discussed in detail with patient who agreed with the plan and verbalized understanding .     The patient was seen and examined on day of discharge and this discharge summary is in conjunction with any daily progress note from day of discharge. Hospital Data:    Labs:    Hematology:  Recent Labs     21  0543 21  0708 07/15/21  0536   WBC 17.9* 20.6* 15.7*   RBC 4.34 4.64 4.59   HGB 13.5 14.4 14.2   HCT 40.7 43.0 43.6   MCV 93.8 92.7 95.0   MCH 31.1 31.0 30.9   MCHC 33.2 33.5 32.6   RDW 12.3 12.7 12.6    322 309   MPV 9.7 9.3 9.3   INR 1.1  --   --      Chemistry:  Recent Labs     21  0543 21  0708 07/15/21  0536   * 130* 130*   K 4.2 3.8 4.3   CL 90* 89* 89*   CO2 31 30 32*   GLUCOSE 150* 135* 136*   BUN 8 10 16   CREATININE 0.74 0.72 0.87   ANIONGAP 9 11 9   LABGLOM >60 >60 >60   GFRAA >60 >60 >60   CALCIUM 9.5 9.3 9.5     No results for input(s): PROT, LABALBU, LABA1C, E7PSLRD, V3FLNLQ, FT4, TSH, AST, ALT, LDH, GGT, ALKPHOS, LABGGT, BILITOT, BILIDIR, AMMONIA, AMYLASE, LIPASE, LACTATE, CHOL, HDL, LDLCHOLESTEROL, CHOLHDLRATIO, TRIG, VLDL, UCI25VY, PHENYTOIN, PHENYF, URICACID, POCGLU in the last 72 hours. Lab Results   Component Value Date    INR 1.1 2021    PROTIME 13.5 2021     Lab Results   Component Value Date/Time    SPECIAL LAC 2021 06:53 AM     Lab Results   Component Value Date/Time    CULTURE NO GROWTH 3 DAYS 2021 06:53 AM       No results found for: POCPH, PHART, PH, POCPCO2, ZXD6KCP, PCO2, POCPO2, PO2ART, PO2, POCHCO3, RKP0MNC, HCO3, NBEA, PBEA, BEART, BE, THGBART, THB, PLV7FTM, FPEO4NCG, Q2LNBGUI, O2SAT, FIO2    Radiology:    XR CHEST PORTABLE    Result Date: 2021  Changes of COPD but otherwise unremarkable exam with cardiac event recorder in place.          All radiological studies reviewed      Reviews of Symptoms:    A 10 point system is reviewed and  negative except described in hospital course    Physical Exam:    Vitals:  /73   Pulse 80   Temp 97.9 °F (36.6 °C) (Oral)   Resp 19   Ht 5' 4\" (1.626 m)   Wt 185 lb 3.2 oz (84 kg)   SpO2 94%   BMI 31.79 kg/m²   Temp (24hrs), Av °F (36.7 °C), Min:97.9 °F (36.6 °C), Max:98.2 °F (36.8 °C)      General appearance - alert, well appearing, and in no acute distress  Mental status - oriented to person, place, and time with normal affect  Head - normocephalic and atraumatic  Eyes - pupils equal and reactive, extraocular eye movements intact, conjunctiva clear  Ears - hearing appears to be intact  Nose - no drainage noted  Mouth - mucous membranes moist  Neck - supple, no carotid bruits, thyroid not palpable  Chest - clear to auscultation, normal effort  Heart - normal rate, regular rhythm, no murmur  Abdomen - soft, nontender, nondistended, bowel sounds present all four quadrants, no masses, hepatomegaly or splenomegaly  Neurological - normal speech, no focal findings or movement disorder noted, cranial nerves II through XII grossly intact  Extremities - peripheral pulses palpable, no pedal edema or calf pain with palpation  Skin - no gross lesions, rashes, or induration noted      Consults:  IP CONSULT TO HOSPITALIST  IP CONSULT TO PULMONOLOGY    Disposition: Home    Discharged Condition: Stable    Follow Up: No follow-up provider specified. Lab Frequency Next Occurrence   Up with assistance PRN    Intake and output EVERY 8 HOURS    Initiate Oxygen Therapy Protocol DAILY (RT)    Pulse Oximetry Spot Check PRN    Basic Metabolic Panel w/ Reflex to MG DAILY    CBC auto differential DAILY    Nasal Cannula Oxygen DAILY (RT)    Initiate RT Inhaler-Nebulizer Bronchodilator Protocol DAILY (RT)    Nebulizer tx intermittent EVERY 4 HOURS WHILE AWAKE (RT)    BIPAP CONTINUOUS WITH Q4H RT CHECKS    HHN Treatment 4 TIMES DAILY          Diet: ADULT DIET; Regular    Discharge Medications:     Garrick Martinez   Home Medication Instructions [facility]:[id number]    Printed on:07/15/21 8458   Medication Information                      albuterol (PROVENTIL) (2.5 MG/3ML) 0.083% nebulizer solution  Take 2.5 mg by nebulization every 6 hours as needed for Wheezing albuterol sulfate  (90 Base) MCG/ACT inhaler  Inhale 2 puffs into the lungs every 4-6 hours as needed for Wheezing             amLODIPine (NORVASC) 5 MG tablet  Take 5 mg by mouth daily             aspirin 81 MG tablet  Take 81 mg by mouth daily             azithromycin (ZITHROMAX) 250 MG tablet  Take 1 tablet by mouth daily for 2 days             budesonide-formoterol (SYMBICORT) 160-4.5 MCG/ACT AERO  Inhale 2 puffs into the lungs 2 times daily             cefUROXime (CEFTIN) 500 MG tablet  Take 1 tablet by mouth every 12 hours for 5 days             Cholecalciferol (VITAMIN D3) 2000 units CAPS  Take 1 capsule by mouth daily             escitalopram (LEXAPRO) 10 MG tablet  Take 10 mg by mouth daily             famotidine (PEPCID) 40 MG tablet  Take 40 mg by mouth 2 times daily as needed (heartburn)              ferrous gluconate (FERGON) 324 (38 Fe) MG tablet  Take 324 mg by mouth daily (with breakfast)             folic acid (FOLVITE) 1 MG tablet  Take 1 mg by mouth daily             hydroCHLOROthiazide (HYDRODIURIL) 25 MG tablet  Take 1 tablet by mouth daily             losartan (COZAAR) 100 MG tablet  Take 1 tablet by mouth daily             metoprolol succinate (TOPROL XL) 50 MG extended release tablet  Take 50 mg by mouth daily             montelukast (SINGULAIR) 10 MG tablet  Take 1 tablet by mouth nightly             pantoprazole (PROTONIX) 40 MG tablet  Take 40 mg by mouth daily             predniSONE (DELTASONE) 20 MG tablet  4tabs qd x 3days, 3tabs qd x 3days, 2 tabs qd x 3days, 1tab qd x 3days #30 for 12 days             QUEtiapine (SEROQUEL) 400 MG tablet  Take 400 mg by mouth nightly              Roflumilast (DALIRESP) 500 MCG tablet  Take 1 tablet by mouth daily             tiotropium (SPIRIVA RESPIMAT) 2.5 MCG/ACT AERS inhaler  Inhale 2 puffs into the lungs daily             vitamin B-1 (THIAMINE) 100 MG tablet  Take 100 mg by mouth daily                 Code Status:  Full Code    Time Spent on discharge is  35 mins in patient examination, evaluation, counseling as well as medication reconciliation, prescriptions for required medications, discharge plan and follow up. Electronically signed by Aidan Carrera MD on 7/15/2021 at 4:45 PM     Thank you Dr. Chano Banks MD for the opportunity to be involved in this patient's care. This note was created with the assistance of a speech-recognition program.  Although the intention is to generate a document that actually reflects the content of the visit, no guarantees can be provided that every mistake has been identified and corrected by editing. Note was updated later by me after  physical examination and  completion of the assessment.

## 2021-07-15 NOTE — FLOWSHEET NOTE
[facility]  PROGRESS NOTE    Room # 2024/2024-01   Name: Lashell Guy            Gnosticist: Debo Gonzalez     Reason for visit: Routine    I visited the patient. Admit Date & Time: 7/12/2021  6:07 AM    Assessment:  Lashell Guy is a 60 y.o. male in the hospital because of \"COPD exacerbation. \" Upon entering the room the pt was sitting up in a chair. The pt states he hopes to go home today. He states no current spiritual/emotional needs. Intervention:  I introduced myself and my title as  I offered space for the pt  to express feelings, needs, and concerns and provided a ministry presence. Outcome: The pt was grateful and receptive. Plan:  Chaplains will remain available to offer spiritual and emotional support as needed. 07/15/21 1640   Encounter Summary   Services provided to: Patient   Referral/Consult From: Trevor   Continue Visiting   (7/15/21)   Complexity of Encounter Low   Length of Encounter 15 minutes   Routine   Type Initial   Assessment Calm; Approachable; Hopeful;Coping   Intervention Active listening;Explored feelings, thoughts, concerns;Nurtured hope;Sustaining presence/ Ministry of presence;Develop care plan   Outcome Acceptance;Comfort;Expressed gratitude;Engaged in conversation;Expressed feelings/needs/concerns;Coping;Encouraged; Hopeful;Receptive       Electronically signed by Jennifer Calero on 7/15/2021 at 4:42 PM.  Alex

## 2021-07-18 LAB
CULTURE: NORMAL
CULTURE: NORMAL
Lab: NORMAL
Lab: NORMAL
SPECIMEN DESCRIPTION: NORMAL
SPECIMEN DESCRIPTION: NORMAL

## 2021-12-31 ENCOUNTER — APPOINTMENT (OUTPATIENT)
Dept: GENERAL RADIOLOGY | Age: 56
DRG: 140 | End: 2021-12-31
Payer: MEDICARE

## 2021-12-31 ENCOUNTER — HOSPITAL ENCOUNTER (INPATIENT)
Age: 56
LOS: 4 days | Discharge: HOME | DRG: 140 | End: 2022-01-04
Attending: EMERGENCY MEDICINE | Admitting: FAMILY MEDICINE
Payer: MEDICARE

## 2021-12-31 ENCOUNTER — APPOINTMENT (OUTPATIENT)
Dept: CT IMAGING | Age: 56
DRG: 140 | End: 2021-12-31
Payer: MEDICARE

## 2021-12-31 DIAGNOSIS — J44.1 COPD EXACERBATION (HCC): Primary | ICD-10-CM

## 2021-12-31 DIAGNOSIS — K76.9 LIVER LESION: ICD-10-CM

## 2021-12-31 LAB
ABSOLUTE EOS #: 0.05 K/UL (ref 0–0.44)
ABSOLUTE IMMATURE GRANULOCYTE: 0.12 K/UL (ref 0–0.3)
ABSOLUTE LYMPH #: 2.1 K/UL (ref 1.1–3.7)
ABSOLUTE MONO #: 1.22 K/UL (ref 0.1–1.2)
AFP: 3.5 UG/L
ALBUMIN SERPL-MCNC: 4.4 G/DL (ref 3.5–5.2)
ALBUMIN/GLOBULIN RATIO: ABNORMAL (ref 1–2.5)
ALP BLD-CCNC: 69 U/L (ref 40–129)
ALT SERPL-CCNC: 18 U/L (ref 5–41)
ANION GAP SERPL CALCULATED.3IONS-SCNC: 20 MMOL/L (ref 9–17)
AST SERPL-CCNC: 19 U/L
BASOPHILS # BLD: 0 % (ref 0–2)
BASOPHILS ABSOLUTE: 0.06 K/UL (ref 0–0.2)
BILIRUB SERPL-MCNC: 0.69 MG/DL (ref 0.3–1.2)
BNP INTERPRETATION: NORMAL
BUN BLDV-MCNC: 11 MG/DL (ref 6–20)
BUN/CREAT BLD: 12 (ref 9–20)
CALCIUM SERPL-MCNC: 10.4 MG/DL (ref 8.6–10.4)
CHLORIDE BLD-SCNC: 87 MMOL/L (ref 98–107)
CO2: 25 MMOL/L (ref 20–31)
CREAT SERPL-MCNC: 0.94 MG/DL (ref 0.7–1.2)
DIFFERENTIAL TYPE: ABNORMAL
EOSINOPHILS RELATIVE PERCENT: 0 % (ref 1–4)
GFR AFRICAN AMERICAN: >60 ML/MIN
GFR NON-AFRICAN AMERICAN: >60 ML/MIN
GFR SERPL CREATININE-BSD FRML MDRD: ABNORMAL ML/MIN/{1.73_M2}
GFR SERPL CREATININE-BSD FRML MDRD: ABNORMAL ML/MIN/{1.73_M2}
GLUCOSE BLD-MCNC: 103 MG/DL (ref 70–99)
HCT VFR BLD CALC: 47.2 % (ref 40.7–50.3)
HEMOGLOBIN: 15.7 G/DL (ref 13–17)
IMMATURE GRANULOCYTES: 1 %
LIPASE: 26 U/L (ref 13–60)
LYMPHOCYTES # BLD: 14 % (ref 24–43)
MCH RBC QN AUTO: 30.7 PG (ref 25.2–33.5)
MCHC RBC AUTO-ENTMCNC: 33.3 G/DL (ref 28.4–34.8)
MCV RBC AUTO: 92.4 FL (ref 82.6–102.9)
MONOCYTES # BLD: 8 % (ref 3–12)
MYOGLOBIN: 46 NG/ML (ref 28–72)
MYOGLOBIN: 55 NG/ML (ref 28–72)
MYOGLOBIN: 68 NG/ML (ref 28–72)
NRBC AUTOMATED: 0 PER 100 WBC
PDW BLD-RTO: 12 % (ref 11.8–14.4)
PLATELET # BLD: 321 K/UL (ref 138–453)
PLATELET ESTIMATE: ABNORMAL
PMV BLD AUTO: 9.6 FL (ref 8.1–13.5)
POTASSIUM SERPL-SCNC: 3.6 MMOL/L (ref 3.7–5.3)
PRO-BNP: 21 PG/ML
RBC # BLD: 5.11 M/UL (ref 4.21–5.77)
RBC # BLD: ABNORMAL 10*6/UL
SARS-COV-2, RAPID: NOT DETECTED
SEG NEUTROPHILS: 77 % (ref 36–65)
SEGMENTED NEUTROPHILS ABSOLUTE COUNT: 11.09 K/UL (ref 1.5–8.1)
SODIUM BLD-SCNC: 132 MMOL/L (ref 135–144)
SPECIMEN DESCRIPTION: NORMAL
TOTAL PROTEIN: 8 G/DL (ref 6.4–8.3)
TROPONIN INTERP: NORMAL
TROPONIN T: NORMAL NG/ML
TROPONIN, HIGH SENSITIVITY: 18 NG/L (ref 0–22)
TROPONIN, HIGH SENSITIVITY: 18 NG/L (ref 0–22)
TROPONIN, HIGH SENSITIVITY: 20 NG/L (ref 0–22)
WBC # BLD: 14.6 K/UL (ref 3.5–11.3)
WBC # BLD: ABNORMAL 10*3/UL

## 2021-12-31 PROCEDURE — 6370000000 HC RX 637 (ALT 250 FOR IP): Performed by: FAMILY MEDICINE

## 2021-12-31 PROCEDURE — 6370000000 HC RX 637 (ALT 250 FOR IP): Performed by: EMERGENCY MEDICINE

## 2021-12-31 PROCEDURE — 96374 THER/PROPH/DIAG INJ IV PUSH: CPT

## 2021-12-31 PROCEDURE — 2700000000 HC OXYGEN THERAPY PER DAY

## 2021-12-31 PROCEDURE — 6360000002 HC RX W HCPCS: Performed by: PHYSICIAN ASSISTANT

## 2021-12-31 PROCEDURE — 1200000000 HC SEMI PRIVATE

## 2021-12-31 PROCEDURE — 84484 ASSAY OF TROPONIN QUANT: CPT

## 2021-12-31 PROCEDURE — 82105 ALPHA-FETOPROTEIN SERUM: CPT

## 2021-12-31 PROCEDURE — 94761 N-INVAS EAR/PLS OXIMETRY MLT: CPT

## 2021-12-31 PROCEDURE — 96376 TX/PRO/DX INJ SAME DRUG ADON: CPT

## 2021-12-31 PROCEDURE — 6360000004 HC RX CONTRAST MEDICATION: Performed by: EMERGENCY MEDICINE

## 2021-12-31 PROCEDURE — 96375 TX/PRO/DX INJ NEW DRUG ADDON: CPT

## 2021-12-31 PROCEDURE — 87635 SARS-COV-2 COVID-19 AMP PRB: CPT

## 2021-12-31 PROCEDURE — 74177 CT ABD & PELVIS W/CONTRAST: CPT

## 2021-12-31 PROCEDURE — 71045 X-RAY EXAM CHEST 1 VIEW: CPT

## 2021-12-31 PROCEDURE — 2580000003 HC RX 258: Performed by: EMERGENCY MEDICINE

## 2021-12-31 PROCEDURE — 80053 COMPREHEN METABOLIC PANEL: CPT

## 2021-12-31 PROCEDURE — 83880 ASSAY OF NATRIURETIC PEPTIDE: CPT

## 2021-12-31 PROCEDURE — 94640 AIRWAY INHALATION TREATMENT: CPT

## 2021-12-31 PROCEDURE — 85025 COMPLETE CBC W/AUTO DIFF WBC: CPT

## 2021-12-31 PROCEDURE — 2580000003 HC RX 258: Performed by: FAMILY MEDICINE

## 2021-12-31 PROCEDURE — 83874 ASSAY OF MYOGLOBIN: CPT

## 2021-12-31 PROCEDURE — 83690 ASSAY OF LIPASE: CPT

## 2021-12-31 PROCEDURE — 99284 EMERGENCY DEPT VISIT MOD MDM: CPT

## 2021-12-31 PROCEDURE — 36415 COLL VENOUS BLD VENIPUNCTURE: CPT

## 2021-12-31 PROCEDURE — 6360000002 HC RX W HCPCS: Performed by: FAMILY MEDICINE

## 2021-12-31 RX ORDER — METHYLPREDNISOLONE SODIUM SUCCINATE 125 MG/2ML
125 INJECTION, POWDER, LYOPHILIZED, FOR SOLUTION INTRAMUSCULAR; INTRAVENOUS ONCE
Status: COMPLETED | OUTPATIENT
Start: 2021-12-31 | End: 2021-12-31

## 2021-12-31 RX ORDER — ONDANSETRON 2 MG/ML
4 INJECTION INTRAMUSCULAR; INTRAVENOUS EVERY 6 HOURS PRN
Status: DISCONTINUED | OUTPATIENT
Start: 2021-12-31 | End: 2022-01-04 | Stop reason: HOSPADM

## 2021-12-31 RX ORDER — ALBUTEROL SULFATE 2.5 MG/3ML
2.5 SOLUTION RESPIRATORY (INHALATION) EVERY 4 HOURS PRN
Status: DISCONTINUED | OUTPATIENT
Start: 2021-12-31 | End: 2021-12-31

## 2021-12-31 RX ORDER — HYDROCHLOROTHIAZIDE 25 MG/1
25 TABLET ORAL DAILY
Status: DISCONTINUED | OUTPATIENT
Start: 2022-01-01 | End: 2022-01-04 | Stop reason: HOSPADM

## 2021-12-31 RX ORDER — MORPHINE SULFATE 4 MG/ML
4 INJECTION, SOLUTION INTRAMUSCULAR; INTRAVENOUS ONCE
Status: COMPLETED | OUTPATIENT
Start: 2021-12-31 | End: 2021-12-31

## 2021-12-31 RX ORDER — ALBUTEROL SULFATE 90 UG/1
2 AEROSOL, METERED RESPIRATORY (INHALATION) EVERY 6 HOURS PRN
Status: DISCONTINUED | OUTPATIENT
Start: 2021-12-31 | End: 2021-12-31

## 2021-12-31 RX ORDER — SODIUM CHLORIDE 0.9 % (FLUSH) 0.9 %
10 SYRINGE (ML) INJECTION PRN
Status: DISCONTINUED | OUTPATIENT
Start: 2021-12-31 | End: 2022-01-04 | Stop reason: HOSPADM

## 2021-12-31 RX ORDER — POTASSIUM CHLORIDE 20 MEQ/1
40 TABLET, EXTENDED RELEASE ORAL PRN
Status: DISCONTINUED | OUTPATIENT
Start: 2021-12-31 | End: 2022-01-04 | Stop reason: HOSPADM

## 2021-12-31 RX ORDER — METHYLPREDNISOLONE SODIUM SUCCINATE 125 MG/2ML
60 INJECTION, POWDER, LYOPHILIZED, FOR SOLUTION INTRAMUSCULAR; INTRAVENOUS EVERY 6 HOURS
Status: DISCONTINUED | OUTPATIENT
Start: 2021-12-31 | End: 2022-01-02

## 2021-12-31 RX ORDER — POTASSIUM CHLORIDE 7.45 MG/ML
10 INJECTION INTRAVENOUS PRN
Status: DISCONTINUED | OUTPATIENT
Start: 2021-12-31 | End: 2022-01-04 | Stop reason: HOSPADM

## 2021-12-31 RX ORDER — LOSARTAN POTASSIUM 100 MG/1
100 TABLET ORAL DAILY
Status: DISCONTINUED | OUTPATIENT
Start: 2022-01-01 | End: 2022-01-04 | Stop reason: HOSPADM

## 2021-12-31 RX ORDER — ACETAMINOPHEN 650 MG/1
650 SUPPOSITORY RECTAL EVERY 6 HOURS PRN
Status: DISCONTINUED | OUTPATIENT
Start: 2021-12-31 | End: 2022-01-04 | Stop reason: HOSPADM

## 2021-12-31 RX ORDER — IPRATROPIUM BROMIDE AND ALBUTEROL SULFATE 2.5; .5 MG/3ML; MG/3ML
1 SOLUTION RESPIRATORY (INHALATION) 3 TIMES DAILY
Status: DISCONTINUED | OUTPATIENT
Start: 2021-12-31 | End: 2022-01-04 | Stop reason: HOSPADM

## 2021-12-31 RX ORDER — AMLODIPINE BESYLATE 5 MG/1
5 TABLET ORAL DAILY
Status: DISCONTINUED | OUTPATIENT
Start: 2022-01-01 | End: 2022-01-04 | Stop reason: HOSPADM

## 2021-12-31 RX ORDER — IPRATROPIUM BROMIDE AND ALBUTEROL SULFATE 2.5; .5 MG/3ML; MG/3ML
1 SOLUTION RESPIRATORY (INHALATION)
Status: DISCONTINUED | OUTPATIENT
Start: 2021-12-31 | End: 2021-12-31

## 2021-12-31 RX ORDER — SODIUM CHLORIDE 9 MG/ML
25 INJECTION, SOLUTION INTRAVENOUS PRN
Status: DISCONTINUED | OUTPATIENT
Start: 2021-12-31 | End: 2022-01-04 | Stop reason: HOSPADM

## 2021-12-31 RX ORDER — IPRATROPIUM BROMIDE AND ALBUTEROL SULFATE 2.5; .5 MG/3ML; MG/3ML
1 SOLUTION RESPIRATORY (INHALATION) 2 TIMES DAILY
Status: DISCONTINUED | OUTPATIENT
Start: 2022-01-31 | End: 2021-12-31

## 2021-12-31 RX ORDER — BUDESONIDE AND FORMOTEROL FUMARATE DIHYDRATE 160; 4.5 UG/1; UG/1
2 AEROSOL RESPIRATORY (INHALATION) 2 TIMES DAILY
Status: DISCONTINUED | OUTPATIENT
Start: 2021-12-31 | End: 2022-01-04 | Stop reason: HOSPADM

## 2021-12-31 RX ORDER — SODIUM CHLORIDE 0.9 % (FLUSH) 0.9 %
10 SYRINGE (ML) INJECTION PRN
Status: DISCONTINUED | OUTPATIENT
Start: 2021-12-31 | End: 2021-12-31 | Stop reason: SDUPTHER

## 2021-12-31 RX ORDER — ONDANSETRON 2 MG/ML
4 INJECTION INTRAMUSCULAR; INTRAVENOUS ONCE
Status: COMPLETED | OUTPATIENT
Start: 2021-12-31 | End: 2021-12-31

## 2021-12-31 RX ORDER — ACETAMINOPHEN 325 MG/1
650 TABLET ORAL EVERY 6 HOURS PRN
Status: DISCONTINUED | OUTPATIENT
Start: 2021-12-31 | End: 2022-01-04 | Stop reason: HOSPADM

## 2021-12-31 RX ORDER — ONDANSETRON 4 MG/1
4 TABLET, ORALLY DISINTEGRATING ORAL EVERY 8 HOURS PRN
Status: DISCONTINUED | OUTPATIENT
Start: 2021-12-31 | End: 2022-01-04 | Stop reason: HOSPADM

## 2021-12-31 RX ORDER — DOXYCYCLINE HYCLATE 50 MG/1
324 CAPSULE, GELATIN COATED ORAL
Status: DISCONTINUED | OUTPATIENT
Start: 2022-01-01 | End: 2022-01-04 | Stop reason: HOSPADM

## 2021-12-31 RX ORDER — TRAMADOL HYDROCHLORIDE 50 MG/1
50 TABLET ORAL EVERY 6 HOURS PRN
Status: DISCONTINUED | OUTPATIENT
Start: 2021-12-31 | End: 2022-01-02

## 2021-12-31 RX ORDER — ASPIRIN 81 MG/1
81 TABLET, CHEWABLE ORAL DAILY
Status: DISCONTINUED | OUTPATIENT
Start: 2022-01-01 | End: 2022-01-04 | Stop reason: HOSPADM

## 2021-12-31 RX ORDER — FOLIC ACID 1 MG/1
1 TABLET ORAL DAILY
Status: DISCONTINUED | OUTPATIENT
Start: 2022-01-01 | End: 2022-01-04 | Stop reason: HOSPADM

## 2021-12-31 RX ORDER — MAGNESIUM SULFATE 1 G/100ML
1000 INJECTION INTRAVENOUS PRN
Status: DISCONTINUED | OUTPATIENT
Start: 2021-12-31 | End: 2022-01-04 | Stop reason: HOSPADM

## 2021-12-31 RX ORDER — SODIUM CHLORIDE 0.9 % (FLUSH) 0.9 %
5-40 SYRINGE (ML) INJECTION EVERY 12 HOURS SCHEDULED
Status: DISCONTINUED | OUTPATIENT
Start: 2021-12-31 | End: 2022-01-04 | Stop reason: HOSPADM

## 2021-12-31 RX ORDER — MONTELUKAST SODIUM 10 MG/1
10 TABLET ORAL NIGHTLY
Status: DISCONTINUED | OUTPATIENT
Start: 2021-12-31 | End: 2022-01-04 | Stop reason: HOSPADM

## 2021-12-31 RX ORDER — METOPROLOL SUCCINATE 50 MG/1
50 TABLET, EXTENDED RELEASE ORAL DAILY
Status: DISCONTINUED | OUTPATIENT
Start: 2022-01-01 | End: 2022-01-04 | Stop reason: HOSPADM

## 2021-12-31 RX ORDER — QUETIAPINE FUMARATE 200 MG/1
400 TABLET, FILM COATED ORAL NIGHTLY
Status: DISCONTINUED | OUTPATIENT
Start: 2021-12-31 | End: 2022-01-04 | Stop reason: HOSPADM

## 2021-12-31 RX ORDER — ESCITALOPRAM OXALATE 10 MG/1
10 TABLET ORAL DAILY
Status: DISCONTINUED | OUTPATIENT
Start: 2022-01-01 | End: 2022-01-04 | Stop reason: HOSPADM

## 2021-12-31 RX ORDER — 0.9 % SODIUM CHLORIDE 0.9 %
80 INTRAVENOUS SOLUTION INTRAVENOUS ONCE
Status: DISCONTINUED | OUTPATIENT
Start: 2021-12-31 | End: 2021-12-31 | Stop reason: ALTCHOICE

## 2021-12-31 RX ORDER — ALBUTEROL SULFATE 2.5 MG/3ML
2.5 SOLUTION RESPIRATORY (INHALATION) ONCE
Status: DISCONTINUED | OUTPATIENT
Start: 2021-12-31 | End: 2021-12-31

## 2021-12-31 RX ORDER — METHYLPREDNISOLONE SODIUM SUCCINATE 125 MG/2ML
60 INJECTION, POWDER, LYOPHILIZED, FOR SOLUTION INTRAMUSCULAR; INTRAVENOUS EVERY 6 HOURS
Status: DISCONTINUED | OUTPATIENT
Start: 2021-12-31 | End: 2021-12-31

## 2021-12-31 RX ADMIN — BUDESONIDE AND FORMOTEROL FUMARATE DIHYDRATE 2 PUFF: 160; 4.5 AEROSOL RESPIRATORY (INHALATION) at 22:27

## 2021-12-31 RX ADMIN — MORPHINE SULFATE 4 MG: 4 INJECTION, SOLUTION INTRAMUSCULAR; INTRAVENOUS at 16:33

## 2021-12-31 RX ADMIN — MONTELUKAST 10 MG: 10 TABLET, FILM COATED ORAL at 19:55

## 2021-12-31 RX ADMIN — MORPHINE SULFATE 4 MG: 4 INJECTION, SOLUTION INTRAMUSCULAR; INTRAVENOUS at 13:00

## 2021-12-31 RX ADMIN — IPRATROPIUM BROMIDE AND ALBUTEROL SULFATE 1 AMPULE: .5; 2.5 SOLUTION RESPIRATORY (INHALATION) at 22:27

## 2021-12-31 RX ADMIN — Medication 4 MG: at 10:40

## 2021-12-31 RX ADMIN — METHYLPREDNISOLONE SODIUM SUCCINATE 125 MG: 125 INJECTION, POWDER, FOR SOLUTION INTRAMUSCULAR; INTRAVENOUS at 13:00

## 2021-12-31 RX ADMIN — SODIUM CHLORIDE, PRESERVATIVE FREE 10 ML: 5 INJECTION INTRAVENOUS at 19:55

## 2021-12-31 RX ADMIN — SODIUM CHLORIDE, PRESERVATIVE FREE 10 ML: 5 INJECTION INTRAVENOUS at 14:15

## 2021-12-31 RX ADMIN — IOPAMIDOL 75 ML: 755 INJECTION, SOLUTION INTRAVENOUS at 14:15

## 2021-12-31 RX ADMIN — TRAMADOL HYDROCHLORIDE 50 MG: 50 TABLET, COATED ORAL at 20:05

## 2021-12-31 RX ADMIN — Medication 80 ML: at 14:15

## 2021-12-31 RX ADMIN — ONDANSETRON HYDROCHLORIDE 4 MG: 2 INJECTION, SOLUTION INTRAVENOUS at 10:40

## 2021-12-31 RX ADMIN — ALBUTEROL SULFATE 2 PUFF: 108 AEROSOL, METERED RESPIRATORY (INHALATION) at 12:27

## 2021-12-31 RX ADMIN — ENOXAPARIN SODIUM 40 MG: 100 INJECTION SUBCUTANEOUS at 19:55

## 2021-12-31 RX ADMIN — METHYLPREDNISOLONE SODIUM SUCCINATE 60 MG: 125 INJECTION, POWDER, FOR SOLUTION INTRAMUSCULAR; INTRAVENOUS at 19:55

## 2021-12-31 ASSESSMENT — PAIN SCALES - GENERAL
PAINLEVEL_OUTOF10: 7
PAINLEVEL_OUTOF10: 4
PAINLEVEL_OUTOF10: 8
PAINLEVEL_OUTOF10: 7

## 2021-12-31 ASSESSMENT — PAIN DESCRIPTION - PAIN TYPE
TYPE: ACUTE PAIN
TYPE: ACUTE PAIN

## 2021-12-31 ASSESSMENT — PAIN DESCRIPTION - LOCATION
LOCATION: ABDOMEN
LOCATION: ABDOMEN

## 2021-12-31 ASSESSMENT — PAIN DESCRIPTION - FREQUENCY
FREQUENCY: CONTINUOUS
FREQUENCY: CONTINUOUS

## 2021-12-31 ASSESSMENT — PAIN DESCRIPTION - PROGRESSION
CLINICAL_PROGRESSION: GRADUALLY IMPROVING
CLINICAL_PROGRESSION: GRADUALLY IMPROVING

## 2021-12-31 ASSESSMENT — PAIN DESCRIPTION - ONSET
ONSET: ON-GOING
ONSET: ON-GOING

## 2021-12-31 ASSESSMENT — PAIN DESCRIPTION - DESCRIPTORS
DESCRIPTORS: SHARP
DESCRIPTORS: SHARP

## 2021-12-31 ASSESSMENT — PAIN DESCRIPTION - ORIENTATION
ORIENTATION: MID;UPPER
ORIENTATION: MID;UPPER

## 2021-12-31 NOTE — ED NOTES
Patient presents to the er with c/o abdominal pain along with sob. Patient verbalizing that he has had abdominal pain for the past few days along with increased sob. Patient has hx of COPD and has recently quit smoking. Patient states utilizes home O2. Patient attempted to drive over to the hospital without oxygen and O2 sat on RA was 88%.       Asad Le RN  12/31/21 0339

## 2021-12-31 NOTE — H&P
History & Physical  [facility].,    Adult Hospitalist      Name: Chris Antonio  MRN: [id number]     Acct: [de-identified]  Room: [facility]/    Admit Date: 12/31/2021  9:59 AM  PCP: Jose G Gutierrez MD    Primary Problem  Active Problems:    COPD exacerbation [facility])  Resolved Problems:    * No resolved hospital problems.  *        Assesment:   Acute on chronic hypoxemic respiratory failure  Acute COPD exacerbation  Hypertension  Mixed hyperlipidemia  Chronic coronary artery disease  Depression with anxiety  Tobacco abuse  Obesity, BMI 30.79        Plan:   Admit patient to progressive unit  Oxygen to keep SPO2 more than 90%  Telemetry  Check vital signs closely  CBC BMP daily    IV Solu-Medrol  DuoNeb breathing treatment  Continue his inhalers  Continue Singulair  Continue Roflumilast  Pulmonology consult    Continue amlodipine, hydrochlorothiazide  Continue aspirin  Continue Celexa and Seroquel  Continue other medication as below    Scheduled Meds:   sodium chloride  80 mL IntraVENous Once    albuterol  2.5 mg Nebulization Once    sodium chloride flush  5-40 mL IntraVENous 2 times per day    enoxaparin  40 mg SubCUTAneous Daily    methylPREDNISolone  60 mg IntraVENous Q6H    ipratropium-albuterol  1 ampule Inhalation Q4H WA    amLODIPine  5 mg Oral Daily    aspirin  81 mg Oral Daily    budesonide-formoterol  2 puff Inhalation BID    escitalopram  10 mg Oral Daily    folic acid  1 mg Oral Daily    [START ON 1/1/2022] ferrous gluconate  324 mg Oral Daily with breakfast    hydroCHLOROthiazide  25 mg Oral Daily    losartan  100 mg Oral Daily    metoprolol succinate  50 mg Oral Daily    montelukast  10 mg Oral Nightly    QUEtiapine  400 mg Oral Nightly    Roflumilast  500 mcg Oral Daily     Continuous Infusions:   sodium chloride       PRN Meds:  albuterol sulfate HFA, 2 puff, Q6H PRN  sodium chloride flush, 10 mL, PRN  sodium chloride flush, 10 mL, PRN  sodium chloride, 25 mL, PRN  potassium chloride, 40 mEq, PRN   Or  potassium alternative oral replacement, 40 mEq, PRN   Or  potassium chloride, 10 mEq, PRN  magnesium sulfate, 1,000 mg, PRN  ondansetron, 4 mg, Q8H PRN   Or  ondansetron, 4 mg, Q6H PRN  magnesium hydroxide, 30 mL, Daily PRN  acetaminophen, 650 mg, Q6H PRN   Or  acetaminophen, 650 mg, Q6H PRN        Chief Complaint:     Chief Complaint   Patient presents with    Shortness of Breath         History of Present Illness:      Janeth Harding is a 60 y.o.  male who presents with Shortness of Breath  This is a 75-year-old gentleman who is been admitted via the emergency room, patient came to the ER with a complaint of having some shortness of breath, patient has past medical history significant coronary disease and COPD, patient has been having shortness of breath for past 2 weeks which has progressively gotten worse, patient denies any fever or chills, patient has some nausea but no vomiting, patient is been having some coughing spells, further patient does wear oxygen at home, patient is requiring more oxygen, for this reason came to the emergency room, initial testing in the emergency room is unremarkable patient is negative for COVID-19 and is vaccinated, further patient is noticed to be in COPD exacerbation admitted for further management    I have personally reviewed the past medical history, past surgical history, medications, social history, and family history, and summarized in the note. Review of Systems:     All 10 point system is reviewed and negative otherwise mentioned in HPI.       Past Medical History:     Past Medical History:   Diagnosis Date    Arthritis     CAD (coronary artery disease)     Cancer ([facility]     prostate    Cirrhosis with alcoholism ([facility]     COPD (chronic obstructive pulmonary disease) ([facility]     Depression     BOURGEOIS (dyspnea on exertion)     H/O prostate cancer     Hyperlipidemia     Hypertension     MI (myocardial infarction) ([address]     Seizures ([address] 2016    last one over 2 years ago     [facility]ra[facility]ricular [facility]) [facility]     Tobacco abuse         Past Surgical History:     Past Surgical History:   Procedure Laterality Date    CARDIAC CATHETERIZATION      ablation    INSERTABLE CARDIAC MONITOR  10/03/2018    INTERNAL LOOP RECORDER    PROSTATECTOMY      SINUS SURGERY      TONGUE SURGERY      UPPER GASTROINTESTINAL ENDOSCOPY N/A 6/10/2021    EGD BIOPSY performed by Mckayla Orellana MD at [facility]        Medications Prior to Admission:       Prior to Admission medications    Medication Sig Start Date End Date Taking?  Authorizing Provider   losartan (COZAAR) 100 MG tablet Take 1 tablet by mouth daily 7/16/21   Zahida Shha MD   hydroCHLOROthiazide (HYDRODIURIL) 25 MG tablet Take 1 tablet by mouth daily 7/16/21   Zahida Shah MD   montelukast (SINGULAIR) 10 MG tablet Take 1 tablet by mouth nightly 7/15/21   Zahida Shah MD   predniSONE (DELTASONE) 20 MG tablet 4tabs qd x 3days, 3tabs qd x 3days, 2 tabs qd x 3days, 1tab qd x 3days #30 for 12 days 7/15/21   Zahida Shah MD   Roflumilast (DALIRESP) 500 MCG tablet Take 1 tablet by mouth daily 7/16/21   Zahida Shah MD   escitalopram (LEXAPRO) 10 MG tablet Take 10 mg by mouth daily    Historical Provider, MD   ferrous gluconate (FERGON) 324 (38 Fe) MG tablet Take 324 mg by mouth daily (with breakfast)    Historical Provider, MD   famotidine (PEPCID) 40 MG tablet Take 40 mg by mouth 2 times daily as needed (heartburn)     Historical Provider, MD   amLODIPine (NORVASC) 5 MG tablet Take 5 mg by mouth daily    Historical Provider, MD   albuterol (PROVENTIL) (2.5 MG/3ML) 0.083% nebulizer solution Take 2.5 mg by nebulization every 6 hours as needed for Wheezing    Historical Provider, MD   albuterol sulfate  (90 Base) MCG/ACT inhaler Inhale 2 puffs into the lungs every 4-6 hours as needed for Wheezing    Historical Provider, MD   aspirin 81 MG tablet Take 81 mg by mouth daily    Historical Provider, MD   budesonide-formoterol (SYMBICORT) 160-4.5 MCG/ACT AERO Inhale 2 puffs into the lungs 2 times daily    Historical Provider, MD   folic acid (FOLVITE) 1 MG tablet Take 1 mg by mouth daily    Historical Provider, MD   metoprolol succinate (TOPROL XL) 50 MG extended release tablet Take 50 mg by mouth daily    Historical Provider, MD   pantoprazole (PROTONIX) 40 MG tablet Take 40 mg by mouth daily    Historical Provider, MD   QUEtiapine (SEROQUEL) 400 MG tablet Take 400 mg by mouth nightly     Historical Provider, MD   vitamin B-1 (THIAMINE) 100 MG tablet Take 100 mg by mouth daily    Historical Provider, MD   Cholecalciferol (VITAMIN D3) 2000 units CAPS Take 1 capsule by mouth daily    Historical Provider, MD   tiotropium (SPIRIVA RESPIMAT) 2.5 MCG/ACT AERS inhaler Inhale 2 puffs into the lungs daily    Historical Provider, MD        Allergies:       Patient has no known allergies. Social History:     Tobacco:    reports that he has quit smoking. His smoking use included cigarettes. He smoked 0.10 packs per day. He has never used smokeless tobacco.  Alcohol:      reports previous alcohol use. Drug Use:  reports no history of drug use. Family History:     History reviewed. No pertinent family history.       Physical Exam:     Vitals:  /79   Pulse 82   Temp 98.2 °F (36.8 °C) (Oral)   Resp 18   Ht 5' 5\" (1.651 m)   Wt 185 lb (83.9 kg)   SpO2 97%   BMI 30.79 kg/m²   Temp (24hrs), Av.2 °F (36.8 °C), Min:98.2 °F (36.8 °C), Max:98.2 °F (36.8 °C)          General appearance - alert, well appearing, mild distress  Mental status - oriented to person, place, and time with normal affect  Head - normocephalic and atraumatic  Eyes - pupils equal and reactive, extraocular eye movements intact, conjunctiva clear  Ears - hearing appears to be intact  Nose - no drainage noted  Mouth - mucous membranes moist  Neck - supple, no carotid bruits, thyroid not palpable  Chest -wheezing to auscultation, increased effort  Heart - normal rate, regular rhythm, no murmur  Abdomen - soft, nontender, nondistended, bowel sounds present all four quadrants, no masses, hepatomegaly or splenomegaly  Neurological - normal speech, no focal findings or movement disorder noted, cranial nerves II through XII grossly intact  Extremities - peripheral pulses palpable, no pedal edema or calf pain with palpation  Skin - no gross lesions, rashes, or induration noted        Data:     Labs:    Hematology:  Recent Labs     12/31/21  1038   WBC 14.6*   RBC 5.11   HGB 15.7   HCT 47.2   MCV 92.4   MCH 30.7   MCHC 33.3   RDW 12.0      MPV 9.6     Chemistry:  Recent Labs     12/31/21  1038 12/31/21  1234 12/31/21  1436   *  --   --    K 3.6*  --   --    CL 87*  --   --    CO2 25  --   --    GLUCOSE 103*  --   --    BUN 11  --   --    CREATININE 0.94  --   --    ANIONGAP 20*  --   --    LABGLOM >60  --   --    GFRAA >60  --   --    CALCIUM 10.4  --   --    PROBNP 21  --   --    TROPHS 20 18 18   MYOGLOBIN 46 55 68     Recent Labs     12/31/21  1038   PROT 8.0   LABALBU 4.4   AST 19   ALT 18   ALKPHOS 69   BILITOT 0.69   LIPASE 26       Lab Results   Component Value Date    INR 1.1 07/13/2021    PROTIME 13.5 07/13/2021       Lab Results   Component Value Date/Time    SPECIAL LAC 07/12/2021 06:53 AM     Lab Results   Component Value Date/Time    CULTURE NO GROWTH 6 DAYS 07/12/2021 06:53 AM       No results found for: POCPH, PHART, PH, POCPCO2, WSP2NWD, PCO2, POCPO2, PO2ART, PO2, POCHCO3, YHZ7USP, HCO3, NBEA, PBEA, BEART, BE, THGBART, THB, RNL8DVT, KBER2DMK, V9SROUIA, O2SAT, FIO2    Radiology:    CT ABDOMEN PELVIS W IV CONTRAST Additional Contrast? None    Result Date: 12/31/2021  1. Multiple hepatic lesions. Those that are included on prior CT scan of the chest of March 2021, appear grossly unchanged. However, the lesions are ill-defined and non cystic. They have not been fully characterized or evaluated.   Given the patient's history of chronic hepatitis C, malignancy/metastases are not excluded. 2.  No bowel obstruction, appendicitis urinary obstruction or gallstones. 3.  Liver is heterogeneous but of generalized decreased attenuation suggesting fatty infiltration. 4.  Mucosal thickening of the stomach in the fundal area suggestive of gastritis with prominent folds and crypts. RECOMMENDATIONS: Further assessment of liver lesions is advised. This could include follow-up CT with hemangioma protocol or MRI imaging of the abdomen with and without contrast.     XR CHEST PORTABLE    Result Date: 12/31/2021  No acute cardiopulmonary disease. COPD. All radiological studies reviewed                Code Status:  Full Code    Electronically signed by Harpreet Bell MD on 12/31/2021 at 4:19 PM     Copy sent to Dr. Alvaro Holter, MD    This note was created with the assistance of a speech-recognition program.  Although the intention is to generate a document that actually reflects the content of the visit, no guarantees can be provided that every mistake has been identified and corrected by editing. Note was updated later by me after  physical examination and  completion of the assessment.

## 2021-12-31 NOTE — ED PROVIDER NOTES
(2.5 MG/3ML) 0.083% nebulizer solution Take 2.5 mg by nebulization every 6 hours as needed for Wheezing      albuterol sulfate  (90 Base) MCG/ACT inhaler Inhale 2 puffs into the lungs every 4-6 hours as needed for Wheezing      aspirin 81 MG tablet Take 81 mg by mouth daily      budesonide-formoterol (SYMBICORT) 160-4.5 MCG/ACT AERO Inhale 2 puffs into the lungs 2 times daily      folic acid (FOLVITE) 1 MG tablet Take 1 mg by mouth daily      metoprolol succinate (TOPROL XL) 50 MG extended release tablet Take 50 mg by mouth daily      pantoprazole (PROTONIX) 40 MG tablet Take 40 mg by mouth daily      QUEtiapine (SEROQUEL) 400 MG tablet Take 400 mg by mouth nightly       vitamin B-1 (THIAMINE) 100 MG tablet Take 100 mg by mouth daily      Cholecalciferol (VITAMIN D3) 2000 units CAPS Take 1 capsule by mouth daily      tiotropium (SPIRIVA RESPIMAT) 2.5 MCG/ACT AERS inhaler Inhale 2 puffs into the lungs daily             PAST MEDICAL HISTORY         Diagnosis Date    Arthritis     CAD (coronary artery disease)     Cancer (HCC)     prostate    [facility]     COPD (chronic obstructive pulmonary disease) (HCC)     Depression     BOURGEOIS (dyspnea on exertion)     H/O prostate cancer     Hyperlipidemia     Hypertension     MI (myocardial infarction) ([facility]     Seizures ([facility] 2016    last one over 2 years ago     SVT (supraventricular tachycardia) ([facility]     Tobacco abuse        SURGICAL HISTORY           Procedure Laterality Date    CARDIAC CATHETERIZATION      ablation    INSERTABLE CARDIAC MONITOR  10/03/2018    INTERNAL LOOP RECORDER    PROSTATECTOMY      SINUS SURGERY      TONGUE SURGERY      UPPER GASTROINTESTINAL ENDOSCOPY N/A 6/10/2021    EGD BIOPSY performed by Lulu Aleman MD at [place] [address]     History reviewed. No pertinent family history. No family status information on file. SOCIAL HISTORY      reports that he has quit smoking.  His smoking use included cigarettes. He smoked 0.10 packs per day. He quit smokeless tobacco use about 3 weeks ago. He reports previous alcohol use. He reports that he does not use drugs. REVIEW OFSYSTEMS    (2-9 systems for level 4, 10 or more for level 5)   Review of Systems    Except as noted above the remainder of the review of systems was reviewed and negative. PHYSICAL EXAM    (up to 7 for level 4, 8 or more for level 5)     ED Triage Vitals [12/31/21 0936]   BP Temp Temp Source Pulse Resp SpO2 Height Weight   134/85 98.2 °F (36.8 °C) Oral 100 22 (!) 88 % 5' 5\" (1.651 m) 185 lb (83.9 kg)      Physical Exam  Constitutional:       Appearance: He is well-developed. HENT:      Head: Normocephalic and atraumatic. Cardiovascular:      Rate and Rhythm: Normal rate and regular rhythm. Pulmonary:      Effort: Pulmonary effort is normal.      Breath sounds: Wheezing present. Abdominal:      Palpations: Abdomen is soft. Musculoskeletal:         General: Normal range of motion. Cervical back: Normal range of motion and neck supple. Skin:     General: Skin is warm. Neurological:      Mental Status: He is alert and oriented to person, place, and time.    Psychiatric:         Behavior: Behavior normal.                 DIAGNOSTIC RESULTS     EKG: All EKG's are interpreted by the Emergency Department Physician who either signs or Co-signs this chart in the absence of a cardiologist.        RADIOLOGY:   Non-plain film images such as CT, Ultrasound and MRI are read by the radiologist. Plain radiographic images arevisualized and preliminarily interpreted by the emergency physician with the below findings:        Interpretation per the Radiologist below, if available at thetime of this note:          ED BEDSIDE ULTRASOUND:   Performed by ED Physician - none    LABS:  Labs Reviewed   CBC WITH AUTO DIFFERENTIAL - Abnormal; Notable for the following components:       Result Value    WBC 14.6 (*)     Seg Neutrophils 77 (*) Lymphocytes 14 (*)     Eosinophils % 0 (*)     Immature Granulocytes 1 (*)     Segs Absolute 11.09 (*)     Absolute Mono # 1.22 (*)     All other components within normal limits   COMPREHENSIVE METABOLIC PANEL - Abnormal; Notable for the following components:    Glucose 103 (*)     Sodium 132 (*)     Potassium 3.6 (*)     Chloride 87 (*)     Anion Gap 20 (*)     All other components within normal limits   COVID-19, RAPID   BRAIN NATRIURETIC PEPTIDE   TROP/MYOGLOBIN   TROP/MYOGLOBIN   LIPASE   TROP/MYOGLOBIN   AFP TUMOR MARKER   BASIC METABOLIC PANEL W/ REFLEX TO MG FOR LOW K   CBC WITH AUTO DIFFERENTIAL   PROTIME-INR       All other labs were within normal range or not returned as of this dictation. EMERGENCY DEPARTMENT COURSE and DIFFERENTIAL DIAGNOSIS/MDM:   Vitals:    Vitals:    12/31/21 1349 12/31/21 1506 12/31/21 1800 12/31/21 1922   BP: 124/79  (!) 140/60 121/71   Pulse: 82  80 83   Resp: 18  20 18   Temp:    97.2 °F (36.2 °C)   TempSrc:    Axillary   SpO2:  97% 95% 97%   Weight:    186 lb 11.7 oz (84.7 kg)   Height:    5' 5\" (1.651 m)   will admit due to increased oxygen demand. Hypoxic at 88 percent on room air despite  o2 being given. Also reported abdominal pain. Ct shows liver lesions that patient is aware of but shows he does not remember any investigation being done. Will admit for copd exacerbation. The patient was evaluated during the global COVID-19 pandemic, and that diagnosis was suspected/considered upon their initial presentation. Their evaluation, treatment and testing was consistent with current guidelines for patients who present with complaints or symptoms that may be related to COVID-19. Spoke with dr Venecia Moreira and admission was accepted.         CONSULTS:  IP CONSULT TO HOSPITALIST  IP CONSULT TO GI  IP CONSULT TO PULMONOLOGY    PROCEDURES:  Procedures    CRITICAL CARE TIME     Due to the high probability of sudden and clinically significant deterioration in the patient's condition he required highest level of my preparedness to intervene urgently. I provided critical care time including documentation time, medication orders and management, reevaluation, vital sign assessment, ordering and reviewing of of lab tests ordering and reviewing of x-ray studies, and admission orders. Aggregate critical care time is between 35  minutes including only time during which I was engaged in work directly related to his care and did not include time spent treating other patients simultaneously. FINAL IMPRESSION      1. COPD exacerbation ([facility]    2. Liver lesion          DISPOSITION/PLAN   DISPOSITION Admitted 12/31/2021 05:51:27 PM      PATIENTREFERRED TO:   No follow-up provider specified.     DISCHARGE MEDICATIONS:     Current Discharge Medication List              (Please note that portions of this note were completed with a voice recognition program.  Efforts were made to edit thedictations but occasionally words are mis-transcribed.)    NELLIE Esparza PA-C  12/31/21 1734

## 2021-12-31 NOTE — Clinical Note
Patient Class: Inpatient [101]   REQUIRED: Diagnosis: COPD exacerbation ([facility] [[id number]]   Estimated Length of Stay: Estimated stay of more than 2 midnights   Telemetry/Cardiac Monitoring Required?: Yes

## 2021-12-31 NOTE — ED PROVIDER NOTES
[facility] ED  EMERGENCY DEPARTMENT ENCOUNTER   ATTENDING ATTESTATION     Pt Name: Josefa Escoto  MRN: [id number]  [place] 1965  Date of evaluation: 12/31/21       Josefa Escoto is a 60 y.o. male who presents with Shortness of Breath      MDM:     53-year-old male presents with complaints of shortness of breath, the patient has evidence of a COPD exacerbation, plan is admission to the hospitalist service with breathing treatments steroids and reevaluation. Vitals:   Vitals:    12/31/21 1157 12/31/21 1227 12/31/21 1247 12/31/21 1300   BP: 110/80 123/79 130/82 127/79   Pulse: 84 83 88 85   Resp:   19 21   Temp:       TempSrc:       SpO2:       Weight:       Height: This visit was performed by both a physician and an APC. I personally evaluated and examined the patient.  I performed all aspects of the MDM as documented      Misa Souza MD  Attending Emergency  Physician                  Brittney Reynolds MD  12/31/21 1827

## 2022-01-01 LAB
ABSOLUTE EOS #: <0.03 K/UL (ref 0–0.44)
ABSOLUTE IMMATURE GRANULOCYTE: 0.11 K/UL (ref 0–0.3)
ABSOLUTE LYMPH #: 0.87 K/UL (ref 1.1–3.7)
ABSOLUTE MONO #: 0.34 K/UL (ref 0.1–1.2)
ANION GAP SERPL CALCULATED.3IONS-SCNC: 17 MMOL/L (ref 9–17)
BASOPHILS # BLD: 0 % (ref 0–2)
BASOPHILS ABSOLUTE: <0.03 K/UL (ref 0–0.2)
BUN BLDV-MCNC: 17 MG/DL (ref 6–20)
BUN/CREAT BLD: 18 (ref 9–20)
CALCIUM SERPL-MCNC: 10 MG/DL (ref 8.6–10.4)
CHLORIDE BLD-SCNC: 93 MMOL/L (ref 98–107)
CO2: 26 MMOL/L (ref 20–31)
CREAT SERPL-MCNC: 0.97 MG/DL (ref 0.7–1.2)
DIFFERENTIAL TYPE: ABNORMAL
EOSINOPHILS RELATIVE PERCENT: 0 % (ref 1–4)
GFR AFRICAN AMERICAN: >60 ML/MIN
GFR NON-AFRICAN AMERICAN: >60 ML/MIN
GFR SERPL CREATININE-BSD FRML MDRD: ABNORMAL ML/MIN/{1.73_M2}
GFR SERPL CREATININE-BSD FRML MDRD: ABNORMAL ML/MIN/{1.73_M2}
GLUCOSE BLD-MCNC: 129 MG/DL (ref 70–99)
HCT VFR BLD CALC: 44.1 % (ref 40.7–50.3)
HEMOGLOBIN: 14.8 G/DL (ref 13–17)
IMMATURE GRANULOCYTES: 1 %
INR BLD: 1
LYMPHOCYTES # BLD: 7 % (ref 24–43)
MCH RBC QN AUTO: 30.6 PG (ref 25.2–33.5)
MCHC RBC AUTO-ENTMCNC: 33.6 G/DL (ref 28.4–34.8)
MCV RBC AUTO: 91.3 FL (ref 82.6–102.9)
MONOCYTES # BLD: 3 % (ref 3–12)
NRBC AUTOMATED: 0 PER 100 WBC
PDW BLD-RTO: 11.9 % (ref 11.8–14.4)
PLATELET # BLD: 305 K/UL (ref 138–453)
PLATELET ESTIMATE: ABNORMAL
PMV BLD AUTO: 10 FL (ref 8.1–13.5)
POTASSIUM SERPL-SCNC: 3.9 MMOL/L (ref 3.7–5.3)
PROTHROMBIN TIME: 13.2 SEC (ref 11.5–14.2)
RBC # BLD: 4.83 M/UL (ref 4.21–5.77)
RBC # BLD: ABNORMAL 10*6/UL
SEG NEUTROPHILS: 89 % (ref 36–65)
SEGMENTED NEUTROPHILS ABSOLUTE COUNT: 10.35 K/UL (ref 1.5–8.1)
SODIUM BLD-SCNC: 136 MMOL/L (ref 135–144)
WBC # BLD: 11.7 K/UL (ref 3.5–11.3)
WBC # BLD: ABNORMAL 10*3/UL

## 2022-01-01 PROCEDURE — 36415 COLL VENOUS BLD VENIPUNCTURE: CPT

## 2022-01-01 PROCEDURE — 6370000000 HC RX 637 (ALT 250 FOR IP): Performed by: HOSPITALIST

## 2022-01-01 PROCEDURE — 87252 VIRUS INOCULATION TISSUE: CPT

## 2022-01-01 PROCEDURE — 85610 PROTHROMBIN TIME: CPT

## 2022-01-01 PROCEDURE — 6370000000 HC RX 637 (ALT 250 FOR IP): Performed by: FAMILY MEDICINE

## 2022-01-01 PROCEDURE — 2580000003 HC RX 258: Performed by: FAMILY MEDICINE

## 2022-01-01 PROCEDURE — 87140 CULTURE TYPE IMMUNOFLUORESC: CPT

## 2022-01-01 PROCEDURE — 87300 AG DETECTION POLYVAL IF: CPT

## 2022-01-01 PROCEDURE — 87529 HSV DNA AMP PROBE: CPT

## 2022-01-01 PROCEDURE — 85025 COMPLETE CBC W/AUTO DIFF WBC: CPT

## 2022-01-01 PROCEDURE — 6360000002 HC RX W HCPCS: Performed by: FAMILY MEDICINE

## 2022-01-01 PROCEDURE — 94761 N-INVAS EAR/PLS OXIMETRY MLT: CPT

## 2022-01-01 PROCEDURE — 99254 IP/OBS CNSLTJ NEW/EST MOD 60: CPT | Performed by: INTERNAL MEDICINE

## 2022-01-01 PROCEDURE — 87015 SPECIMEN INFECT AGNT CONCNTJ: CPT

## 2022-01-01 PROCEDURE — 2700000000 HC OXYGEN THERAPY PER DAY

## 2022-01-01 PROCEDURE — APPNB30 APP NON BILLABLE TIME 0-30 MINS: Performed by: NURSE PRACTITIONER

## 2022-01-01 PROCEDURE — 1200000000 HC SEMI PRIVATE

## 2022-01-01 PROCEDURE — 94640 AIRWAY INHALATION TREATMENT: CPT

## 2022-01-01 PROCEDURE — 80048 BASIC METABOLIC PNL TOTAL CA: CPT

## 2022-01-01 RX ORDER — ZOLPIDEM TARTRATE 5 MG/1
5 TABLET ORAL NIGHTLY PRN
Status: DISCONTINUED | OUTPATIENT
Start: 2022-01-01 | End: 2022-01-02

## 2022-01-01 RX ORDER — PANTOPRAZOLE SODIUM 40 MG/1
40 TABLET, DELAYED RELEASE ORAL
Status: DISCONTINUED | OUTPATIENT
Start: 2022-01-02 | End: 2022-01-04 | Stop reason: HOSPADM

## 2022-01-01 RX ADMIN — ZOLPIDEM TARTRATE 5 MG: 5 TABLET ORAL at 21:25

## 2022-01-01 RX ADMIN — IPRATROPIUM BROMIDE AND ALBUTEROL SULFATE 1 AMPULE: .5; 2.5 SOLUTION RESPIRATORY (INHALATION) at 21:32

## 2022-01-01 RX ADMIN — TRAMADOL HYDROCHLORIDE 50 MG: 50 TABLET, COATED ORAL at 06:47

## 2022-01-01 RX ADMIN — SODIUM CHLORIDE, PRESERVATIVE FREE 10 ML: 5 INJECTION INTRAVENOUS at 00:48

## 2022-01-01 RX ADMIN — METHYLPREDNISOLONE SODIUM SUCCINATE 60 MG: 125 INJECTION, POWDER, FOR SOLUTION INTRAMUSCULAR; INTRAVENOUS at 00:48

## 2022-01-01 RX ADMIN — MONTELUKAST 10 MG: 10 TABLET, FILM COATED ORAL at 20:22

## 2022-01-01 RX ADMIN — MAGNESIUM HYDROXIDE 30 ML: 2400 SUSPENSION ORAL at 06:47

## 2022-01-01 RX ADMIN — SODIUM CHLORIDE, PRESERVATIVE FREE 10 ML: 5 INJECTION INTRAVENOUS at 20:23

## 2022-01-01 RX ADMIN — ESCITALOPRAM OXALATE 10 MG: 10 TABLET ORAL at 09:58

## 2022-01-01 RX ADMIN — BUDESONIDE AND FORMOTEROL FUMARATE DIHYDRATE 2 PUFF: 160; 4.5 AEROSOL RESPIRATORY (INHALATION) at 21:32

## 2022-01-01 RX ADMIN — ROFLUMILAST 500 MCG: 500 TABLET ORAL at 09:58

## 2022-01-01 RX ADMIN — METHYLPREDNISOLONE SODIUM SUCCINATE 60 MG: 125 INJECTION, POWDER, FOR SOLUTION INTRAMUSCULAR; INTRAVENOUS at 18:56

## 2022-01-01 RX ADMIN — TRAMADOL HYDROCHLORIDE 50 MG: 50 TABLET, COATED ORAL at 20:22

## 2022-01-01 RX ADMIN — AMLODIPINE BESYLATE 5 MG: 5 TABLET ORAL at 09:59

## 2022-01-01 RX ADMIN — ASPIRIN 81 MG CHEWABLE TABLET 81 MG: 81 TABLET CHEWABLE at 09:58

## 2022-01-01 RX ADMIN — METHYLPREDNISOLONE SODIUM SUCCINATE 60 MG: 125 INJECTION, POWDER, FOR SOLUTION INTRAMUSCULAR; INTRAVENOUS at 12:23

## 2022-01-01 RX ADMIN — IPRATROPIUM BROMIDE AND ALBUTEROL SULFATE 1 AMPULE: .5; 2.5 SOLUTION RESPIRATORY (INHALATION) at 14:46

## 2022-01-01 RX ADMIN — BUDESONIDE AND FORMOTEROL FUMARATE DIHYDRATE 2 PUFF: 160; 4.5 AEROSOL RESPIRATORY (INHALATION) at 11:36

## 2022-01-01 RX ADMIN — FOLIC ACID 1 MG: 1 TABLET ORAL at 09:58

## 2022-01-01 RX ADMIN — METOPROLOL SUCCINATE 50 MG: 50 TABLET, EXTENDED RELEASE ORAL at 09:58

## 2022-01-01 RX ADMIN — SODIUM CHLORIDE, PRESERVATIVE FREE 10 ML: 5 INJECTION INTRAVENOUS at 06:03

## 2022-01-01 RX ADMIN — TRAMADOL HYDROCHLORIDE 50 MG: 50 TABLET, COATED ORAL at 12:52

## 2022-01-01 RX ADMIN — TRAMADOL HYDROCHLORIDE 50 MG: 50 TABLET, COATED ORAL at 19:03

## 2022-01-01 RX ADMIN — IPRATROPIUM BROMIDE AND ALBUTEROL SULFATE 1 AMPULE: .5; 2.5 SOLUTION RESPIRATORY (INHALATION) at 11:37

## 2022-01-01 RX ADMIN — HYDROCHLOROTHIAZIDE 25 MG: 25 TABLET ORAL at 09:59

## 2022-01-01 RX ADMIN — LOSARTAN POTASSIUM 100 MG: 100 TABLET, FILM COATED ORAL at 09:58

## 2022-01-01 RX ADMIN — ONDANSETRON 4 MG: 4 TABLET, ORALLY DISINTEGRATING ORAL at 10:13

## 2022-01-01 RX ADMIN — METHYLPREDNISOLONE SODIUM SUCCINATE 60 MG: 125 INJECTION, POWDER, FOR SOLUTION INTRAMUSCULAR; INTRAVENOUS at 06:03

## 2022-01-01 RX ADMIN — ENOXAPARIN SODIUM 40 MG: 100 INJECTION SUBCUTANEOUS at 09:59

## 2022-01-01 RX ADMIN — SODIUM CHLORIDE, PRESERVATIVE FREE 10 ML: 5 INJECTION INTRAVENOUS at 10:00

## 2022-01-01 ASSESSMENT — PAIN - FUNCTIONAL ASSESSMENT
PAIN_FUNCTIONAL_ASSESSMENT: ACTIVITIES ARE NOT PREVENTED

## 2022-01-01 ASSESSMENT — PAIN DESCRIPTION - ONSET
ONSET: ON-GOING

## 2022-01-01 ASSESSMENT — PAIN DESCRIPTION - FREQUENCY
FREQUENCY: CONTINUOUS

## 2022-01-01 ASSESSMENT — PAIN DESCRIPTION - DESCRIPTORS
DESCRIPTORS: CONSTANT;DISCOMFORT;PRESSURE
DESCRIPTORS: BURNING;CONSTANT;SHARP
DESCRIPTORS: CONSTANT;PRESSURE

## 2022-01-01 ASSESSMENT — PAIN DESCRIPTION - PROGRESSION
CLINICAL_PROGRESSION: NOT CHANGED

## 2022-01-01 ASSESSMENT — PAIN DESCRIPTION - PAIN TYPE
TYPE: ACUTE PAIN

## 2022-01-01 ASSESSMENT — PAIN DESCRIPTION - LOCATION
LOCATION: ABDOMEN

## 2022-01-01 ASSESSMENT — PAIN SCALES - GENERAL
PAINLEVEL_OUTOF10: 8
PAINLEVEL_OUTOF10: 3
PAINLEVEL_OUTOF10: 3
PAINLEVEL_OUTOF10: 7
PAINLEVEL_OUTOF10: 5
PAINLEVEL_OUTOF10: 7

## 2022-01-01 ASSESSMENT — PAIN DESCRIPTION - ORIENTATION: ORIENTATION: ANTERIOR

## 2022-01-01 NOTE — FLOWSHEET NOTE
Patient states well, expresses no major needs or prayers.  leaves prayer card for possible follow up.     01/01/22 1538   Encounter Summary   Services provided to: Patient   Referral/Consult From: Trevor   Continue Visiting   (1-1-22 )   Complexity of Encounter Low   Length of Encounter 15 minutes   Spiritual Assessment Completed Yes   Routine   Type Initial   Assessment Calm   Intervention Explored feelings, thoughts, concerns;Sustaining presence/ Ministry of presence; Discussed belief system/Confucianist practices/jh;Discussed illness/injury and it's impact   Outcome Expressed gratitude

## 2022-01-01 NOTE — PLAN OF CARE
Problem: Pain:  Goal: Pain level will decrease  Description: Pain level will decrease  Outcome: Ongoing  Note: Pt medicated with pain medication prn. Assessed all pain characteristics including level, type, location, frequency, and onset. Non-pharmacologic interventions offered to pt as well. Pt states pain is tolerable at this time. Will continue to monitor      Problem:  Activity Intolerance:  Goal: Ability to tolerate increased activity will improve  Description: Ability to tolerate increased activity will improve  Outcome: Ongoing     Problem: Airway Clearance - Ineffective:  Goal: Ability to maintain a clear airway will improve  Description: Ability to maintain a clear airway will improve  Outcome: Ongoing     Problem: Breathing Pattern - Ineffective:  Goal: Ability to achieve and maintain a regular respiratory rate will improve  Description: Ability to achieve and maintain a regular respiratory rate will improve  Outcome: Ongoing     Problem: Gas Exchange - Impaired:  Goal: Levels of oxygenation will improve  Description: Levels of oxygenation will improve  Outcome: Ongoing     Problem: Falls - Risk of:  Goal: Will remain free from falls  Description: Will remain free from falls  Outcome: Ongoing

## 2022-01-01 NOTE — CONSULTS
Pulmonary Medicine and Critical Care Consult    Patient - Annemarie Gomez   MRN -  [id number]   [facility]   - 1965      Date of Admission -  2021  9:59 AM  Date of evaluation -  2022  Room -    Hospital Day - 1  Consulting - Marylou Costello MD Primary Care Physician - Perry Torres MD     Reason for Consult      COPD exacerbation  Assessment   · Acute on chronic respiratory failure  · COPD exacerbation  · Smoker recently quit  · Liver cirrhosis with ascites /hepatitis C/new liver lesions on CT abdomen with fatty infiltration noted and gastric mucosal prominence      Recommendations   · Oxygen by nasal cannula  · Incentive spirometry every hour awake  · DuoNeb  · Symbicort  · Solu-Medrol 60 IV every 6 hours  · GI consultation/consider EGD colonoscopy/consider CT-guided biopsy of liver lesion  · PFT outpatient  · Smoking cessation  · DVT prophylaxis    Problem List      Patient Active Problem List   Diagnosis    Respiratory failure ([address]    Pneumonia    Essential hypertension    Diarrhea    H/O paroxysmal supraventricular tachycardia    Status post placement of implantable loop recorder    Smoker    COPD (chronic obstructive pulmonary disease) ([address]    Hep C w/o coma, chronic ([address]    COPD exacerbation ([address]    Liver lesion    Generalized abdominal pain    Alcohol abuse       HPI     Annemarie Gomez is 60 y.o.,  male, previous medical history chronic respiratory failure on home oxygen 4 L 24 hours a secondary COPD, coronary artery disease status post MI, prostate cancer, history of liver cirrhosis with alcoholism, seizure disorder a. Patient presents for worsening shortness of breath gradually over the last 2 weeks. He had mostly dry coughing spells. He was becoming nauseated dizzy on ambulation. He also reported noticing worsening abdominal distention. He denies nausea vomiting. He has been having difficulty defecating with excessive gas.   He quit smoking few weeks ago and smoked for many years.     PMHx   Past Medical History      Diagnosis Date    Arthritis     CAD (coronary artery disease)     Cancer ([facility]     prostate    [facility]     COPD [facility]) [facility]     Depression     BOURGEOIS (dyspnea on exertion)     H/O prostate cancer     Hyperlipidemia     Hypertension     MI (myocardial infarction) ([facility]     Se[facility]ures [facility] 2016    last one over 2 years ago     SVT [facility]supra[facility]ricular tachycardia) ([facility]     Tobacco abuse       Past Surgical History        Procedure Laterality Date    CARDIAC CATHETERIZATION      ablation    INSERTABLE CARDIAC MONITOR  10/03/2018    INTERNAL LOOP RECORDER    PROSTATECTOMY      SINUS SURGERY      TONGUE SURGERY      UPPER GASTROINTESTINAL ENDOSCOPY N/A 6/10/2021    EGD BIOPSY performed by Brian Mccray MD at [address] [place] [address]    Current Medications    [START ON 1/2/2022] pantoprazole  40 mg Oral QAM AC    sodium chloride flush  5-40 mL IntraVENous 2 times per day    enoxaparin  40 mg SubCUTAneous Daily    amLODIPine  5 mg Oral Daily    aspirin  81 mg Oral Daily    budesonide-formoterol  2 puff Inhalation BID    escitalopram  10 mg Oral Daily    folic acid  1 mg Oral Daily    ferrous gluconate  324 mg Oral Daily with breakfast    hydroCHLOROthiazide  25 mg Oral Daily    losartan  100 mg Oral Daily    metoprolol succinate  50 mg Oral Daily    montelukast  10 mg Oral Nightly    QUEtiapine  400 mg Oral Nightly    Roflumilast  500 mcg Oral Daily    methylPREDNISolone  60 mg IntraVENous Q6H    influenza virus vaccine  0.5 mL IntraMUSCular Prior to discharge    ipratropium-albuterol  1 ampule Inhalation TID     sodium chloride flush, sodium chloride, potassium chloride **OR** potassium alternative oral replacement **OR** potassium chloride, magnesium sulfate, ondansetron **OR** ondansetron, magnesium hydroxide, acetaminophen **OR** acetaminophen, traMADol  IV Drips/Infusions   sodium chloride       Home Medications  Medications Prior to Admission: losartan (COZAAR) 100 MG tablet, Take 1 tablet by mouth daily  hydroCHLOROthiazide (HYDRODIURIL) 25 MG tablet, Take 1 tablet by mouth daily  montelukast (SINGULAIR) 10 MG tablet, Take 1 tablet by mouth nightly  predniSONE (DELTASONE) 20 MG tablet, 4tabs qd x 3days, 3tabs qd x 3days, 2 tabs qd x 3days, 1tab qd x 3days #30 for 12 days  Roflumilast (DALIRESP) 500 MCG tablet, Take 1 tablet by mouth daily  escitalopram (LEXAPRO) 10 MG tablet, Take 10 mg by mouth daily  ferrous gluconate (FERGON) 324 (38 Fe) MG tablet, Take 324 mg by mouth daily (with breakfast)  famotidine (PEPCID) 40 MG tablet, Take 40 mg by mouth 2 times daily as needed (heartburn)   amLODIPine (NORVASC) 5 MG tablet, Take 5 mg by mouth daily  albuterol (PROVENTIL) (2.5 MG/3ML) 0.083% nebulizer solution, Take 2.5 mg by nebulization every 6 hours as needed for Wheezing  albuterol sulfate  (90 Base) MCG/ACT inhaler, Inhale 2 puffs into the lungs every 4-6 hours as needed for Wheezing  aspirin 81 MG tablet, Take 81 mg by mouth daily  budesonide-formoterol (SYMBICORT) 160-4.5 MCG/ACT AERO, Inhale 2 puffs into the lungs 2 times daily  folic acid (FOLVITE) 1 MG tablet, Take 1 mg by mouth daily  metoprolol succinate (TOPROL XL) 50 MG extended release tablet, Take 50 mg by mouth daily  pantoprazole (PROTONIX) 40 MG tablet, Take 40 mg by mouth daily  QUEtiapine (SEROQUEL) 400 MG tablet, Take 400 mg by mouth nightly   vitamin B-1 (THIAMINE) 100 MG tablet, Take 100 mg by mouth daily  Cholecalciferol (VITAMIN D3) 2000 units CAPS, Take 1 capsule by mouth daily  tiotropium (SPIRIVA RESPIMAT) 2.5 MCG/ACT AERS inhaler, Inhale 2 puffs into the lungs daily    Allergies    Patient has no known allergies.   Social History     Social History     Socioeconomic History    Marital status:      Spouse name: Not on file    Number of children: Not on file    Years of education: Not on file    Highest education level: Not on file   Occupational History    Not on file   Tobacco Use    Smoking status: Former Smoker     Packs/day: 0.10     Types: Cigarettes    Smokeless tobacco: Former User     Quit date: 12/5/2021    Tobacco comment: 5/2021 quit    Vaping Use    Vaping Use: Never used   Substance and Sexual Activity    Alcohol use: Not Currently     Comment: last drink at the end of Nov 2021    Drug use: No    Sexual activity: Not on file   Other Topics Concern    Not on file   Social History Narrative    Not on file     Social Determinants of Health     Financial Resource Strain:     Difficulty of Paying Living Expenses: Not on file   Food Insecurity:     Worried About [address] in the Last Year: Not on file    [address] in the Last Year: Not on file   Transportation Needs:     Lack of Transportation (Medical): Not on file    Lack of Transportation (Non-Medical): Not on file   Physical Activity:     Days of Exercise per Week: Not on file    Minutes of Exercise per Session: Not on file   Stress:     Feeling of Stress : Not on file   Social Connections:     Frequency of Communication with Friends and Family: Not on file    Frequency of Social Gatherings with Friends and Family: Not on file    Attends Mosque Services: Not on file    Active Member of [address] [place] [address] or Organizations: Not on file    Attends Club or Organization Meetings: Not on file    Marital Status: Not on file   Intimate Partner Violence:     Fear of Current or Ex-Partner: Not on file    Emotionally Abused: Not on file    Physically Abused: Not on file    Sexually Abused: Not on file   Housing Stability:     Unable to Pay for Housing in the Last Year: Not on file    Number of [place] in the Last Year: Not on file    Unstable Housing in the Last Year: Not on file     Family History    History reviewed. No pertinent family history.   ROS - 11 systems   General Denies any fever or chills  HEENT Denies any diplopia, tinnitus or vertigo  Resp as above  Cardiac Denies any chest pain, palpitations, claudication or edema  GI Denies any melena, hematochezia, hematemesis or pyrosis   Denies any frequency, urgency, hesitancy or incontinence  Heme Denies bruising or bleeding easily  Endocrine Denies any history of diabetes or thyroid disease  Neuro Denies any focal motor  deficits  Psychiatric Denies anxiety, depression, suicidal ideation  Skin Denies rashes, itching, open sores    Vitals     height is 5' 5\" (1.651 m) and weight is 186 lb 11.7 oz (84.7 kg). His oral temperature is 98.2 °F (36.8 °C). His blood pressure is 118/70 and his pulse is 82. His respiration is 19 and oxygen saturation is 92%. Body mass index is 31.07 kg/m². I/O        Intake/Output Summary (Last 24 hours) at 1/1/2022 1746  Last data filed at 1/1/2022 0532  Gross per 24 hour   Intake 500 ml   Output --   Net 500 ml     I/O last 3 completed shifts: In: 500 [P.O.:500]  Out: -    Patient Vitals for the past 96 hrs (Last 3 readings):   Weight   12/31/21 1922 186 lb 11.7 oz (84.7 kg)   12/31/21 0936 185 lb (83.9 kg)     Exam   General Appearance  Awake, alert, oriented, in no acute distress  HEENT - Head is normocephalic, atraumatic. Pupil reactive to light  Neck - Supple, symmetrical, trachea midline and Soft, trachea midline and straight  Lungs - decreased breath sounds mild wheezing  Cardiovascular - Heart sounds are normal.  Regular rhythm normal rate without murmur, gallop or rub. Abdomen - Soft, nontender, distended, no masses or organomegaly  Neurologic - CN II-XII are grossly intact.  There are no focal motor  deficits  Skin - No bruising or bleeding  Extremities - No cyanosis, clubbing or edema    Labs  - Old records and notes have been reviewed in [facility]   CBC     Lab Results   Component Value Date    WBC 11.7 01/01/2022    RBC 4.83 01/01/2022    RBC 4.83 04/18/2012    HGB 14.8 01/01/2022    HCT 44.1 01/01/2022     01/01/2022     04/18/2012    MCV 91.3 01/01/2022    MCH 30.6 01/01/2022    MCHC 33.6 01/01/2022    RDW 11.9 01/01/2022    LYMPHOPCT 7 01/01/2022    MONOPCT 3 01/01/2022    BASOPCT 0 01/01/2022    MONOSABS 0.34 01/01/2022    LYMPHSABS 0.87 01/01/2022    EOSABS <0.03 01/01/2022    BASOSABS <0.03 01/01/2022    DIFFTYPE NOT REPORTED 01/01/2022     BMP   Lab Results   Component Value Date     01/01/2022    K 3.9 01/01/2022    CL 93 01/01/2022    CO2 26 01/01/2022    BUN 17 01/01/2022    CREATININE 0.97 01/01/2022    GLUCOSE 129 01/01/2022    GLUCOSE 87 04/18/2012    CALCIUM 10.0 01/01/2022    MG 2.1 03/10/2019     LFTS  Lab Results   Component Value Date    ALKPHOS 69 12/31/2021    ALT 18 12/31/2021    AST 19 12/31/2021    PROT 8.0 12/31/2021    BILITOT 0.69 12/31/2021    BILIDIR <0.08 09/20/2018    IBILI CANNOT BE CALCULATED 09/20/2018    LABALBU 4.4 12/31/2021    LABALBU 3.9 04/18/2012     Lab Results   Component Value Date    APTT 24.4 03/08/2019     INR   Lab Results   Component Value Date    INR 1.0 01/01/2022    INR 1.1 07/13/2021    PROTIME 13.2 01/01/2022    PROTIME 13.5 07/13/2021       Radiology    CXR 12/31    No acute cardiopulmonary disease.  COPD  CT abdomen pelvis  1.  Multiple hepatic lesions.  Those that are included on prior CT scan of   the chest of March 2021, appear grossly unchanged.  However, the lesions are   ill-defined and non cystic.  They have not been fully characterized or   evaluated.  Given the patient's history of chronic hepatitis C,   malignancy/metastases are not excluded.       2.  No bowel obstruction, appendicitis urinary obstruction or gallstones.       3.  Liver is heterogeneous but of generalized decreased attenuation   suggesting fatty infiltration.       4.  Mucosal thickening of the stomach in the fundal area suggestive of   gastritis with prominent folds and crypts.            (See actual reports for details)    \"Thank you for asking us to see this patient\"    Case discussed with nurse and patient/family. Questions and concerns addressed.     Electronically signed by     Zina Zabala MD on 1/1/2022 at 5:46 PM

## 2022-01-01 NOTE — PLAN OF CARE
PRE CONSULT ROUNDING NOTE  HPI  60year old male with pmh of treated hepatitis c, alcohol abuse, copd cad htn mi seizures prostate cancer hyperlipidemia depression who presented to the ed for shortness of breath, he is being treated for an exacerbation of copd. Our service is consulted for liver lesions. He is a patient of dr rachid Castillo, his office notes are not available for review today. The pt states he has a long history of liver lesions. Ct abd shows hepatic steatosis with multiple hypodensities int he liver with evidence of peripheral enhancement, wall thickening in the fundus of the stomach. He had a ct abd on 4/3/16 that described the lesions as hemangiomas. He states he was treated for hepatitis C. His AFP is normal. He reports abdominal distention with generalized abdominal pain for \" a while-probably months\", he states his pain is worse with eating and having bowel movements. He takes protonix and pepcid at home with no change in the symptoms. He has had intermittent melena but is taking oral iron at home. He has not had hematochezia. Reports weight gain. He takes aspirin at home. Wbc 11.7, lfts are normal. He denies fevers chills, weight loss hematemesis, rash change in the bowel habits dyspepsia . Endoscopy 6/10/21 egd (atia) irregular z line with patchy hyperemia of the stomach ?candida bx negative for h pylori, candida distal esophagus showed intestinal metaplasia ?barretts vs goblet cells.  Pt states he had a colonoscopy by dr Tita Castillo also that removed benign polyps 3 months ago but no record in epic today    Family reports dad had stomach/colon cancer no liver or pancreatic cancer no uc/crohns  Social no smoking states he had 2 beers in November-was heavy drinker prior to this no illicit drugs   /80   Pulse 77   Temp 97.7 °F (36.5 °C) (Oral)   Resp 18   Ht 5' 5\" (1.651 m)   Wt 186 lb 11.7 oz (84.7 kg)   SpO2 97%   BMI 31.07 kg/m²     ROS as above meds labs imaging and past medical records were reviewed    Exam  General Appearance: alert and oriented to person, place and time, well-developed and well-nourished, in no acute distress  Skin: warm and dry, no rash or erythema  Head: normocephalic and atraumatic  Eyes: pupils equal, round, and reactive to light, extraocular eye movements intact, conjunctivae normal  ENT: hearing grossly normal bilaterally  Neck: neck supple and non tender without mass, no thyromegaly or thyroid nodules, no cervical lymphadenopathy   Pulmonary/Chest: clear to auscultation bilaterally- no wheezes, rales or rhonchi, normal air movement, no respiratory distress  Cardiovascular: normal rate, regular rhythm, normal S1 and S2, no murmurs, rubs, clicks or gallops, distal pulses intact, no carotid bruits  Abdomen: soft, obese non tender, distended, normal bowel sounds, no masses or organomegaly no ascites  Extremities: no cyanosis, clubbing or edema  Musculoskeletal: normal range of motion, no joint swelling, deformity or tenderness  Neurologic: no cranial nerve deficit and muscle strength normal    Assessment  Multiple liver lesions with hx of treated hepatitis c, afp is normal, hemangiomas on previous imaging  Alcohol abuse  Abdominal pain with distention ?gstritis on ct scan  Acute on chronic respiratory failure with acute copd exacerbation  Reported melena but pt on iron supplementation-egd in June of this year did not show any sign of bleeding    Plan  Will d/w md  Obtain medical records from dr Bianka Madison office  Pain mgt and copd to be managed by primary service  Will add ppi  Doubt he can have mri abd with implanted loop recorder  Formal gi consult to follow  . Rometta Favre Alden Hazy, APRN - CNP

## 2022-01-01 NOTE — RT PROTOCOL NOTE
RT Inhaler-Nebulizer Bronchodilator Protocol Note    There is a bronchodilator order in the chart from a provider indicating to follow the RT Bronchodilator Protocol and there is an Initiate RT Inhaler-Nebulizer Bronchodilator Protocol order as well (see protocol at bottom of note). CXR Findings:  XR CHEST PORTABLE    Result Date: 12/31/2021  No acute cardiopulmonary disease. COPD. The findings from the last RT Protocol Assessment were as follows:   History Pulmonary Disease: Chronic pulmonary disease  Respiratory Pattern: Dyspnea on exertion or RR 21-25 bpm  Breath Sounds: Inspiratory and expiratory or bilateral wheezing and/or rhonchi  Cough: Strong, spontaneous, non-productive  Indication for Bronchodilator Therapy: Decreased or absent breath sounds  Bronchodilator Assessment Score: 10    Aerosolized bronchodilator medication orders have been revised according to the RT Inhaler-Nebulizer Bronchodilator Protocol below. Respiratory Therapist to perform RT Therapy Protocol Assessment initially then follow the protocol. Repeat RT Therapy Protocol Assessment PRN for score 0-3 or on second treatment, BID, and PRN for scores above 3. No Indications - adjust the frequency to every 6 hours PRN wheezing or bronchospasm, if no treatments needed after 48 hours then discontinue using Per Protocol order mode. If indication present, adjust the RT bronchodilator orders based on the Bronchodilator Assessment Score as indicated below. Use Inhaler orders unless patient has one or more of the following: on home nebulizer, not able to hold breath for 10 seconds, is not alert and oriented, cannot activate and use MDI correctly, or respiratory rate 25 breaths per minute or more, then use the equivalent nebulizer order(s) with same Frequency and PRN reasons based on the score. If a patient is on this medication at home then do not decrease Frequency below that used at home.     0-3 - enter or revise RT bronchodilator order(s) to equivalent RT Bronchodilator order with Frequency of every 4 hours PRN for wheezing or increased work of breathing using Per Protocol order mode. 4-6 - enter or revise RT Bronchodilator order(s) to two equivalent RT bronchodilator orders with one order with BID Frequency and one order with Frequency of every 4 hours PRN wheezing or increased work of breathing using Per Protocol order mode. 7-10 - enter or revise RT Bronchodilator order(s) to two equivalent RT bronchodilator orders with one order with TID Frequency and one order with Frequency of every 4 hours PRN wheezing or increased work of breathing using Per Protocol order mode. 11-13 - enter or revise RT Bronchodilator order(s) to one equivalent RT bronchodilator order with QID Frequency and an Albuterol order with Frequency of every 4 hours PRN wheezing or increased work of breathing using Per Protocol order mode. Greater than 13 - enter or revise RT Bronchodilator order(s) to one equivalent RT bronchodilator order with every 4 hours Frequency and an Albuterol order with Frequency of every 2 hours PRN wheezing or increased work of breathing using Per Protocol order mode. RT to enter RT Home Evaluation for COPD & MDI Assessment order using Per Protocol order mode.     Electronically signed by Barbara Tijerina RCP on 12/31/2021 at 10:22 PM

## 2022-01-01 NOTE — PROGRESS NOTES
Pt admitted to room 2007 from ER. Oriented to room and call light/tv controls. Bed in lowest position, wheels locked, 2/4 side rails up  Call light in reach, room free of clutter, adequate lighting provided. Admission database, vital signs and assessment as charted.

## 2022-01-01 NOTE — CARE COORDINATION
Case Management Initial Discharge Plan  Colleen Berrios,             Met with:patient to discuss discharge plans. Information verified: address, contacts, phone number, , insurance Yes  Insurance Provider: [employer]    Emergency Contact/Next of Kin name & number: Bhavana Garcia   [phone]  Who are involved in patient's support system? self    PCP: Kurtis Valadez MD  Date of last visit: been awhile      Discharge Planning    Living Arrangements:  Friends     Home has 1 stories  0 stairs to climb to get into front door, 0stairs to climb to reach second floor  Location of bedroom/bathroom in home main    Patient able to perform ADL's:Independent    Current Services (outpatient & in home) none  DME equipment: Home O2 4L  DME provider: Caryn Mcgregor    Is patient receiving oral anticoagulation therapy? No    If indicated:   Physician managing anticoagulation treatment:   Where does patient obtain lab work for ATC treatment? Potential Assistance Needed:  N/A    Patient agreeable to home care: No  [place] of choice provided:  n/a    Prior SNF/Rehab Placement and Facility: n/a  Agreeable to SNF/Rehab: No  [place] of choice provided: n/a     Evaluation: no    Expected Discharge date:  22    Patient expects to be discharged to: If home: is the family and/or caregiver wiling & able to provide support at home? no  Who will be providing this support? self    Follow Up Appointment: Best Day/ Time:      Transportation provider: friend  Transportation arrangements needed for discharge: No    Readmission Risk              Risk of Unplanned Readmission:  19             Does patient have a readmission risk score greater than 14?: Yes  If yes, follow-up appointment must be made within 7 days of discharge.      Goals of Care:       Educated patient on transitional options, provided freedom of choice and are agreeable with plan      Discharge Plan: COPD exacerbation  Patient lives in a 1 story home with 0 steps to enter. He lives with 2 roommates. Declines any skilled needs. Independent. Pharmacy is [facility] on [address]. Has home O2 and a nebulizer through [facility]. Continue to follow for any needs.   PT/OT to eval.               Electronically signed by Sahe Peoples RN on 1/1/22 at 1:28 PM EST

## 2022-01-01 NOTE — PROGRESS NOTES
DATE: 2022    NAME: Gutierrez Guerrier  MRN: [id number]   : 1965    Patient not seen this date for Physical Therapy due to:      [] Cancel by RN or physician due to:    [] Hemodialysis    [] Critical Lab Value Level     [] Blood transfusion in progress    [] Acute or unstable cardiovascular status   _MAP < 55 or more than >115  _HR < 40 or > 130    [] Acute or unstable pulmonary status   -FiO2 > 60%   _RR < 5 or >40    _O2 sats < 85%    [] Strict Bedrest    [] Off Unit for surgery or procedure    [] Off Unit for testing       [] Pending imaging to R/O fracture    [x] Refusal by Patient      [] Other      [] PT being discontinued at this time. Patient independent. No further needs. [] PT being discontinued at this time as the patient has been transferred to hospice care. No further needs.       Patience Parrish, PT

## 2022-01-01 NOTE — PROGRESS NOTES
[facility]  Occupational Therapy Not Seen    DATE: 2022    NAME: Kalyani Romero  MRN: [id number]   : 1965    Patient not seen this date for Occupational Therapy due to:      [] Cancel by RN or physician due to:    [] Hemodialysis    [] Critical Lab Value Level     [] Blood transfusion in progress    [] Acute or unstable cardiovascular status   _MAP < 55 or more than >115  _HR < 40 or > 130    [] Acute or unstable pulmonary status   -FiO2 > 60%   _RR < 5 or >40    _O2 sats < 85%    [] Strict Bedrest    [] Off Unit for surgery or procedure    [] Off Unit for testing       [] Pending imaging to R/O fracture    [x] Refusal by Patient      [] Other      [] OT being discontinued at this time. Patient independent. No further needs. [] OT being discontinued at this time as the patient has been transferred to hospice care. No further needs.       Michael Funez, OT

## 2022-01-01 NOTE — RT PROTOCOL NOTE
RT Inhaler-Nebulizer Bronchodilator Protocol Note    There is a bronchodilator order in the chart from a provider indicating to follow the RT Bronchodilator Protocol and there is an Initiate RT Inhaler-Nebulizer Bronchodilator Protocol order as well (see protocol at bottom of note). CXR Findings:  XR CHEST PORTABLE    Result Date: 12/31/2021  No acute cardiopulmonary disease. COPD. The findings from the last RT Protocol Assessment were as follows:   History Pulmonary Disease: Chronic pulmonary disease  Respiratory Pattern: Regular pattern and RR 12-20 bpm  Breath Sounds: Slightly diminished and/or crackles  Cough: Strong, spontaneous, non-productive  Indication for Bronchodilator Therapy: Decreased or absent breath sounds  Bronchodilator Assessment Score: 4    Aerosolized bronchodilator medication orders have been revised according to the RT Inhaler-Nebulizer Bronchodilator Protocol below. Respiratory Therapist to perform RT Therapy Protocol Assessment initially then follow the protocol. Repeat RT Therapy Protocol Assessment PRN for score 0-3 or on second treatment, BID, and PRN for scores above 3. No Indications - adjust the frequency to every 6 hours PRN wheezing or bronchospasm, if no treatments needed after 48 hours then discontinue using Per Protocol order mode. If indication present, adjust the RT bronchodilator orders based on the Bronchodilator Assessment Score as indicated below. Use Inhaler orders unless patient has one or more of the following: on home nebulizer, not able to hold breath for 10 seconds, is not alert and oriented, cannot activate and use MDI correctly, or respiratory rate 25 breaths per minute or more, then use the equivalent nebulizer order(s) with same Frequency and PRN reasons based on the score. If a patient is on this medication at home then do not decrease Frequency below that used at home.     0-3 - enter or revise RT bronchodilator order(s) to equivalent RT Bronchodilator order with Frequency of every 4 hours PRN for wheezing or increased work of breathing using Per Protocol order mode. 4-6 - enter or revise RT Bronchodilator order(s) to two equivalent RT bronchodilator orders with one order with BID Frequency and one order with Frequency of every 4 hours PRN wheezing or increased work of breathing using Per Protocol order mode. 7-10 - enter or revise RT Bronchodilator order(s) to two equivalent RT bronchodilator orders with one order with TID Frequency and one order with Frequency of every 4 hours PRN wheezing or increased work of breathing using Per Protocol order mode. 11-13 - enter or revise RT Bronchodilator order(s) to one equivalent RT bronchodilator order with QID Frequency and an Albuterol order with Frequency of every 4 hours PRN wheezing or increased work of breathing using Per Protocol order mode. Greater than 13 - enter or revise RT Bronchodilator order(s) to one equivalent RT bronchodilator order with every 4 hours Frequency and an Albuterol order with Frequency of every 2 hours PRN wheezing or increased work of breathing using Per Protocol order mode. RT to enter RT Home Evaluation for COPD & MDI Assessment order using Per Protocol order mode.     Electronically signed by Vik Lantigua RCP on 12/31/2021 at 9:37 PM

## 2022-01-02 LAB
ABSOLUTE EOS #: <0.03 K/UL (ref 0–0.44)
ABSOLUTE IMMATURE GRANULOCYTE: 0.28 K/UL (ref 0–0.3)
ABSOLUTE LYMPH #: 0.78 K/UL (ref 1.1–3.7)
ABSOLUTE MONO #: 0.93 K/UL (ref 0.1–1.2)
ANION GAP SERPL CALCULATED.3IONS-SCNC: 13 MMOL/L (ref 9–17)
BASOPHILS # BLD: 0 % (ref 0–2)
BASOPHILS ABSOLUTE: <0.03 K/UL (ref 0–0.2)
BUN BLDV-MCNC: 25 MG/DL (ref 6–20)
BUN/CREAT BLD: 25 (ref 9–20)
CALCIUM SERPL-MCNC: 10.1 MG/DL (ref 8.6–10.4)
CHLORIDE BLD-SCNC: 94 MMOL/L (ref 98–107)
CO2: 29 MMOL/L (ref 20–31)
CREAT SERPL-MCNC: 0.99 MG/DL (ref 0.7–1.2)
DIFFERENTIAL TYPE: ABNORMAL
EOSINOPHILS RELATIVE PERCENT: 0 % (ref 1–4)
GFR AFRICAN AMERICAN: >60 ML/MIN
GFR NON-AFRICAN AMERICAN: >60 ML/MIN
GFR SERPL CREATININE-BSD FRML MDRD: ABNORMAL ML/MIN/{1.73_M2}
GFR SERPL CREATININE-BSD FRML MDRD: ABNORMAL ML/MIN/{1.73_M2}
GLUCOSE BLD-MCNC: 153 MG/DL (ref 70–99)
HCT VFR BLD CALC: 42 % (ref 40.7–50.3)
HEMOGLOBIN: 14.3 G/DL (ref 13–17)
IMMATURE GRANULOCYTES: 1 %
LYMPHOCYTES # BLD: 4 % (ref 24–43)
MAGNESIUM: 2.2 MG/DL (ref 1.6–2.6)
MCH RBC QN AUTO: 30.9 PG (ref 25.2–33.5)
MCHC RBC AUTO-ENTMCNC: 34 G/DL (ref 28.4–34.8)
MCV RBC AUTO: 90.7 FL (ref 82.6–102.9)
MONOCYTES # BLD: 4 % (ref 3–12)
NRBC AUTOMATED: 0 PER 100 WBC
PDW BLD-RTO: 12 % (ref 11.8–14.4)
PLATELET # BLD: 287 K/UL (ref 138–453)
PLATELET ESTIMATE: ABNORMAL
PMV BLD AUTO: 10.2 FL (ref 8.1–13.5)
POTASSIUM SERPL-SCNC: 3.7 MMOL/L (ref 3.7–5.3)
RBC # BLD: 4.63 M/UL (ref 4.21–5.77)
RBC # BLD: ABNORMAL 10*6/UL
SEG NEUTROPHILS: 91 % (ref 36–65)
SEGMENTED NEUTROPHILS ABSOLUTE COUNT: 19.35 K/UL (ref 1.5–8.1)
SODIUM BLD-SCNC: 136 MMOL/L (ref 135–144)
WBC # BLD: 21.4 K/UL (ref 3.5–11.3)
WBC # BLD: ABNORMAL 10*3/UL

## 2022-01-02 PROCEDURE — 6360000002 HC RX W HCPCS: Performed by: FAMILY MEDICINE

## 2022-01-02 PROCEDURE — 6360000002 HC RX W HCPCS: Performed by: HOSPITALIST

## 2022-01-02 PROCEDURE — 83735 ASSAY OF MAGNESIUM: CPT

## 2022-01-02 PROCEDURE — 6370000000 HC RX 637 (ALT 250 FOR IP): Performed by: FAMILY MEDICINE

## 2022-01-02 PROCEDURE — 94640 AIRWAY INHALATION TREATMENT: CPT

## 2022-01-02 PROCEDURE — 80048 BASIC METABOLIC PNL TOTAL CA: CPT

## 2022-01-02 PROCEDURE — 36415 COLL VENOUS BLD VENIPUNCTURE: CPT

## 2022-01-02 PROCEDURE — 2580000003 HC RX 258: Performed by: FAMILY MEDICINE

## 2022-01-02 PROCEDURE — 6370000000 HC RX 637 (ALT 250 FOR IP): Performed by: NURSE PRACTITIONER

## 2022-01-02 PROCEDURE — 1200000000 HC SEMI PRIVATE

## 2022-01-02 PROCEDURE — 6370000000 HC RX 637 (ALT 250 FOR IP): Performed by: HOSPITALIST

## 2022-01-02 PROCEDURE — 99232 SBSQ HOSP IP/OBS MODERATE 35: CPT | Performed by: INTERNAL MEDICINE

## 2022-01-02 PROCEDURE — 2700000000 HC OXYGEN THERAPY PER DAY

## 2022-01-02 PROCEDURE — 94761 N-INVAS EAR/PLS OXIMETRY MLT: CPT

## 2022-01-02 PROCEDURE — 85025 COMPLETE CBC W/AUTO DIFF WBC: CPT

## 2022-01-02 PROCEDURE — APPSS30 APP SPLIT SHARED TIME 16-30 MINUTES: Performed by: NURSE PRACTITIONER

## 2022-01-02 RX ORDER — MORPHINE SULFATE 2 MG/ML
1 INJECTION, SOLUTION INTRAMUSCULAR; INTRAVENOUS EVERY 6 HOURS PRN
Status: DISCONTINUED | OUTPATIENT
Start: 2022-01-02 | End: 2022-01-04 | Stop reason: HOSPADM

## 2022-01-02 RX ORDER — ZOLPIDEM TARTRATE 5 MG/1
10 TABLET ORAL NIGHTLY PRN
Status: DISCONTINUED | OUTPATIENT
Start: 2022-01-02 | End: 2022-01-04 | Stop reason: HOSPADM

## 2022-01-02 RX ORDER — HYDROCODONE BITARTRATE AND ACETAMINOPHEN 5; 325 MG/1; MG/1
1 TABLET ORAL EVERY 4 HOURS PRN
Status: DISCONTINUED | OUTPATIENT
Start: 2022-01-02 | End: 2022-01-04 | Stop reason: HOSPADM

## 2022-01-02 RX ORDER — METHYLPREDNISOLONE SODIUM SUCCINATE 40 MG/ML
40 INJECTION, POWDER, LYOPHILIZED, FOR SOLUTION INTRAMUSCULAR; INTRAVENOUS EVERY 8 HOURS
Status: DISCONTINUED | OUTPATIENT
Start: 2022-01-03 | End: 2022-01-03

## 2022-01-02 RX ADMIN — SODIUM CHLORIDE, PRESERVATIVE FREE 10 ML: 5 INJECTION INTRAVENOUS at 13:33

## 2022-01-02 RX ADMIN — AMLODIPINE BESYLATE 5 MG: 5 TABLET ORAL at 08:13

## 2022-01-02 RX ADMIN — HYDROCODONE BITARTRATE AND ACETAMINOPHEN 1 TABLET: 5; 325 TABLET ORAL at 23:05

## 2022-01-02 RX ADMIN — HYDROCHLOROTHIAZIDE 25 MG: 25 TABLET ORAL at 08:13

## 2022-01-02 RX ADMIN — BUDESONIDE AND FORMOTEROL FUMARATE DIHYDRATE 2 PUFF: 160; 4.5 AEROSOL RESPIRATORY (INHALATION) at 08:58

## 2022-01-02 RX ADMIN — ZOLPIDEM TARTRATE 10 MG: 5 TABLET ORAL at 20:08

## 2022-01-02 RX ADMIN — IPRATROPIUM BROMIDE AND ALBUTEROL SULFATE 1 AMPULE: .5; 2.5 SOLUTION RESPIRATORY (INHALATION) at 08:58

## 2022-01-02 RX ADMIN — SODIUM CHLORIDE, PRESERVATIVE FREE 10 ML: 5 INJECTION INTRAVENOUS at 08:15

## 2022-01-02 RX ADMIN — METHYLPREDNISOLONE SODIUM SUCCINATE 60 MG: 125 INJECTION, POWDER, FOR SOLUTION INTRAMUSCULAR; INTRAVENOUS at 13:33

## 2022-01-02 RX ADMIN — HYDROCODONE BITARTRATE AND ACETAMINOPHEN 1 TABLET: 5; 325 TABLET ORAL at 18:51

## 2022-01-02 RX ADMIN — PANTOPRAZOLE SODIUM 40 MG: 40 TABLET, DELAYED RELEASE ORAL at 06:18

## 2022-01-02 RX ADMIN — ASPIRIN 81 MG CHEWABLE TABLET 81 MG: 81 TABLET CHEWABLE at 08:13

## 2022-01-02 RX ADMIN — BUDESONIDE AND FORMOTEROL FUMARATE DIHYDRATE 2 PUFF: 160; 4.5 AEROSOL RESPIRATORY (INHALATION) at 20:33

## 2022-01-02 RX ADMIN — IPRATROPIUM BROMIDE AND ALBUTEROL SULFATE 1 AMPULE: .5; 2.5 SOLUTION RESPIRATORY (INHALATION) at 20:33

## 2022-01-02 RX ADMIN — ACETAMINOPHEN 650 MG: 325 TABLET ORAL at 16:03

## 2022-01-02 RX ADMIN — METHYLPREDNISOLONE SODIUM SUCCINATE 60 MG: 125 INJECTION, POWDER, FOR SOLUTION INTRAMUSCULAR; INTRAVENOUS at 18:51

## 2022-01-02 RX ADMIN — TRAMADOL HYDROCHLORIDE 50 MG: 50 TABLET, COATED ORAL at 06:27

## 2022-01-02 RX ADMIN — METHYLPREDNISOLONE SODIUM SUCCINATE 60 MG: 125 INJECTION, POWDER, FOR SOLUTION INTRAMUSCULAR; INTRAVENOUS at 00:58

## 2022-01-02 RX ADMIN — ACETAMINOPHEN 650 MG: 325 TABLET ORAL at 08:14

## 2022-01-02 RX ADMIN — METHYLPREDNISOLONE SODIUM SUCCINATE 60 MG: 125 INJECTION, POWDER, FOR SOLUTION INTRAMUSCULAR; INTRAVENOUS at 06:17

## 2022-01-02 RX ADMIN — TRAMADOL HYDROCHLORIDE 50 MG: 50 TABLET, COATED ORAL at 13:33

## 2022-01-02 RX ADMIN — ROFLUMILAST 500 MCG: 500 TABLET ORAL at 08:13

## 2022-01-02 RX ADMIN — ENOXAPARIN SODIUM 40 MG: 100 INJECTION SUBCUTANEOUS at 08:14

## 2022-01-02 RX ADMIN — LOSARTAN POTASSIUM 100 MG: 100 TABLET, FILM COATED ORAL at 08:13

## 2022-01-02 RX ADMIN — METOPROLOL SUCCINATE 50 MG: 50 TABLET, EXTENDED RELEASE ORAL at 08:13

## 2022-01-02 RX ADMIN — FOLIC ACID 1 MG: 1 TABLET ORAL at 08:13

## 2022-01-02 RX ADMIN — ESCITALOPRAM OXALATE 10 MG: 10 TABLET ORAL at 08:13

## 2022-01-02 RX ADMIN — MONTELUKAST 10 MG: 10 TABLET, FILM COATED ORAL at 20:05

## 2022-01-02 RX ADMIN — IPRATROPIUM BROMIDE AND ALBUTEROL SULFATE 1 AMPULE: .5; 2.5 SOLUTION RESPIRATORY (INHALATION) at 14:17

## 2022-01-02 RX ADMIN — MORPHINE SULFATE 1 MG: 2 INJECTION, SOLUTION INTRAMUSCULAR; INTRAVENOUS at 20:12

## 2022-01-02 RX ADMIN — SODIUM CHLORIDE, PRESERVATIVE FREE 10 ML: 5 INJECTION INTRAVENOUS at 20:05

## 2022-01-02 ASSESSMENT — PAIN DESCRIPTION - PAIN TYPE
TYPE: ACUTE PAIN

## 2022-01-02 ASSESSMENT — PAIN DESCRIPTION - ONSET
ONSET: PROGRESSIVE
ONSET: ON-GOING
ONSET: PROGRESSIVE
ONSET: ON-GOING
ONSET: ON-GOING

## 2022-01-02 ASSESSMENT — PAIN DESCRIPTION - FREQUENCY
FREQUENCY: CONTINUOUS
FREQUENCY: INTERMITTENT
FREQUENCY: INTERMITTENT
FREQUENCY: CONTINUOUS
FREQUENCY: CONTINUOUS

## 2022-01-02 ASSESSMENT — PAIN DESCRIPTION - PROGRESSION
CLINICAL_PROGRESSION: GRADUALLY WORSENING
CLINICAL_PROGRESSION: NOT CHANGED
CLINICAL_PROGRESSION: GRADUALLY WORSENING
CLINICAL_PROGRESSION: NOT CHANGED

## 2022-01-02 ASSESSMENT — PAIN DESCRIPTION - LOCATION
LOCATION: ABDOMEN
LOCATION: HEAD
LOCATION: ABDOMEN
LOCATION: HEAD
LOCATION: ABDOMEN

## 2022-01-02 ASSESSMENT — PAIN SCALES - GENERAL
PAINLEVEL_OUTOF10: 7
PAINLEVEL_OUTOF10: 3
PAINLEVEL_OUTOF10: 8
PAINLEVEL_OUTOF10: 8
PAINLEVEL_OUTOF10: 1
PAINLEVEL_OUTOF10: 3
PAINLEVEL_OUTOF10: 3
PAINLEVEL_OUTOF10: 7
PAINLEVEL_OUTOF10: 8
PAINLEVEL_OUTOF10: 7

## 2022-01-02 ASSESSMENT — PAIN DESCRIPTION - DESCRIPTORS
DESCRIPTORS: HEADACHE
DESCRIPTORS: HEADACHE
DESCRIPTORS: PRESSURE
DESCRIPTORS: PRESSURE
DESCRIPTORS: DISCOMFORT

## 2022-01-02 ASSESSMENT — PAIN - FUNCTIONAL ASSESSMENT
PAIN_FUNCTIONAL_ASSESSMENT: ACTIVITIES ARE NOT PREVENTED

## 2022-01-02 NOTE — PLAN OF CARE
Problem: Pain:  Goal: Pain level will decrease  Description: Pain level will decrease  1/2/2022 0013 by Helena Wright RN  Outcome: Ongoing    Note: Pt medicated with pain medication prn. Assessed all pain characteristics including level, type, location, frequency, and onset. Non-pharmacologic interventions offered to pt as well. Pt states pain is tolerable at this time. Will continue to monitor   Goal: Control of acute pain  Description: Control of acute pain  1/2/2022 0013 by Helena Wright RN  Outcome: Ongoing  Goal: Control of chronic pain  Description: Control of chronic pain  1/2/2022 0013 by Helena Wright RN  Outcome: Ongoing     Problem: Discharge Planning:  Goal: Discharged to appropriate level of care  Description: Discharged to appropriate level of care  1/2/2022 0013 by Helena Wright RN  Outcome: Ongoing     Problem:  Activity Intolerance:  Goal: Ability to tolerate increased activity will improve  Description: Ability to tolerate increased activity will improve  1/2/2022 0013 by Helena Wright RN  Outcome: Ongoing     Problem: Airway Clearance - Ineffective:  Goal: Ability to maintain a clear airway will improve  Description: Ability to maintain a clear airway will improve  1/2/2022 0013 by Helena Wright RN  Outcome: Ongoing     Problem: Breathing Pattern - Ineffective:  Goal: Ability to achieve and maintain a regular respiratory rate will improve  Description: Ability to achieve and maintain a regular respiratory rate will improve  1/2/2022 0013 by Helena Wright RN  Outcome: Ongoing     Problem: Gas Exchange - Impaired:  Goal: Levels of oxygenation will improve  Description: Levels of oxygenation will improve  1/2/2022 0013 by Helena Wright RN  Outcome: Ongoing  1/2/2022 0000 by Helena Wright RN  Outcome: Ongoing  Problem: Falls - Risk of:  Goal: Will remain free from falls  Description: Will remain free from falls  1/2/2022 0013 by Helena Wright RN  Outcome: Ongoing  Goal: Absence of physical injury  Description: Absence of physical injury  1/2/2022 0013 by Bubba Cesar RN  Outcome: Ongoing

## 2022-01-02 NOTE — RT PROTOCOL NOTE
RT Inhaler-Nebulizer Bronchodilator Protocol Note    There is a bronchodilator order in the chart from a provider indicating to follow the RT Bronchodilator Protocol and there is an Initiate RT Inhaler-Nebulizer Bronchodilator Protocol order as well (see protocol at bottom of note). CXR Findings:  XR CHEST PORTABLE    Result Date: 12/31/2021  No acute cardiopulmonary disease. COPD. The findings from the last RT Protocol Assessment were as follows:   History Pulmonary Disease: Chronic pulmonary disease  Respiratory Pattern: Mild dyspnea at rest, irregular pattern, or RR 21-25 bpm  Breath Sounds: Slightly diminished and/or crackles  Cough: Strong, spontaneous, non-productive  Indication for Bronchodilator Therapy: Decreased or absent breath sounds  Bronchodilator Assessment Score: 8    Aerosolized bronchodilator medication orders have been revised according to the RT Inhaler-Nebulizer Bronchodilator Protocol below. Respiratory Therapist to perform RT Therapy Protocol Assessment initially then follow the protocol. Repeat RT Therapy Protocol Assessment PRN for score 0-3 or on second treatment, BID, and PRN for scores above 3. No Indications - adjust the frequency to every 6 hours PRN wheezing or bronchospasm, if no treatments needed after 48 hours then discontinue using Per Protocol order mode. If indication present, adjust the RT bronchodilator orders based on the Bronchodilator Assessment Score as indicated below. Use Inhaler orders unless patient has one or more of the following: on home nebulizer, not able to hold breath for 10 seconds, is not alert and oriented, cannot activate and use MDI correctly, or respiratory rate 25 breaths per minute or more, then use the equivalent nebulizer order(s) with same Frequency and PRN reasons based on the score. If a patient is on this medication at home then do not decrease Frequency below that used at home.     0-3 - enter or revise RT bronchodilator order(s) to equivalent RT Bronchodilator order with Frequency of every 4 hours PRN for wheezing or increased work of breathing using Per Protocol order mode. 4-6 - enter or revise RT Bronchodilator order(s) to two equivalent RT bronchodilator orders with one order with BID Frequency and one order with Frequency of every 4 hours PRN wheezing or increased work of breathing using Per Protocol order mode. 7-10 - enter or revise RT Bronchodilator order(s) to two equivalent RT bronchodilator orders with one order with TID Frequency and one order with Frequency of every 4 hours PRN wheezing or increased work of breathing using Per Protocol order mode. 11-13 - enter or revise RT Bronchodilator order(s) to one equivalent RT bronchodilator order with QID Frequency and an Albuterol order with Frequency of every 4 hours PRN wheezing or increased work of breathing using Per Protocol order mode. Greater than 13 - enter or revise RT Bronchodilator order(s) to one equivalent RT bronchodilator order with every 4 hours Frequency and an Albuterol order with Frequency of every 2 hours PRN wheezing or increased work of breathing using Per Protocol order mode. RT to enter RT Home Evaluation for COPD & MDI Assessment order using Per Protocol order mode.     Electronically signed by Dayron Doll RCP on 1/1/2022 at 9:37 PM

## 2022-01-02 NOTE — CONSULTS
Gastroenterology Consult Note      Patient: Shalonda Pollack  : 1965  Acct#:  [de-identified]     Date:  2022    Subjective:       History of Present Illness  Patient is a 60 y.o.  male admitted with COPD exacerbation ([facility] [J44.1]  Liver lesion [K76.9] who is seen in consult for  liver mass, abnormal CT scan of the stomach. This is a 78-year-old gentleman with multiple medical problems including hepatitis C which is treated, but he has liver cirrhosis from alcohol abuse and hepatitis C in the past.  In addition to COPD coronary artery disease MI etc. he came to the emergency room with complaint of shortness of breath, found to be in COPD and treated for exacerbation at this time for that. His imaging has shown liver lesions which seems to be since  and consistent with hemangiomas  But could not be confirmed because he could not have an MRI because of a loop recorder  . Patient follow with Dr. Katherine Norton. A CAT scan of the abdomen is showing also thickening of the fundus of the stomach at this admission. In addition to the lesion described above with multiple hepatic lesions which are not changed from the CAT scan in 2021  The alpha-fetoprotein on this patient has been normal.  The patient does have abdominal pain which feels that he has some distention of his abdomen he said especially after he eats. Although he does not have any nausea or vomiting. This feeling has been going on for few weeks. Skin skin is not reporting ascites but on my exam there is some ascites as below. The patient on Protonix and Pepcid as an outpatient. The patient reports some intermittent melena but he is taking iron. There is no hematochezia there is no hematemesis. He gains weight and he thinks that his lower extremities are edematous.   He is white blood count is 11.7  LFTs are normal no other symptoms         Endoscopy 6/10/21 egd (atia) irregular z line with patchy hyperemia of the stomach ?candida bx negative for h pylori, candida distal esophagus showed intestinal metaplasia ?barretts vs goblet cells. Pt states he had a colonoscopy by dr Manasa Lepe also that removed benign polyps 3 months ago but no record in epic today            Past Medical History:   Diagnosis Date    Arthritis     CAD (coronary artery disease)     Cancer ([facility]     prostate    [facility]     COPD [facility]chronic obstructive pulmonary disease) [facility]     Depression     BOURGEOIS (dyspnea on exertion)     H/O prostate cancer     Hyperlipidemia     Hypertension     [facility]my[facility] infarction) ([facility]     Seizures ([facility] 2016    last one over 2 years ago     SV[facility]ra[facility]ricular tachycardia) ([facility]     Tobacco abuse       Past Surgical History:   Procedure Laterality Date    CARDIAC CATHETERIZATION      ablation    INSERTABLE CARDIAC MONITOR  10/03/2018    INTERNAL LOOP RECORDER    PROSTATECTOMY      SINUS SURGERY      TONGUE SURGERY      UPPER GASTROINTESTINAL ENDOSCOPY N/A 6/10/2021    EGD BIOPSY performed by Kevin Aguila MD at [facility]      Past Endoscopic History above    Admission Meds  No current facility-administered medications on file prior to encounter.      Current Outpatient Medications on File Prior to Encounter   Medication Sig Dispense Refill    losartan (COZAAR) 100 MG tablet Take 1 tablet by mouth daily 30 tablet 3    hydroCHLOROthiazide (HYDRODIURIL) 25 MG tablet Take 1 tablet by mouth daily 30 tablet 3    montelukast (SINGULAIR) 10 MG tablet Take 1 tablet by mouth nightly 30 tablet 3    predniSONE (DELTASONE) 20 MG tablet 4tabs qd x 3days, 3tabs qd x 3days, 2 tabs qd x 3days, 1tab qd x 3days #30 for 12 days 30 tablet 0    Roflumilast (DALIRESP) 500 MCG tablet Take 1 tablet by mouth daily 30 tablet 0    escitalopram (LEXAPRO) 10 MG tablet Take 10 mg by mouth daily      ferrous gluconate (FERGON) 324 (38 Fe) MG tablet Take 324 mg by mouth daily (with breakfast)      famotidine (PEPCID) 40 MG tablet Take 40 mg by mouth 2 times daily as needed (heartburn)       amLODIPine (NORVASC) 5 MG tablet Take 5 mg by mouth daily      albuterol (PROVENTIL) (2.5 MG/3ML) 0.083% nebulizer solution Take 2.5 mg by nebulization every 6 hours as needed for Wheezing      albuterol sulfate  (90 Base) MCG/ACT inhaler Inhale 2 puffs into the lungs every 4-6 hours as needed for Wheezing      aspirin 81 MG tablet Take 81 mg by mouth daily      budesonide-formoterol (SYMBICORT) 160-4.5 MCG/ACT AERO Inhale 2 puffs into the lungs 2 times daily      folic acid (FOLVITE) 1 MG tablet Take 1 mg by mouth daily      metoprolol succinate (TOPROL XL) 50 MG extended release tablet Take 50 mg by mouth daily      pantoprazole (PROTONIX) 40 MG tablet Take 40 mg by mouth daily      QUEtiapine (SEROQUEL) 400 MG tablet Take 400 mg by mouth nightly       vitamin B-1 (THIAMINE) 100 MG tablet Take 100 mg by mouth daily      Cholecalciferol (VITAMIN D3) 2000 units CAPS Take 1 capsule by mouth daily      tiotropium (SPIRIVA RESPIMAT) 2.5 MCG/ACT AERS inhaler Inhale 2 puffs into the lungs daily         Patient   Does Use ASA, NSAID No  Allergies  No Known Allergies     Social   Social History     Tobacco Use    Smoking status: Former Smoker     Packs/day: 0.10     Types: Cigarettes    Smokeless tobacco: Former User     Quit date: 12/5/2021    Tobacco comment: 5/2021 quit    Substance Use Topics    Alcohol use: Not Currently     Comment: last drink at the end of Nov 2021        PSYCH HISTORY:  Depression No  Anxiety No  Suicide No       History reviewed. No pertinent family history. No family history of colon cancer, Crohn's disease, or ulcerative colitis.      Review of Systems  Constitutional: negative  Eyes: negative  Ears, nose, mouth, throat, and face: negative  Respiratory: negative  Cardiovascular: negative  Gastrointestinal: negative  Genitourinary:negative  Integument/breast: negative  Hematologic/lymphatic: negative  Musculoskeletal:negative  Endocrine: negative           Physical Exam  Blood pressure 113/71, pulse 89, temperature 98.2 °F (36.8 °C), temperature source Oral, resp. rate 15, height 5' 5\" (1.651 m), weight 189 lb (85.7 kg), SpO2 92 %. General Appearance: alert and oriented to person, place and time, well-developed and well-nourished, in no acute distress  Skin: warm and dry, no rash or erythema  Head: normocephalic and atraumatic  Eyes: pupils equal, round, and reactive to light, extraocular eye movements intact, conjunctivae normal  ENT: hearing grossly normal bilaterally  Neck: neck supple and non tender without mass, no thyromegaly or thyroid nodules, no cervical lymphadenopathy   Pulmonary/Chest: clear to auscultation bilaterally- no wheezes, rales or rhonchi, normal air movement, no respiratory distress  Cardiovascular: normal rate, regular rhythm, normal S1 and S2, no murmurs, rubs, clicks or gallops, distal pulses intact, no carotid bruits  Abdomen: soft, mildly distended with positive ascites , normal bowel sounds, no masses or organomegaly  Extremities: no cyanosis, clubbing, positive edema  Musculoskeletal: normal range of motion, no joint swelling, deformity or tenderness  Neurologic: no cranial nerve deficit and muscle strength normal    Data Review:    Recent Labs     12/31/21  1038 01/01/22  0603 01/02/22  0556   WBC 14.6* 11.7* 21.4*   HGB 15.7 14.8 14.3   HCT 47.2 44.1 42.0   MCV 92.4 91.3 90.7    305 287     Recent Labs     12/31/21  1038 01/01/22  0603 01/02/22  0556   * 136 136   K 3.6* 3.9 3.7   CL 87* 93* 94*   CO2 25 26 29   BUN 11 17 25*   CREATININE 0.94 0.97 0.99     Recent Labs     12/31/21  1038   AST 19   ALT 18   BILITOT 0.69   ALKPHOS 69     Recent Labs     12/31/21  1038   LIPASE 26     Recent Labs     01/01/22  0603   PROTIME 13.2   INR 1.0     No results for input(s): PTT in the last 72 hours.   No results for input(s): OCCULTBLD in the last 72 hours.  CEA:  No results found for: CEA  Ca 125:  No results found for:   Ca 19-9:  No results found for:   Ca 15-3:  No results found for:   AFP:  No components found for: AFAFP  Beta HCG:  No components found for: BHCG  Neuron Specific Enolase:  No results found for: NSE  Imaging Studies:                           All appropriate imaging studies and reports reviewed: Yes                 Assessment:     Active Problems:    COPD exacerbation (HCC)    Liver lesion    Generalized abdominal pain    Alcohol abuse  Resolved Problems:    * No resolved hospital problems. *    Multiple liver lesions most likely hemangioma although could not confirm because we cannot do an MRI because of a loop recorder  But there is stable in size and the alpha-fetoprotein is been normal  History of hepatitis C which was treated  Alcohol abuse  Abdominal distention and pain I am thinking this patient has ascites he cannot try to see if we can get paracentesis on him  Lower extremity edema  CAT scan showing some thickening of the distal stomach which needs to be evaluated  Melena but patient is on aspirin EGD on June by Dr. Perlita Garza was done      Recommendations:   Need repeat the EGD to evaluate the CAT scan finding  Will evaluate for fluid and see if paracentesis can be done  PPI  Pain management  Treatment for the COPD                      Thank you for allowing me to participate in the care of your patient. Please feel free to contact me with any questions or concerns.      Momo Foss MD

## 2022-01-02 NOTE — PROGRESS NOTES
Progress note  [facility].,    Adult Hospitalist      Name: Ashutosh Hopkins  MRN: [id number]     Acct: [de-identified]  Room: 2007/2007-02    Admit Date: 12/31/2021  9:59 AM  PCP: Kerry Carranza MD    Primary Problem  Active Problems:    COPD exacerbation [facility])    Liver lesion    Generalized abdominal pain    Alcohol abuse  Resolved Problems:    * No resolved hospital problems.  *        Assesment:   Acute on chronic hypoxemic respiratory failure  Acute COPD exacerbation  Hypertension  Mixed hyperlipidemia  Chronic coronary artery disease  Depression with anxiety  Tobacco abuse  Obesity, BMI 30.79        Plan:   Patient is on immediate floor  Oxygen to keep SPO2 more than 90%  Telemetry  Check vital signs closely  CBC BMP daily    IV Solu-Medrol  DuoNeb breathing treatment  Continue his inhalers  Continue Singulair  Continue Roflumilast  Check viral respiratory c/s  Pulmonology consult    Continue amlodipine, hydrochlorothiazide  Continue aspirin  Continue Celexa and Seroquel  Continue other medication as below    Scheduled Meds:   [START ON 1/2/2022] pantoprazole  40 mg Oral QAM AC    sodium chloride flush  5-40 mL IntraVENous 2 times per day    enoxaparin  40 mg SubCUTAneous Daily    amLODIPine  5 mg Oral Daily    aspirin  81 mg Oral Daily    budesonide-formoterol  2 puff Inhalation BID    escitalopram  10 mg Oral Daily    folic acid  1 mg Oral Daily    ferrous gluconate  324 mg Oral Daily with breakfast    hydroCHLOROthiazide  25 mg Oral Daily    losartan  100 mg Oral Daily    metoprolol succinate  50 mg Oral Daily    montelukast  10 mg Oral Nightly    QUEtiapine  400 mg Oral Nightly    Roflumilast  500 mcg Oral Daily    methylPREDNISolone  60 mg IntraVENous Q6H    influenza virus vaccine  0.5 mL IntraMUSCular Prior to discharge    ipratropium-albuterol  1 ampule Inhalation TID     Continuous Infusions:   sodium chloride       PRN Meds:  sodium chloride flush, 10 mL, PRN  sodium chloride, 25 mL, PRN  potassium chloride, 40 mEq, PRN   Or  potassium alternative oral replacement, 40 mEq, PRN   Or  potassium chloride, 10 mEq, PRN  magnesium sulfate, 1,000 mg, PRN  ondansetron, 4 mg, Q8H PRN   Or  ondansetron, 4 mg, Q6H PRN  magnesium hydroxide, 30 mL, Daily PRN  acetaminophen, 650 mg, Q6H PRN   Or  acetaminophen, 650 mg, Q6H PRN  traMADol, 50 mg, Q6H PRN        Chief Complaint:     Chief Complaint   Patient presents with    Shortness of Breath         History of Present Illness:     Patient seen and examined at bedside. No overnight events. C/o SOB and fatigue   afebrile   Denies any chest pain,   changes in urination or bowel habits      HPI:      Oseas Zamudio is a 60 y.o.  male who presents with Shortness of Breath  This is a 64-year-old gentleman who is been admitted via the emergency room, patient came to the ER with a complaint of having some shortness of breath, patient has past medical history significant coronary disease and COPD, patient has been having shortness of breath for past 2 weeks which has progressively gotten worse, patient denies any fever or chills, patient has some nausea but no vomiting, patient is been having some coughing spells, further patient does wear oxygen at home, patient is requiring more oxygen, for this reason came to the emergency room, initial testing in the emergency room is unremarkable patient is negative for COVID-19 and is vaccinated, further patient is noticed to be in COPD exacerbation admitted for further management    I have personally reviewed the past medical history, past surgical history, medications, social history, and family history, and summarized in the note. Review of Systems:     All 10 point system is reviewed and negative otherwise mentioned in HPI.       Past Medical History:     Past Medical History:   Diagnosis Date    Arthritis     CAD (coronary artery disease)     Cancer ([facility]     prostate    [facility]osis with [facility]ism ([facility]     [facility]     Depression     BOURGEOIS (dyspnea on exertion)     H/O prostate cancer     Hyperlipidemia     Hypertension     [facility]     Seizures ([facility] 2016    last one over 2 years ago     SVT (supraventricular tachycardia) ([facility]     Tobacco abuse         Past Surgical History:     Past Surgical History:   Procedure Laterality Date    CARDIAC CATHETERIZATION      ablation    INSERTABLE CARDIAC MONITOR  10/03/2018    INTERNAL LOOP RECORDER    PROSTATECTOMY      SINUS SURGERY      TONGUE SURGERY      UPPER GASTROINTESTINAL ENDOSCOPY N/A 6/10/2021    EGD BIOPSY performed by Bucky Russell MD at [facility]        Medications Prior to Admission:       Prior to Admission medications    Medication Sig Start Date End Date Taking?  Authorizing Provider   losartan (COZAAR) 100 MG tablet Take 1 tablet by mouth daily 7/16/21   Anam Alvarez MD   hydroCHLOROthiazide (HYDRODIURIL) 25 MG tablet Take 1 tablet by mouth daily 7/16/21   Anam Alvarez MD   montelukast (SINGULAIR) 10 MG tablet Take 1 tablet by mouth nightly 7/15/21   Anam Alvarez MD   predniSONE (DELTASONE) 20 MG tablet 4tabs qd x 3days, 3tabs qd x 3days, 2 tabs qd x 3days, 1tab qd x 3days #30 for 12 days 7/15/21   Anam Alvarez MD   Roflumilast (DALIRESP) 500 MCG tablet Take 1 tablet by mouth daily 7/16/21   Anam Alvarez MD   escitalopram (LEXAPRO) 10 MG tablet Take 10 mg by mouth daily    Historical Provider, MD   ferrous gluconate (FERGON) 324 (38 Fe) MG tablet Take 324 mg by mouth daily (with breakfast)    Historical Provider, MD   famotidine (PEPCID) 40 MG tablet Take 40 mg by mouth 2 times daily as needed (heartburn)     Historical Provider, MD   amLODIPine (NORVASC) 5 MG tablet Take 5 mg by mouth daily    Historical Provider, MD   albuterol (PROVENTIL) (2.5 MG/3ML) 0.083% nebulizer solution Take 2.5 mg by nebulization every 6 hours as needed for Wheezing Historical Provider, MD   albuterol sulfate  (90 Base) MCG/ACT inhaler Inhale 2 puffs into the lungs every 4-6 hours as needed for Wheezing    Historical Provider, MD   aspirin 81 MG tablet Take 81 mg by mouth daily    Historical Provider, MD   budesonide-formoterol (SYMBICORT) 160-4.5 MCG/ACT AERO Inhale 2 puffs into the lungs 2 times daily    Historical Provider, MD   folic acid (FOLVITE) 1 MG tablet Take 1 mg by mouth daily    Historical Provider, MD   metoprolol succinate (TOPROL XL) 50 MG extended release tablet Take 50 mg by mouth daily    Historical Provider, MD   pantoprazole (PROTONIX) 40 MG tablet Take 40 mg by mouth daily    Historical Provider, MD   QUEtiapine (SEROQUEL) 400 MG tablet Take 400 mg by mouth nightly     Historical Provider, MD   vitamin B-1 (THIAMINE) 100 MG tablet Take 100 mg by mouth daily    Historical Provider, MD   Cholecalciferol (VITAMIN D3) 2000 units CAPS Take 1 capsule by mouth daily    Historical Provider, MD   tiotropium (SPIRIVA RESPIMAT) 2.5 MCG/ACT AERS inhaler Inhale 2 puffs into the lungs daily    Historical Provider, MD        Allergies:       Patient has no known allergies. Social History:     Tobacco:    reports that he has quit smoking. His smoking use included cigarettes. He smoked 0.10 packs per day. He quit smokeless tobacco use about 3 weeks ago. Alcohol:      reports previous alcohol use. Drug Use:  reports no history of drug use. Family History:     History reviewed. No pertinent family history.       Physical Exam:     Vitals:  /70   Pulse 82   Temp 98.2 °F (36.8 °C) (Oral)   Resp 19   Ht 5' 5\" (1.651 m)   Wt 186 lb 11.7 oz (84.7 kg)   SpO2 92%   BMI 31.07 kg/m²   Temp (24hrs), Av.9 °F (36.6 °C), Min:97.7 °F (36.5 °C), Max:98.2 °F (36.8 °C)          General appearance - alert, well appearing, mild distress  Mental status - oriented to person, place, and time with normal affect  Head - normocephalic and atraumatic  Eyes - pupils equal and reactive, extraocular eye movements intact, conjunctiva clear  Ears - hearing appears to be intact  Nose - no drainage noted  Mouth - mucous membranes moist  Neck - supple, no carotid bruits, thyroid not palpable  Chest -wheezing to auscultation, increased effort  Heart - normal rate, regular rhythm, no murmur  Abdomen - soft, nontender, nondistended, bowel sounds present all four quadrants, no masses, hepatomegaly or splenomegaly  Neurological - normal speech, no focal findings or movement disorder noted, cranial nerves II through XII grossly intact  Extremities - peripheral pulses palpable, no pedal edema or calf pain with palpation  Skin - no gross lesions, rashes, or induration noted        Data:     Labs:    Hematology:  Recent Labs     12/31/21  1038 01/01/22  0603   WBC 14.6* 11.7*   RBC 5.11 4.83   HGB 15.7 14.8   HCT 47.2 44.1   MCV 92.4 91.3   MCH 30.7 30.6   MCHC 33.3 33.6   RDW 12.0 11.9    305   MPV 9.6 10.0   INR  --  1.0     Chemistry:  Recent Labs     12/31/21  1038 12/31/21  1234 12/31/21  1436 01/01/22  0603   *  --   --  136   K 3.6*  --   --  3.9   CL 87*  --   --  93*   CO2 25  --   --  26   GLUCOSE 103*  --   --  129*   BUN 11  --   --  17   CREATININE 0.94  --   --  0.97   ANIONGAP 20*  --   --  17   LABGLOM >60  --   --  >60   GFRAA >60  --   --  >60   CALCIUM 10.4  --   --  10.0   PROBNP 21  --   --   --    TROPHS 20 18 18  --    MYOGLOBIN 46 55 68  --      Recent Labs     12/31/21  1038   PROT 8.0   LABALBU 4.4   AST 19   ALT 18   ALKPHOS 69   BILITOT 0.69   LIPASE 26       Lab Results   Component Value Date    INR 1.0 01/01/2022    INR 1.1 07/13/2021    PROTIME 13.2 01/01/2022    PROTIME 13.5 07/13/2021       Lab Results   Component Value Date/Time    SPECIAL LAC 07/12/2021 06:53 AM     Lab Results   Component Value Date/Time    CULTURE NO GROWTH 6 DAYS 07/12/2021 06:53 AM       No results found for: POCPH, PHART, PH, POCPCO2, CLJ1QQQ, PCO2, POCPO2, PO2ART, PO2, POCHCO3, NVT1BSB, HCO3, NBEA, PBEA, BEART, BE, THGBART, THB, HBI6BDV, HFWR8MPK, M8ZUUFFK, O2SAT, FIO2    Radiology:    CT ABDOMEN PELVIS W IV CONTRAST Additional Contrast? None    Result Date: 12/31/2021  1. Multiple hepatic lesions. Those that are included on prior CT scan of the chest of March 2021, appear grossly unchanged. However, the lesions are ill-defined and non cystic. They have not been fully characterized or evaluated. Given the patient's history of chronic hepatitis C, malignancy/metastases are not excluded. 2.  No bowel obstruction, appendicitis urinary obstruction or gallstones. 3.  Liver is heterogeneous but of generalized decreased attenuation suggesting fatty infiltration. 4.  Mucosal thickening of the stomach in the fundal area suggestive of gastritis with prominent folds and crypts. RECOMMENDATIONS: Further assessment of liver lesions is advised. This could include follow-up CT with hemangioma protocol or MRI imaging of the abdomen with and without contrast.     XR CHEST PORTABLE    Result Date: 12/31/2021  No acute cardiopulmonary disease. COPD. All radiological studies reviewed                Code Status:  Full Code    Electronically signed by Keily Seo MD on 1/1/2022 at 8:09 PM     Copy sent to Dr. Kurtis Valadez MD    This note was created with the assistance of a speech-recognition program.  Although the intention is to generate a document that actually reflects the content of the visit, no guarantees can be provided that every mistake has been identified and corrected by editing. Note was updated later by me after  physical examination and  completion of the assessment.

## 2022-01-02 NOTE — PLAN OF CARE
Problem: Pain:  Goal: Control of acute pain  Description: Control of acute pain  1/2/2022 0856 by William Gomez RN  Outcome: Ongoing  Note: Patient's pain well controlled with ordered pain medications and alternate therapies      Problem: Discharge Planning:  Goal: Discharged to appropriate level of care  Description: Discharged to appropriate level of care  1/2/2022 0856 by William Gomez RN  Outcome: Ongoing     Problem: Activity Intolerance:  Goal: Ability to tolerate increased activity will improve  Description: Ability to tolerate increased activity will improve  1/2/2022 0856 by William Gomez RN  Outcome: Ongoing     Problem: Airway Clearance - Ineffective:  Goal: Ability to maintain a clear airway will improve  Description: Ability to maintain a clear airway will improve  1/2/2022 0856 by William Gomez RN  Outcome: Ongoing     Problem: Breathing Pattern - Ineffective:  Goal: Ability to achieve and maintain a regular respiratory rate will improve  Description: Ability to achieve and maintain a regular respiratory rate will improve  1/2/2022 0856 by William Gomez RN  Outcome: Ongoing     Problem: Gas Exchange - Impaired:  Goal: Levels of oxygenation will improve  Description: Levels of oxygenation will improve  1/2/2022 0856 by William Gomez RN  Outcome: Ongoing  Note: Supplemental oxygen at 4 liters by NC continued with oxygen levels stable between 92-95%. IV solumedrol and respiratory treatments continued as ordered.       Problem: Falls - Risk of:  Goal: Will remain free from falls  Description: Will remain free from falls  1/2/2022 0856 by William Gomez RN  Outcome: Ongoing

## 2022-01-02 NOTE — RT PROTOCOL NOTE
RT Inhaler-Nebulizer Bronchodilator Protocol Note    There is a bronchodilator order in the chart from a provider indicating to follow the RT Bronchodilator Protocol and there is an Initiate RT Inhaler-Nebulizer Bronchodilator Protocol order as well (see protocol at bottom of note). CXR Findings:  XR CHEST PORTABLE    Result Date: 12/31/2021  No acute cardiopulmonary disease. COPD. The findings from the last RT Protocol Assessment were as follows:   History Pulmonary Disease: Chronic pulmonary disease  Respiratory Pattern: Mild dyspnea at rest, irregular pattern, or RR 21-25 bpm  Breath Sounds: Slightly diminished and/or crackles  Cough: Strong, spontaneous, non-productive  Indication for Bronchodilator Therapy: Decreased or absent breath sounds  Bronchodilator Assessment Score: 8    Aerosolized bronchodilator medication orders have been revised according to the RT Inhaler-Nebulizer Bronchodilator Protocol below. Respiratory Therapist to perform RT Therapy Protocol Assessment initially then follow the protocol. Repeat RT Therapy Protocol Assessment PRN for score 0-3 or on second treatment, BID, and PRN for scores above 3. No Indications - adjust the frequency to every 6 hours PRN wheezing or bronchospasm, if no treatments needed after 48 hours then discontinue using Per Protocol order mode. If indication present, adjust the RT bronchodilator orders based on the Bronchodilator Assessment Score as indicated below. Use Inhaler orders unless patient has one or more of the following: on home nebulizer, not able to hold breath for 10 seconds, is not alert and oriented, cannot activate and use MDI correctly, or respiratory rate 25 breaths per minute or more, then use the equivalent nebulizer order(s) with same Frequency and PRN reasons based on the score. If a patient is on this medication at home then do not decrease Frequency below that used at home.     0-3 - enter or revise RT bronchodilator order(s) to equivalent RT Bronchodilator order with Frequency of every 4 hours PRN for wheezing or increased work of breathing using Per Protocol order mode. 4-6 - enter or revise RT Bronchodilator order(s) to two equivalent RT bronchodilator orders with one order with BID Frequency and one order with Frequency of every 4 hours PRN wheezing or increased work of breathing using Per Protocol order mode. 7-10 - enter or revise RT Bronchodilator order(s) to two equivalent RT bronchodilator orders with one order with TID Frequency and one order with Frequency of every 4 hours PRN wheezing or increased work of breathing using Per Protocol order mode. 11-13 - enter or revise RT Bronchodilator order(s) to one equivalent RT bronchodilator order with QID Frequency and an Albuterol order with Frequency of every 4 hours PRN wheezing or increased work of breathing using Per Protocol order mode. Greater than 13 - enter or revise RT Bronchodilator order(s) to one equivalent RT bronchodilator order with every 4 hours Frequency and an Albuterol order with Frequency of every 2 hours PRN wheezing or increased work of breathing using Per Protocol order mode. RT to enter RT Home Evaluation for COPD & MDI Assessment order using Per Protocol order mode.     Electronically signed by Warren Pena RCP on 1/2/2022 at 9:03 AM

## 2022-01-02 NOTE — PROGRESS NOTES
[facility]    Gastroenterology Daily Progress Note      Patient:   Josefa Escoto   :    1965   Facility:   [facility]  Date:     2022  Consultant:   GONZÁLEZ Bledsoe CNP, CNP      SUBJECTIVE  60 y.o. male admitted 2021 with COPD exacerbation ([facility] [address] [J44.1]  Liver lesion [K76.9] and seen for abdominal pain with liver lesions. The pt was seen and examined. Reports mild diffuse abdominal pain, no nausea or emesis. Tolerating a diet.  No bm overnight        OBJECTIVE  Scheduled Meds:   pantoprazole  40 mg Oral QAM AC    sodium chloride flush  5-40 mL IntraVENous 2 times per day    enoxaparin  40 mg SubCUTAneous Daily    amLODIPine  5 mg Oral Daily    aspirin  81 mg Oral Daily    budesonide-formoterol  2 puff Inhalation BID    escitalopram  10 mg Oral Daily    folic acid  1 mg Oral Daily    ferrous gluconate  324 mg Oral Daily with breakfast    hydroCHLOROthiazide  25 mg Oral Daily    losartan  100 mg Oral Daily    metoprolol succinate  50 mg Oral Daily    montelukast  10 mg Oral Nightly    QUEtiapine  400 mg Oral Nightly    Roflumilast  500 mcg Oral Daily    methylPREDNISolone  60 mg IntraVENous Q6H    influenza virus vaccine  0.5 mL IntraMUSCular Prior to discharge    ipratropium-albuterol  1 ampule Inhalation TID       Vital Signs:  /80   Pulse 82   Temp 98.2 °F (36.8 °C) (Oral)   Resp 18   Ht 5' 5\" (1.651 m)   Wt 189 lb (85.7 kg)   SpO2 94%   BMI 31.45 kg/m²      Physical Exam:   General Appearance: alert and oriented to person, place and time, well-developed and well-nourished, in no acute distress  Skin: warm and dry, no rash or erythema  Head: normocephalic and atraumatic  Eyes: pupils equal, round, and reactive to light, extraocular eye movements intact, conjunctivae normal  ENT: hearing grossly normal bilaterally  Neck: neck supple and non tender without mass, no thyromegaly or thyroid nodules, no cervical lymphadenopathy Pulmonary/Chest: clear to auscultation bilaterally- no wheezes, rales or rhonchi, normal air movement, no respiratory distress  Cardiovascular: normal rate, regular rhythm, normal S1 and S2, no murmurs, rubs, clicks or gallops, distal pulses intact, no carotid bruits  Abdomen: soft, non-tender, -distended, normal bowel sounds, no masses or organomegaly  Extremities: no cyanosis, clubbing or edema  Musculoskeletal: normal range of motion, no joint swelling, deformity or tenderness  Neurologic: no cranial nerve deficit and muscle strength normal    Lab and Imaging Review     CBC  Recent Labs     12/31/21  1038 01/01/22  0603 01/02/22  0556   WBC 14.6* 11.7* 21.4*   HGB 15.7 14.8 14.3   HCT 47.2 44.1 42.0   MCV 92.4 91.3 90.7    305 287       BMP  Recent Labs     12/31/21  1038 01/01/22  0603 01/02/22  0556   * 136 136   K 3.6* 3.9 3.7   CL 87* 93* 94*   CO2 25 26 29   BUN 11 17 25*   CREATININE 0.94 0.97 0.99   GLUCOSE 103* 129* 153*   CALCIUM 10.4 10.0 10.1       LFTS  Recent Labs     12/31/21  1038   ALKPHOS 69   ALT 18   AST 19   PROT 8.0   BILITOT 0.69   LABALBU 4.4       AMYLASE/LIPASE/AMMONIA  Recent Labs     12/31/21  1038   LIPASE 26       PT/INR  Recent Labs     01/01/22  0603   PROTIME 13.2   INR 1.0     FINDINGS:ct abd 12/31/21   Lower Chest: No acute abnormality in the included lung bases.       Organs: The spleen, pancreas, gallbladder, adrenals and kidneys demonstrate   no acute abnormality.  Liver is normal in size but heterogeneous and of   generally decreased attenuation suggesting hepatic steatosis.  The patient   has multiple hypodensities in the liver.  Many of these areas show evidence   of peripheral enhancement.  To the extent the liver was included on prior   study, the lesions examined on prior examination appear unchanged in size.    Largest area is present in the inferior right lobe of the liver 2.9 cm,   unchanged.  There are several lesions at the tip of the right lobe of the liver which were not included in the field of view on prior examination and   interval change cannot be assessed.  These lesions cannot be fully evaluated   on the submitted images.  Metastasis would be of concern.  Less likely,   multiple hemangiomas.       GI/Bowel: No free fluid, or free air.  Wall thickening and mucosal thickening   in the fundus of the stomach is noted suggesting inflammation.  No small   bowel obstruction is noted.       Pelvis: No evidence of appendicitis.  The bladder is unremarkable.  No   abnormal fluid collections, pelvic or inguinal adenopathy is noted.       Peritoneum/Retroperitoneum: No aortic aneurysm, retroperitoneal mass or   retroperitoneal adenopathy is noted. Derek Motto is some shotty lymph nodes in the   portal region without wilder adenopathy.       Bones/Soft Tissues: Transistor battery pack is embedded in the left buttock.    This is attached to an electrode which enters the left pelvis.  Sclerotic   lesion in the right sacrum is noted. Derek Motto are also some small sclerotic   areas in the iliac bones, right ischial bone.  These may represent small bone   islands as no sclerotic lesions are present elsewhere.           Impression   1.  Multiple hepatic lesions.  Those that are included on prior CT scan of   the chest of March 2021, appear grossly unchanged.  However, the lesions are   ill-defined and non cystic.  They have not been fully characterized or   evaluated. Valerie Lev the patient's history of chronic hepatitis C,   malignancy/metastases are not excluded.       2.  No bowel obstruction, appendicitis urinary obstruction or gallstones.       3.  Liver is heterogeneous but of generalized decreased attenuation   suggesting fatty infiltration.       4.  Mucosal thickening of the stomach in the fundal area suggestive of   gastritis with prominent folds and crypts.       RECOMMENDATIONS:   Further assessment of liver lesions is advised.  This could include follow-up   CT with hemangioma protocol or MRI imaging of the abdomen with and without   contrast.             ASSESSMENT/plan  1. Abdominal pain with hx of alcohol abuse, liver lesions with hx of treated hepatitis c afp is normal ? Hemangiomas on imaging  -d/w md egd tomorrow to r/o gastritis pt reporting melena  -paracentesis with labs tomorrow  -continue ppi  -unable to do mri abd due to loop recorder  -records from dr Kim Sierra office ordered    2. Acute on chronic respiratory failure with copd exacerbation      This plan was formulated in collaboration with Dr. Ramón Guevara . Electronically signed by: GONZÁLEZ Villafana - CNP on 1/2/2022 at 9:56 AM       Attending Physician Statement  I have discussed the care of Nadege Perez and   I have examined the patient myselft independently, and taken ros and hpi , including pertinent history and exam findings,  with the author of this note . I have reviewed the key elements of all parts of the encounter with the nurse practitioner/resident.     I agree with the assessment, plan and orders as documented by the above health care provider       EGD tomorrow  Paracentesis tomorrow  Alpha-fetoprotein is been normal  Outpatient follow-up for the liver lesions  Electronically signed by Shahriar Ramirez MD

## 2022-01-02 NOTE — PROGRESS NOTES
Progress note  [facility]    Adult Hospitalist      Name: Binh Castro  MRN: [id number]     Acct: [de-identified]  Room: 2007/2007-02    Admit Date: 12/31/2021  9:59 AM  PCP: Fanny Leung MD    Primary Problem  Active Problems:    COPD exacerbation [facility])    Liver lesion    Generalized abdominal pain    Alcohol abuse  Resolved Problems:    * No resolved hospital problems.  *        Assesment:   Acute on chronic hypoxemic respiratory failure  Acute COPD exacerbation  Acute abdominal pain  History of alcohol abuse  Liver lesion with history of treated hepatitis C  Hypertension  Mixed hyperlipidemia  Chronic coronary artery disease  Depression with anxiety  Tobacco abuse  Obesity, BMI 30.79        Plan:   Patient is on immediate floor  Oxygen to keep SPO2 more than 90%  Telemetry  Check vital signs closely  CBC BMP daily    IV Solu-Medrol  DuoNeb breathing treatment  Continue his inhalers  Continue Singulair  Continue Roflumilast  Check viral respiratory c/s  Pulmonology consult    GI following for abdominal pain  Plan for endoscopy with paracentesis on 1/3/2022    Increase Ambien to 10 mg nightly  DC tramadol and Tylenol  IV morphine as needed    Continue amlodipine, hydrochlorothiazide  Continue aspirin  Continue Celexa and Seroquel  Continue other medication as below    Scheduled Meds:   pantoprazole  40 mg Oral QAM AC    sodium chloride flush  5-40 mL IntraVENous 2 times per day    [Held by provider] enoxaparin  40 mg SubCUTAneous Daily    amLODIPine  5 mg Oral Daily    [Held by provider] aspirin  81 mg Oral Daily    budesonide-formoterol  2 puff Inhalation BID    escitalopram  10 mg Oral Daily    folic acid  1 mg Oral Daily    ferrous gluconate  324 mg Oral Daily with breakfast    hydroCHLOROthiazide  25 mg Oral Daily    losartan  100 mg Oral Daily    metoprolol succinate  50 mg Oral Daily    montelukast  10 mg Oral Nightly    QUEtiapine  400 mg Oral Nightly    Roflumilast  500 mcg Oral Daily    methylPREDNISolone  60 mg IntraVENous Q6H    influenza virus vaccine  0.5 mL IntraMUSCular Prior to discharge    ipratropium-albuterol  1 ampule Inhalation TID     Continuous Infusions:   sodium chloride       PRN Meds:  zolpidem, 5 mg, Nightly PRN  sodium chloride flush, 10 mL, PRN  sodium chloride, 25 mL, PRN  potassium chloride, 40 mEq, PRN   Or  potassium alternative oral replacement, 40 mEq, PRN   Or  potassium chloride, 10 mEq, PRN  magnesium sulfate, 1,000 mg, PRN  ondansetron, 4 mg, Q8H PRN   Or  ondansetron, 4 mg, Q6H PRN  magnesium hydroxide, 30 mL, Daily PRN  acetaminophen, 650 mg, Q6H PRN   Or  acetaminophen, 650 mg, Q6H PRN  traMADol, 50 mg, Q6H PRN        Chief Complaint:     Chief Complaint   Patient presents with    Shortness of Breath         History of Present Illness:     Patient seen and examined at bedside. No overnight events.   C/o SOB and fatigue  Patient also complaining of abdominal pain   afebrile   Denies any chest pain,   changes in urination or bowel habits      HPI:      Doss Schirmer is a 60 y.o.  male who presents with Shortness of Breath  This is a 64-year-old gentleman who is been admitted via the emergency room, patient came to the ER with a complaint of having some shortness of breath, patient has past medical history significant coronary disease and COPD, patient has been having shortness of breath for past 2 weeks which has progressively gotten worse, patient denies any fever or chills, patient has some nausea but no vomiting, patient is been having some coughing spells, further patient does wear oxygen at home, patient is requiring more oxygen, for this reason came to the emergency room, initial testing in the emergency room is unremarkable patient is negative for COVID-19 and is vaccinated, further patient is noticed to be in COPD exacerbation admitted for further management    I have personally reviewed the past medical history, past surgical history, medications, social history, and family history, and summarized in the note. Review of Systems:     All 10 point system is reviewed and negative otherwise mentioned in HPI. Past Medical History:     Past Medical History:   Diagnosis Date    Arthritis     CAD (coronary artery disease)     Cancer ([facility]     prostate    Cirrhosis with alcoholism ([facility]     COPD (chronic obstructive pulmonary disease) ([facility]     Depression     BOURGEOIS (dyspnea on exertion)     H/O prostate cancer     Hyperlipidemia     Hypertension     MI (myocardial infarction) ([facility]     Seizures ([facility] 2016    last one over 2 years ago     SVT (supraventricular tachycardia) ([facility]     Tobacco abuse         Past Surgical History:     Past Surgical History:   Procedure Laterality Date    CARDIAC CATHETERIZATION      ablation    INSERTABLE CARDIAC MONITOR  10/03/2018    INTERNAL LOOP RECORDER    PROSTATECTOMY      SINUS SURGERY      TONGUE SURGERY      UPPER GASTROINTESTINAL ENDOSCOPY N/A 6/10/2021    EGD BIOPSY performed by Ce Walls MD at [address] [facility]        Medications Prior to Admission:       Prior to Admission medications    Medication Sig Start Date End Date Taking?  Authorizing Provider   losartan (COZAAR) 100 MG tablet Take 1 tablet by mouth daily 7/16/21   Trinidad Augustine MD   hydroCHLOROthiazide (HYDRODIURIL) 25 MG tablet Take 1 tablet by mouth daily 7/16/21   Trinidad Augustine MD   montelukast (SINGULAIR) 10 MG tablet Take 1 tablet by mouth nightly 7/15/21   Trinidad Augustine MD   predniSONE (DELTASONE) 20 MG tablet 4tabs qd x 3days, 3tabs qd x 3days, 2 tabs qd x 3days, 1tab qd x 3days #30 for 12 days 7/15/21   Trinidad Augustine MD   Roflumilast (DALIRESP) 500 MCG tablet Take 1 tablet by mouth daily 7/16/21   Trinidad Augustine MD   escitalopram (LEXAPRO) 10 MG tablet Take 10 mg by mouth daily    Historical Provider, MD   ferrous gluconate (FERGON) 324 (38 Fe) MG tablet Take 324 mg by mouth daily (with breakfast) Historical Provider, MD   famotidine (PEPCID) 40 MG tablet Take 40 mg by mouth 2 times daily as needed (heartburn)     Historical Provider, MD   amLODIPine (NORVASC) 5 MG tablet Take 5 mg by mouth daily    Historical Provider, MD   albuterol (PROVENTIL) (2.5 MG/3ML) 0.083% nebulizer solution Take 2.5 mg by nebulization every 6 hours as needed for Wheezing    Historical Provider, MD   albuterol sulfate  (90 Base) MCG/ACT inhaler Inhale 2 puffs into the lungs every 4-6 hours as needed for Wheezing    Historical Provider, MD   aspirin 81 MG tablet Take 81 mg by mouth daily    Historical Provider, MD   budesonide-formoterol (SYMBICORT) 160-4.5 MCG/ACT AERO Inhale 2 puffs into the lungs 2 times daily    Historical Provider, MD   folic acid (FOLVITE) 1 MG tablet Take 1 mg by mouth daily    Historical Provider, MD   metoprolol succinate (TOPROL XL) 50 MG extended release tablet Take 50 mg by mouth daily    Historical Provider, MD   pantoprazole (PROTONIX) 40 MG tablet Take 40 mg by mouth daily    Historical Provider, MD   QUEtiapine (SEROQUEL) 400 MG tablet Take 400 mg by mouth nightly     Historical Provider, MD   vitamin B-1 (THIAMINE) 100 MG tablet Take 100 mg by mouth daily    Historical Provider, MD   Cholecalciferol (VITAMIN D3) 2000 units CAPS Take 1 capsule by mouth daily    Historical Provider, MD   tiotropium (SPIRIVA RESPIMAT) 2.5 MCG/ACT AERS inhaler Inhale 2 puffs into the lungs daily    Historical Provider, MD        Allergies:       Patient has no known allergies. Social History:     Tobacco:    reports that he has quit smoking. His smoking use included cigarettes. He smoked 0.10 packs per day. He quit smokeless tobacco use about 4 weeks ago. Alcohol:      reports previous alcohol use. Drug Use:  reports no history of drug use. Family History:     History reviewed. No pertinent family history.       Physical Exam:     Vitals:  /71   Pulse 89   Temp 98.2 °F (36.8 °C) (Oral)   Resp 15 Ht 5' 5\" (1.651 m)   Wt 189 lb (85.7 kg)   SpO2 92%   BMI 31.45 kg/m²   Temp (24hrs), Av.2 °F (36.8 °C), Min:98.1 °F (36.7 °C), Max:98.2 °F (36.8 °C)          General appearance - alert, well appearing, mild distress  Mental status - oriented to person, place, and time with normal affect  Head - normocephalic and atraumatic  Eyes - pupils equal and reactive, extraocular eye movements intact, conjunctiva clear  Ears - hearing appears to be intact  Nose - no drainage noted  Mouth - mucous membranes moist  Neck - supple, no carotid bruits, thyroid not palpable  Chest -wheezing to auscultation, increased effort  Heart - normal rate, regular rhythm, no murmur  Abdomen - soft, nontender, distended, bowel sounds present all four quadrants, no masses, hepatomegaly or splenomegaly  Neurological - normal speech, no focal findings or movement disorder noted, cranial nerves II through XII grossly intact  Extremities - peripheral pulses palpable, no pedal edema or calf pain with palpation  Skin - no gross lesions, rashes, or induration noted        Data:     Labs:    Hematology:  Recent Labs     21  1038 22  0603 22  0556   WBC 14.6* 11.7* 21.4*   RBC 5.11 4.83 4.63   HGB 15.7 14.8 14.3   HCT 47.2 44.1 42.0   MCV 92.4 91.3 90.7   MCH 30.7 30.6 30.9   MCHC 33.3 33.6 34.0   RDW 12.0 11.9 12.0    305 287   MPV 9.6 10.0 10.2   INR  --  1.0  --      Chemistry:  Recent Labs     21  1038 21  1234 21  1436 22  0603 22  0556   *  --   --  136 136   K 3.6*  --   --  3.9 3.7   CL 87*  --   --  93* 94*   CO2 25  --   --  26 29   GLUCOSE 103*  --   --  129* 153*   BUN 11  --   --  17 25*   CREATININE 0.94  --   --  0.97 0.99   ANIONGAP 20*  --   --  17 13   LABGLOM >60  --   --  >60 >60   GFRAA >60  --   --  >60 >60   CALCIUM 10.4  --   --  10.0 10.1   PROBNP 21  --   --   --   --    TROPHS 20 18 18  --   --    MYOGLOBIN 46 55 68  --   --      Recent Labs     21  1038 PROT 8.0   LABALBU 4.4   AST 19   ALT 18   ALKPHOS 69   BILITOT 0.69   LIPASE 26       Lab Results   Component Value Date    INR 1.0 01/01/2022    INR 1.1 07/13/2021    PROTIME 13.2 01/01/2022    PROTIME 13.5 07/13/2021       Lab Results   Component Value Date/Time    SPECIAL NOT REPORTED 01/01/2022 10:15 PM     Lab Results   Component Value Date/Time    CULTURE  01/01/2022 10:15 PM     NEGATIVE: HSV-1 DNA not detected by nucleic acid amplification. CULTURE  01/01/2022 10:15 PM     NEGATIVE: HSV-2 DNA not detected by nucleic acid amplification. CULTURE  01/01/2022 10:15 PM     Due to the specimen source, HSV 1,2 testing was performed by a molecular method. No results found for: POCPH, PHART, PH, POCPCO2, HFK1QFD, PCO2, POCPO2, PO2ART, PO2, POCHCO3, WIG3OAS, HCO3, NBEA, PBEA, BEART, BE, THGBART, THB, TBU7XIJ, EFAJ3PCE, B8XLBUBU, O2SAT, FIO2    Radiology:    CT ABDOMEN PELVIS W IV CONTRAST Additional Contrast? None    Result Date: 12/31/2021  1. Multiple hepatic lesions. Those that are included on prior CT scan of the chest of March 2021, appear grossly unchanged. However, the lesions are ill-defined and non cystic. They have not been fully characterized or evaluated. Given the patient's history of chronic hepatitis C, malignancy/metastases are not excluded. 2.  No bowel obstruction, appendicitis urinary obstruction or gallstones. 3.  Liver is heterogeneous but of generalized decreased attenuation suggesting fatty infiltration. 4.  Mucosal thickening of the stomach in the fundal area suggestive of gastritis with prominent folds and crypts. RECOMMENDATIONS: Further assessment of liver lesions is advised. This could include follow-up CT with hemangioma protocol or MRI imaging of the abdomen with and without contrast.     XR CHEST PORTABLE    Result Date: 12/31/2021  No acute cardiopulmonary disease. COPD.          All radiological studies reviewed                Code Status:  Full

## 2022-01-03 ENCOUNTER — ANESTHESIA (OUTPATIENT)
Dept: OPERATING ROOM | Age: 57
DRG: 140 | End: 2022-01-03
Payer: MEDICARE

## 2022-01-03 ENCOUNTER — ANESTHESIA EVENT (OUTPATIENT)
Dept: OPERATING ROOM | Age: 57
DRG: 140 | End: 2022-01-03
Payer: MEDICARE

## 2022-01-03 VITALS — DIASTOLIC BLOOD PRESSURE: 85 MMHG | OXYGEN SATURATION: 99 % | SYSTOLIC BLOOD PRESSURE: 130 MMHG

## 2022-01-03 LAB
ABSOLUTE EOS #: <0.03 K/UL (ref 0–0.44)
ABSOLUTE IMMATURE GRANULOCYTE: 0.1 K/UL (ref 0–0.3)
ABSOLUTE LYMPH #: 0.72 K/UL (ref 1.1–3.7)
ABSOLUTE MONO #: 0.85 K/UL (ref 0.1–1.2)
ANION GAP SERPL CALCULATED.3IONS-SCNC: 11 MMOL/L (ref 9–17)
BASOPHILS # BLD: 0 % (ref 0–2)
BASOPHILS ABSOLUTE: <0.03 K/UL (ref 0–0.2)
BUN BLDV-MCNC: 24 MG/DL (ref 6–20)
BUN/CREAT BLD: 29 (ref 9–20)
CALCIUM SERPL-MCNC: 9.6 MG/DL (ref 8.6–10.4)
CHLORIDE BLD-SCNC: 97 MMOL/L (ref 98–107)
CO2: 31 MMOL/L (ref 20–31)
CREAT SERPL-MCNC: 0.82 MG/DL (ref 0.7–1.2)
DIFFERENTIAL TYPE: ABNORMAL
EOSINOPHILS RELATIVE PERCENT: 0 % (ref 1–4)
GFR AFRICAN AMERICAN: >60 ML/MIN
GFR NON-AFRICAN AMERICAN: >60 ML/MIN
GFR SERPL CREATININE-BSD FRML MDRD: ABNORMAL ML/MIN/{1.73_M2}
GFR SERPL CREATININE-BSD FRML MDRD: ABNORMAL ML/MIN/{1.73_M2}
GLUCOSE BLD-MCNC: 131 MG/DL (ref 70–99)
HCT VFR BLD CALC: 40.5 % (ref 40.7–50.3)
HEMOGLOBIN: 13.6 G/DL (ref 13–17)
IMMATURE GRANULOCYTES: 1 %
LYMPHOCYTES # BLD: 5 % (ref 24–43)
MAGNESIUM: 2.1 MG/DL (ref 1.6–2.6)
MCH RBC QN AUTO: 30.7 PG (ref 25.2–33.5)
MCHC RBC AUTO-ENTMCNC: 33.6 G/DL (ref 28.4–34.8)
MCV RBC AUTO: 91.4 FL (ref 82.6–102.9)
MONOCYTES # BLD: 6 % (ref 3–12)
NRBC AUTOMATED: 0 PER 100 WBC
PDW BLD-RTO: 12 % (ref 11.8–14.4)
PLATELET # BLD: 250 K/UL (ref 138–453)
PLATELET ESTIMATE: ABNORMAL
PMV BLD AUTO: 10.4 FL (ref 8.1–13.5)
POTASSIUM SERPL-SCNC: 3.4 MMOL/L (ref 3.7–5.3)
RBC # BLD: 4.43 M/UL (ref 4.21–5.77)
RBC # BLD: ABNORMAL 10*6/UL
SARS-COV-2, RAPID: NOT DETECTED
SEG NEUTROPHILS: 88 % (ref 36–65)
SEGMENTED NEUTROPHILS ABSOLUTE COUNT: 13.62 K/UL (ref 1.5–8.1)
SODIUM BLD-SCNC: 139 MMOL/L (ref 135–144)
SPECIMEN DESCRIPTION: NORMAL
WBC # BLD: 15.3 K/UL (ref 3.5–11.3)
WBC # BLD: ABNORMAL 10*3/UL

## 2022-01-03 PROCEDURE — 6370000000 HC RX 637 (ALT 250 FOR IP): Performed by: NURSE PRACTITIONER

## 2022-01-03 PROCEDURE — 3700000000 HC ANESTHESIA ATTENDED CARE: Performed by: INTERNAL MEDICINE

## 2022-01-03 PROCEDURE — 7100000000 HC PACU RECOVERY - FIRST 15 MIN: Performed by: INTERNAL MEDICINE

## 2022-01-03 PROCEDURE — 80048 BASIC METABOLIC PNL TOTAL CA: CPT

## 2022-01-03 PROCEDURE — 6370000000 HC RX 637 (ALT 250 FOR IP): Performed by: INTERNAL MEDICINE

## 2022-01-03 PROCEDURE — 6360000002 HC RX W HCPCS: Performed by: HOSPITALIST

## 2022-01-03 PROCEDURE — 90686 IIV4 VACC NO PRSV 0.5 ML IM: CPT | Performed by: INTERNAL MEDICINE

## 2022-01-03 PROCEDURE — 85025 COMPLETE CBC W/AUTO DIFF WBC: CPT

## 2022-01-03 PROCEDURE — 6360000002 HC RX W HCPCS: Performed by: NURSE ANESTHETIST, CERTIFIED REGISTERED

## 2022-01-03 PROCEDURE — 88342 IMHCHEM/IMCYTCHM 1ST ANTB: CPT

## 2022-01-03 PROCEDURE — 6370000000 HC RX 637 (ALT 250 FOR IP): Performed by: FAMILY MEDICINE

## 2022-01-03 PROCEDURE — 83735 ASSAY OF MAGNESIUM: CPT

## 2022-01-03 PROCEDURE — 7100000001 HC PACU RECOVERY - ADDTL 15 MIN: Performed by: INTERNAL MEDICINE

## 2022-01-03 PROCEDURE — 97530 THERAPEUTIC ACTIVITIES: CPT

## 2022-01-03 PROCEDURE — 1200000000 HC SEMI PRIVATE

## 2022-01-03 PROCEDURE — 6360000002 HC RX W HCPCS: Performed by: INTERNAL MEDICINE

## 2022-01-03 PROCEDURE — 2709999900 HC NON-CHARGEABLE SUPPLY: Performed by: INTERNAL MEDICINE

## 2022-01-03 PROCEDURE — 94640 AIRWAY INHALATION TREATMENT: CPT

## 2022-01-03 PROCEDURE — 94761 N-INVAS EAR/PLS OXIMETRY MLT: CPT

## 2022-01-03 PROCEDURE — 87635 SARS-COV-2 COVID-19 AMP PRB: CPT

## 2022-01-03 PROCEDURE — 43239 EGD BIOPSY SINGLE/MULTIPLE: CPT | Performed by: INTERNAL MEDICINE

## 2022-01-03 PROCEDURE — 3609012400 HC EGD TRANSORAL BIOPSY SINGLE/MULTIPLE: Performed by: INTERNAL MEDICINE

## 2022-01-03 PROCEDURE — G0008 ADMIN INFLUENZA VIRUS VAC: HCPCS | Performed by: INTERNAL MEDICINE

## 2022-01-03 PROCEDURE — 2580000003 HC RX 258: Performed by: NURSE ANESTHETIST, CERTIFIED REGISTERED

## 2022-01-03 PROCEDURE — 6370000000 HC RX 637 (ALT 250 FOR IP): Performed by: HOSPITALIST

## 2022-01-03 PROCEDURE — 2500000003 HC RX 250 WO HCPCS: Performed by: NURSE ANESTHETIST, CERTIFIED REGISTERED

## 2022-01-03 PROCEDURE — 36415 COLL VENOUS BLD VENIPUNCTURE: CPT

## 2022-01-03 PROCEDURE — 0DB68ZX EXCISION OF STOMACH, VIA NATURAL OR ARTIFICIAL OPENING ENDOSCOPIC, DIAGNOSTIC: ICD-10-PCS | Performed by: INTERNAL MEDICINE

## 2022-01-03 PROCEDURE — 2700000000 HC OXYGEN THERAPY PER DAY

## 2022-01-03 PROCEDURE — 2580000003 HC RX 258: Performed by: INTERNAL MEDICINE

## 2022-01-03 PROCEDURE — 97161 PT EVAL LOW COMPLEX 20 MIN: CPT

## 2022-01-03 PROCEDURE — 88305 TISSUE EXAM BY PATHOLOGIST: CPT

## 2022-01-03 RX ORDER — SODIUM CHLORIDE 9 MG/ML
INJECTION, SOLUTION INTRAVENOUS CONTINUOUS PRN
Status: DISCONTINUED | OUTPATIENT
Start: 2022-01-03 | End: 2022-01-03 | Stop reason: SDUPTHER

## 2022-01-03 RX ORDER — LIDOCAINE HYDROCHLORIDE 20 MG/ML
INJECTION, SOLUTION EPIDURAL; INFILTRATION; INTRACAUDAL; PERINEURAL PRN
Status: DISCONTINUED | OUTPATIENT
Start: 2022-01-03 | End: 2022-01-03 | Stop reason: SDUPTHER

## 2022-01-03 RX ORDER — METHYLPREDNISOLONE SODIUM SUCCINATE 40 MG/ML
40 INJECTION, POWDER, LYOPHILIZED, FOR SOLUTION INTRAMUSCULAR; INTRAVENOUS EVERY 12 HOURS
Status: DISCONTINUED | OUTPATIENT
Start: 2022-01-04 | End: 2022-01-04 | Stop reason: HOSPADM

## 2022-01-03 RX ORDER — GUAIFENESIN 600 MG/1
600 TABLET, EXTENDED RELEASE ORAL 2 TIMES DAILY
Status: DISCONTINUED | OUTPATIENT
Start: 2022-01-03 | End: 2022-01-04 | Stop reason: HOSPADM

## 2022-01-03 RX ORDER — PROPOFOL 10 MG/ML
INJECTION, EMULSION INTRAVENOUS PRN
Status: DISCONTINUED | OUTPATIENT
Start: 2022-01-03 | End: 2022-01-03 | Stop reason: SDUPTHER

## 2022-01-03 RX ORDER — ONDANSETRON 2 MG/ML
4 INJECTION INTRAMUSCULAR; INTRAVENOUS
Status: DISCONTINUED | OUTPATIENT
Start: 2022-01-03 | End: 2022-01-03 | Stop reason: HOSPADM

## 2022-01-03 RX ADMIN — METOPROLOL SUCCINATE 50 MG: 50 TABLET, EXTENDED RELEASE ORAL at 09:14

## 2022-01-03 RX ADMIN — PROPOFOL 40 MG: 10 INJECTION, EMULSION INTRAVENOUS at 14:49

## 2022-01-03 RX ADMIN — AMLODIPINE BESYLATE 5 MG: 5 TABLET ORAL at 09:14

## 2022-01-03 RX ADMIN — IPRATROPIUM BROMIDE AND ALBUTEROL SULFATE 1 AMPULE: .5; 2.5 SOLUTION RESPIRATORY (INHALATION) at 09:27

## 2022-01-03 RX ADMIN — LOSARTAN POTASSIUM 100 MG: 100 TABLET, FILM COATED ORAL at 09:13

## 2022-01-03 RX ADMIN — GUAIFENESIN 600 MG: 600 TABLET, EXTENDED RELEASE ORAL at 09:14

## 2022-01-03 RX ADMIN — ROFLUMILAST 500 MCG: 500 TABLET ORAL at 09:13

## 2022-01-03 RX ADMIN — SODIUM CHLORIDE: 9 INJECTION, SOLUTION INTRAVENOUS at 14:45

## 2022-01-03 RX ADMIN — METHYLPREDNISOLONE SODIUM SUCCINATE 40 MG: 40 INJECTION, POWDER, FOR SOLUTION INTRAMUSCULAR; INTRAVENOUS at 02:06

## 2022-01-03 RX ADMIN — GUAIFENESIN 600 MG: 600 TABLET, EXTENDED RELEASE ORAL at 20:28

## 2022-01-03 RX ADMIN — PROPOFOL 50 MG: 10 INJECTION, EMULSION INTRAVENOUS at 14:48

## 2022-01-03 RX ADMIN — PROPOFOL 50 MG: 10 INJECTION, EMULSION INTRAVENOUS at 14:45

## 2022-01-03 RX ADMIN — ZOLPIDEM TARTRATE 10 MG: 5 TABLET ORAL at 20:36

## 2022-01-03 RX ADMIN — PROPOFOL 50 MG: 10 INJECTION, EMULSION INTRAVENOUS at 14:44

## 2022-01-03 RX ADMIN — IPRATROPIUM BROMIDE AND ALBUTEROL SULFATE 1 AMPULE: .5; 2.5 SOLUTION RESPIRATORY (INHALATION) at 22:00

## 2022-01-03 RX ADMIN — PROPOFOL 30 MG: 10 INJECTION, EMULSION INTRAVENOUS at 14:47

## 2022-01-03 RX ADMIN — MONTELUKAST 10 MG: 10 TABLET, FILM COATED ORAL at 20:27

## 2022-01-03 RX ADMIN — PANTOPRAZOLE SODIUM 40 MG: 40 TABLET, DELAYED RELEASE ORAL at 09:17

## 2022-01-03 RX ADMIN — MORPHINE SULFATE 1 MG: 2 INJECTION, SOLUTION INTRAMUSCULAR; INTRAVENOUS at 08:16

## 2022-01-03 RX ADMIN — MORPHINE SULFATE 1 MG: 2 INJECTION, SOLUTION INTRAMUSCULAR; INTRAVENOUS at 01:45

## 2022-01-03 RX ADMIN — ESCITALOPRAM OXALATE 10 MG: 10 TABLET ORAL at 09:14

## 2022-01-03 RX ADMIN — MORPHINE SULFATE 1 MG: 2 INJECTION, SOLUTION INTRAMUSCULAR; INTRAVENOUS at 16:58

## 2022-01-03 RX ADMIN — LIDOCAINE HYDROCHLORIDE 80 MG: 20 INJECTION, SOLUTION EPIDURAL; INFILTRATION; INTRACAUDAL; PERINEURAL at 14:44

## 2022-01-03 RX ADMIN — METHYLPREDNISOLONE SODIUM SUCCINATE 40 MG: 40 INJECTION, POWDER, FOR SOLUTION INTRAMUSCULAR; INTRAVENOUS at 11:21

## 2022-01-03 RX ADMIN — INFLUENZA A VIRUS A/VICTORIA/2570/2019 IVR-215 (H1N1) ANTIGEN (PROPIOLACTONE INACTIVATED), INFLUENZA A VIRUS A/CAMBODIA/E0826360/2020 IVR-224 (H3N2) ANTIGEN (PROPIOLACTONE INACTIVATED), INFLUENZA B VIRUS B/VICTORIA/705/2018 BVR-11 ANTIGEN (PROPIOLACTONE INACTIVATED), INFLUENZA B VIRUS B/PHUKET/3073/2013 BVR-1B ANTIGEN (PROPIOLACTONE INACTIVATED) 0.5 ML: 15; 15; 15; 15 INJECTION, SUSPENSION INTRAMUSCULAR at 20:28

## 2022-01-03 RX ADMIN — POTASSIUM CHLORIDE 40 MEQ: 1500 TABLET, EXTENDED RELEASE ORAL at 11:21

## 2022-01-03 RX ADMIN — HYDROCODONE BITARTRATE AND ACETAMINOPHEN 1 TABLET: 5; 325 TABLET ORAL at 06:57

## 2022-01-03 RX ADMIN — SODIUM CHLORIDE, PRESERVATIVE FREE 10 ML: 5 INJECTION INTRAVENOUS at 20:37

## 2022-01-03 RX ADMIN — FOLIC ACID 1 MG: 1 TABLET ORAL at 09:14

## 2022-01-03 RX ADMIN — HYDROCHLOROTHIAZIDE 25 MG: 25 TABLET ORAL at 09:14

## 2022-01-03 RX ADMIN — HYDROCODONE BITARTRATE AND ACETAMINOPHEN 1 TABLET: 5; 325 TABLET ORAL at 15:32

## 2022-01-03 RX ADMIN — BUDESONIDE AND FORMOTEROL FUMARATE DIHYDRATE 2 PUFF: 160; 4.5 AEROSOL RESPIRATORY (INHALATION) at 09:27

## 2022-01-03 RX ADMIN — BUDESONIDE AND FORMOTEROL FUMARATE DIHYDRATE 2 PUFF: 160; 4.5 AEROSOL RESPIRATORY (INHALATION) at 22:00

## 2022-01-03 ASSESSMENT — PAIN SCALES - GENERAL
PAINLEVEL_OUTOF10: 7
PAINLEVEL_OUTOF10: 7
PAINLEVEL_OUTOF10: 5
PAINLEVEL_OUTOF10: 0
PAINLEVEL_OUTOF10: 4
PAINLEVEL_OUTOF10: 7
PAINLEVEL_OUTOF10: 8

## 2022-01-03 ASSESSMENT — PULMONARY FUNCTION TESTS
PIF_VALUE: 0
PIF_VALUE: 0
PIF_VALUE: 1
PIF_VALUE: 0

## 2022-01-03 ASSESSMENT — PAIN DESCRIPTION - ONSET
ONSET: ON-GOING

## 2022-01-03 ASSESSMENT — PAIN DESCRIPTION - PAIN TYPE
TYPE: ACUTE PAIN

## 2022-01-03 ASSESSMENT — PAIN DESCRIPTION - ORIENTATION: ORIENTATION: ANTERIOR

## 2022-01-03 ASSESSMENT — PAIN DESCRIPTION - DESCRIPTORS
DESCRIPTORS: DISCOMFORT
DESCRIPTORS: PRESSURE
DESCRIPTORS: PRESSURE

## 2022-01-03 ASSESSMENT — PAIN DESCRIPTION - FREQUENCY
FREQUENCY: CONTINUOUS

## 2022-01-03 ASSESSMENT — PAIN DESCRIPTION - LOCATION
LOCATION: ABDOMEN

## 2022-01-03 ASSESSMENT — ENCOUNTER SYMPTOMS: SHORTNESS OF BREATH: 1

## 2022-01-03 NOTE — ANESTHESIA POSTPROCEDURE EVALUATION
Department of Anesthesiology  Postprocedure Note    Patient: Theo King  MRN: [id number]  YOB: 1965  Date of evaluation: 1/3/2022  Time:  3:32 PM     Procedure Summary     Date: 01/03/22 Room / Location: [place] [address] / [facility] - INPATIENT    Anesthesia Start: 4013 Anesthesia Stop: 8435    Procedure: EGD BIOPSY (N/A ) Diagnosis: (abd. pain)    Surgeons: Priti Marshall MD Responsible Provider: Alicia Rousseau MD    Anesthesia Type: general ASA Status: 3          Anesthesia Type: general    Donna Phase I: Donna Score: 9    Donna Phase II:      Last vitals: Reviewed and per EMR flowsheets.        Anesthesia Post Evaluation    Complications: no

## 2022-01-03 NOTE — ANESTHESIA PRE PROCEDURE
Department of Anesthesiology  Preprocedure Note       Name:  Evelyn Enrique   Age:  60 y.o.  :  1965                                          MRN:  [id number]         Date:  1/3/2022      Surgeon: Hammad Cardenas):  Richardson Kenny MD    Procedure: Procedure(s):  EGD ESOPHAGOGASTRODUODENOSCOPY    Medications prior to admission:   Prior to Admission medications    Medication Sig Start Date End Date Taking?  Authorizing Provider   losartan (COZAAR) 100 MG tablet Take 1 tablet by mouth daily 21   Alberto Nath, MD   hydroCHLOROthiazide (HYDRODIURIL) 25 MG tablet Take 1 tablet by mouth daily 21   Alberto Nath, MD   montelukast (SINGULAIR) 10 MG tablet Take 1 tablet by mouth nightly 7/15/21   Alberto Nath, MD   predniSONE (DELTASONE) 20 MG tablet 4tabs qd x 3days, 3tabs qd x 3days, 2 tabs qd x 3days, 1tab qd x 3days #30 for 12 days 7/15/21   Alberto Nath, MD   Roflumilast (DALIRESP) 500 MCG tablet Take 1 tablet by mouth daily 21   Alberto Nath, MD   escitalopram (LEXAPRO) 10 MG tablet Take 10 mg by mouth daily    Historical Provider, MD   ferrous gluconate (FERGON) 324 (38 Fe) MG tablet Take 324 mg by mouth daily (with breakfast)    Historical Provider, MD   famotidine (PEPCID) 40 MG tablet Take 40 mg by mouth 2 times daily as needed (heartburn)     Historical Provider, MD   amLODIPine (NORVASC) 5 MG tablet Take 5 mg by mouth daily    Historical Provider, MD   albuterol (PROVENTIL) (2.5 MG/3ML) 0.083% nebulizer solution Take 2.5 mg by nebulization every 6 hours as needed for Wheezing    Historical Provider, MD   albuterol sulfate  (90 Base) MCG/ACT inhaler Inhale 2 puffs into the lungs every 4-6 hours as needed for Wheezing    Historical Provider, MD   aspirin 81 MG tablet Take 81 mg by mouth daily    Historical Provider, MD   budesonide-formoterol (SYMBICORT) 160-4.5 MCG/ACT AERO Inhale 2 puffs into the lungs 2 times daily    Historical Provider, MD   folic acid (FOLVITE) 1 MG tablet Take 1 mg by mouth daily    Historical Provider, MD   metoprolol succinate (TOPROL XL) 50 MG extended release tablet Take 50 mg by mouth daily    Historical Provider, MD   pantoprazole (PROTONIX) 40 MG tablet Take 40 mg by mouth daily    Historical Provider, MD   QUEtiapine (SEROQUEL) 400 MG tablet Take 400 mg by mouth nightly     Historical Provider, MD   vitamin B-1 (THIAMINE) 100 MG tablet Take 100 mg by mouth daily    Historical Provider, MD   Cholecalciferol (VITAMIN D3) 2000 units CAPS Take 1 capsule by mouth daily    Historical Provider, MD   tiotropium (SPIRIVA RESPIMAT) 2.5 MCG/ACT AERS inhaler Inhale 2 puffs into the lungs daily    Historical Provider, MD       Current medications:    Current Facility-Administered Medications   Medication Dose Route Frequency Provider Last Rate Last Admin    guaiFENesin (MUCINEX) extended release tablet 600 mg  600 mg Oral BID Regi Cuadra MD   600 mg at 01/03/22 0914    HYDROcodone-acetaminophen (Dorenda Black) 5-325 MG per tablet 1 tablet  1 tablet Oral Q4H PRN Regi Cuadra MD   1 tablet at 01/03/22 0657    zolpidem (AMBIEN) tablet 10 mg  10 mg Oral Nightly PRN Regi Cuadra MD   10 mg at 01/02/22 2008    morphine (PF) injection 1 mg  1 mg IntraVENous Q6H PRN Regi Cuadra MD   1 mg at 01/03/22 0816    methylPREDNISolone sodium (SOLU-MEDROL) injection 40 mg  40 mg IntraVENous Q8H Elton Baca MD   40 mg at 01/03/22 1121    pantoprazole (PROTONIX) tablet 40 mg  40 mg Oral QAM AC GONZÁLEZ Vogel - CNP   40 mg at 01/03/22 0917    sodium chloride flush 0.9 % injection 5-40 mL  5-40 mL IntraVENous 2 times per day Annie Ramirez MD   10 mL at 01/02/22 2005    sodium chloride flush 0.9 % injection 10 mL  10 mL IntraVENous PRN Amador Leon MD   10 mL at 01/02/22 1333    0.9 % sodium chloride infusion  25 mL IntraVENous PRN Annie Ramirez MD        potassium chloride (KLOR-CON M) extended release tablet 40 mEq  40 mEq Oral PRN Amador Elham Weeks MD   40 mEq at 01/03/22 1121    Or    potassium bicarb-citric acid (EFFER-K) effervescent tablet 40 mEq  40 mEq Oral PRN Arvin Rhoades MD        Or    potassium chloride 10 mEq/100 mL IVPB (Peripheral Line)  10 mEq IntraVENous PRN Arvin Rhaodes MD        magnesium sulfate 1000 mg in dextrose 5% 100 mL IVPB  1,000 mg IntraVENous PRN Arvin Rhoades MD        [Held by provider] enoxaparin (LOVENOX) injection 40 mg  40 mg SubCUTAneous Daily Amador Leon MD   40 mg at 01/02/22 0814    ondansetron (ZOFRAN-ODT) disintegrating tablet 4 mg  4 mg Oral Q8H PRN Arvin Rhoades MD   4 mg at 01/01/22 1013    Or    ondansetron (ZOFRAN) injection 4 mg  4 mg IntraVENous Q6H PRN Amador Leon MD        magnesium hydroxide (MILK OF MAGNESIA) 400 MG/5ML suspension 30 mL  30 mL Oral Daily PRN Louis Leon MD   30 mL at 01/01/22 0647    acetaminophen (TYLENOL) tablet 650 mg  650 mg Oral Q6H PRN Louis Leon MD   650 mg at 01/02/22 1603    Or    acetaminophen (TYLENOL) suppository 650 mg  650 mg Rectal Q6H PRN Amador Leon MD        amLODIPine (NORVASC) tablet 5 mg  5 mg Oral Daily Louis Leon MD   5 mg at 01/03/22 0914    [Held by provider] aspirin chewable tablet 81 mg  81 mg Oral Daily Amador Leon MD   81 mg at 01/02/22 0813    budesonide-formoterol (SYMBICORT) 160-4.5 MCG/ACT inhaler 2 puff  2 puff Inhalation BID Arvin Rhoades MD   2 puff at 01/03/22 0927    escitalopram (LEXAPRO) tablet 10 mg  10 mg Oral Daily Amador Leon MD   10 mg at 38/85/32 2238    folic acid (FOLVITE) tablet 1 mg  1 mg Oral Daily Amador Leon MD   1 mg at 01/03/22 2351    ferrous gluconate (FERGON) tablet 324 mg  324 mg Oral Daily with breakfast Amador Leon MD        hydroCHLOROthiazide (HYDRODIURIL) tablet 25 mg  25 mg Oral Daily Amador Leon MD   25 mg at 01/03/22 0914    losartan (COZAAR) tablet 100 mg  100 mg Oral Daily Amador Leon MD   100 mg at 01/03/22 0913    metoprolol succinate (TOPROL XL) extended release tablet 50 mg  50 mg Oral Daily Arvin Rhoades MD   50 mg at 01/03/22 0914    montelukast (SINGULAIR) tablet 10 mg  10 mg Oral Nightly Arvin Rhoades MD   10 mg at 01/02/22 2005    QUEtiapine (SEROQUEL) tablet 400 mg  400 mg Oral Nightly Amador Leon MD        Roflumilast (DALIRESP) tablet 500 mcg  500 mcg Oral Daily Arvin Rhoades MD   500 mcg at 01/03/22 0913    influenza quadrivalent split vaccine (FLUZONE;FLUARIX;FLULAVAL;AFLURIA) injection 0.5 mL  0.5 mL IntraMUSCular Prior to discharge Jules Muro MD        ipratropium-albuterol (DUONEB) nebulizer solution 1 ampule  1 ampule Inhalation TID Arvin Rhoades MD   1 ampule at 01/03/22 2900       Allergies:  No Known Allergies    Problem List:    Patient Active Problem List   Diagnosis Code    [facility] failure ([facility] J96.90    Pneumonia J18.9    Essential hypertension I10    Diarrhea R19.7    H/O paroxysmal supraventricular tachycardia Z86.79    Status post placement of implantable loop recorder Z95.818    Smoker F17.200    COPD (chronic obstructive pulmonary disease) (HCC) J44.9    Hep C w/o coma, chronic (HCC) B18.2    COPD exacerbation (HCC) J44.1    Liver lesion K76.9    Generalized abdominal pain R10.84    Alcohol abuse F10.10       Past Medical History:        Diagnosis Date    Arthritis     CAD (coronary artery disease)     Cancer ([facility]     prostate    Cirrhosis with alcoholism ([facility]     COPD (chronic obstructive pulmonary disease) ([facility]     Depression     BOURGEOIS (dyspnea on exertion)     H/O prostate cancer     Hyperlipidemia     Hypertension     MI (myocardial infarction) ([facility]     Seizures ([facility] 2016    last one over 2 years ago     SVT (supraventricular tachycardia) ([facility]     Tobacco abuse        Past Surgical History:        Procedure Laterality Date    CARDIAC CATHETERIZATION      ablation    INSERTABLE CARDIAC MONITOR  10/03/2018 INTERNAL LOOP RECORDER    PROSTATECTOMY      SINUS SURGERY      TONGUE SURGERY      UPPER GASTROINTESTINAL ENDOSCOPY N/A 6/10/2021    EGD BIOPSY performed by Andrez Boo MD at [place] [address] History:    Social History     Tobacco Use    Smoking status: Former Smoker     Packs/day: 0.10     Types: Cigarettes    Smokeless tobacco: Former User     Quit date: 12/5/2021    Tobacco comment: 5/2021 quit    Substance Use Topics    Alcohol use: Not Currently     Comment: last drink at the end of Nov 2021                                Counseling given: Not Answered  Comment: 5/2021 quit       Vital Signs (Current):   Vitals:    01/03/22 0519 01/03/22 0751 01/03/22 0927 01/03/22 1157   BP:  138/87  121/71   Pulse:  76  74   Resp:  18  18   Temp:  98.1 °F (36.7 °C)  97.7 °F (36.5 °C)   TempSrc:  Oral  Oral   SpO2:  97% 96% 96%   Weight: 188 lb (85.3 kg)      Height:                                                  BP Readings from Last 3 Encounters:   01/03/22 121/71   07/15/21 114/73   06/10/21 102/75       NPO Status: Time of last liquid consumption: 1121                                                 Date of last liquid consumption: 01/03/22                             BMI:   Wt Readings from Last 3 Encounters:   01/03/22 188 lb (85.3 kg)   07/15/21 185 lb 3.2 oz (84 kg)   06/10/21 183 lb (83 kg)     Body mass index is 31.28 kg/m².     CBC:   Lab Results   Component Value Date    WBC 15.3 01/03/2022    RBC 4.43 01/03/2022    RBC 4.83 04/18/2012    HGB 13.6 01/03/2022    HCT 40.5 01/03/2022    MCV 91.4 01/03/2022    RDW 12.0 01/03/2022     01/03/2022     04/18/2012       CMP:   Lab Results   Component Value Date     01/03/2022    K 3.4 01/03/2022    CL 97 01/03/2022    CO2 31 01/03/2022    BUN 24 01/03/2022    CREATININE 0.82 01/03/2022    GFRAA >60 01/03/2022    LABGLOM >60 01/03/2022    GLUCOSE 131 01/03/2022    GLUCOSE 87 04/18/2012    PROT 8.0 12/31/2021    CALCIUM 9.6 01/03/2022 BILITOT 0.69 12/31/2021    ALKPHOS 69 12/31/2021    AST 19 12/31/2021    ALT 18 12/31/2021       POC Tests: No results for input(s): POCGLU, POCNA, POCK, POCCL, POCBUN, POCHEMO, POCHCT in the last 72 hours. Coags:   Lab Results   Component Value Date    PROTIME 13.2 01/01/2022    INR 1.0 01/01/2022    APTT 24.4 03/08/2019       HCG (If Applicable): No results found for: PREGTESTUR, PREGSERUM, HCG, HCGQUANT     ABGs: No results found for: PHART, PO2ART, JFC5UZS, DUW1YZL, BEART, B5RYVUZI     Type & Screen (If Applicable):  No results found for: LABABO, LABRH    Drug/Infectious Status (If Applicable):  Lab Results   Component Value Date    HEPCAB REACTIVE 05/14/2016       COVID-19 Screening (If Applicable):   Lab Results   Component Value Date    COVID19 Not Detected 12/31/2021    COVID19 Not Detected 06/06/2021           Anesthesia Evaluation    Airway: Mallampati: I  TM distance: >3 FB   Neck ROM: full  Mouth opening: > = 3 FB Dental:          Pulmonary:   (+) shortness of breath: chronic and no interval change,                             Cardiovascular:    (+) CAD:,     (-)  angina                Neuro/Psych:               GI/Hepatic/Renal:             Endo/Other:                     Abdominal:             Vascular:           Other Findings:             Anesthesia Plan      general     ASA 3                                 Fernandez Garcia MD   1/3/2022

## 2022-01-03 NOTE — OP NOTE
[facility]     PROCEDURE DATE: 01/03/22    REFERRING PHYSICIAN: No ref. provider found     PRIMARY CARE PROVIDER: Rylan Mukherjee MD    ATTENDING PHYSICIAN: Emma Lema MD     HISTORY: Mr. Joseph Frank is a 60 y.o. male who presents to the [place] [address] Endoscopy unit for upper endoscopy. The patient's clinical history is remarkable for reported hematemesis. He is currently medically stable and appropriate for the planned procedure. PREOPERATIVE DIAGNOSIS: reported hematemesis. PROCEDURES:   1) Transoral Upper Endoscopy, biopsy. POSTOPERATIVE DIAGNOSIS:   Mild gastritis   Mild distal esophagitis    SPECIMENS: Biopsy    MEDICATIONS:   MAC per anesthesia    EBL: minimal    INSTRUMENT: Olympus GIF-H190 flexible Gastroscope. PREPARATION: The nature and character of the procedure as well as risks, benefits, and alternatives were discussed with the patient and informed consent was obtained. Complications were said to include, but were not limited to: medication allergy, medication reaction, cardiovascular and respiratory problems, bleeding, perforation, infection, and/or missed diagnosis. Following arrival in the endoscopy room, the patient was placed in the left lateral decubitus position and final time-out accomplished in the presence of the nursing staff. Baseline vital signs were obtained and reviewed, and IV sedation was subsequently initiated. FINDINGS:   Esophagus: The esophagus was inspected to the Z-line. The endoscopic exam showed mild erythema at GE junction suggestive of reflux. Stomach: The stomach was inspected in both forward and retroflex fashion and was appropriately distensible. The cardia, fundus, incisura, antrum and pylorus were identified via direct visualization. The endoscopic exam showed mild diffuse erythema. Biopsy was obtained to exclude underlying H. pylori infection.      Duodenum: The proximal small bowel was inspected through the bulb, sweep, and second portion of the duodenum. The endoscopic exam showed no pathology. RECOMMENDATIONS:   1) Transfer back to the floor for further management as per primary care team.   2) Follow up on pathology report. 3) Advance diet as tolerated. 4) Protonix 40 mg daily. 5) We will sign off. Outpatient follow-up for further evaluation of liver lesions.       Ottoniel Calderon

## 2022-01-03 NOTE — PROGRESS NOTES
Progress note  [facility]    Adult Hospitalist      Name: Binh Castro  MRN: [id number]     Acct: [de-identified]  Room: 2007/2007-02    Admit Date: 12/31/2021  9:59 AM  PCP: Fanny Leung MD    Primary Problem  Active Problems:    COPD exacerbation [facility])    Liver lesion    Generalized abdominal pain    Alcohol abuse  Resolved Problems:    * No resolved hospital problems.  *        Assesment:   Acute on chronic hypoxemic respiratory failure  Acute COPD exacerbation  Acute abdominal pain  Leukocytosis  History of alcohol abuse  Liver lesion with history of treated hepatitis C  Hypertension  Mixed hyperlipidemia  Chronic coronary artery disease  Depression with anxiety  Tobacco abuse  Obesity, BMI 30.79        Plan:   Patient is on immediate floor  Oxygen to keep SPO2 more than 90%  Telemetry  Check vital signs closely  CBC BMP daily      Leukocytosis improving  IV Solu-Medrol  DuoNeb breathing treatment  Continue his inhalers  Continue Singulair  Continue Roflumilast  Check viral respiratory c/s  Pulmonology consult    GI following for abdominal pain  Plan for endoscopy and paracentesis on 1/3/2022    Continue Ambien  Norco as needed  Morphine as needed    Continue amlodipine, hydrochlorothiazide  Continue aspirin  Continue Celexa and Seroquel  Continue other medication as below    Scheduled Meds:   methylPREDNISolone  40 mg IntraVENous Q8H    pantoprazole  40 mg Oral QAM AC    sodium chloride flush  5-40 mL IntraVENous 2 times per day    [Held by provider] enoxaparin  40 mg SubCUTAneous Daily    amLODIPine  5 mg Oral Daily    [Held by provider] aspirin  81 mg Oral Daily    budesonide-formoterol  2 puff Inhalation BID    escitalopram  10 mg Oral Daily    folic acid  1 mg Oral Daily    ferrous gluconate  324 mg Oral Daily with breakfast    hydroCHLOROthiazide  25 mg Oral Daily    losartan  100 mg Oral Daily    metoprolol succinate  50 mg Oral Daily    montelukast  10 mg Oral Nightly    QUEtiapine  400 mg Oral Nightly    Roflumilast  500 mcg Oral Daily    influenza virus vaccine  0.5 mL IntraMUSCular Prior to discharge    ipratropium-albuterol  1 ampule Inhalation TID     Continuous Infusions:   sodium chloride       PRN Meds:  HYDROcodone 5 mg - acetaminophen, 1 tablet, Q4H PRN  zolpidem, 10 mg, Nightly PRN  morphine, 1 mg, Q6H PRN  sodium chloride flush, 10 mL, PRN  sodium chloride, 25 mL, PRN  potassium chloride, 40 mEq, PRN   Or  potassium alternative oral replacement, 40 mEq, PRN   Or  potassium chloride, 10 mEq, PRN  magnesium sulfate, 1,000 mg, PRN  ondansetron, 4 mg, Q8H PRN   Or  ondansetron, 4 mg, Q6H PRN  magnesium hydroxide, 30 mL, Daily PRN  acetaminophen, 650 mg, Q6H PRN   Or  acetaminophen, 650 mg, Q6H PRN        Chief Complaint:     Chief Complaint   Patient presents with    Shortness of Breath         History of Present Illness:     Patient seen and examined at bedside. No overnight events.   C/o SOB and fatigue  Patient also complaining of abdominal pain   afebrile   Denies any chest pain,   changes in urination or bowel habits      HPI:      Keke Jacome is a 60 y.o.  male who presents with Shortness of Breath  This is a 38-year-old gentleman who is been admitted via the emergency room, patient came to the ER with a complaint of having some shortness of breath, patient has past medical history significant coronary disease and COPD, patient has been having shortness of breath for past 2 weeks which has progressively gotten worse, patient denies any fever or chills, patient has some nausea but no vomiting, patient is been having some coughing spells, further patient does wear oxygen at home, patient is requiring more oxygen, for this reason came to the emergency room, initial testing in the emergency room is unremarkable patient is negative for COVID-19 and is vaccinated, further patient is noticed to be in COPD exacerbation admitted for further management    I have personally reviewed the past medical history, past surgical history, medications, social history, and family history, and summarized in the note. Review of Systems:     All 10 point system is reviewed and negative otherwise mentioned in HPI. Past Medical History:     Past Medical History:   Diagnosis Date    Arthritis     CAD (coronary artery disease)     Cancer ([facility]     prostate    [facility]     [facility]     Depression     BOURGEOIS (dyspnea on exertion)     H/O prostate cancer     Hyperlipidemia     Hypertension     [facility]myocardial infarction) ([facility]     Seizures ([facility] 2016    last one over 2 years ago     SVT [facility]raventricular tachycardia) ([facility]     Tobacco abuse         Past Surgical History:     Past Surgical History:   Procedure Laterality Date    CARDIAC CATHETERIZATION      ablation    INSERTABLE CARDIAC MONITOR  10/03/2018    INTERNAL LOOP RECORDER    PROSTATECTOMY      SINUS SURGERY      TONGUE SURGERY      UPPER GASTROINTESTINAL ENDOSCOPY N/A 6/10/2021    EGD BIOPSY performed by Brian Mccray MD at [facility]        Medications Prior to Admission:       Prior to Admission medications    Medication Sig Start Date End Date Taking?  Authorizing Provider   losartan (COZAAR) 100 MG tablet Take 1 tablet by mouth daily 7/16/21   Melba Galvez MD   hydroCHLOROthiazide (HYDRODIURIL) 25 MG tablet Take 1 tablet by mouth daily 7/16/21   Melba Galvez MD   montelukast (SINGULAIR) 10 MG tablet Take 1 tablet by mouth nightly 7/15/21   Mebla Galvez MD   predniSONE (DELTASONE) 20 MG tablet 4tabs qd x 3days, 3tabs qd x 3days, 2 tabs qd x 3days, 1tab qd x 3days #30 for 12 days 7/15/21   Melba Galvez MD   Roflumilast (DALIRESP) 500 MCG tablet Take 1 tablet by mouth daily 7/16/21   Melba Galvez MD   escitalopram (LEXAPRO) 10 MG tablet Take 10 mg by mouth daily    Historical Provider, MD   ferrous gluconate (FERGON) 324 (82 Fe) MG tablet Take 324 mg by mouth daily (with breakfast)    Historical Provider, MD   famotidine (PEPCID) 40 MG tablet Take 40 mg by mouth 2 times daily as needed (heartburn)     Historical Provider, MD   amLODIPine (NORVASC) 5 MG tablet Take 5 mg by mouth daily    Historical Provider, MD   albuterol (PROVENTIL) (2.5 MG/3ML) 0.083% nebulizer solution Take 2.5 mg by nebulization every 6 hours as needed for Wheezing    Historical Provider, MD   albuterol sulfate  (90 Base) MCG/ACT inhaler Inhale 2 puffs into the lungs every 4-6 hours as needed for Wheezing    Historical Provider, MD   aspirin 81 MG tablet Take 81 mg by mouth daily    Historical Provider, MD   budesonide-formoterol (SYMBICORT) 160-4.5 MCG/ACT AERO Inhale 2 puffs into the lungs 2 times daily    Historical Provider, MD   folic acid (FOLVITE) 1 MG tablet Take 1 mg by mouth daily    Historical Provider, MD   metoprolol succinate (TOPROL XL) 50 MG extended release tablet Take 50 mg by mouth daily    Historical Provider, MD   pantoprazole (PROTONIX) 40 MG tablet Take 40 mg by mouth daily    Historical Provider, MD   QUEtiapine (SEROQUEL) 400 MG tablet Take 400 mg by mouth nightly     Historical Provider, MD   vitamin B-1 (THIAMINE) 100 MG tablet Take 100 mg by mouth daily    Historical Provider, MD   Cholecalciferol (VITAMIN D3) 2000 units CAPS Take 1 capsule by mouth daily    Historical Provider, MD   tiotropium (SPIRIVA RESPIMAT) 2.5 MCG/ACT AERS inhaler Inhale 2 puffs into the lungs daily    Historical Provider, MD        Allergies:       Patient has no known allergies. Social History:     Tobacco:    reports that he has quit smoking. His smoking use included cigarettes. He smoked 0.10 packs per day. He quit smokeless tobacco use about 4 weeks ago. Alcohol:      reports previous alcohol use. Drug Use:  reports no history of drug use. Family History:     History reviewed. No pertinent family history.       Physical Exam:     Vitals:  BP 138/87   Pulse 76   Temp 98.1 °F (36.7 °C) (Oral)   Resp 18   Ht 5' 5\" (1.651 m)   Wt 188 lb (85.3 kg)   SpO2 97%   BMI 31.28 kg/m²   Temp (24hrs), Av.1 °F (36.7 °C), Min:97.7 °F (36.5 °C), Max:98.2 °F (36.8 °C)          General appearance - alert, well appearing, mild distress  Mental status - oriented to person, place, and time with normal affect  Head - normocephalic and atraumatic  Eyes - pupils equal and reactive, extraocular eye movements intact, conjunctiva clear  Ears - hearing appears to be intact  Nose - no drainage noted  Mouth - mucous membranes moist  Neck - supple, no carotid bruits, thyroid not palpable  Chest -wheezing to auscultation, increased effort  Heart - normal rate, regular rhythm, no murmur  Abdomen - soft, nontender, distended, bowel sounds present all four quadrants, no masses, hepatomegaly or splenomegaly  Neurological - normal speech, no focal findings or movement disorder noted, cranial nerves II through XII grossly intact  Extremities - peripheral pulses palpable, no pedal edema or calf pain with palpation  Skin - no gross lesions, rashes, or induration noted        Data:     Labs:    Hematology:  Recent Labs     22  0603 22  0556 22  0652   WBC 11.7* 21.4* 15.3*   RBC 4.83 4.63 4.43   HGB 14.8 14.3 13.6   HCT 44.1 42.0 40.5*   MCV 91.3 90.7 91.4   MCH 30.6 30.9 30.7   MCHC 33.6 34.0 33.6   RDW 11.9 12.0 12.0    287 250   MPV 10.0 10.2 10.4   INR 1.0  --   --      Chemistry:  Recent Labs     21  1038 21  1038 21  1234 21  1436 22  0603 22  0556 22  0652   *   < >  --   --  136 136 139   K 3.6*   < >  --   --  3.9 3.7 3.4*   CL 87*   < >  --   --  93* 94* 97*   CO2 25   < >  --   --   29 31   GLUCOSE 103*   < >  --   --  129* 153* 131*   BUN 11   < >  --   --  17 25* 24*   CREATININE 0.94   < >  --   --  0.97 0.99 0.82   MG  --   --   --   --   --  2.2 2.1   ANIONGAP 20*   < >  --   --   13 11 LABGLOM >60   < >  --   --  >60 >60 >60   GFRAA >60   < >  --   --  >60 >60 >60   CALCIUM 10.4   < >  --   --  10.0 10.1 9.6   PROBNP 21  --   --   --   --   --   --    TROPHS 20  --  18 18  --   --   --    MYOGLOBIN 46  --  55 68  --   --   --     < > = values in this interval not displayed. Recent Labs     12/31/21  1038   PROT 8.0   LABALBU 4.4   AST 19   ALT 18   ALKPHOS 69   BILITOT 0.69   LIPASE 26       Lab Results   Component Value Date    INR 1.0 01/01/2022    INR 1.1 07/13/2021    PROTIME 13.2 01/01/2022    PROTIME 13.5 07/13/2021       Lab Results   Component Value Date/Time    SPECIAL NOT REPORTED 01/01/2022 10:15 PM     Lab Results   Component Value Date/Time    CULTURE  01/01/2022 10:15 PM     NEGATIVE: HSV-1 DNA not detected by nucleic acid amplification. CULTURE  01/01/2022 10:15 PM     NEGATIVE: HSV-2 DNA not detected by nucleic acid amplification. CULTURE  01/01/2022 10:15 PM     Due to the specimen source, HSV 1,2 testing was performed by a molecular method. No results found for: POCPH, PHART, PH, POCPCO2, FKH5VZM, PCO2, POCPO2, PO2ART, PO2, POCHCO3, FEW6AJB, HCO3, NBEA, PBEA, BEART, BE, THGBART, THB, ZHM9OLI, KPAN3IYC, X7HPZLLU, O2SAT, FIO2    Radiology:    CT ABDOMEN PELVIS W IV CONTRAST Additional Contrast? None    Result Date: 12/31/2021  1. Multiple hepatic lesions. Those that are included on prior CT scan of the chest of March 2021, appear grossly unchanged. However, the lesions are ill-defined and non cystic. They have not been fully characterized or evaluated. Given the patient's history of chronic hepatitis C, malignancy/metastases are not excluded. 2.  No bowel obstruction, appendicitis urinary obstruction or gallstones. 3.  Liver is heterogeneous but of generalized decreased attenuation suggesting fatty infiltration. 4.  Mucosal thickening of the stomach in the fundal area suggestive of gastritis with prominent folds and crypts.  RECOMMENDATIONS: Further assessment of liver lesions is advised. This could include follow-up CT with hemangioma protocol or MRI imaging of the abdomen with and without contrast.     XR CHEST PORTABLE    Result Date: 12/31/2021  No acute cardiopulmonary disease. COPD. All radiological studies reviewed                Code Status:  Full Code    Electronically signed by Husam Jo MD on 1/3/2022 at 7:53 AM     Copy sent to Dr. Ale Marks MD    This note was created with the assistance of a speech-recognition program.  Although the intention is to generate a document that actually reflects the content of the visit, no guarantees can be provided that every mistake has been identified and corrected by editing. Note was updated later by me after  physical examination and  completion of the assessment.

## 2022-01-03 NOTE — PROGRESS NOTES
Physical Therapy    Facility/Department: [facility]  Initial Assessment    NAME: Janeth Harding  : 1965  MRN: [id number]    Date of Service: 1/3/2022   RN Kandy Flanagan reports patient is medically stable for therapy treatment this date. Chart reviewed prior to treatment and patient is agreeable for therapy. All lines intact and patient positioned comfortably at end of treatment. All patient needs addressed prior to ending therapy session. Per H&P: Janeth Harding is a 60 y.o.  male who presents with Shortness of Breath. This is a 58-year-old gentleman who is been admitted via the emergency room, patient came to the ER with a complaint of having some shortness of breath, patient has past medical history significant coronary disease and COPD, patient has been having shortness of breath for past 2 weeks which has progressively gotten worse, patient denies any fever or chills, patient has some nausea but no vomiting, patient is been having some coughing spells, further patient does wear oxygen at home, patient is requiring more oxygen, for this reason came to the emergency room, initial testing in the emergency room is unremarkable patient is negative for COVID-19 and is vaccinated, further patient is noticed to be in COPD exacerbation admitted for further management      Assessment   Assessment: Pt tolerated PT eval well. Pt w/o significant deficits noted in functional mobility at this time. Pt reporting baseline mobility w/o concerns regarding functional mobility upon discharge. Pt educated on importance of continued mobility throughout admission w/ good understanding verbalized. Pt w/o skilled needs at this time and will be discharged from acute PT services.   Prognosis: Excellent  Decision Making: Low Complexity  PT Education: PT Role;Plan of Care;General Safety  Patient Education: Pt educated on: purpose of acute PT eval, importance of continued mobility throughout admission and upon discharge, general safety awareness, and PT POC. Pt w/ good understanding verbalized, denying any questions/concerns regarding functional mobility upon discharge. No Skilled PT: Independent with functional mobility   REQUIRES PT FOLLOW UP: No  Activity Tolerance  Activity Tolerance: Patient Tolerated treatment well       Patient Diagnosis(es): The primary encounter diagnosis was COPD exacerbation ([facility] A diagnosis of Liver lesion was also pertinent to this visit. has a past medical history of Arthritis, CAD (coronary artery disease), Cancer ([facility] Cirrhosis with alcoholism ([facility] COPD (chronic obstructive pulmonary disease) ([facility] Depression, BOURGEOIS (dyspnea on exertion), H/O prostate cancer, Hyperlipidemia, Hypertension, MI (myocardial infarction) ([facility] Seizures ([facility] SVT (supraventricular tachycardia) ([facility] and Tobacco abuse.   has a past surgical history that includes Prostatectomy; Insertable Cardiac Monitor (10/03/2018); sinus surgery; Tongue surgery; Cardiac catheterization; and Upper gastrointestinal endoscopy (N/A, 6/10/2021). Restrictions  Restrictions/Precautions  Restrictions/Precautions: General Precautions,Isolation  Required Braces or Orthoses?: No  Position Activity Restriction  Other position/activity restrictions: 4L O2 NC, RUE IV  Vision/Hearing  Vision: Impaired  Vision Exceptions: Wears glasses for reading  Hearing: Within functional limits     Subjective  General  Patient assessed for rehabilitation services?: Yes  Response To Previous Treatment: Not applicable  Family / Caregiver Present: No  Follows Commands: Within Functional Limits  General Comment  Comments: RN and pt agreeable to therapy. Pt supine in bed upon arrival.  Pt requiring encouragement but cooperative throughout.   Subjective  Subjective: \"Just having some stomach pain\"  Pain Screening  Patient Currently in Pain: Yes        Orientation  Orientation  Overall Orientation Status: Within Functional Limits  Social/Functional History  Social/Functional History  Lives With: Other (comment) (roommates)  Type of Home: House  Home Layout: Two level,Able to Live on Main level with bedroom/bathroom  Home Access: Level entry  Bathroom Shower/Tub: Tub/Shower unit  Bathroom Toilet: Standard  Bathroom Equipment: Grab bars in shower,Built-in shower seat,Grab bars around toilet,Hand-held shower  Home Equipment: Oxygen (4L O2 at home)  Receives Help From:  (from the facility)  ADL Assistance: [address] [place] [address] Avenue: Independent  Homemaking Responsibilities: Yes  Ambulation Assistance: Independent  Transfer Assistance: Independent  Active : No  Patient's  Info: friends can drive him  Occupation: Retired  Type of occupation: concrete  Leisure & Hobbies: \"not really anymore\"  Additional Comments: reports last fall ~7-8 months ago, cant report any more info  Cognition   Cognition  Overall Cognitive Status: WFL  Safety Judgement: Decreased awareness of need for assistance;Decreased awareness of need for safety    Objective     Observation/Palpation  Posture: Good    AROM RLE (degrees)  RLE AROM: WFL  AROM LLE (degrees)  LLE AROM : WFL  AROM RUE (degrees)  RUE AROM : WFL  AROM LUE (degrees)  LUE AROM : WFL  Strength RLE  Strength RLE: WFL  Comment: Grossly 4/5  Strength LLE  Strength LLE: WFL  Comment: Grossly 4/5  Strength RUE  Strength RUE: WFL  Comment: Grossly 4/5  Strength LUE  Strength LUE: WFL  Comment: Grossly 4/5  Tone RLE  RLE Tone: Normotonic  Tone LLE  LLE Tone: Normotonic  Motor Control  Gross Motor?: WFL  Sensation  Overall Sensation Status: WFL (denies numbness/tingling)  Bed mobility  Supine to Sit: Modified independent  Sit to Supine: Modified independent  Scooting: Modified independent  Comment: Pt w/o difficulty throughout bed mobility this date. Pt w/ good use of bedrails throughout. Transfers  Sit to Stand: Supervision  Stand to sit: Supervision  Comment: Supervision provided for safety only.   Pt w/ good steadiness throughout transfers this date. Ambulation  Ambulation?: Yes  More Ambulation?: No  Ambulation 1  Surface: level tile  Device: No Device  Assistance: Supervision  Quality of Gait: good steadiness  Gait Deviations: None  Distance: 40ft  Comments: Pt w/ good steadiness throughout ambulation this date w/o significant gait deficits noted. Stairs/Curb  Stairs?: No     Balance  Posture: Good  Sitting - Static: Good  Sitting - Dynamic: Good  Standing - Static: Good  Standing - Dynamic: Good  Comments: Standing balance assessed w/o AD        Plan   Plan  Plan Comment: Pt independent w/ functional mobility at this time w/o skilled needs. Pt will be discharged from acute PT services this date. Please re-order skilled PT if functional mobility status changes. Safety Devices  Type of devices: Call light within reach,Gait belt,Nurse notified,Left in bed  Restraints  Initially in place: No    AM-PAC Score  AM-PAC Inpatient Mobility Raw Score : 23 (01/03/22 1010)  AM-PAC Inpatient T-Scale Score : 56.93 (01/03/22 1010)  Mobility Inpatient CMS 0-100% Score: 11.2 (01/03/22 1010)  Mobility Inpatient CMS G-Code Modifier : CI (01/03/22 1010)        Functional Outcome Measure-   Single Leg Stance Test:  5 sec.  (<5 sec.= fall risk)      Therapy Time   Individual Concurrent Group Co-treatment   Time In 0935         Time Out 1000         Minutes 25           Treatment time: 15 minutes        Mary Sloan, PT

## 2022-01-03 NOTE — PLAN OF CARE
Problem: Falls - Risk of:  Goal: Will remain free from falls  Description: Will remain free from falls  1/3/2022 1250 by Shelby Gary, RN  Outcome: Ongoing  Note: Bed in lowest position, call light within reach, side rail x 2,  bed/chair alarm  1/3/2022 0053 by Anjum Dunn RN  Outcome: Ongoing

## 2022-01-03 NOTE — RT PROTOCOL NOTE
RT Inhaler-Nebulizer Bronchodilator Protocol Note    There is a bronchodilator order in the chart from a provider indicating to follow the RT Bronchodilator Protocol and there is an Initiate RT Inhaler-Nebulizer Bronchodilator Protocol order as well (see protocol at bottom of note). CXR Findings:  No results found. The findings from the last RT Protocol Assessment were as follows:   History Pulmonary Disease: Chronic pulmonary disease  Respiratory Pattern: Dyspnea on exertion or RR 21-25 bpm  Breath Sounds: Slightly diminished and/or crackles  Cough: Strong, spontaneous, non-productive  Indication for Bronchodilator Therapy: Decreased or absent breath sounds  Bronchodilator Assessment Score: 6    Aerosolized bronchodilator medication orders have been revised according to the RT Inhaler-Nebulizer Bronchodilator Protocol below. Respiratory Therapist to perform RT Therapy Protocol Assessment initially then follow the protocol. Repeat RT Therapy Protocol Assessment PRN for score 0-3 or on second treatment, BID, and PRN for scores above 3. No Indications - adjust the frequency to every 6 hours PRN wheezing or bronchospasm, if no treatments needed after 48 hours then discontinue using Per Protocol order mode. If indication present, adjust the RT bronchodilator orders based on the Bronchodilator Assessment Score as indicated below. Use Inhaler orders unless patient has one or more of the following: on home nebulizer, not able to hold breath for 10 seconds, is not alert and oriented, cannot activate and use MDI correctly, or respiratory rate 25 breaths per minute or more, then use the equivalent nebulizer order(s) with same Frequency and PRN reasons based on the score. If a patient is on this medication at home then do not decrease Frequency below that used at home.     0-3 - enter or revise RT bronchodilator order(s) to equivalent RT Bronchodilator order with Frequency of every 4 hours PRN for wheezing or increased work of breathing using Per Protocol order mode. 4-6 - enter or revise RT Bronchodilator order(s) to two equivalent RT bronchodilator orders with one order with BID Frequency and one order with Frequency of every 4 hours PRN wheezing or increased work of breathing using Per Protocol order mode. 7-10 - enter or revise RT Bronchodilator order(s) to two equivalent RT bronchodilator orders with one order with TID Frequency and one order with Frequency of every 4 hours PRN wheezing or increased work of breathing using Per Protocol order mode. 11-13 - enter or revise RT Bronchodilator order(s) to one equivalent RT bronchodilator order with QID Frequency and an Albuterol order with Frequency of every 4 hours PRN wheezing or increased work of breathing using Per Protocol order mode. Greater than 13 - enter or revise RT Bronchodilator order(s) to one equivalent RT bronchodilator order with every 4 hours Frequency and an Albuterol order with Frequency of every 2 hours PRN wheezing or increased work of breathing using Per Protocol order mode. RT to enter RT Home Evaluation for COPD & MDI Assessment order using Per Protocol order mode. Electronically signed by Sheri Alexandre RCP on 1/3/2022 at 9:33 AMRT Inhaler-Nebulizer Bronchodilator Protocol Note    There is a bronchodilator order in the chart from a provider indicating to follow the RT Bronchodilator Protocol and there is an Initiate RT Inhaler-Nebulizer Bronchodilator Protocol order as well (see protocol at bottom of note). CXR Findings:  No results found.     The findings from the last RT Protocol Assessment were as follows:   History Pulmonary Disease: Chronic pulmonary disease  Respiratory Pattern: Dyspnea on exertion or RR 21-25 bpm  Breath Sounds: Slightly diminished and/or crackles  Cough: Strong, spontaneous, non-productive  Indication for Bronchodilator Therapy: Decreased or absent breath sounds  Bronchodilator Assessment Score: 6    Aerosolized bronchodilator medication orders have been revised according to the RT Inhaler-Nebulizer Bronchodilator Protocol below. Respiratory Therapist to perform RT Therapy Protocol Assessment initially then follow the protocol. Repeat RT Therapy Protocol Assessment PRN for score 0-3 or on second treatment, BID, and PRN for scores above 3. No Indications - adjust the frequency to every 6 hours PRN wheezing or bronchospasm, if no treatments needed after 48 hours then discontinue using Per Protocol order mode. If indication present, adjust the RT bronchodilator orders based on the Bronchodilator Assessment Score as indicated below. Use Inhaler orders unless patient has one or more of the following: on home nebulizer, not able to hold breath for 10 seconds, is not alert and oriented, cannot activate and use MDI correctly, or respiratory rate 25 breaths per minute or more, then use the equivalent nebulizer order(s) with same Frequency and PRN reasons based on the score. If a patient is on this medication at home then do not decrease Frequency below that used at home. 0-3 - enter or revise RT bronchodilator order(s) to equivalent RT Bronchodilator order with Frequency of every 4 hours PRN for wheezing or increased work of breathing using Per Protocol order mode. 4-6 - enter or revise RT Bronchodilator order(s) to two equivalent RT bronchodilator orders with one order with BID Frequency and one order with Frequency of every 4 hours PRN wheezing or increased work of breathing using Per Protocol order mode. 7-10 - enter or revise RT Bronchodilator order(s) to two equivalent RT bronchodilator orders with one order with TID Frequency and one order with Frequency of every 4 hours PRN wheezing or increased work of breathing using Per Protocol order mode.        11-13 - enter or revise RT Bronchodilator order(s) to one equivalent RT bronchodilator order with QID Frequency

## 2022-01-03 NOTE — PLAN OF CARE
Problem: Pain:  Goal: Pain level will decrease  Description: Pain level will decrease  Outcome: Ongoing  Goal: Control of acute pain  Description: Control of acute pain  Outcome: Ongoing  Goal: Control of chronic pain  Description: Control of chronic pain  Outcome: Ongoing     Problem: Discharge Planning:  Goal: Discharged to appropriate level of care  Description: Discharged to appropriate level of care  Outcome: Ongoing     Problem:  Activity Intolerance:  Goal: Ability to tolerate increased activity will improve  Description: Ability to tolerate increased activity will improve  Outcome: Ongoing     Problem: Airway Clearance - Ineffective:  Goal: Ability to maintain a clear airway will improve  Description: Ability to maintain a clear airway will improve  Outcome: Ongoing     Problem: Breathing Pattern - Ineffective:  Goal: Ability to achieve and maintain a regular respiratory rate will improve  Description: Ability to achieve and maintain a regular respiratory rate will improve  Outcome: Ongoing     Problem: Gas Exchange - Impaired:  Goal: Levels of oxygenation will improve  Description: Levels of oxygenation will improve  Outcome: Ongoing     Problem: Falls - Risk of:  Goal: Will remain free from falls  Description: Will remain free from falls  Outcome: Ongoing  Goal: Absence of physical injury  Description: Absence of physical injury  Outcome: Ongoing

## 2022-01-03 NOTE — PROGRESS NOTES
Writer called spoke with Samina Milan at [facility].  Writer requested copies of EGD report and progress notes in regards to liver disease

## 2022-01-03 NOTE — PROGRESS NOTES
Patient returned to room s/p EGD vitals obtained pt c/o pain rates at 7 patient medicated with Norco for pain.  Writer reviewed doctor orders

## 2022-01-03 NOTE — PROGRESS NOTES
Pulmonary Critical Care Progress Note    Patient seen for the follow up of COPD exacerbation     Subjective:    He has improved shortness of breath. He still at baseline of 4 L oxygen. He has occasional dry cough. He still complains of having abdominal pain. He is not ambulating much. He still has dyspnea on minimal ambulation    Examination:    Vitals: /67   Pulse 80   Temp 97.7 °F (36.5 °C) (Oral)   Resp 18   Ht 5' 5\" (1.651 m)   Wt 189 lb (85.7 kg)   SpO2 96%   BMI 31.45 kg/m²   SpO2  Av.1 %  Min: 92 %  Max: 96 %  General appearance: alert and cooperative with exam  Neck: No JVD  Lungs: Decreased breath sound no crackles or wheezing  Heart: regular rate and rhythm, S1, S2 normal, no gallop  Abdomen: Soft, non tender, + BS  Extremities: no cyanosis or clubbing.  No significant edema    LABs:    CBC:   Recent Labs     22  0603 22  0556   WBC 11.7* 21.4*   HGB 14.8 14.3   HCT 44.1 42.0    287     BMP:   Recent Labs     22  0603 22  0556    136   K 3.9 3.7   CO2 26 29   BUN 17 25*   CREATININE 0.97 0.99   LABGLOM >60 >60   GLUCOSE 129* 153*     PT/INR:   Recent Labs     22  0603   PROTIME 13.2   INR 1.0     LIVER PROFILE:  Recent Labs     21  1038   AST 19   ALT 18   LABALBU 4.4       Radiology:     CXR    No acute cardiopulmonary disease.  COPD    Impression:  · Acute on chronic respiratory failure  · COPD exacerbation  · Smoker recently quit  · Liver cirrhosis with ascites /hepatitis C/new liver lesions on CT abdomen with fatty infiltration noted and gastric mucosal prominence      Recommendations:  · Oxygen by nasal cannula  · Incentive spirometry every hour awake  · DuoNeb  · Symbicort  · Make Solu-Medrol 40 IV every 8 hours  · GI consultation/consider EGD colonoscopy/consider CT-guided biopsy of liver lesion  · PFT outpatient  · Smoking cessation  · DVT prophylaxis      Katie Meade MD, MD, [facility]  Pulmonary Critical Care and Sleep Medicine,  [facility]  Cell: [phone]  Office: [phone]

## 2022-01-04 ENCOUNTER — APPOINTMENT (OUTPATIENT)
Dept: INTERVENTIONAL RADIOLOGY/VASCULAR | Age: 57
DRG: 140 | End: 2022-01-04
Payer: MEDICARE

## 2022-01-04 VITALS
OXYGEN SATURATION: 95 % | RESPIRATION RATE: 18 BRPM | HEIGHT: 65 IN | HEART RATE: 81 BPM | BODY MASS INDEX: 31.12 KG/M2 | DIASTOLIC BLOOD PRESSURE: 79 MMHG | WEIGHT: 186.8 LBS | SYSTOLIC BLOOD PRESSURE: 136 MMHG | TEMPERATURE: 97.5 F

## 2022-01-04 LAB
ABSOLUTE EOS #: 0.03 K/UL (ref 0–0.44)
ABSOLUTE IMMATURE GRANULOCYTE: 0.04 K/UL (ref 0–0.3)
ABSOLUTE LYMPH #: 1.25 K/UL (ref 1.1–3.7)
ABSOLUTE MONO #: 0.85 K/UL (ref 0.1–1.2)
ANION GAP SERPL CALCULATED.3IONS-SCNC: 10 MMOL/L (ref 9–17)
BASOPHILS # BLD: 0 % (ref 0–2)
BASOPHILS ABSOLUTE: <0.03 K/UL (ref 0–0.2)
BUN BLDV-MCNC: 22 MG/DL (ref 6–20)
BUN/CREAT BLD: 29 (ref 9–20)
CALCIUM SERPL-MCNC: 9.3 MG/DL (ref 8.6–10.4)
CHLORIDE BLD-SCNC: 98 MMOL/L (ref 98–107)
CO2: 30 MMOL/L (ref 20–31)
CREAT SERPL-MCNC: 0.76 MG/DL (ref 0.7–1.2)
DIFFERENTIAL TYPE: ABNORMAL
EOSINOPHILS RELATIVE PERCENT: 0 % (ref 1–4)
GFR AFRICAN AMERICAN: >60 ML/MIN
GFR NON-AFRICAN AMERICAN: >60 ML/MIN
GFR SERPL CREATININE-BSD FRML MDRD: ABNORMAL ML/MIN/{1.73_M2}
GFR SERPL CREATININE-BSD FRML MDRD: ABNORMAL ML/MIN/{1.73_M2}
GLUCOSE BLD-MCNC: 105 MG/DL (ref 70–99)
HCT VFR BLD CALC: 41.5 % (ref 40.7–50.3)
HEMOGLOBIN: 13.6 G/DL (ref 13–17)
IMMATURE GRANULOCYTES: 0 %
LYMPHOCYTES # BLD: 12 % (ref 24–43)
MCH RBC QN AUTO: 30.2 PG (ref 25.2–33.5)
MCHC RBC AUTO-ENTMCNC: 32.8 G/DL (ref 28.4–34.8)
MCV RBC AUTO: 92.2 FL (ref 82.6–102.9)
MONOCYTES # BLD: 8 % (ref 3–12)
NRBC AUTOMATED: 0 PER 100 WBC
PDW BLD-RTO: 12.1 % (ref 11.8–14.4)
PLATELET # BLD: 206 K/UL (ref 138–453)
PLATELET ESTIMATE: ABNORMAL
PMV BLD AUTO: 9.9 FL (ref 8.1–13.5)
POTASSIUM SERPL-SCNC: 3.7 MMOL/L (ref 3.7–5.3)
RBC # BLD: 4.5 M/UL (ref 4.21–5.77)
RBC # BLD: ABNORMAL 10*6/UL
SEG NEUTROPHILS: 80 % (ref 36–65)
SEGMENTED NEUTROPHILS ABSOLUTE COUNT: 8.27 K/UL (ref 1.5–8.1)
SODIUM BLD-SCNC: 138 MMOL/L (ref 135–144)
WBC # BLD: 10.5 K/UL (ref 3.5–11.3)
WBC # BLD: ABNORMAL 10*3/UL

## 2022-01-04 PROCEDURE — 94640 AIRWAY INHALATION TREATMENT: CPT

## 2022-01-04 PROCEDURE — 36415 COLL VENOUS BLD VENIPUNCTURE: CPT

## 2022-01-04 PROCEDURE — 6360000002 HC RX W HCPCS: Performed by: FAMILY MEDICINE

## 2022-01-04 PROCEDURE — 2580000003 HC RX 258: Performed by: INTERNAL MEDICINE

## 2022-01-04 PROCEDURE — 6370000000 HC RX 637 (ALT 250 FOR IP): Performed by: INTERNAL MEDICINE

## 2022-01-04 PROCEDURE — 6360000002 HC RX W HCPCS: Performed by: INTERNAL MEDICINE

## 2022-01-04 PROCEDURE — 2700000000 HC OXYGEN THERAPY PER DAY

## 2022-01-04 PROCEDURE — 6370000000 HC RX 637 (ALT 250 FOR IP): Performed by: FAMILY MEDICINE

## 2022-01-04 PROCEDURE — 85025 COMPLETE CBC W/AUTO DIFF WBC: CPT

## 2022-01-04 PROCEDURE — 80048 BASIC METABOLIC PNL TOTAL CA: CPT

## 2022-01-04 PROCEDURE — 76705 ECHO EXAM OF ABDOMEN: CPT

## 2022-01-04 RX ORDER — HYDROCODONE BITARTRATE AND ACETAMINOPHEN 5; 325 MG/1; MG/1
1 TABLET ORAL EVERY 4 HOURS PRN
Qty: 15 TABLET | Refills: 0 | Status: SHIPPED | OUTPATIENT
Start: 2022-01-04 | End: 2022-01-07

## 2022-01-04 RX ORDER — PREDNISONE 20 MG/1
20 TABLET ORAL 2 TIMES DAILY
Qty: 10 TABLET | Refills: 0 | Status: SHIPPED | OUTPATIENT
Start: 2022-01-04 | End: 2022-01-09

## 2022-01-04 RX ADMIN — BUDESONIDE AND FORMOTEROL FUMARATE DIHYDRATE 2 PUFF: 160; 4.5 AEROSOL RESPIRATORY (INHALATION) at 10:22

## 2022-01-04 RX ADMIN — AMLODIPINE BESYLATE 5 MG: 5 TABLET ORAL at 09:36

## 2022-01-04 RX ADMIN — SODIUM CHLORIDE, PRESERVATIVE FREE 10 ML: 5 INJECTION INTRAVENOUS at 09:38

## 2022-01-04 RX ADMIN — LOSARTAN POTASSIUM 100 MG: 100 TABLET, FILM COATED ORAL at 09:36

## 2022-01-04 RX ADMIN — FOLIC ACID 1 MG: 1 TABLET ORAL at 09:36

## 2022-01-04 RX ADMIN — METOPROLOL SUCCINATE 50 MG: 50 TABLET, EXTENDED RELEASE ORAL at 09:36

## 2022-01-04 RX ADMIN — ENOXAPARIN SODIUM 40 MG: 100 INJECTION SUBCUTANEOUS at 09:37

## 2022-01-04 RX ADMIN — MORPHINE SULFATE 1 MG: 2 INJECTION, SOLUTION INTRAMUSCULAR; INTRAVENOUS at 11:10

## 2022-01-04 RX ADMIN — METHYLPREDNISOLONE SODIUM SUCCINATE 40 MG: 40 INJECTION, POWDER, FOR SOLUTION INTRAMUSCULAR; INTRAVENOUS at 05:25

## 2022-01-04 RX ADMIN — IPRATROPIUM BROMIDE AND ALBUTEROL SULFATE 1 AMPULE: .5; 2.5 SOLUTION RESPIRATORY (INHALATION) at 10:22

## 2022-01-04 RX ADMIN — HYDROCODONE BITARTRATE AND ACETAMINOPHEN 1 TABLET: 5; 325 TABLET ORAL at 15:02

## 2022-01-04 RX ADMIN — PANTOPRAZOLE SODIUM 40 MG: 40 TABLET, DELAYED RELEASE ORAL at 05:26

## 2022-01-04 RX ADMIN — HYDROCHLOROTHIAZIDE 25 MG: 25 TABLET ORAL at 09:36

## 2022-01-04 RX ADMIN — ASPIRIN 81 MG CHEWABLE TABLET 81 MG: 81 TABLET CHEWABLE at 09:36

## 2022-01-04 RX ADMIN — ESCITALOPRAM OXALATE 10 MG: 10 TABLET ORAL at 09:36

## 2022-01-04 RX ADMIN — ROFLUMILAST 500 MCG: 500 TABLET ORAL at 09:36

## 2022-01-04 RX ADMIN — GUAIFENESIN 600 MG: 600 TABLET, EXTENDED RELEASE ORAL at 09:36

## 2022-01-04 RX ADMIN — MORPHINE SULFATE 1 MG: 2 INJECTION, SOLUTION INTRAMUSCULAR; INTRAVENOUS at 16:52

## 2022-01-04 RX ADMIN — HYDROCODONE BITARTRATE AND ACETAMINOPHEN 1 TABLET: 5; 325 TABLET ORAL at 08:06

## 2022-01-04 RX ADMIN — MORPHINE SULFATE 1 MG: 2 INJECTION, SOLUTION INTRAMUSCULAR; INTRAVENOUS at 05:30

## 2022-01-04 ASSESSMENT — PAIN DESCRIPTION - DESCRIPTORS
DESCRIPTORS: PRESSURE
DESCRIPTORS: PRESSURE

## 2022-01-04 ASSESSMENT — PAIN SCALES - GENERAL
PAINLEVEL_OUTOF10: 4
PAINLEVEL_OUTOF10: 2
PAINLEVEL_OUTOF10: 8
PAINLEVEL_OUTOF10: 4
PAINLEVEL_OUTOF10: 7
PAINLEVEL_OUTOF10: 6
PAINLEVEL_OUTOF10: 7
PAINLEVEL_OUTOF10: 4
PAINLEVEL_OUTOF10: 7

## 2022-01-04 ASSESSMENT — PAIN DESCRIPTION - PAIN TYPE
TYPE: ACUTE PAIN
TYPE: ACUTE PAIN

## 2022-01-04 ASSESSMENT — PAIN DESCRIPTION - LOCATION
LOCATION: ABDOMEN
LOCATION: ABDOMEN

## 2022-01-04 ASSESSMENT — PAIN DESCRIPTION - PROGRESSION: CLINICAL_PROGRESSION: NOT CHANGED

## 2022-01-04 ASSESSMENT — PAIN DESCRIPTION - ORIENTATION
ORIENTATION: ANTERIOR
ORIENTATION: ANTERIOR

## 2022-01-04 ASSESSMENT — PAIN DESCRIPTION - FREQUENCY: FREQUENCY: CONTINUOUS

## 2022-01-04 ASSESSMENT — PAIN - FUNCTIONAL ASSESSMENT: PAIN_FUNCTIONAL_ASSESSMENT: ACTIVITIES ARE NOT PREVENTED

## 2022-01-04 ASSESSMENT — PAIN DESCRIPTION - ONSET: ONSET: ON-GOING

## 2022-01-04 NOTE — PLAN OF CARE
Problem: Pain:  Goal: Control of acute pain  Description: Control of acute pain  Outcome: Ongoing  Note: PRN Norco and Morphine for pain control/ patient states medication is effective

## 2022-01-04 NOTE — PROGRESS NOTES
Occupational Therapy  DATE: 2022    NAME: Duncan Goel  MRN: [id number]   : 1965    Patient not seen this date for Occupational Therapy due to:      [] Cancel by RN or physician due to:    [] Hemodialysis    [] Critical Lab Value Level     [] Blood transfusion in progress    [] Acute or unstable cardiovascular status   _MAP < 55 or more than >115  _HR < 40 or > 130    [] Acute or unstable pulmonary status   -FiO2 > 60%   _RR < 5 or >40    _O2 sats < 85%    [] Strict Bedrest    [] Off Unit for surgery or procedure    [] Off Unit for testing       [] Pending imaging to R/O fracture    [] Refusal by Patient      [x] Other- PT reports pt is I and has no OT needs; DC OT order      [] PT being discontinued at this time. Patient independent. No further needs. [] PT being discontinued at this time as the patient has been transferred to hospice care. No further needs.       Shane Rater, OT

## 2022-01-04 NOTE — PROGRESS NOTES
Pulmonary Critical Care Progress Note    Patient seen for the follow up of COPD exacerbation     Subjective:    He has improved shortness of breath. He still at baseline of 4 L oxygen. He has occasional dry cough. He has improved but persisting abdominal pain. He is ambulating more. No evidence of ascites and paracentesis    Examination:    Vitals: /79   Pulse 81   Temp 97.5 °F (36.4 °C) (Oral)   Resp 18   Ht 5' 5\" (1.651 m)   Wt 186 lb 12.8 oz (84.7 kg)   SpO2 95%   BMI 31.09 kg/m²   SpO2  Av.5 %  Min: 95 %  Max: 96 %  General appearance: alert and cooperative with exam  Neck: No JVD  Lungs: Decreased breath sound no crackles or wheezing  Heart: regular rate and rhythm, S1, S2 normal, no gallop  Abdomen: Soft, non tender, + BS  Extremities: no cyanosis or clubbing. No significant edema    LABs:    CBC:   Recent Labs     22  0652 22  0705   WBC 15.3* 10.5   HGB 13.6 13.6   HCT 40.5* 41.5    206     BMP:   Recent Labs     22  0652 22  0705    138   K 3.4* 3.7   CO2 31 30   BUN 24* 22*   CREATININE 0.82 0.76   LABGLOM >60 >60   GLUCOSE 131* 105*     PT/INR:   No results for input(s): PROTIME, INR in the last 72 hours. LIVER PROFILE:  No results for input(s): AST, ALT, LABALBU in the last 72 hours.     Radiology:     CXR    No acute cardiopulmonary disease.  COPD    Impression:  · Acute on chronic respiratory failure  · COPD exacerbation  · Smoker recently quit  · Liver cirrhosis with ascites /hepatitis C/new liver lesions on CT abdomen with fatty infiltration noted and gastric mucosal prominence      Recommendations:  · Oxygen by nasal cannula  · Incentive spirometry every hour awake  · DuoNeb  · Symbicort  · Discontinue Solu-Medrol 40 IV every 12 hours  · Prednisone taper to stop  · GI consultation/consider EGD colonoscopy/consider CT-guided biopsy of liver lesion  · PFT outpatient  · Smoking cessation  · Increase ambulation  · Okay to discharge from pulmonary point  · DVT prophylaxis      Elton Baca MD, MD, [facility]  Pulmonary Critical Care and Sleep Medicine,  [facility]  Cell: [phone]  Office: [phone]

## 2022-01-04 NOTE — PROGRESS NOTES
Pulmonary Critical Care Progress Note    Patient seen for the follow up of COPD exacerbation     Subjective:    He has improved shortness of breath. He still at baseline of 4 L oxygen. He has occasional dry cough. He still complains of having abdominal pain. He is not ambulating much. He still has dyspnea on minimal ambulation    Examination:    Vitals: /70   Pulse 70   Temp 98.7 °F (37.1 °C) (Oral)   Resp 18   Ht 5' 5\" (1.651 m)   Wt 188 lb (85.3 kg)   SpO2 96%   BMI 31.28 kg/m²   SpO2  Av.7 %  Min: 93 %  Max: 100 %  General appearance: alert and cooperative with exam  Neck: No JVD  Lungs: Decreased breath sound no crackles or wheezing  Heart: regular rate and rhythm, S1, S2 normal, no gallop  Abdomen: Soft, non tender, + BS  Extremities: no cyanosis or clubbing. No significant edema    LABs:    CBC:   Recent Labs     22  0556 22  0652   WBC 21.4* 15.3*   HGB 14.3 13.6   HCT 42.0 40.5*    250     BMP:   Recent Labs     22  0556 22  0652    139   K 3.7 3.4*   CO2 29 31   BUN 25* 24*   CREATININE 0.99 0.82   LABGLOM >60 >60   GLUCOSE 153* 131*     PT/INR:   Recent Labs     22  0603   PROTIME 13.2   INR 1.0     LIVER PROFILE:  No results for input(s): AST, ALT, LABALBU in the last 72 hours.     Radiology:     CXR    No acute cardiopulmonary disease.  COPD    Impression:  · Acute on chronic respiratory failure  · COPD exacerbation  · Smoker recently quit  · Liver cirrhosis with ascites /hepatitis C/new liver lesions on CT abdomen with fatty infiltration noted and gastric mucosal prominence      Recommendations:  · Oxygen by nasal cannula  · Incentive spirometry every hour awake  · DuoNeb  · Symbicort  · Make Solu-Medrol 40 IV every 12 hours  · GI consultation/consider EGD colonoscopy/consider CT-guided biopsy of liver lesion  · PFT outpatient  · Smoking cessation  · Increase ambulation  · DVT prophylaxis      Bk Yoder MD, MD, [facility]  Pulmonary Critical Care and Sleep Medicine,  [facility]  Cell: [phone]  Office: [phone]

## 2022-01-04 NOTE — DISCHARGE INSTR - DIET

## 2022-01-04 NOTE — PROGRESS NOTES
Writer reviewed discharge instruction with patient he verbalized understanding. Signature obtained.  Patient sent home with belongings and script for Prednisone and Norco

## 2022-01-04 NOTE — DISCHARGE SUMMARY
4 [facility].,    Adult Hospitalist      Patient ID: Ashutosh Hopkins  MRN: [id number]     Acct:  [de-identified]       Patient's PCP: Kerry Carranza MD    Admit Date: 12/31/2021     Discharge Date:  1/4/22    Admitting Physician: Baldev Gonzales MD    Discharge Physician: Toni Johnson MD     CONSULTANTS: Patient Care Team:  Kerry Carranza MD as PCP - General (Family Medicine)    PROCEDURES PERFORMED:     Active Discharge Diagnoses:  Acute on chronic hypoxemic respiratory failure  Acute COPD exacerbation  Acute abdominal pain  Leukocytosis  History of alcohol abuse  Liver lesion with history of treated hepatitis C  Hypertension  Mixed hyperlipidemia  Chronic coronary artery disease  Depression with anxiety  Tobacco abuse  Obesity, BMI 30.79      Primary Problem  <principal problem not specified>    Hospital Course: This is a 49-year-old gentleman who is been admitted via the emergency room, patient came to the ER with a complaint of having some shortness of breath, patient has past medical history significant coronary disease and COPD, patient has been having shortness of breath for past 2 weeks which has progressively gotten worse, patient denies any fever or chills, patient has some nausea but no vomiting, patient is been having some coughing spells, further patient does wear oxygen at home, patient is requiring more oxygen, for this reason came to the emergency room, initial testing in the emergency room is unremarkable patient is negative for COVID-19 and is vaccinated, further patient is noticed to be in COPD exacerbation admitted for further management  Patient received IV Solu-Medrol. His respiratory status improved. He was weaned down to 4 L O2 which he uses at home. Patient discharged home on p.o. prednisone. Patient is also complaining of abdominal pain and discomfort. Patient has history of alcohol abuse and cirrhosis.   Imaging was consistent with questionable hemangiomas. GI was involved in the care. EGD was done which was unremarkable. GI signed off. At the time of discharge patient medically stable and asymptomatic. The plan was discussed in detail with patient who agreed with the plan and verbalized understanding . The patient was seen and examined on day of discharge and this discharge summary is in conjunction with any daily progress note from day of discharge. Hospital Data:    Labs:    Hematology:  Recent Labs     01/02/22  0556 01/03/22  0652 01/04/22  0705   WBC 21.4* 15.3* 10.5   RBC 4.63 4.43 4.50   HGB 14.3 13.6 13.6   HCT 42.0 40.5* 41.5   MCV 90.7 91.4 92.2   MCH 30.9 30.7 30.2   MCHC 34.0 33.6 32.8   RDW 12.0 12.0 12.1    250 206   MPV 10.2 10.4 9.9     Chemistry:  Recent Labs     01/02/22  0556 01/03/22  0652 01/04/22  0705    139 138   K 3.7 3.4* 3.7   CL 94* 97* 98   CO2 29 31 30   GLUCOSE 153* 131* 105*   BUN 25* 24* 22*   CREATININE 0.99 0.82 0.76   MG 2.2 2.1  --    ANIONGAP 13 11 10   LABGLOM >60 >60 >60   GFRAA >60 >60 >60   CALCIUM 10.1 9.6 9.3     No results for input(s): PROT, LABALBU, LABA1C, L1NUHZR, K6SNZHR, FT4, TSH, AST, ALT, LDH, GGT, ALKPHOS, LABGGT, BILITOT, BILIDIR, AMMONIA, AMYLASE, LIPASE, LACTATE, CHOL, HDL, LDLCHOLESTEROL, CHOLHDLRATIO, TRIG, VLDL, EIO85LR, PHENYTOIN, PHENYF, URICACID, POCGLU in the last 72 hours. Lab Results   Component Value Date    INR 1.0 01/01/2022    INR 1.1 07/13/2021    PROTIME 13.2 01/01/2022    PROTIME 13.5 07/13/2021     Lab Results   Component Value Date/Time    SPECIAL NOT REPORTED 01/01/2022 10:15 PM     Lab Results   Component Value Date/Time    CULTURE  01/01/2022 10:15 PM     NEGATIVE: HSV-1 DNA not detected by nucleic acid amplification. CULTURE  01/01/2022 10:15 PM     NEGATIVE: HSV-2 DNA not detected by nucleic acid amplification. CULTURE  01/01/2022 10:15 PM     Due to the specimen source, HSV 1,2 testing was performed by a molecular method.     CULTURE 01/01/2022 10:15 PM     NEGATIVE for Influenza A and B, Para influenza 1,2,3, Adenovirus and RSV. at day 2    CULTURE  01/01/2022 10:15 PM     Negative results do not preclude respiratory virus infection and should not be used as the sole basis for diagnosis, treatment or other management decisions. CULTURE NEGATIVE for Cytomegalovirus at day 1 01/01/2022 10:15 PM       No results found for: POCPH, PHART, PH, POCPCO2, MGL9UAC, PCO2, POCPO2, PO2ART, PO2, POCHCO3, LWA9QFG, HCO3, NBEA, PBEA, BEART, BE, THGBART, THB, QUZ8BHG, GOVH1TRM, I2UTMDDL, O2SAT, FIO2    Radiology:    CT ABDOMEN PELVIS W IV CONTRAST Additional Contrast? None    Result Date: 12/31/2021  1. Multiple hepatic lesions. Those that are included on prior CT scan of the chest of March 2021, appear grossly unchanged. However, the lesions are ill-defined and non cystic. They have not been fully characterized or evaluated. Given the patient's history of chronic hepatitis C, malignancy/metastases are not excluded. 2.  No bowel obstruction, appendicitis urinary obstruction or gallstones. 3.  Liver is heterogeneous but of generalized decreased attenuation suggesting fatty infiltration. 4.  Mucosal thickening of the stomach in the fundal area suggestive of gastritis with prominent folds and crypts. RECOMMENDATIONS: Further assessment of liver lesions is advised. This could include follow-up CT with hemangioma protocol or MRI imaging of the abdomen with and without contrast.     XR CHEST PORTABLE    Result Date: 12/31/2021  No acute cardiopulmonary disease. COPD. IR US GUIDED PARACENTESIS    Result Date: 1/4/2022  No evidence for ascites on ultrasound of the abdomen and pelvis. Paracentesis was not performed.          All radiological studies reviewed      Reviews of Symptoms:    A 10 point system is reviewed and  negative except described in hospital course    Physical Exam:    Vitals:  /79   Pulse 81   Temp 97.5 °F (36.4 °C) (Oral)   Resp 18   Ht 5' 5\" (1.651 m)   Wt 186 lb 12.8 oz (84.7 kg)   SpO2 95%   BMI 31.09 kg/m²   Temp (24hrs), Av.1 °F (36.7 °C), Min:97.5 °F (36.4 °C), Max:98.7 °F (37.1 °C)      General appearance - alert, well appearing, and in no acute distress  Mental status - oriented to person, place, and time with normal affect  Head - normocephalic and atraumatic  Eyes - pupils equal and reactive, extraocular eye movements intact, conjunctiva clear  Ears - hearing appears to be intact  Nose - no drainage noted  Mouth - mucous membranes moist  Neck - supple, no carotid bruits, thyroid not palpable  Chest - clear to auscultation, normal effort  Heart - normal rate, regular rhythm, no murmur  Abdomen - soft, nontender, nondistended, bowel sounds present all four quadrants, no masses, hepatomegaly or splenomegaly  Neurological - normal speech, no focal findings or movement disorder noted, cranial nerves II through XII grossly intact  Extremities - peripheral pulses palpable, no pedal edema or calf pain with palpation  Skin - no gross lesions, rashes, or induration noted      Consults:  IP CONSULT TO HOSPITALIST  IP CONSULT TO GI  IP CONSULT TO PULMONOLOGY    Disposition: Home    Discharged Condition: Stable    Follow Up: No follow-up provider specified. Lab Frequency Next Occurrence   Nasal Cannula Oxygen DAILY (RT)    Up with assistance PRN    Intake and output EVERY 8 HOURS    Initiate Oxygen Therapy Protocol DAILY (RT)    Pulse Oximetry Spot Check PRN    Basic Metabolic Panel w/ Reflex to MG DAILY    CBC auto differential DAILY    Initiate RT Inhaler-Nebulizer Bronchodilator Protocol DAILY (RT)    HHN Treatment 3 TIMES DAILY (RT)          Diet: ADULT DIET; Regular    Discharge Medications:      Medication List      START taking these medications    HYDROcodone-acetaminophen 5-325 MG per tablet  Commonly known as: NORCO  Take 1 tablet by mouth every 4 hours as needed for Pain for up to 3 days.         CHANGE how you take these medications    predniSONE 20 MG tablet  Commonly known as: DELTASONE  Take 1 tablet by mouth 2 times daily for 5 days  What changed:   · how much to take  · how to take this  · when to take this  · additional instructions        CONTINUE taking these medications    * albuterol (2.5 MG/3ML) 0.083% nebulizer solution  Commonly known as: PROVENTIL     * albuterol sulfate  (90 Base) MCG/ACT inhaler     amLODIPine 5 MG tablet  Commonly known as: NORVASC     aspirin 81 MG tablet     escitalopram 10 MG tablet  Commonly known as: LEXAPRO     famotidine 40 MG tablet  Commonly known as: PEPCID     ferrous gluconate 324 (38 Fe) MG tablet  Commonly known as: FERGON     folic acid 1 MG tablet  Commonly known as: FOLVITE     hydroCHLOROthiazide 25 MG tablet  Commonly known as: HYDRODIURIL  Take 1 tablet by mouth daily     losartan 100 MG tablet  Commonly known as: COZAAR  Take 1 tablet by mouth daily     metoprolol succinate 50 MG extended release tablet  Commonly known as: TOPROL XL     montelukast 10 MG tablet  Commonly known as: SINGULAIR  Take 1 tablet by mouth nightly     pantoprazole 40 MG tablet  Commonly known as: PROTONIX     QUEtiapine 400 MG tablet  Commonly known as: SEROQUEL     Roflumilast 500 MCG tablet  Commonly known as: DALIRESP  Take 1 tablet by mouth daily     Spiriva Respimat 2.5 MCG/ACT Aers inhaler  Generic drug: tiotropium     Symbicort 160-4.5 MCG/ACT Aero  Generic drug: budesonide-formoterol     vitamin B-1 100 MG tablet  Commonly known as: THIAMINE     Vitamin D3 50 MCG (2000 UT) Caps         * This list has 2 medication(s) that are the same as other medications prescribed for you. Read the directions carefully, and ask your doctor or other care provider to review them with you.                Where to Get Your Medications      These medications were sent to [facility] [place] [address] [phone] - [phone]  [address] [place] [address], [address] [address]    Phone: [phone]   · HYDROcodone-acetaminophen 5-325 MG per tablet  · predniSONE 20 MG tablet         Code Status:  Full Code    Time Spent on discharge is  45 minutes in patient examination, evaluation, counseling as well as medication reconciliation, prescriptions for required medications, discharge plan and follow up. Electronically signed by Vijay Webb MD on 1/4/2022 at 3:42 PM     Thank you Dr. Catherine Phillips MD for the opportunity to be involved in this patient's care. This note was created with the assistance of a speech-recognition program.  Although the intention is to generate a document that actually reflects the content of the visit, no guarantees can be provided that every mistake has been identified and corrected by editing. Note was updated later by me after  physical examination and  completion of the assessment.

## 2022-01-04 NOTE — PROGRESS NOTES
CLINICAL PHARMACY NOTE: MEDS TO BEDS    Total # of Prescriptions Filled: 2   The following medications were delivered to the patient:  · NORCO 5-325  · PREDNISONE 20MG    Additional Documentation:

## 2022-01-04 NOTE — PROGRESS NOTES
Covid 19 swab taken from LEFT nare, labeled, placed in red dot bag, and handed off to second healthcare worker outside of room for transport to laboratory per hospital policy and procedure.   Patient tolerated procedure WELL

## 2022-01-04 NOTE — PROGRESS NOTES
Patient to IR for paracentesis. Upon evaluation with ultrasound, Dr. Leslie Montero determined there is no fluid to be drained at this time. Patient returned to room, nurse updated.

## 2022-01-06 LAB — SURGICAL PATHOLOGY REPORT: NORMAL

## 2022-01-07 LAB
CULTURE: NORMAL
Lab: NORMAL
SPECIMEN DESCRIPTION: NORMAL

## 2022-01-13 NOTE — PROGRESS NOTES
Report given to Carla Aparicio RN. Patient transferred via wheelchair with all valuables on 4L/NC to [facility], room 2024. Detail Level: Generalized

## 2022-08-24 ENCOUNTER — APPOINTMENT (OUTPATIENT)
Dept: GENERAL RADIOLOGY | Age: 57
DRG: 140 | End: 2022-08-24
Payer: MEDICARE

## 2022-08-24 ENCOUNTER — HOSPITAL ENCOUNTER (INPATIENT)
Age: 57
LOS: 9 days | Discharge: HOME | DRG: 140 | End: 2022-09-02
Attending: EMERGENCY MEDICINE | Admitting: HOSPITALIST
Payer: MEDICARE

## 2022-08-24 DIAGNOSIS — K55.9 ISCHEMIC COLITIS (HCC): ICD-10-CM

## 2022-08-24 DIAGNOSIS — J44.1 COPD EXACERBATION (HCC): Primary | ICD-10-CM

## 2022-08-24 DIAGNOSIS — K63.89 COLONIC MASS: ICD-10-CM

## 2022-08-24 PROBLEM — J45.901 ACUTE EXACERBATION OF COPD WITH ASTHMA (HCC): Status: ACTIVE | Noted: 2022-08-24

## 2022-08-24 LAB
ALBUMIN SERPL-MCNC: 4.4 G/DL (ref 3.5–5.2)
ALP BLD-CCNC: 77 U/L (ref 40–129)
ALT SERPL-CCNC: 18 U/L (ref 5–41)
ANION GAP SERPL CALCULATED.3IONS-SCNC: 13 MMOL/L (ref 9–17)
AST SERPL-CCNC: 13 U/L
BILIRUB SERPL-MCNC: 0.51 MG/DL (ref 0.3–1.2)
BILIRUBIN URINE: NEGATIVE
BUN BLDV-MCNC: 9 MG/DL (ref 6–20)
BUN/CREAT BLD: 11 (ref 9–20)
CALCIUM SERPL-MCNC: 10.4 MG/DL (ref 8.6–10.4)
CHLORIDE BLD-SCNC: 93 MMOL/L (ref 98–107)
CO2: 27 MMOL/L (ref 20–31)
COLOR: YELLOW
COMMENT UA: NORMAL
CREAT SERPL-MCNC: 0.81 MG/DL (ref 0.7–1.2)
D-DIMER QUANTITATIVE: 0.28 MG/L FEU (ref 0–0.59)
GFR AFRICAN AMERICAN: >60 ML/MIN
GFR NON-AFRICAN AMERICAN: >60 ML/MIN
GFR SERPL CREATININE-BSD FRML MDRD: ABNORMAL ML/MIN/{1.73_M2}
GLUCOSE BLD-MCNC: 121 MG/DL (ref 70–99)
GLUCOSE URINE: NEGATIVE
HCT VFR BLD CALC: 45.3 % (ref 40.7–50.3)
HEMOGLOBIN: 15 G/DL (ref 13–17)
KETONES, URINE: NEGATIVE
LACTIC ACID: 1.3 MMOL/L (ref 0.5–2.2)
LEGIONELLA PNEUMOPHILIA AG, URINE: NEGATIVE
LEUKOCYTE ESTERASE, URINE: NEGATIVE
LIPASE: 23 U/L (ref 13–60)
MCH RBC QN AUTO: 30.9 PG (ref 25.2–33.5)
MCHC RBC AUTO-ENTMCNC: 33.1 G/DL (ref 28.4–34.8)
MCV RBC AUTO: 93.2 FL (ref 82.6–102.9)
NITRITE, URINE: NEGATIVE
NRBC AUTOMATED: 0 PER 100 WBC
PDW BLD-RTO: 12.9 % (ref 11.8–14.4)
PH UA: 7 (ref 5–8)
PLATELET # BLD: 364 K/UL (ref 138–453)
PMV BLD AUTO: 9.7 FL (ref 8.1–13.5)
POTASSIUM SERPL-SCNC: 3.9 MMOL/L (ref 3.7–5.3)
PRO-BNP: 41 PG/ML
PROTEIN UA: NEGATIVE
RBC # BLD: 4.86 M/UL (ref 4.21–5.77)
SARS-COV-2, RAPID: NOT DETECTED
SODIUM BLD-SCNC: 133 MMOL/L (ref 135–144)
SPECIFIC GRAVITY UA: 1.01 (ref 1–1.03)
SPECIMEN DESCRIPTION: NORMAL
TOTAL PROTEIN: 8 G/DL (ref 6.4–8.3)
TROPONIN, HIGH SENSITIVITY: 14 NG/L (ref 0–22)
TROPONIN, HIGH SENSITIVITY: 16 NG/L (ref 0–22)
TURBIDITY: CLEAR
URINE HGB: NEGATIVE
UROBILINOGEN, URINE: NORMAL
WBC # BLD: 12.2 K/UL (ref 3.5–11.3)

## 2022-08-24 PROCEDURE — 6370000000 HC RX 637 (ALT 250 FOR IP): Performed by: NURSE PRACTITIONER

## 2022-08-24 PROCEDURE — 87449 NOS EACH ORGANISM AG IA: CPT

## 2022-08-24 PROCEDURE — 87635 SARS-COV-2 COVID-19 AMP PRB: CPT

## 2022-08-24 PROCEDURE — 6360000002 HC RX W HCPCS: Performed by: HOSPITALIST

## 2022-08-24 PROCEDURE — 96374 THER/PROPH/DIAG INJ IV PUSH: CPT

## 2022-08-24 PROCEDURE — 6360000002 HC RX W HCPCS: Performed by: PHYSICIAN ASSISTANT

## 2022-08-24 PROCEDURE — 87205 SMEAR GRAM STAIN: CPT

## 2022-08-24 PROCEDURE — 87899 AGENT NOS ASSAY W/OPTIC: CPT

## 2022-08-24 PROCEDURE — 6370000000 HC RX 637 (ALT 250 FOR IP): Performed by: PHYSICIAN ASSISTANT

## 2022-08-24 PROCEDURE — 2700000000 HC OXYGEN THERAPY PER DAY

## 2022-08-24 PROCEDURE — 94640 AIRWAY INHALATION TREATMENT: CPT

## 2022-08-24 PROCEDURE — 85379 FIBRIN DEGRADATION QUANT: CPT

## 2022-08-24 PROCEDURE — 36415 COLL VENOUS BLD VENIPUNCTURE: CPT

## 2022-08-24 PROCEDURE — 6370000000 HC RX 637 (ALT 250 FOR IP): Performed by: INTERNAL MEDICINE

## 2022-08-24 PROCEDURE — 2500000003 HC RX 250 WO HCPCS: Performed by: PHYSICIAN ASSISTANT

## 2022-08-24 PROCEDURE — 1200000000 HC SEMI PRIVATE

## 2022-08-24 PROCEDURE — 80053 COMPREHEN METABOLIC PANEL: CPT

## 2022-08-24 PROCEDURE — 99285 EMERGENCY DEPT VISIT HI MDM: CPT

## 2022-08-24 PROCEDURE — 84484 ASSAY OF TROPONIN QUANT: CPT

## 2022-08-24 PROCEDURE — 87040 BLOOD CULTURE FOR BACTERIA: CPT

## 2022-08-24 PROCEDURE — 96375 TX/PRO/DX INJ NEW DRUG ADDON: CPT

## 2022-08-24 PROCEDURE — 81003 URINALYSIS AUTO W/O SCOPE: CPT

## 2022-08-24 PROCEDURE — 83605 ASSAY OF LACTIC ACID: CPT

## 2022-08-24 PROCEDURE — 2580000003 HC RX 258: Performed by: HOSPITALIST

## 2022-08-24 PROCEDURE — 93005 ELECTROCARDIOGRAM TRACING: CPT | Performed by: EMERGENCY MEDICINE

## 2022-08-24 PROCEDURE — 71045 X-RAY EXAM CHEST 1 VIEW: CPT

## 2022-08-24 PROCEDURE — 85027 COMPLETE CBC AUTOMATED: CPT

## 2022-08-24 PROCEDURE — 6370000000 HC RX 637 (ALT 250 FOR IP): Performed by: HOSPITALIST

## 2022-08-24 PROCEDURE — 94761 N-INVAS EAR/PLS OXIMETRY MLT: CPT

## 2022-08-24 PROCEDURE — 87154 CUL TYP ID BLD PTHGN 6+ TRGT: CPT

## 2022-08-24 PROCEDURE — 83880 ASSAY OF NATRIURETIC PEPTIDE: CPT

## 2022-08-24 PROCEDURE — 83690 ASSAY OF LIPASE: CPT

## 2022-08-24 RX ORDER — BUDESONIDE AND FORMOTEROL FUMARATE DIHYDRATE 160; 4.5 UG/1; UG/1
2 AEROSOL RESPIRATORY (INHALATION) 2 TIMES DAILY
Status: DISCONTINUED | OUTPATIENT
Start: 2022-08-24 | End: 2022-08-24

## 2022-08-24 RX ORDER — IPRATROPIUM BROMIDE AND ALBUTEROL SULFATE 2.5; .5 MG/3ML; MG/3ML
1 SOLUTION RESPIRATORY (INHALATION) EVERY 4 HOURS
Status: DISCONTINUED | OUTPATIENT
Start: 2022-08-24 | End: 2022-08-26

## 2022-08-24 RX ORDER — GAUZE BANDAGE 2" X 2"
100 BANDAGE TOPICAL DAILY
Status: DISCONTINUED | OUTPATIENT
Start: 2022-08-24 | End: 2022-09-02 | Stop reason: HOSPADM

## 2022-08-24 RX ORDER — FUROSEMIDE 40 MG/1
40 TABLET ORAL DAILY
COMMUNITY

## 2022-08-24 RX ORDER — LOSARTAN POTASSIUM 100 MG/1
100 TABLET ORAL DAILY
Status: DISCONTINUED | OUTPATIENT
Start: 2022-08-25 | End: 2022-09-02 | Stop reason: HOSPADM

## 2022-08-24 RX ORDER — METOPROLOL SUCCINATE 50 MG/1
50 TABLET, EXTENDED RELEASE ORAL DAILY
Status: DISCONTINUED | OUTPATIENT
Start: 2022-08-24 | End: 2022-09-02 | Stop reason: HOSPADM

## 2022-08-24 RX ORDER — ASPIRIN 81 MG/1
81 TABLET ORAL DAILY
Status: DISCONTINUED | OUTPATIENT
Start: 2022-08-24 | End: 2022-09-02 | Stop reason: HOSPADM

## 2022-08-24 RX ORDER — LANOLIN ALCOHOL/MO/W.PET/CERES
324 CREAM (GRAM) TOPICAL
Status: DISCONTINUED | OUTPATIENT
Start: 2022-08-25 | End: 2022-09-02 | Stop reason: HOSPADM

## 2022-08-24 RX ORDER — PREDNISONE 20 MG/1
40 TABLET ORAL DAILY
Status: DISCONTINUED | OUTPATIENT
Start: 2022-08-27 | End: 2022-08-25

## 2022-08-24 RX ORDER — ONDANSETRON 4 MG/1
4 TABLET, ORALLY DISINTEGRATING ORAL EVERY 8 HOURS PRN
Status: DISCONTINUED | OUTPATIENT
Start: 2022-08-24 | End: 2022-09-02 | Stop reason: HOSPADM

## 2022-08-24 RX ORDER — POTASSIUM CHLORIDE 20 MEQ/1
40 TABLET, EXTENDED RELEASE ORAL PRN
Status: DISCONTINUED | OUTPATIENT
Start: 2022-08-24 | End: 2022-09-02 | Stop reason: HOSPADM

## 2022-08-24 RX ORDER — ALBUTEROL SULFATE 90 UG/1
2 AEROSOL, METERED RESPIRATORY (INHALATION) EVERY 4 HOURS PRN
Status: DISCONTINUED | OUTPATIENT
Start: 2022-08-24 | End: 2022-08-24

## 2022-08-24 RX ORDER — ALBUTEROL SULFATE 2.5 MG/3ML
2.5 SOLUTION RESPIRATORY (INHALATION) EVERY 4 HOURS
Status: DISCONTINUED | OUTPATIENT
Start: 2022-08-24 | End: 2022-08-24

## 2022-08-24 RX ORDER — PREDNISONE 10 MG/1
10 TABLET ORAL DAILY
Status: ON HOLD | COMMUNITY
End: 2022-08-31 | Stop reason: HOSPADM

## 2022-08-24 RX ORDER — QUETIAPINE FUMARATE 200 MG/1
400 TABLET, FILM COATED ORAL NIGHTLY
Status: DISCONTINUED | OUTPATIENT
Start: 2022-08-24 | End: 2022-08-24

## 2022-08-24 RX ORDER — SENNA PLUS 8.6 MG/1
1 TABLET ORAL 2 TIMES DAILY PRN
Status: DISCONTINUED | OUTPATIENT
Start: 2022-08-24 | End: 2022-08-30

## 2022-08-24 RX ORDER — IPRATROPIUM BROMIDE AND ALBUTEROL SULFATE 2.5; .5 MG/3ML; MG/3ML
1 SOLUTION RESPIRATORY (INHALATION)
Status: DISCONTINUED | OUTPATIENT
Start: 2022-08-24 | End: 2022-08-24

## 2022-08-24 RX ORDER — LANOLIN ALCOHOL/MO/W.PET/CERES
1000 CREAM (GRAM) TOPICAL DAILY
COMMUNITY

## 2022-08-24 RX ORDER — AMLODIPINE BESYLATE 10 MG/1
10 TABLET ORAL DAILY
Status: DISCONTINUED | OUTPATIENT
Start: 2022-08-25 | End: 2022-09-02 | Stop reason: HOSPADM

## 2022-08-24 RX ORDER — LEVETIRACETAM 500 MG/1
500 TABLET ORAL 2 TIMES DAILY
COMMUNITY

## 2022-08-24 RX ORDER — ONDANSETRON 2 MG/ML
4 INJECTION INTRAMUSCULAR; INTRAVENOUS EVERY 6 HOURS PRN
Status: DISCONTINUED | OUTPATIENT
Start: 2022-08-24 | End: 2022-08-24 | Stop reason: SDUPTHER

## 2022-08-24 RX ORDER — MAGNESIUM SULFATE 1 G/100ML
1000 INJECTION INTRAVENOUS PRN
Status: DISCONTINUED | OUTPATIENT
Start: 2022-08-24 | End: 2022-09-02 | Stop reason: HOSPADM

## 2022-08-24 RX ORDER — METHYLPREDNISOLONE SODIUM SUCCINATE 40 MG/ML
40 INJECTION, POWDER, LYOPHILIZED, FOR SOLUTION INTRAMUSCULAR; INTRAVENOUS EVERY 6 HOURS
Status: DISCONTINUED | OUTPATIENT
Start: 2022-08-24 | End: 2022-08-25

## 2022-08-24 RX ORDER — MORPHINE SULFATE 2 MG/ML
2 INJECTION, SOLUTION INTRAMUSCULAR; INTRAVENOUS ONCE
Status: COMPLETED | OUTPATIENT
Start: 2022-08-24 | End: 2022-08-24

## 2022-08-24 RX ORDER — ENOXAPARIN SODIUM 100 MG/ML
40 INJECTION SUBCUTANEOUS DAILY
Status: DISCONTINUED | OUTPATIENT
Start: 2022-08-24 | End: 2022-09-02 | Stop reason: HOSPADM

## 2022-08-24 RX ORDER — FUROSEMIDE 40 MG/1
40 TABLET ORAL DAILY
Status: DISCONTINUED | OUTPATIENT
Start: 2022-08-25 | End: 2022-09-02 | Stop reason: HOSPADM

## 2022-08-24 RX ORDER — METHYLPREDNISOLONE SODIUM SUCCINATE 125 MG/2ML
125 INJECTION, POWDER, LYOPHILIZED, FOR SOLUTION INTRAMUSCULAR; INTRAVENOUS ONCE
Status: COMPLETED | OUTPATIENT
Start: 2022-08-24 | End: 2022-08-24

## 2022-08-24 RX ORDER — FOLIC ACID 1 MG/1
1 TABLET ORAL DAILY
Status: DISCONTINUED | OUTPATIENT
Start: 2022-08-24 | End: 2022-09-02 | Stop reason: HOSPADM

## 2022-08-24 RX ORDER — BUDESONIDE AND FORMOTEROL FUMARATE DIHYDRATE 160; 4.5 UG/1; UG/1
2 AEROSOL RESPIRATORY (INHALATION) 2 TIMES DAILY
Status: DISCONTINUED | OUTPATIENT
Start: 2022-08-24 | End: 2022-09-02 | Stop reason: HOSPADM

## 2022-08-24 RX ORDER — ONDANSETRON 2 MG/ML
4 INJECTION INTRAMUSCULAR; INTRAVENOUS EVERY 6 HOURS PRN
Status: DISCONTINUED | OUTPATIENT
Start: 2022-08-24 | End: 2022-09-02 | Stop reason: HOSPADM

## 2022-08-24 RX ORDER — ONDANSETRON 4 MG/1
4 TABLET, ORALLY DISINTEGRATING ORAL EVERY 8 HOURS PRN
Status: DISCONTINUED | OUTPATIENT
Start: 2022-08-24 | End: 2022-08-24 | Stop reason: SDUPTHER

## 2022-08-24 RX ORDER — HYDROCHLOROTHIAZIDE 25 MG/1
25 TABLET ORAL DAILY
Status: DISCONTINUED | OUTPATIENT
Start: 2022-08-25 | End: 2022-09-02 | Stop reason: HOSPADM

## 2022-08-24 RX ORDER — ALBUTEROL SULFATE 2.5 MG/3ML
2.5 SOLUTION RESPIRATORY (INHALATION)
Status: DISCONTINUED | OUTPATIENT
Start: 2022-08-24 | End: 2022-09-02 | Stop reason: HOSPADM

## 2022-08-24 RX ORDER — LOSARTAN POTASSIUM AND HYDROCHLOROTHIAZIDE 25; 100 MG/1; MG/1
1 TABLET ORAL DAILY
Status: ON HOLD | COMMUNITY
End: 2022-09-02 | Stop reason: HOSPADM

## 2022-08-24 RX ORDER — ASPIRIN 81 MG/1
324 TABLET, CHEWABLE ORAL ONCE
Status: COMPLETED | OUTPATIENT
Start: 2022-08-24 | End: 2022-08-24

## 2022-08-24 RX ORDER — LEVETIRACETAM 500 MG/1
500 TABLET ORAL 2 TIMES DAILY
Status: DISCONTINUED | OUTPATIENT
Start: 2022-08-24 | End: 2022-09-02 | Stop reason: HOSPADM

## 2022-08-24 RX ORDER — HYDROCODONE BITARTRATE AND ACETAMINOPHEN 5; 325 MG/1; MG/1
1 TABLET ORAL EVERY 4 HOURS PRN
Status: DISCONTINUED | OUTPATIENT
Start: 2022-08-24 | End: 2022-08-24

## 2022-08-24 RX ORDER — POTASSIUM CHLORIDE 7.45 MG/ML
10 INJECTION INTRAVENOUS PRN
Status: DISCONTINUED | OUTPATIENT
Start: 2022-08-24 | End: 2022-09-02 | Stop reason: HOSPADM

## 2022-08-24 RX ORDER — HYDROCODONE BITARTRATE AND ACETAMINOPHEN 5; 325 MG/1; MG/1
2 TABLET ORAL EVERY 6 HOURS PRN
Status: DISCONTINUED | OUTPATIENT
Start: 2022-08-24 | End: 2022-08-31

## 2022-08-24 RX ORDER — MORPHINE SULFATE 2 MG/ML
2 INJECTION, SOLUTION INTRAMUSCULAR; INTRAVENOUS EVERY 4 HOURS PRN
Status: DISCONTINUED | OUTPATIENT
Start: 2022-08-24 | End: 2022-09-02 | Stop reason: HOSPADM

## 2022-08-24 RX ORDER — DOXYCYCLINE 100 MG/1
100 CAPSULE ORAL 2 TIMES DAILY
Status: DISPENSED | OUTPATIENT
Start: 2022-08-24 | End: 2022-08-29

## 2022-08-24 RX ORDER — QUETIAPINE FUMARATE 200 MG/1
200 TABLET, FILM COATED ORAL NIGHTLY
Status: DISCONTINUED | OUTPATIENT
Start: 2022-08-24 | End: 2022-09-02 | Stop reason: HOSPADM

## 2022-08-24 RX ORDER — ESCITALOPRAM OXALATE 10 MG/1
10 TABLET ORAL DAILY
Status: DISCONTINUED | OUTPATIENT
Start: 2022-08-24 | End: 2022-08-24

## 2022-08-24 RX ORDER — MONTELUKAST SODIUM 10 MG/1
10 TABLET ORAL NIGHTLY
Status: DISCONTINUED | OUTPATIENT
Start: 2022-08-24 | End: 2022-09-02 | Stop reason: HOSPADM

## 2022-08-24 RX ORDER — AMLODIPINE BESYLATE 5 MG/1
5 TABLET ORAL DAILY
Status: DISCONTINUED | OUTPATIENT
Start: 2022-08-25 | End: 2022-08-24

## 2022-08-24 RX ORDER — PANTOPRAZOLE SODIUM 40 MG/1
40 TABLET, DELAYED RELEASE ORAL DAILY
Status: DISCONTINUED | OUTPATIENT
Start: 2022-08-24 | End: 2022-08-27

## 2022-08-24 RX ORDER — HYDROCODONE BITARTRATE AND ACETAMINOPHEN 5; 325 MG/1; MG/1
1 TABLET ORAL EVERY 6 HOURS PRN
Status: DISCONTINUED | OUTPATIENT
Start: 2022-08-24 | End: 2022-08-31

## 2022-08-24 RX ORDER — ENOXAPARIN SODIUM 100 MG/ML
40 INJECTION SUBCUTANEOUS DAILY
Status: DISCONTINUED | OUTPATIENT
Start: 2022-08-24 | End: 2022-08-24 | Stop reason: SDUPTHER

## 2022-08-24 RX ORDER — BUMETANIDE 0.25 MG/ML
1 INJECTION, SOLUTION INTRAMUSCULAR; INTRAVENOUS ONCE
Status: COMPLETED | OUTPATIENT
Start: 2022-08-24 | End: 2022-08-24

## 2022-08-24 RX ORDER — ALBUTEROL SULFATE 2.5 MG/3ML
2.5 SOLUTION RESPIRATORY (INHALATION) EVERY 6 HOURS PRN
Status: DISCONTINUED | OUTPATIENT
Start: 2022-08-24 | End: 2022-08-24

## 2022-08-24 RX ADMIN — ALBUTEROL SULFATE 2.5 MG: 2.5 SOLUTION RESPIRATORY (INHALATION) at 16:38

## 2022-08-24 RX ADMIN — HYDROCODONE BITARTRATE AND ACETAMINOPHEN 1 TABLET: 5; 325 TABLET ORAL at 17:28

## 2022-08-24 RX ADMIN — HYDROCODONE BITARTRATE AND ACETAMINOPHEN 2 TABLET: 5; 325 TABLET ORAL at 22:15

## 2022-08-24 RX ADMIN — BUDESONIDE AND FORMOTEROL FUMARATE DIHYDRATE 2 PUFF: 160; 4.5 AEROSOL RESPIRATORY (INHALATION) at 20:12

## 2022-08-24 RX ADMIN — ENOXAPARIN SODIUM 40 MG: 100 INJECTION SUBCUTANEOUS at 17:33

## 2022-08-24 RX ADMIN — IPRATROPIUM BROMIDE AND ALBUTEROL SULFATE 1 AMPULE: 2.5; .5 SOLUTION RESPIRATORY (INHALATION) at 20:09

## 2022-08-24 RX ADMIN — LEVETIRACETAM 500 MG: 500 TABLET, FILM COATED ORAL at 20:00

## 2022-08-24 RX ADMIN — ASPIRIN 81 MG CHEWABLE TABLET 324 MG: 81 TABLET CHEWABLE at 11:59

## 2022-08-24 RX ADMIN — MORPHINE SULFATE 2 MG: 2 INJECTION, SOLUTION INTRAMUSCULAR; INTRAVENOUS at 14:31

## 2022-08-24 RX ADMIN — IPRATROPIUM BROMIDE AND ALBUTEROL SULFATE 1 AMPULE: 2.5; .5 SOLUTION RESPIRATORY (INHALATION) at 23:30

## 2022-08-24 RX ADMIN — CEFTRIAXONE SODIUM 1000 MG: 1 INJECTION, POWDER, FOR SOLUTION INTRAMUSCULAR; INTRAVENOUS at 17:27

## 2022-08-24 RX ADMIN — MONTELUKAST 10 MG: 10 TABLET, FILM COATED ORAL at 19:59

## 2022-08-24 RX ADMIN — ROFLUMILAST 500 MCG: 500 TABLET ORAL at 17:28

## 2022-08-24 RX ADMIN — METHYLPREDNISOLONE SODIUM SUCCINATE 40 MG: 40 INJECTION, POWDER, FOR SOLUTION INTRAMUSCULAR; INTRAVENOUS at 20:00

## 2022-08-24 RX ADMIN — PANTOPRAZOLE SODIUM 40 MG: 40 TABLET, DELAYED RELEASE ORAL at 20:00

## 2022-08-24 RX ADMIN — QUETIAPINE FUMARATE 200 MG: 200 TABLET ORAL at 19:59

## 2022-08-24 RX ADMIN — ASPIRIN 81 MG: 81 TABLET, COATED ORAL at 17:33

## 2022-08-24 RX ADMIN — DOXYCYCLINE 100 MG: 100 CAPSULE ORAL at 17:33

## 2022-08-24 RX ADMIN — FOLIC ACID 1 MG: 1 TABLET ORAL at 17:33

## 2022-08-24 RX ADMIN — MORPHINE SULFATE 2 MG: 2 INJECTION, SOLUTION INTRAMUSCULAR; INTRAVENOUS at 12:01

## 2022-08-24 RX ADMIN — BUMETANIDE 1 MG: 0.25 INJECTION, SOLUTION INTRAMUSCULAR; INTRAVENOUS at 12:00

## 2022-08-24 RX ADMIN — METHYLPREDNISOLONE SODIUM SUCCINATE 125 MG: 125 INJECTION, POWDER, FOR SOLUTION INTRAMUSCULAR; INTRAVENOUS at 14:31

## 2022-08-24 ASSESSMENT — ENCOUNTER SYMPTOMS
VOMITING: 0
WHEEZING: 0
DIARRHEA: 0
BACK PAIN: 0
NAUSEA: 0
CONSTIPATION: 0
ABDOMINAL PAIN: 0
COUGH: 0
SHORTNESS OF BREATH: 1
ABDOMINAL DISTENTION: 1
BLOOD IN STOOL: 0
CHEST TIGHTNESS: 1

## 2022-08-24 ASSESSMENT — PAIN - FUNCTIONAL ASSESSMENT
PAIN_FUNCTIONAL_ASSESSMENT: 0-10
PAIN_FUNCTIONAL_ASSESSMENT: PREVENTS OR INTERFERES SOME ACTIVE ACTIVITIES AND ADLS
PAIN_FUNCTIONAL_ASSESSMENT: PREVENTS OR INTERFERES SOME ACTIVE ACTIVITIES AND ADLS

## 2022-08-24 ASSESSMENT — PAIN DESCRIPTION - ONSET
ONSET: ON-GOING
ONSET: ON-GOING

## 2022-08-24 ASSESSMENT — PAIN SCALES - GENERAL
PAINLEVEL_OUTOF10: 0
PAINLEVEL_OUTOF10: 8
PAINLEVEL_OUTOF10: 7
PAINLEVEL_OUTOF10: 8
PAINLEVEL_OUTOF10: 8

## 2022-08-24 ASSESSMENT — PAIN DESCRIPTION - PAIN TYPE
TYPE: ACUTE PAIN
TYPE: ACUTE PAIN

## 2022-08-24 ASSESSMENT — PAIN DESCRIPTION - FREQUENCY
FREQUENCY: CONTINUOUS
FREQUENCY: CONTINUOUS

## 2022-08-24 ASSESSMENT — PAIN DESCRIPTION - DESCRIPTORS
DESCRIPTORS: ACHING;BURNING
DESCRIPTORS: ACHING;THROBBING
DESCRIPTORS: ACHING;BURNING

## 2022-08-24 ASSESSMENT — PAIN DESCRIPTION - ORIENTATION
ORIENTATION: RIGHT;LEFT

## 2022-08-24 ASSESSMENT — PAIN DESCRIPTION - LOCATION
LOCATION: LEG

## 2022-08-24 NOTE — PLAN OF CARE
Problem: Respiratory - Adult  Goal: Achieves optimal ventilation and oxygenation  Outcome: Progressing     Problem: Respiratory - Adult  Goal: Clear lung sounds  Outcome: Progressing

## 2022-08-24 NOTE — RT PROTOCOL NOTE
RT Inhaler-Nebulizer Bronchodilator Protocol Note    There is a bronchodilator order in the chart from a provider indicating to follow the RT Bronchodilator Protocol and there is an Initiate RT Inhaler-Nebulizer Bronchodilator Protocol order as well (see protocol at bottom of note). CXR Findings:  XR CHEST PORTABLE    Result Date: 8/24/2022  No acute cardiopulmonary findings       The findings from the last RT Protocol Assessment were as follows:   History Pulmonary Disease: Chronic pulmonary disease  Respiratory Pattern: Use of accessory muscles, prolonged exhalation, or RR 26-30 bpm  Breath Sounds: Inspiratory and expiratory or bilateral wheezing and/or rhonchi  Cough: Strong, spontaneous, non-productive  Indication for Bronchodilator Therapy: Decreased or absent breath sounds, On home bronchodilators (q4-6 hrs)  Bronchodilator Assessment Score: 14    Aerosolized bronchodilator medication orders have been revised according to the RT Inhaler-Nebulizer Bronchodilator Protocol below. Respiratory Therapist to perform RT Therapy Protocol Assessment initially then follow the protocol. Repeat RT Therapy Protocol Assessment PRN for score 0-3 or on second treatment, BID, and PRN for scores above 3. No Indications - adjust the frequency to every 6 hours PRN wheezing or bronchospasm, if no treatments needed after 48 hours then discontinue using Per Protocol order mode. If indication present, adjust the RT bronchodilator orders based on the Bronchodilator Assessment Score as indicated below. Use Inhaler orders unless patient has one or more of the following: on home nebulizer, not able to hold breath for 10 seconds, is not alert and oriented, cannot activate and use MDI correctly, or respiratory rate 25 breaths per minute or more, then use the equivalent nebulizer order(s) with same Frequency and PRN reasons based on the score.   If a patient is on this medication at home then do not decrease Frequency below

## 2022-08-24 NOTE — ED NOTES
Pt called out for assistance. Pt found with urine soaked linen. External cath not positioned correctly and leaking. Full linen change, new external cath placed. Pt requests additional pain medication for midsternal chest pain and his \"aching\" legs. Carolina VILLALOBOS notified, n.o. to follow.       Preet Bansal RN  08/24/22 2149

## 2022-08-24 NOTE — PROGRESS NOTES
Transitions of Care Pharmacy Service   Medication Review    The patient's list of current home medications has been reviewed. He is getting both Symbicort and Advair from [employer] and confirmed using both inhalers everyday -- he will need to be instructed on which to continue at discharge. Source(s) of information: patient, [facility] refill report      PROVIDER ACTION REQUESTED  Medications that need to be addressed by a physician/nurse practitioner:    Medication Action Requested      Remaining med list needs physician review (Keppra, Lasix, etc)   Montelukast 10mg   Not a current home med (hasn't refilled since July 2021) -- he will need a new prescription to resume at discharge     Amlodipine 5mg daily   Current home dose is 10mg daily instead -- consider adjust order if appropriate           Please feel free to call me with any questions about this encounter. Thank you.     Savi Rai [address] [place] [address]   Transitions of [facility] Service  Phone:  [phone]  Fax: [phone]      Electronically signed by Savi Rai [address] [place] [address] on 8/24/2022 at 6:19 PM           Medications Prior to Admission:   fluticasone-salmeterol (ADVAIR HFA) 230-21 MCG/ACT inhaler, Inhale 2 puffs into the lungs 2 times daily  vitamin B-12 (CYANOCOBALAMIN) 1000 MCG tablet, Take 1,000 mcg by mouth daily  furosemide (LASIX) 40 MG tablet, Take 40 mg by mouth daily  levETIRAcetam (KEPPRA) 500 MG tablet, Take 500 mg by mouth 2 times daily  predniSONE (DELTASONE) 10 MG tablet, Take 10 mg by mouth daily  losartan-hydroCHLOROthiazide (HYZAAR) 100-25 MG per tablet, Take 1 tablet by mouth daily  Roflumilast (DALIRESP) 500 MCG tablet, Take 1 tablet by mouth daily  ferrous gluconate (FERGON) 324 (38 Fe) MG tablet, Take 324 mg by mouth daily (with breakfast)  famotidine (PEPCID) 40 MG tablet, Take 40 mg by mouth 2 times daily as needed (heartburn)   amLODIPine (NORVASC) 10 MG tablet, Take 10 mg by mouth daily  albuterol (PROVENTIL) (2.5 MG/3ML) 0.083% nebulizer solution, Take 2.5 mg by nebulization every 6 hours as needed for Wheezing  albuterol sulfate  (90 Base) MCG/ACT inhaler, Inhale 2 puffs into the lungs every 4-6 hours as needed for Wheezing  aspirin 81 MG tablet, Take 81 mg by mouth daily  folic acid (FOLVITE) 1 MG tablet, Take 1 mg by mouth daily  metoprolol succinate (TOPROL XL) 50 MG extended release tablet, Take 50 mg by mouth daily  pantoprazole (PROTONIX) 40 MG tablet, Take 40 mg by mouth daily  QUEtiapine (SEROQUEL) 400 MG tablet, Take 200 mg by mouth nightly Takes 1/2 tab (200mg) nightly  vitamin B-1 (THIAMINE) 100 MG tablet, Take 100 mg by mouth daily  Cholecalciferol (VITAMIN D3) 2000 units CAPS, Take 1 capsule by mouth daily  tiotropium (SPIRIVA RESPIMAT) 2.5 MCG/ACT AERS inhaler, Inhale 2 puffs into the lungs daily  budesonide-formoterol (SYMBICORT) 160-4.5 MCG/ACT AERO, Inhale 2 puffs into the lungs 2 times daily

## 2022-08-24 NOTE — ED TRIAGE NOTES
Pt to ED c/o sob, increasing over the past week and midsternal chest pain. Pt AOx4. States he is easily fatigued with activity. Pt 85%ra on arrival, tripoding and using accessory muscles, speaks 2-3 words sentences. Patent airway, lung sounds diminished throughout. Skin warm, appropriate to hue and dry. Placed on 2lpm NC, IV access and EKG to Dr. Yolis Sanford to lowest position, side rails up x2, call light within reach.

## 2022-08-24 NOTE — ED PROVIDER NOTES
[address] [place] [address] ED  eMERGENCY dEPARTMENT eNCOUnter      Pt Name: Agnes Segovia  MRN: [id number]  [place] 1965  Date of evaluation: 8/24/2022  Provider: Jacob VILLALOBOS PA-C    CHIEF COMPLAINT       Chief Complaint   Patient presents with    Chest Pain     Midsternal to right feels like pressure, has hx of chf and COPD    Shortness of Breath         HISTORY OF PRESENT ILLNESS  (Location/Symptom, Timing/Onset, Context/Setting, Quality, Duration, Modifying Factors, Severity.)   Agnes Segovia is a 59 y.o. male who presents to the emergency department with history of CHF, COPD, coronary artery disease who complains of shortness of breath which has been consistent for the last 2 days as well as intermittent right-sided chest pain for the last 2 days. Patient states that he wears 4 L of oxygen chronically at home. He denies any fevers, chills, heart palpitations. The chest pain is intermittent and last for few minutes at a time and resolves on its own. There has been some radiation to his back. Patient has had some abdominal pain and distention. He states that his appetite has been normal and he has been having normal bowel movements. Patient denies any blood in the stools. He has had no nausea or vomiting. Patient does take a diuretic and states that he has been taking all of his medications as prescribed. He has been urinating without difficulty. He complains of a burning sensation and swelling in his bilateral extremities for the last couple of days. Nursing Notes were reviewed. ALLERGIES     Patient has no known allergies.     CURRENT MEDICATIONS       Previous Medications    ALBUTEROL (PROVENTIL) (2.5 MG/3ML) 0.083% NEBULIZER SOLUTION    Take 2.5 mg by nebulization every 6 hours as needed for Wheezing    ALBUTEROL SULFATE  (90 BASE) MCG/ACT INHALER    Inhale 2 puffs into the lungs every 4-6 hours as needed for Wheezing    AMLODIPINE (NORVASC) 5 MG TABLET    Take 5 mg by mouth daily    ASPIRIN 81 MG TABLET    Take 81 mg by mouth daily    BUDESONIDE-FORMOTEROL (SYMBICORT) 160-4.5 MCG/ACT AERO    Inhale 2 puffs into the lungs 2 times daily    CHOLECALCIFEROL (VITAMIN D3) 2000 UNITS CAPS    Take 1 capsule by mouth daily    ESCITALOPRAM (LEXAPRO) 10 MG TABLET    Take 10 mg by mouth daily    FAMOTIDINE (PEPCID) 40 MG TABLET    Take 40 mg by mouth 2 times daily as needed (heartburn)     FERROUS GLUCONATE (FERGON) 324 (38 FE) MG TABLET    Take 324 mg by mouth daily (with breakfast)    FOLIC ACID (FOLVITE) 1 MG TABLET    Take 1 mg by mouth daily    HYDROCHLOROTHIAZIDE (HYDRODIURIL) 25 MG TABLET    Take 1 tablet by mouth daily    LOSARTAN (COZAAR) 100 MG TABLET    Take 1 tablet by mouth daily    METOPROLOL SUCCINATE (TOPROL XL) 50 MG EXTENDED RELEASE TABLET    Take 50 mg by mouth daily    MONTELUKAST (SINGULAIR) 10 MG TABLET    Take 1 tablet by mouth nightly    PANTOPRAZOLE (PROTONIX) 40 MG TABLET    Take 40 mg by mouth daily    QUETIAPINE (SEROQUEL) 400 MG TABLET    Take 400 mg by mouth nightly     ROFLUMILAST (DALIRESP) 500 MCG TABLET    Take 1 tablet by mouth daily    TIOTROPIUM (SPIRIVA RESPIMAT) 2.5 MCG/ACT AERS INHALER    Inhale 2 puffs into the lungs daily    VITAMIN B-1 (THIAMINE) 100 MG TABLET    Take 100 mg by mouth daily       PAST MEDICAL HISTORY         Diagnosis Date    Arthritis     CAD (coronary artery disease)     Cancer (HCC)     prostate    Cirrhosis with alcoholism (HCC)     COPD (chronic obstructive pulmonary disease) (HCC)     Depression     BOURGEOIS (dyspnea on exertion)     H/O prostate cancer     Hyperlipidemia     Hypertension     MI [facility] infarction) ([facility]     Seizures ([facility] 2016    last one over 2 years ago     SVT (supraventricular tachycardia) ([facility]     Tobacco abuse        SURGICAL HISTORY           Procedure Laterality Date    CARDIAC CATHETERIZATION      ablation    INSERTABLE CARDIAC MONITOR  10/03/2018    INTERNAL LOOP RECORDER    PROSTATECTOMY      SINUS SURGERY      TONGUE SURGERY      UPPER GASTROINTESTINAL ENDOSCOPY N/A 6/10/2021    EGD BIOPSY performed by Marivel Velez MD at [address] [place] [address] St  01/03/2022    UPPER GASTROINTESTINAL ENDOSCOPY N/A 1/3/2022    EGD BIOPSY performed by Andreea Navarro MD at [place] [id number]     No family history on file. No family status information on file. SOCIAL HISTORY      reports that he has quit smoking. His smoking use included cigarettes. He smoked an average of .1 packs per day. He quit smokeless tobacco use about 8 months ago. He reports that he does not currently use alcohol. He reports that he does not use drugs. REVIEW OF SYSTEMS    (2-9 systems for level 4, 10 or more for level 5)     Review of Systems   Constitutional:  Positive for fatigue. Negative for appetite change, chills, diaphoresis, fever and unexpected weight change. Respiratory:  Positive for chest tightness and shortness of breath. Negative for cough and wheezing. Cardiovascular:  Positive for chest pain and leg swelling. Negative for palpitations. Gastrointestinal:  Positive for abdominal distention. Negative for abdominal pain, blood in stool, constipation, diarrhea, nausea and vomiting. Genitourinary:  Negative for decreased urine volume, difficulty urinating, dysuria, flank pain, frequency, hematuria and urgency. Musculoskeletal:  Negative for back pain and myalgias. Neurological:  Negative for dizziness, syncope, weakness, light-headedness and headaches. Except as noted above the remainder of the review of systems was reviewed and negative. PHYSICAL EXAM    (up to 7 for level 4, 8 or more for level 5)     ED Triage Vitals [08/24/22 1059]   BP Temp Temp src Heart Rate Resp SpO2 Height Weight   (!) 142/84 98.1 °F (36.7 °C) -- 93 16 92 % 5' 5\" (1.651 m) 200 lb (90.7 kg)       Physical Exam  Vitals and nursing note reviewed.    Constitutional:       General: He is not in acute distress. Appearance: Normal appearance. He is well-developed. He is ill-appearing. He is not toxic-appearing or diaphoretic. HENT:      Head: Normocephalic and atraumatic. Eyes:      General: No scleral icterus. Right eye: No discharge. Left eye: No discharge. Conjunctiva/sclera: Conjunctivae normal.   Neck:      Thyroid: No thyroid mass, thyromegaly or thyroid tenderness. Cardiovascular:      Rate and Rhythm: Normal rate and regular rhythm. Heart sounds: Normal heart sounds. No murmur heard. No friction rub. No gallop. Pulmonary:      Effort: Pulmonary effort is normal. No respiratory distress. Breath sounds: Examination of the right-lower field reveals rales. Examination of the left-lower field reveals rales. Decreased breath sounds and rales present. No wheezing or rhonchi. Chest:      Chest wall: No tenderness. Abdominal:      General: Bowel sounds are normal. There is distension. Palpations: Abdomen is soft. Tenderness: There is no abdominal tenderness. Musculoskeletal:         General: No tenderness. Normal range of motion. Cervical back: Normal range of motion and neck supple. Right lower leg: Edema present. Left lower leg: Edema present. Skin:     General: Skin is warm and dry. Neurological:      General: No focal deficit present. Mental Status: He is alert and oriented to person, place, and time. Psychiatric:         Attention and Perception: Attention and perception normal.         Mood and Affect: Mood and affect normal.         Speech: Speech normal.         Behavior: Behavior normal. Behavior is cooperative. Thought Content:  Thought content normal.         Cognition and Memory: Cognition and memory normal.         Judgment: Judgment normal.          DIAGNOSTIC RESULTS     EKG: All EKG's are interpreted by the Emergency Department Physician who either signs or Co-signs this chart in the absence of a cardiologist.    Chest x-ray with no acute findings    RADIOLOGY:   Non-plain film images such as CT, Ultrasound and MRI are read by the radiologist. Plain radiographic images are visualized and preliminarily interpreted by the emergency physician with the below findings:    EKG with ventricular rate of 88 bpm with right bundle branch block no other acute findings. Interpretation per the Radiologist below, if available at the time of this note:        ED BEDSIDE ULTRASOUND:   Performed by ED Physician - none    LABS:  Results for orders placed or performed during the hospital encounter of 08/24/22   COVID-19, Rapid    Specimen: Nasopharyngeal Swab   Result Value Ref Range    Specimen Description . NASOPHARYNGEAL SWAB     SARS-CoV-2, Rapid Not Detected Not Detected   CBC   Result Value Ref Range    WBC 12.2 (H) 3.5 - 11.3 k/uL    RBC 4.86 4.21 - 5.77 m/uL    Hemoglobin 15.0 13.0 - 17.0 g/dL    Hematocrit 45.3 40.7 - 50.3 %    MCV 93.2 82.6 - 102.9 fL    MCH 30.9 25.2 - 33.5 pg    MCHC 33.1 28.4 - 34.8 g/dL    RDW 12.9 11.8 - 14.4 %    Platelets 921 156 - 841 k/uL    MPV 9.7 8.1 - 13.5 fL    NRBC Automated 0.0 0.0 per 100 WBC   Comprehensive Metabolic Panel   Result Value Ref Range    Glucose 121 (H) 70 - 99 mg/dL    BUN 9 6 - 20 mg/dL    Creatinine 0.81 0.70 - 1.20 mg/dL    Bun/Cre Ratio 11 9 - 20    Calcium 10.4 8.6 - 10.4 mg/dL    Sodium 133 (L) 135 - 144 mmol/L    Potassium 3.9 3.7 - 5.3 mmol/L    Chloride 93 (L) 98 - 107 mmol/L    CO2 27 20 - 31 mmol/L    Anion Gap 13 9 - 17 mmol/L    Alkaline Phosphatase 77 40 - 129 U/L    ALT 18 5 - 41 U/L    AST 13 <40 U/L    Total Bilirubin 0.51 0.3 - 1.2 mg/dL    Total Protein 8.0 6.4 - 8.3 g/dL    Albumin 4.4 3.5 - 5.2 g/dL    GFR Non-African American >60 >60 mL/min    GFR African American >60 >60 mL/min    GFR Comment         Troponin   Result Value Ref Range    Troponin, High Sensitivity 16 0 - 22 ng/L   Urinalysis   Result Value Ref Range    Color, UA Yellow Yellow Turbidity UA Clear Clear    Glucose, Ur NEGATIVE NEGATIVE    Bilirubin Urine NEGATIVE NEGATIVE    Ketones, Urine NEGATIVE NEGATIVE    Specific Gravity, UA 1.010 1.005 - 1.030    Urine Hgb NEGATIVE NEGATIVE    pH, UA 7.0 5.0 - 8.0    Protein, UA NEGATIVE NEGATIVE    Urobilinogen, Urine Normal Normal    Nitrite, Urine NEGATIVE NEGATIVE    Leukocyte Esterase, Urine NEGATIVE NEGATIVE    Urinalysis Comments       Microscopic exam not performed based on chemical results unless requested in original order. Lactic Acid   Result Value Ref Range    Lactic Acid 1.3 0.5 - 2.2 mmol/L   Brain Natriuretic Peptide   Result Value Ref Range    Pro-BNP 41 <300 pg/mL   Lipase   Result Value Ref Range    Lipase 23 13 - 60 U/L   D-Dimer, Quantitative   Result Value Ref Range    D-Dimer, Quant 0.28 0.00 - 0.59 mg/L FEU   EKG 12 Lead   Result Value Ref Range    Ventricular Rate 88 BPM    Atrial Rate 88 BPM    P-R Interval 130 ms    QRS Duration 136 ms    Q-T Interval 402 ms    QTc Calculation (Bazett) 486 ms    P Axis 72 degrees    R Axis 85 degrees    T Axis 61 degrees       All other labs were within normal range or not returned as of this dictation. EMERGENCY DEPARTMENT COURSE and DIFFERENTIAL DIAGNOSIS/MDM:   Vitals:    Vitals:    08/24/22 1059   BP: (!) 142/84   Pulse: 93   Resp: 16   Temp: 98.1 °F (36.7 °C)   SpO2: 92%   Weight: 200 lb (90.7 kg)   Height: 5' 5\" (1.651 m)           Medical Decision Making patient is a 61-year-old male with history of CHF and COPD with increased shortness of breath and chest pain for the last week. Blood work including troponin within normal limits. Chest x-ray with no acute findings. Patient has received aerosol treatments and IV Solu-Medrol. Oxygen saturation on 4 L still at 92% and patient still complaining of chest pain or shortness of breath. Patient will be admitted to the hospital for further evaluation and treatment.     CONSULTS:  IP CONSULT TO INTERNAL MEDICINE    PROCEDURES:  None    FINAL IMPRESSION      1. COPD exacerbation (HCC)          DISPOSITION/PLAN   DISPOSITION Decision To Admit 08/24/2022 01:42:12 PM      PATIENT REFERRED TO:   No follow-up provider specified.     DISCHARGE MEDICATIONS:     New Prescriptions    No medications on file         (Please note that portions of this note were completed with a voice recognition program.  Efforts were made to edit the dictations but occasionally words are mis-transcribed.)    Clementeen Essex PA, PA-C  Attending Emergency Physician         Anna Mcgee PA-C  08/24/22 9260

## 2022-08-24 NOTE — CONSULTS
Pulmonary Medicine and [address] MD      Patient - Ronnie Tay   MRN -  [id number]   Acct # - [de-identified]   - 1965      Date of Admission -  2022 11:08 AM  Date of evaluation -  2022  Room - -   Eufemia Castrejon MD Primary Care Physician - Yenny Angulo MD     Reason for Consult    COPD, respiratory failure    Assessment   Chronic hypoxic respiratory failure   Acute exacerbation of COPD/active smoking  Mild pulmonary hypertension, RVSP 37 mmHg echo 2021  Suspected obstructive sleep apnea/Obesity  Liver cirrhosis with ascites/hepatitis C  Paroxysmal SVT status post ablation    Recommendations   Oxygen via nasal cannula, keep SPO2 90% or greater   Incentive spirometry every hour while awake   Continue Rocephin and doxycycline  IV solu-medrol 40 mg every 6 hours  Symbicort 160/4.52 puffs twice daily  Stop Spiriva as patient is already receiving DuoNeb  Singulair  Daliresp  Albuterol and Ipratropium Q 4 hours and prn  X-ray chest in am  Labs: CBC and BMP in am  DVT prophylaxis with low molecular weight heparin  Sleep apnea evaluation as an outpatient  Will follow with you    HPI     Ronnie Tay is 59 y.o., male who presented to the emergency room today with complaints of shortness of breath for the last 2 days as well as intermittent right-sided chest pain. He wears oxygen at 4 L chronically at home. He denies any fever or chills at home. His chest pain has had some radiation to his back. He is also had some abdominal pain and distention as well. He has a history of ascites/liver cirrhosis as well as of CHF and COPD and coronary artery disease. He is on diuretics at home which he claims to be taking as prescribed however still is having some swelling in his bilateral lower extremities. Medications include Symbicort, Spiriva, albuterol, Daliresp and Singulair. He is a former smoker, he still smokes occasionally.   Last p.o. 2 days ago. He also has significant secondhand smoke exposure. His x-ray chest was unremarkable for any acute cardiopulmonary process. He had a D-dimer within normal range. He was admitted for further treatment of COPD exacerbation.   PMHx   Past Medical History      Diagnosis Date    Arthritis     CAD (coronary artery disease)     Cancer ([facility]     prostate    Cirrhosis with alcoholism ([facility]     COPD (chronic obstructive pulmonary disease) (HCC)     Depression     BOURGEOIS (dyspnea on exertion)     H/O prostate cancer     Hyperlipidemia     Hypertension     MI (myocardial infarction) ([facility]     Seizures ([facility] 2016    last one over 2 years ago     SVT (supraventricular tachycardia) ([facility]     Tobacco abuse       Past Surgical History        Procedure Laterality Date    CARDIAC CATHETERIZATION      ablation    INSERTABLE CARDIAC MONITOR  10/03/2018    INTERNAL LOOP RECORDER    PROSTATECTOMY      SINUS SURGERY      TONGUE SURGERY      UPPER GASTROINTESTINAL ENDOSCOPY N/A 6/10/2021    EGD BIOPSY performed by Melvi Boateng MD at [address]  01/03/2022    UPPER GASTROINTESTINAL ENDOSCOPY N/A 1/3/2022    EGD BIOPSY performed by Avery Abad MD at [address] [place] [address]    Current Medications    amLODIPine  5 mg Oral Daily    aspirin  81 mg Oral Daily    budesonide-formoterol  2 puff Inhalation BID    escitalopram  10 mg Oral Daily    [START ON 8/25/2022] ferrous gluconate  324 mg Oral Daily with breakfast    folic acid  1 mg Oral Daily    hydroCHLOROthiazide  25 mg Oral Daily    losartan  100 mg Oral Daily    metoprolol succinate  50 mg Oral Daily    montelukast  10 mg Oral Nightly    QUEtiapine  400 mg Oral Nightly    Roflumilast  500 mcg Oral Daily    tiotropium  2 puff Inhalation Daily    enoxaparin  40 mg SubCUTAneous Daily    ipratropium-albuterol  1 ampule Inhalation Q4H WA    methylPREDNISolone  40 mg IntraVENous Q6H    Followed by    Dennard Gosselin ON 8/27/2022] predniSONE  40 mg Oral Daily cefTRIAXone (ROCEPHIN) IV  1,000 mg IntraVENous Q24H    doxycycline hyclate  100 mg Oral Q12H     albuterol, albuterol sulfate HFA, potassium chloride **OR** potassium alternative oral replacement **OR** potassium chloride, magnesium sulfate, ondansetron **OR** ondansetron, senna  IV Drips/Infusions    Home Medications  Medications Prior to Admission: losartan (COZAAR) 100 MG tablet, Take 1 tablet by mouth daily  hydroCHLOROthiazide (HYDRODIURIL) 25 MG tablet, Take 1 tablet by mouth daily  montelukast (SINGULAIR) 10 MG tablet, Take 1 tablet by mouth nightly  Roflumilast (DALIRESP) 500 MCG tablet, Take 1 tablet by mouth daily  escitalopram (LEXAPRO) 10 MG tablet, Take 10 mg by mouth daily  ferrous gluconate (FERGON) 324 (38 Fe) MG tablet, Take 324 mg by mouth daily (with breakfast)  famotidine (PEPCID) 40 MG tablet, Take 40 mg by mouth 2 times daily as needed (heartburn)   amLODIPine (NORVASC) 5 MG tablet, Take 5 mg by mouth daily  albuterol (PROVENTIL) (2.5 MG/3ML) 0.083% nebulizer solution, Take 2.5 mg by nebulization every 6 hours as needed for Wheezing  albuterol sulfate  (90 Base) MCG/ACT inhaler, Inhale 2 puffs into the lungs every 4-6 hours as needed for Wheezing  aspirin 81 MG tablet, Take 81 mg by mouth daily  budesonide-formoterol (SYMBICORT) 160-4.5 MCG/ACT AERO, Inhale 2 puffs into the lungs 2 times daily  folic acid (FOLVITE) 1 MG tablet, Take 1 mg by mouth daily  metoprolol succinate (TOPROL XL) 50 MG extended release tablet, Take 50 mg by mouth daily  pantoprazole (PROTONIX) 40 MG tablet, Take 40 mg by mouth daily  QUEtiapine (SEROQUEL) 400 MG tablet, Take 400 mg by mouth nightly   vitamin B-1 (THIAMINE) 100 MG tablet, Take 100 mg by mouth daily  Cholecalciferol (VITAMIN D3) 2000 units CAPS, Take 1 capsule by mouth daily  tiotropium (SPIRIVA RESPIMAT) 2.5 MCG/ACT AERS inhaler, Inhale 2 puffs into the lungs daily    Allergies    Patient has no known allergies.   Social History     Social History     Tobacco Use    Smoking status: Former     Packs/day: 0.10     Types: Cigarettes    Smokeless tobacco: Former     Quit date: 12/5/2021    Tobacco comments:     5/2021 quit    Substance Use Topics    Alcohol use: Not Currently     Comment: last drink at the end of Nov 2021     Family History    No family history on file. ROS - 11 systems   General Denies any fever or chills  HEENT Denies any diplopia, tinnitus or vertigo  Resp positive for  dyspnea and wheezing  Cardiac Denies any chest pain, palpitations, claudication or edema  GI Denies any melena, hematochezia, hematemesis or pyrosis   Denies any frequency, urgency, hesitancy or incontinence  Heme Denies bruising or bleeding easily  Endocrine Denies any history of diabetes or thyroid disease  Neuro Denies any focal motor or sensory deficits  Psychiatric Denies anxiety, depression, suicidal ideation  Skin Denies rashes, itching, open sores    Vitals     height is 5' 5\" (1.651 m) and weight is 200 lb (90.7 kg). His temperature is 98.1 °F (36.7 °C). His blood pressure is 107/73 and his pulse is 79. His respiration is 18 and oxygen saturation is 96%. Body mass index is 33.28 kg/m². I/O      Intake/Output Summary (Last 24 hours) at 8/24/2022 1620  Last data filed at 8/24/2022 1530  Gross per 24 hour   Intake --   Output 750 ml   Net -750 ml     No intake/output data recorded. Patient Vitals for the past 96 hrs (Last 3 readings):   Weight   08/24/22 1059 200 lb (90.7 kg)     Exam   General Appearance Awake, alert, oriented, in no acute distress  HEENT - Head is normocephalic, atraumatic. Pupil reactive to light  Neck - Supple, symmetrical, trachea midline and Soft, trachea midline and straight  Lungs -decreased air exchange, positive wheezing  Cardiovascular - Heart sounds are normal.  Regular rhythm normal rate without murmur, gallop or rub.   Abdomen - Soft, nontender, nondistended, no masses or organomegaly  Neurologic - CN II-XII are grossly intact.  There are no focal motor or sensory deficits  Skin - No bruising or bleeding  Extremities - No cyanosis, clubbing or edema    Labs  - Old records and notes have been reviewed in [facility]   CBC     Lab Results   Component Value Date/Time    WBC 12.2 08/24/2022 11:25 AM    RBC 4.86 08/24/2022 11:25 AM    RBC 4.83 04/18/2012 05:20 AM    HGB 15.0 08/24/2022 11:25 AM    HCT 45.3 08/24/2022 11:25 AM     08/24/2022 11:25 AM     04/18/2012 05:20 AM    MCV 93.2 08/24/2022 11:25 AM    MCH 30.9 08/24/2022 11:25 AM    MCHC 33.1 08/24/2022 11:25 AM    RDW 12.9 08/24/2022 11:25 AM    LYMPHOPCT 12 01/04/2022 07:05 AM    MONOPCT 8 01/04/2022 07:05 AM    BASOPCT 0 01/04/2022 07:05 AM    MONOSABS 0.85 01/04/2022 07:05 AM    LYMPHSABS 1.25 01/04/2022 07:05 AM    EOSABS 0.03 01/04/2022 07:05 AM    BASOSABS <0.03 01/04/2022 07:05 AM    DIFFTYPE NOT REPORTED 01/04/2022 07:05 AM     BMP   Lab Results   Component Value Date/Time     08/24/2022 11:25 AM    K 3.9 08/24/2022 11:25 AM    CL 93 08/24/2022 11:25 AM    CO2 27 08/24/2022 11:25 AM    BUN 9 08/24/2022 11:25 AM    CREATININE 0.81 08/24/2022 11:25 AM    GLUCOSE 121 08/24/2022 11:25 AM    GLUCOSE 87 04/18/2012 05:20 AM    CALCIUM 10.4 08/24/2022 11:25 AM    MG 2.1 01/03/2022 06:52 AM     LFTS  Lab Results   Component Value Date/Time    ALKPHOS 77 08/24/2022 11:25 AM    ALT 18 08/24/2022 11:25 AM    AST 13 08/24/2022 11:25 AM    PROT 8.0 08/24/2022 11:25 AM    BILITOT 0.51 08/24/2022 11:25 AM    BILIDIR <0.08 09/20/2018 10:31 AM    IBILI CANNOT BE CALCULATED 09/20/2018 10:31 AM    LABALBU 4.4 08/24/2022 11:25 AM    LABALBU 3.9 04/18/2012 05:20 AM     PTT  Lab Results   Component Value Date    APTT 24.4 03/08/2019     INR   Lab Results   Component Value Date    INR 1.0 01/01/2022    INR 1.1 07/13/2021    PROTIME 13.2 01/01/2022    PROTIME 13.5 07/13/2021       Radiology    CXR       (See actual reports for details)    \"Thank you for asking us to see this patient\"    Case discussed with nurse and patient. Questions and concerns addressed.     Electronically signed by     Pepper Reza MD on 8/24/2022 at 4:20 PM  Pulmonary Critical Care and Sleep Medicine,  [facility]  Cell: [phone]  Office: [phone]

## 2022-08-24 NOTE — ED NOTES
ED to inpatient nurses report     Chief Complaint   Patient presents with    Chest Pain     Midsternal to right feels like pressure, has hx of chf and COPD    Shortness of Breath      Present to ED c/o sob, increasing over the past week and midsternal chest pain. Pt AOx4. States he is easily fatigued with activity. Pt 85%ra on arrival, tripoding and using accessory muscles, speaks 2-3 words sentences. Patent airway, lung sounds diminished throughout. Skin warm, appropriate to hue and dry. Placed on 2lpm NC, IV access and EKG to Dr. Sravani Curry to lowest position, side rails up x2, call light within reach.    LOC: alert and orientated to name, place, date  Vital signs   Vitals:    08/24/22 1300 08/24/22 1315 08/24/22 1330 08/24/22 1400   BP: 113/82 101/77 114/70 116/77   Pulse: 84 79 75 82   Resp: 16 16 18 17   Temp:       SpO2: 97% 97% 97% 95%   Weight:       Height:          Oxygen Baseline 85% ra    Current needs required 2lpm NC   LDAs:   Peripheral IV 08/24/22 Right Antecubital (Active)   Site Assessment Clean, dry & intact 08/24/22 1156   Line Status Normal saline locked;Specimen collected 08/24/22 1156   Phlebitis Assessment No symptoms 08/24/22 1156   Infiltration Assessment 0 08/24/22 1156   Dressing Status New dressing applied 08/24/22 1156     Mobility: Independent  Fall Risk:    Pending ED orders: Proventil prn  Present condition: stable, 2lpm NC  Code Status: full  Consults: IP CONSULT TO INTERNAL MEDICINE  IP CONSULT TO PULMONOLOGY  IP CONSULT TO SOCIAL WORK  [x]  Hospitalist  Completed  [x] yes [] no Who: Dr Tesha Pacheco  []  Medicine  Completed  [] yes [] No Who:   []  Cardiology  Completed  [] yes [] No Who:   []  GI   Completed  [] yes [] No Who:   []  Neurology  Completed  [] yes [] No Who:   []  Nephrology Completed  [] yes [] No Who:    []  Vascular  Completed  [] yes [] No Who:   []  Ortho  Completed  [] yes [] No Who:     []  Surgery  Completed  [] yes [] No Who:    []  Urology  Completed  [] yes [] No Who: []  CT Surgery Completed  [] yes [] No Who:   []  Podiatry  Completed  [] yes [] No Who:    []  Other    Completed  [] yes [] No Who:  Interventions: 20g IV R FA, morphine sulfate 2mg x2 doses, solumedrol 125mg, ASA 324mg, Bumex 1mg, external catheter  Important Events: none        Electronically signed by Sosa Calderon RN on 8/24/2022 at 3:08 PM       Sosa Calderon RN  08/24/22 [id number]

## 2022-08-24 NOTE — ED PROVIDER NOTES
eMERGENCY dEPARTMENT eNCOUnter   Independent Attestation     Pt Name: Agnes Segovia  MRN: [id number]  [place] 1965  Date of evaluation: 8/24/22     Agnes Segovia is a 59 y.o. male with CC: Chest Pain (Midsternal to right feels like pressure, has hx of chf and COPD) and Shortness of Breath        This visit was performed by both a physician and an APC. I performed all aspects of the MDM as documented.       The care is provided during an unprecedented national emergency due to the novel coronavirus, Jarad Murray MD  Attending Emergency Physician           Sherrell Godwin MD  08/24/22 [id number]

## 2022-08-25 ENCOUNTER — APPOINTMENT (OUTPATIENT)
Dept: INTERVENTIONAL RADIOLOGY/VASCULAR | Age: 57
DRG: 140 | End: 2022-08-25
Payer: MEDICARE

## 2022-08-25 LAB
CULTURE: ABNORMAL
EKG ATRIAL RATE: 88 BPM
EKG P AXIS: 72 DEGREES
EKG P-R INTERVAL: 130 MS
EKG Q-T INTERVAL: 402 MS
EKG QRS DURATION: 136 MS
EKG QTC CALCULATION (BAZETT): 486 MS
EKG R AXIS: 85 DEGREES
EKG T AXIS: 61 DEGREES
EKG VENTRICULAR RATE: 88 BPM
INR BLD: 1
Lab: ABNORMAL
PARTIAL THROMBOPLASTIN TIME: 29.8 SEC (ref 23.9–33.8)
PLATELET # BLD: 400 K/UL (ref 138–453)
PRO-BNP: 83 PG/ML
PROTHROMBIN TIME: 13.4 SEC (ref 11.5–14.2)
SOURCE: NORMAL
SPECIMEN DESCRIPTION: ABNORMAL
STREP PNEUMONIAE ANTIGEN: NEGATIVE

## 2022-08-25 PROCEDURE — 97166 OT EVAL MOD COMPLEX 45 MIN: CPT

## 2022-08-25 PROCEDURE — 97116 GAIT TRAINING THERAPY: CPT

## 2022-08-25 PROCEDURE — 6360000002 HC RX W HCPCS: Performed by: INTERNAL MEDICINE

## 2022-08-25 PROCEDURE — 93010 ELECTROCARDIOGRAM REPORT: CPT | Performed by: INTERNAL MEDICINE

## 2022-08-25 PROCEDURE — 94761 N-INVAS EAR/PLS OXIMETRY MLT: CPT

## 2022-08-25 PROCEDURE — 97163 PT EVAL HIGH COMPLEX 45 MIN: CPT

## 2022-08-25 PROCEDURE — 6370000000 HC RX 637 (ALT 250 FOR IP): Performed by: NURSE PRACTITIONER

## 2022-08-25 PROCEDURE — 2709999900 IR US GUIDED PARACENTESIS

## 2022-08-25 PROCEDURE — 49083 ABD PARACENTESIS W/IMAGING: CPT

## 2022-08-25 PROCEDURE — 2580000003 HC RX 258: Performed by: HOSPITALIST

## 2022-08-25 PROCEDURE — 85049 AUTOMATED PLATELET COUNT: CPT

## 2022-08-25 PROCEDURE — 97535 SELF CARE MNGMENT TRAINING: CPT

## 2022-08-25 PROCEDURE — 6370000000 HC RX 637 (ALT 250 FOR IP): Performed by: INTERNAL MEDICINE

## 2022-08-25 PROCEDURE — 36415 COLL VENOUS BLD VENIPUNCTURE: CPT

## 2022-08-25 PROCEDURE — 83880 ASSAY OF NATRIURETIC PEPTIDE: CPT

## 2022-08-25 PROCEDURE — 2709999900 IR US GUIDED NEEDLE PLACEMENT

## 2022-08-25 PROCEDURE — 84145 PROCALCITONIN (PCT): CPT

## 2022-08-25 PROCEDURE — 97530 THERAPEUTIC ACTIVITIES: CPT

## 2022-08-25 PROCEDURE — 85730 THROMBOPLASTIN TIME PARTIAL: CPT

## 2022-08-25 PROCEDURE — 85610 PROTHROMBIN TIME: CPT

## 2022-08-25 PROCEDURE — 6370000000 HC RX 637 (ALT 250 FOR IP): Performed by: HOSPITALIST

## 2022-08-25 PROCEDURE — 6360000002 HC RX W HCPCS: Performed by: HOSPITALIST

## 2022-08-25 PROCEDURE — 1200000000 HC SEMI PRIVATE

## 2022-08-25 PROCEDURE — 87040 BLOOD CULTURE FOR BACTERIA: CPT

## 2022-08-25 PROCEDURE — 2700000000 HC OXYGEN THERAPY PER DAY

## 2022-08-25 PROCEDURE — 94640 AIRWAY INHALATION TREATMENT: CPT

## 2022-08-25 PROCEDURE — 76705 ECHO EXAM OF ABDOMEN: CPT

## 2022-08-25 RX ORDER — METHYLPREDNISOLONE SODIUM SUCCINATE 40 MG/ML
30 INJECTION, POWDER, LYOPHILIZED, FOR SOLUTION INTRAMUSCULAR; INTRAVENOUS EVERY 8 HOURS
Status: COMPLETED | OUTPATIENT
Start: 2022-08-25 | End: 2022-08-26

## 2022-08-25 RX ORDER — CALCIUM CARBONATE 200(500)MG
500 TABLET,CHEWABLE ORAL EVERY 4 HOURS PRN
Status: DISCONTINUED | OUTPATIENT
Start: 2022-08-25 | End: 2022-09-02 | Stop reason: HOSPADM

## 2022-08-25 RX ORDER — PREDNISONE 20 MG/1
40 TABLET ORAL DAILY
Status: DISCONTINUED | OUTPATIENT
Start: 2022-08-26 | End: 2022-08-28

## 2022-08-25 RX ADMIN — METOPROLOL SUCCINATE 50 MG: 50 TABLET, FILM COATED, EXTENDED RELEASE ORAL at 09:36

## 2022-08-25 RX ADMIN — AMLODIPINE BESYLATE 10 MG: 10 TABLET ORAL at 09:36

## 2022-08-25 RX ADMIN — ROFLUMILAST 500 MCG: 500 TABLET ORAL at 09:36

## 2022-08-25 RX ADMIN — HYDROCODONE BITARTRATE AND ACETAMINOPHEN 2 TABLET: 5; 325 TABLET ORAL at 10:56

## 2022-08-25 RX ADMIN — IPRATROPIUM BROMIDE AND ALBUTEROL SULFATE 1 AMPULE: 2.5; .5 SOLUTION RESPIRATORY (INHALATION) at 07:47

## 2022-08-25 RX ADMIN — LEVETIRACETAM 500 MG: 500 TABLET, FILM COATED ORAL at 09:36

## 2022-08-25 RX ADMIN — Medication 500 MG: at 23:27

## 2022-08-25 RX ADMIN — HYDROCODONE BITARTRATE AND ACETAMINOPHEN 2 TABLET: 5; 325 TABLET ORAL at 17:14

## 2022-08-25 RX ADMIN — HYDROCODONE BITARTRATE AND ACETAMINOPHEN 2 TABLET: 5; 325 TABLET ORAL at 23:27

## 2022-08-25 RX ADMIN — METHYLPREDNISOLONE SODIUM SUCCINATE 40 MG: 40 INJECTION, POWDER, FOR SOLUTION INTRAMUSCULAR; INTRAVENOUS at 09:37

## 2022-08-25 RX ADMIN — BUDESONIDE AND FORMOTEROL FUMARATE DIHYDRATE 2 PUFF: 160; 4.5 AEROSOL RESPIRATORY (INHALATION) at 19:37

## 2022-08-25 RX ADMIN — LOSARTAN POTASSIUM 100 MG: 100 TABLET, FILM COATED ORAL at 09:36

## 2022-08-25 RX ADMIN — IPRATROPIUM BROMIDE AND ALBUTEROL SULFATE 1 AMPULE: 2.5; .5 SOLUTION RESPIRATORY (INHALATION) at 03:10

## 2022-08-25 RX ADMIN — DOXYCYCLINE 100 MG: 100 CAPSULE ORAL at 09:36

## 2022-08-25 RX ADMIN — IPRATROPIUM BROMIDE AND ALBUTEROL SULFATE 1 AMPULE: 2.5; .5 SOLUTION RESPIRATORY (INHALATION) at 15:10

## 2022-08-25 RX ADMIN — HYDROCHLOROTHIAZIDE 25 MG: 25 TABLET ORAL at 09:36

## 2022-08-25 RX ADMIN — BUDESONIDE AND FORMOTEROL FUMARATE DIHYDRATE 2 PUFF: 160; 4.5 AEROSOL RESPIRATORY (INHALATION) at 07:47

## 2022-08-25 RX ADMIN — FUROSEMIDE 40 MG: 40 TABLET ORAL at 09:36

## 2022-08-25 RX ADMIN — PANTOPRAZOLE SODIUM 40 MG: 40 TABLET, DELAYED RELEASE ORAL at 05:16

## 2022-08-25 RX ADMIN — Medication 100 MG: at 09:36

## 2022-08-25 RX ADMIN — LEVETIRACETAM 500 MG: 500 TABLET, FILM COATED ORAL at 20:59

## 2022-08-25 RX ADMIN — MONTELUKAST 10 MG: 10 TABLET, FILM COATED ORAL at 20:59

## 2022-08-25 RX ADMIN — FERROUS SULFATE TAB EC 325 MG (65 MG FE EQUIVALENT) 324 MG: 325 (65 FE) TABLET DELAYED RESPONSE at 09:36

## 2022-08-25 RX ADMIN — CEFTRIAXONE SODIUM 1000 MG: 1 INJECTION, POWDER, FOR SOLUTION INTRAMUSCULAR; INTRAVENOUS at 17:17

## 2022-08-25 RX ADMIN — QUETIAPINE FUMARATE 200 MG: 200 TABLET ORAL at 20:59

## 2022-08-25 RX ADMIN — FOLIC ACID 1 MG: 1 TABLET ORAL at 09:37

## 2022-08-25 RX ADMIN — ONDANSETRON 4 MG: 4 TABLET, ORALLY DISINTEGRATING ORAL at 21:01

## 2022-08-25 RX ADMIN — IPRATROPIUM BROMIDE AND ALBUTEROL SULFATE 1 AMPULE: 2.5; .5 SOLUTION RESPIRATORY (INHALATION) at 19:36

## 2022-08-25 RX ADMIN — IPRATROPIUM BROMIDE AND ALBUTEROL SULFATE 1 AMPULE: 2.5; .5 SOLUTION RESPIRATORY (INHALATION) at 11:24

## 2022-08-25 RX ADMIN — METHYLPREDNISOLONE SODIUM SUCCINATE 30 MG: 40 INJECTION, POWDER, FOR SOLUTION INTRAMUSCULAR; INTRAVENOUS at 17:14

## 2022-08-25 RX ADMIN — ENOXAPARIN SODIUM 40 MG: 100 INJECTION SUBCUTANEOUS at 09:37

## 2022-08-25 RX ADMIN — HYDROCODONE BITARTRATE AND ACETAMINOPHEN 2 TABLET: 5; 325 TABLET ORAL at 04:35

## 2022-08-25 RX ADMIN — ASPIRIN 81 MG: 81 TABLET, COATED ORAL at 09:36

## 2022-08-25 RX ADMIN — DOXYCYCLINE 100 MG: 100 CAPSULE ORAL at 20:59

## 2022-08-25 RX ADMIN — METHYLPREDNISOLONE SODIUM SUCCINATE 40 MG: 40 INJECTION, POWDER, FOR SOLUTION INTRAMUSCULAR; INTRAVENOUS at 02:21

## 2022-08-25 ASSESSMENT — PAIN - FUNCTIONAL ASSESSMENT
PAIN_FUNCTIONAL_ASSESSMENT: PREVENTS OR INTERFERES SOME ACTIVE ACTIVITIES AND ADLS

## 2022-08-25 ASSESSMENT — PAIN SCALES - GENERAL
PAINLEVEL_OUTOF10: 7
PAINLEVEL_OUTOF10: 3
PAINLEVEL_OUTOF10: 4
PAINLEVEL_OUTOF10: 7

## 2022-08-25 ASSESSMENT — PAIN DESCRIPTION - ORIENTATION
ORIENTATION: RIGHT;LEFT
ORIENTATION: LEFT;RIGHT;LOWER

## 2022-08-25 ASSESSMENT — PAIN DESCRIPTION - LOCATION
LOCATION: ABDOMEN;LEG
LOCATION: LEG
LOCATION: ABDOMEN;LEG
LOCATION: LEG
LOCATION: LEG

## 2022-08-25 ASSESSMENT — PAIN DESCRIPTION - DESCRIPTORS
DESCRIPTORS: ACHING;BURNING
DESCRIPTORS: ACHING
DESCRIPTORS: ACHING;SHARP
DESCRIPTORS: ACHING;SHARP

## 2022-08-25 ASSESSMENT — PAIN DESCRIPTION - FREQUENCY
FREQUENCY: CONTINUOUS

## 2022-08-25 ASSESSMENT — PAIN DESCRIPTION - PAIN TYPE
TYPE: ACUTE PAIN

## 2022-08-25 ASSESSMENT — PAIN DESCRIPTION - ONSET
ONSET: ON-GOING

## 2022-08-25 NOTE — PROGRESS NOTES
Physical Therapy  Facility/Department: [facility]  Physical Therapy Initial Assessment    Name: Nelsy Monroy  : 1965  MRN: [id number]  Date of Service: 2022    Discharge Recommendations:  Due to recent hospitalization and medical condition, pt would benefit from additional intermittent skilled therapy at time of discharge. Please refer to the AM-PAC score for current functional status. Continue to assess       Nelsy Monroy is a 59 y.o.  male who presents with Chest Pain (Midsternal to right feels like pressure, has hx of chf and COPD) and Shortness of Breath  20-year-old gentleman past medical history of CHF, COPD, coronary disease, liver cirrhosis presented to ER complaining of shortness of breath going on for 2 days. He is also complaining of intermittent right-sided chest pain associated with it. He wears 4 L of oxygen at home. Chest pain is moderate, intermittent, sharp and resolved on its own. Patient denies any  Fever, chills, changes in urination, bowel habit or rash. Patient Diagnosis(es): The encounter diagnosis was COPD exacerbation ([address] Past Medical History:  has a past medical history of Arthritis, CAD (coronary artery disease), Cancer ([address] Cirrhosis with alcoholism ([address] COPD (chronic obstructive pulmonary disease) ([address] Depression, BOURGEOIS (dyspnea on exertion), H/O prostate cancer, Hyperlipidemia, Hypertension, MI (myocardial infarction) ([address] Seizures ([address] SVT (supraventricular tachycardia) ([address] and Tobacco abuse. Past Surgical History:  has a past surgical history that includes Prostatectomy; Insertable Cardiac Monitor (10/03/2018); sinus surgery; Tongue surgery; Cardiac catheterization; Upper gastrointestinal endoscopy (N/A, 6/10/2021); Upper gastrointestinal endoscopy (2022); and Upper gastrointestinal endoscopy (N/A, 1/3/2022).     Assessment   Body Structures, Functions, Activity Limitations Requiring Skilled Therapeutic Intervention: Decreased functional mobility ; Decreased safe awareness;Decreased cognition;Decreased endurance;Decreased sensation;Decreased balance; Increased pain  Assessment: Pt presenting with balance deficits and poor activity tolerance due to SOB and abd. discomfort from fluid in abdomen. Will progress as able. Specific Instructions for Next Treatment: Progress OOB mobility; assess tolerance to up to chair;  show Rollator and compare to RW- have d/c planner get for home if d/c home  Requires PT Follow-Up: Yes  Activity Tolerance  Activity Tolerance: Patient limited by endurance;Treatment limited secondary to medical complications  Activity Tolerance Comments: Not tolerating up to chair due to abdominal discomfort/ edema. Possible draining of fluid this admission per pulm. NP     Plan   Plan  Plan: 5-7 times per week  Specific Instructions for Next Treatment: Progress OOB mobility; assess tolerance to up to chair;  show Rollator and compare to RW- have d/c planner get for home if d/c home  Current Treatment Recommendations: Strengthening, Balance training, Functional mobility training, Transfer training, Endurance training, Gait training  Safety Devices  Type of Devices:  All fall risk precautions in place, Left in bed, Bed alarm in place, Call light within reach, Gait belt, Nurse notified, Patient at risk for falls     Restrictions  Restrictions/Precautions  Restrictions/Precautions: Fall Risk, General Precautions, Up as Tolerated, Bed Alarm  Position Activity Restriction  Other position/activity restrictions: Rt. UE IV     Subjective   General  Chart Reviewed: Yes  Patient assessed for rehabilitation services?: Yes  Family / Caregiver Present: No  Follows Commands: Within Functional Limits  Subjective  Subjective: \"SOB is my problem\"         Social/Functional History  Social/Functional History  Lives With: Friend(s)  Type of Home: House  Home Layout: Two level, Able to Live on Main level with bedroom/bathroom  Home Access: Level entry (1/2 step)  Bathroom Shower/Tub: Tub/Shower unit  Bathroom Toilet: Standard  Home Equipment: Oxygen  Has the patient had two or more falls in the past year or any fall with injury in the past year?: Yes (2-3 falls (during seizures))  ADL Assistance: Independent (difficulty with socks/shoes (bending/ breathing))  Homemaking Assistance: Independent  Ambulation Assistance: Independent (furniture walking)  Transfer Assistance: Independent  Active : No  Patient's  Info: friends can drive him  Mode of Transportation: Friends  Occupation: On disability  Type of Occupation: construction  Leisure & Hobbies: tv  Additional Comments: grocery shopping is difficult- uses cart; has assist for this when too SOB  Vision/Hearing  Vision  Vision: Impaired  Vision Exceptions: Wears glasses for reading  Hearing  Hearing: Within functional limits    Cognition   Orientation  Overall Orientation Status: Within Normal Limits  Cognition  Overall Cognitive Status: Exceptions  Safety Judgement: Decreased awareness of need for assistance;Decreased awareness of need for safety  Problem Solving: Assistance required to generate solutions;Assistance required to implement solutions;Assistance required to identify errors made;Assistance required to correct errors made  Insights: Decreased awareness of deficits  Cognition Comment: poor safety awareness overall and insight into balance deficits; disengaged with any edu. Objective   Heart Rate: 92  Heart Rate Source: Monitor  BP: 116/62  BP Location: Left Arm  MAP (Calculated): 80  Resp: 16  SpO2: 94 %  O2 Device: Nasal cannula     Observation/Palpation  Posture: Good  Observation: agreeable to eval but disengaged with any edu.  attempts  Edema: mild LE edema; L ankle> Rt. ankle (pt. states improved since admission)  Gross Assessment  AROM: Generally decreased, functional  Strength: Generally decreased, functional  Sensation: Impaired (N/T legs; diminished sensation feet) Balance  Sitting: Intact  Standing: With support (mostly min A with standing, straight line walking, but having one significant LOB while turning needing min-mod A x 2 to correct. Pt with sensation deficits in feet)  Gait  Overall Level of Assistance: Minimum assistance; Moderate assistance;Assist X1;Assist X2  Interventions: Safety awareness training;Verbal cues;Demonstration  Assistive Device: Walker, rolling (initially ambulating in room distances without AD; very unsteady with one significant LOB. Trialed with RW and pt did much better: CGA/min A for mob with support. Pt would benefit from either RW or 4ww. )  Bed mobility  Supine to Sit: Contact guard assistance  Sit to Supine: Contact guard assistance  Transfers  Sit to Stand: Minimal Assistance (wide MARQUEZ; immediate standing balance unsteady)  Stand to sit: Minimal Assistance  Ambulation  Surface: level tile  Device: No Device;Rolling Walker  Assistance: Minimal assistance; Moderate assistance;2 Person assistance  Quality of Gait: Started amb. away from bed with no device, min A x 2 at times due to unsteady/ staggering steps at times. Obvious sensation loss in feet (pt. reports diminished sensation) evident by pt. stepping in odd manner, ngoc. with Rt. foot. Pt. very unsteady with mod x 2 on one occasion to prevent fall. Gave pt. RW and gait/ balance improved. Edu. as to how to turn with RW to sit. Edu. as to how RW could be used intermittently at home as needed for balance and endurance purposes. Will introduce pt. to rollator to have seat to sit for decreased endurance. Gait Deviations: Decreased step length; Increased MARQUEZ; Deviated path;Staggers  Distance: 30ft x 2  Comments: Up to chair for several min. and pt. stated that he could not tolerate due to his swollen abdomen causing discomfort. Assisted pt. back to bed with bed alarm. Exercise Treatment: Edu. to keep UEs/ LEs moving as able in bed/ chair. Good demo and understanding.   Pt. states he does this at home. Static Sitting Balance Exercises: Sat unsupported EOB x 5 min., SBA  Breathing Techniques: Edu. for pursed lip breathing. Pt. needing max cues. Good demo after edu. Pt. watched O2 monitor and observed himself improving SpO2 from 92% to 98% on 4L O2 using pursed lip breathing sitting EOB. Will need review. OutComes Score                                                  AM-PAC Score  AM-PAC Inpatient Mobility Raw Score : 16 (08/25/22 1447)  AM-PAC Inpatient T-Scale Score : 40.78 (08/25/22 1447)  Mobility Inpatient CMS 0-100% Score: 54.16 (08/25/22 1447)  Mobility Inpatient CMS G-Code Modifier : CK (08/25/22 1447)          Tinneti Score       Goals  Short Term Goals  Time Frame for Short term goals: 12 visits:  Short term goal 1: Pt. to be indep with bed mob. Short term goal 2: Pt. to be SBA for sit to stand tranfser with approp AD with good immediate standing balance  Short term goal 3: Pt. to amb. 50ft (household distance) with approp. O2 and AD, SBA  Short term goal 4: Pt to trial rollator vs. RW and arrange with D/C planner to get for home if d/c home  Short term goal 5: Pt. to tolerate 25+ min. of PT daily for ther ex/ gait/ balance training/ endurance training/ energy conservation edu. Patient Goals   Patient goals : breathe better       Education  Patient Education  Education Given To: Patient  Education Provided: Role of Therapy;Plan of Care  Education Provided Comments: see Ex section, impt. of OOB, walker safety  Education Method: Demonstration;Verbal  Education Outcome: Verbalized understanding;Demonstrated understanding;Continued education needed      Therapy Time   Individual Concurrent Group Co-treatment   Time In 1000         Time Out 1043         Minutes 43             Treatment time:  33min.      Ginger Choi, PT

## 2022-08-25 NOTE — PROGRESS NOTES
Progress note  [facility].,    Adult Hospitalist      Name: Margy Marquis  MRN: [id number]     Acct: [de-identified]  Room: 2006/2006-02    Admit Date: 8/24/2022 11:08 AM  PCP: Darryle Norris, MD    Primary Problem  Principal Problem:    Acute exacerbation of COPD with asthma [facility])  Resolved Problems:    * No resolved hospital problems. *        Assesment:     Acute COPD exacerbation  Mild hyponatremia  Mild Leukocytosis  History of alcohol abuse  Liver lesion with history of treated hepatitis C  Hypertension  Mixed hyperlipidemia  Chronic coronary artery disease  Depression with anxiety  Tobacco abuse  Obesity, BMI 30.79        Plan:      Admit to med surge telemetry  O2 maintain oxygen saturation greater than 92%     IV Solu-Medrol  Duo neb   Respiratory pathogen better   Sputum culture   IV ceftriaxone and p.o. doxycycline  Monitor respiratory status   Pulmonology consult     Continue to monitor/telemetry/CBC with differential daily/BMP daily  DVT and GI prophylaxis.   Continue aspirin  Continue hydrochlorothiazide, metoprolol, amlodipine  Continue Singulair  Continue Roflumilast  Continue inhalers  Continue Keppra  Continue medications as below      Scheduled Meds:   methylPREDNISolone  30 mg IntraVENous Q8H    Followed by    Garrett Dave ON 8/26/2022] predniSONE  40 mg Oral Daily    aspirin  81 mg Oral Daily    ferrous sulfate  324 mg Oral Daily with breakfast    folic acid  1 mg Oral Daily    hydroCHLOROthiazide  25 mg Oral Daily    losartan  100 mg Oral Daily    metoprolol succinate  50 mg Oral Daily    montelukast  10 mg Oral Nightly    Roflumilast  500 mcg Oral Daily    enoxaparin  40 mg SubCUTAneous Daily    cefTRIAXone (ROCEPHIN) IV  1,000 mg IntraVENous Q24H    doxycycline monohydrate  100 mg Oral BID    QUEtiapine  200 mg Oral Nightly    vitamin B-1  100 mg Oral Daily    pantoprazole  40 mg Oral Daily    budesonide-formoterol  2 puff Inhalation BID    levETIRAcetam  500 mg Oral BID    furosemide  40 mg Oral Daily    ipratropium-albuterol  1 ampule Inhalation Q4H    amLODIPine  10 mg Oral Daily     Continuous Infusions:    PRN Meds:  potassium chloride, 40 mEq, PRN   Or  potassium alternative oral replacement, 40 mEq, PRN   Or  potassium chloride, 10 mEq, PRN  magnesium sulfate, 1,000 mg, PRN  ondansetron, 4 mg, Q8H PRN   Or  ondansetron, 4 mg, Q6H PRN  senna, 1 tablet, BID PRN  HYDROcodone 5 mg - acetaminophen, 1 tablet, Q6H PRN  HYDROcodone 5 mg - acetaminophen, 2 tablet, Q6H PRN  morphine, 2 mg, Q4H PRN  albuterol, 2.5 mg, Q2H PRN      Chief Complaint:     Chief Complaint   Patient presents with    Chest Pain     Midsternal to right feels like pressure, has hx of chf and COPD    Shortness of Breath         History of Present Illness:      Krissy Starr is a 59 y.o.  male who presents with Chest Pain (Midsternal to right feels like pressure, has hx of chf and COPD) and Shortness of Breath    Patient seen and examined at bedside and reviewed  last 24 hrs events with nursing staff  No acute events overnight. Patient denies any acute complaints. Afebrile  Patient denies any chest pain, palpitation, headache, dizziness, cough, nausea, vomiting, abdominal pain, pain, changes in urination or bowel habit or rash. Patient continues to have some shortness of breath        HPI  57-year-old gentleman past medical history of CHF, COPD, coronary disease, liver cirrhosis presented to ER complaining of shortness of breath going on for 2 days. He is also complaining of intermittent right-sided chest pain associated with it. He wears 4 L of oxygen at home. Chest pain is moderate, intermittent, sharp and resolved on its own. Patient denies any  Fever, chills, changes in urination, bowel habit or rash. Patient received Solu-Medrol and IV Bumex in the ER. Sodium was 133. WBC was 12. 2. X-ray chest was negative.     I have personally reviewed the past medical history, past surgical history, medications, social history, and family history, and summarized in the note. Review of Systems:     All 10 point system is reviewed and negative otherwise mentioned in HPI. Past Medical History:     Past Medical History:   Diagnosis Date    Arthritis     CAD (coronary artery disease)     Cancer ([facility]     prostate    Cir[facility]ism [facility]     COPD (chronic obstructive pulmonary disease) ([facility])     Depression     BOURGEOIS (dyspnea on exertion)     H/O prostate cancer     Hyperlipidemia     Hypertension     MI (myocardial infarction) ([facility]     Seizures ([facility] 2016    last one over 2 years ago     SVT (supraventricular tachycardia) ([facility]     Tobacco abuse         Past Surgical History:     Past Surgical History:   Procedure Laterality Date    CARDIAC CATHETERIZATION      ablation    INSERTABLE CARDIAC MONITOR  10/03/2018    INTERNAL LOOP RECORDER    PROSTATECTOMY      SINUS SURGERY      TONGUE SURGERY      UPPER GASTROINTESTINAL ENDOSCOPY N/A 6/10/2021    EGD BIOPSY performed by Chaya Armstrong MD at [address] [place] [address]  01/03/2022    UPPER GASTROINTESTINAL ENDOSCOPY N/A 1/3/2022    EGD BIOPSY performed by Chon Miller MD at [address] [place] [address]        Medications Prior to Admission:       Prior to Admission medications    Medication Sig Start Date End Date Taking?  Authorizing Provider   fluticasone-salmeterol (ADVAIR HFA) 230-21 MCG/ACT inhaler Inhale 2 puffs into the lungs 2 times daily   Yes Historical Provider, MD   vitamin B-12 (CYANOCOBALAMIN) 1000 MCG tablet Take 1,000 mcg by mouth daily   Yes Historical Provider, MD   furosemide (LASIX) 40 MG tablet Take 40 mg by mouth daily   Yes Historical Provider, MD   levETIRAcetam (KEPPRA) 500 MG tablet Take 500 mg by mouth 2 times daily   Yes Historical Provider, MD   predniSONE (DELTASONE) 10 MG tablet Take 10 mg by mouth daily   Yes Historical Provider, MD   losartan-hydroCHLOROthiazide (HYZAAR) 100-25 MG per tablet Take 1 tablet by mouth daily   Yes Historical Provider, MD Roflumilast (DALIRESP) 500 MCG tablet Take 1 tablet by mouth daily 7/16/21   Mihir Escalona MD   ferrous gluconate (FERGON) 324 (38 Fe) MG tablet Take 324 mg by mouth daily (with breakfast)    Historical Provider, MD   famotidine (PEPCID) 40 MG tablet Take 40 mg by mouth 2 times daily as needed (heartburn)     Historical Provider, MD   amLODIPine (NORVASC) 10 MG tablet Take 10 mg by mouth daily    Historical Provider, MD   albuterol (PROVENTIL) (2.5 MG/3ML) 0.083% nebulizer solution Take 2.5 mg by nebulization every 6 hours as needed for Wheezing    Historical Provider, MD   albuterol sulfate  (90 Base) MCG/ACT inhaler Inhale 2 puffs into the lungs every 4-6 hours as needed for Wheezing    Historical Provider, MD   aspirin 81 MG tablet Take 81 mg by mouth daily    Historical Provider, MD   folic acid (FOLVITE) 1 MG tablet Take 1 mg by mouth daily    Historical Provider, MD   metoprolol succinate (TOPROL XL) 50 MG extended release tablet Take 50 mg by mouth daily    Historical Provider, MD   pantoprazole (PROTONIX) 40 MG tablet Take 40 mg by mouth daily    Historical Provider, MD   QUEtiapine (SEROQUEL) 400 MG tablet Take 200 mg by mouth nightly Takes 1/2 tab (200mg) nightly    Historical Provider, MD   vitamin B-1 (THIAMINE) 100 MG tablet Take 100 mg by mouth daily    Historical Provider, MD   Cholecalciferol (VITAMIN D3) 2000 units CAPS Take 1 capsule by mouth daily    Historical Provider, MD   tiotropium (SPIRIVA RESPIMAT) 2.5 MCG/ACT AERS inhaler Inhale 2 puffs into the lungs daily    Historical Provider, MD        Allergies:       Patient has no known allergies. Social History:     Tobacco:    reports that he has quit smoking. His smoking use included cigarettes. He smoked an average of .1 packs per day. He quit smokeless tobacco use about 8 months ago. Alcohol:      reports that he does not currently use alcohol. Drug Use:  reports no history of drug use.     Family History:     No family history on file.       Physical Exam:     Vitals:  /62   Pulse 92   Temp 97.9 °F (36.6 °C) (Axillary)   Resp 16   Ht 5' 5\" (1.651 m)   Wt 201 lb 12.8 oz (91.5 kg)   SpO2 94%   BMI 33.58 kg/m²   Temp (24hrs), Av.8 °F (36.6 °C), Min:97.5 °F (36.4 °C), Max:98.2 °F (36.8 °C)          General appearance - alert, well appearing, and in no acute distress  Mental status - oriented to person, place, and time with normal affect  Head - normocephalic and atraumatic  Eyes - pupils equal and reactive, extraocular eye movements intact, conjunctiva clear  Ears - hearing appears to be intact  Nose - no drainage noted  Mouth - mucous membranes moist  Neck - supple, no carotid bruits, thyroid not palpable  Chest - clear to auscultation, normal effort  Heart - normal rate, regular rhythm, no murmur  Abdomen - soft, nontender, nondistended, bowel sounds present all four quadrants, no masses, hepatomegaly or splenomegaly  Neurological - normal speech, no focal findings or movement disorder noted, cranial nerves II through XII grossly intact  Extremities - peripheral pulses palpable, no pedal edema or calf pain with palpation  Skin - no gross lesions, rashes, or induration noted        Data:     Labs:    Hematology:  Recent Labs     22  1125 22  1224   WBC 12.2*  --    RBC 4.86  --    HGB 15.0  --    HCT 45.3  --    MCV 93.2  --    MCH 30.9  --    MCHC 33.1  --    RDW 12.9  --     400   MPV 9.7  --    INR  --  1.0   DDIMER 0.28  --        Chemistry:  Recent Labs     22  1125 22  1349 22  0613   *  --   --    K 3.9  --   --    CL 93*  --   --    CO2 27  --   --    GLUCOSE 121*  --   --    BUN 9  --   --    CREATININE 0.81  --   --    ANIONGAP 13  --   --    LABGLOM >60  --   --    GFRAA >60  --   --    CALCIUM 10.4  --   --    PROBNP 41  --  83   TROPHS 16 14  --        Recent Labs     22  1125   PROT 8.0   LABALBU 4.4   AST 13   ALT 18   ALKPHOS 77   BILITOT 0.51   LIPASE 23         Lab Results   Component Value Date    INR 1.0 08/25/2022    INR 1.0 01/01/2022    INR 1.1 07/13/2021    PROTIME 13.4 08/25/2022    PROTIME 13.2 01/01/2022    PROTIME 13.5 07/13/2021       Lab Results   Component Value Date/Time    SPECIAL 10ML LH 08/25/2022 11:45 AM     Lab Results   Component Value Date/Time    CULTURE NO GROWTH <24 HRS 08/25/2022 11:45 AM       No results found for: POCPH, PHART, PH, POCPCO2, MFQ1LQO, PCO2, POCPO2, PO2ART, PO2, POCHCO3, SIP7SBC, HCO3, NBEA, PBEA, BEART, BE, THGBART, THB, QFG1ZTN, BIMH7YDS, L1CWMMST, O2SAT, FIO2    Radiology:    XR CHEST PORTABLE    Result Date: 8/24/2022  No acute cardiopulmonary findings         All radiological studies reviewed                Code Status:  Full Code    Electronically signed by Lavon Powell MD on 8/25/2022 at 3:05 PM     Copy sent to Dr. Sherrell Alejandre MD    This note was created with the assistance of a speech-recognition program.  Although the intention is to generate a document that actually reflects the content of the visit, no guarantees can be provided that every mistake has been identified and corrected by editing. Note was updated later by me after  physical examination and  completion of the assessment.

## 2022-08-25 NOTE — PROGRESS NOTES
Occupational Therapy  Facility/Department: [facility] MED SURG  Occupational Therapy Initial Assessment    Name: Jerry Powell  : 1965  MRN: [id number]  Date of Service: 2022    Discharge Recommendations:  Patient would benefit from continued therapy after discharge  OT Equipment Recommendations  Equipment Needed: Yes  Mobility Devices: Albino Hawks; ADL Assistive Devices  ADL Assistive Devices: Shower Chair with back;Long-handled Sponge;Reacher     RN reports patient is medically stable for therapy treatment this date. Chart reviewed prior to treatment and patient is agreeable for therapy. All lines intact and patient positioned comfortably at end of treatment. All patient needs addressed prior to ending therapy session. Patient Diagnosis(es): The encounter diagnosis was COPD exacerbation ([facility] [address] Past Medical History:  has a past medical history of Arthritis, CAD (coronary artery disease), Cancer ([address] Cirrhosis with alcoholism ([address] COPD (chronic obstructive pulmonary disease) ([address] Depression, BOURGEOIS (dyspnea on exertion), H/O prostate cancer, Hyperlipidemia, Hypertension, MI (myocardial infarction) ([address] Seizures ([address] SVT (supraventricular tachycardia) ([address] and Tobacco abuse. Past Surgical History:  has a past surgical history that includes Prostatectomy; Insertable Cardiac Monitor (10/03/2018); sinus surgery; Tongue surgery; Cardiac catheterization; Upper gastrointestinal endoscopy (N/A, 6/10/2021); Upper gastrointestinal endoscopy (2022); and Upper gastrointestinal endoscopy (N/A, 1/3/2022). HPI from chart:  Jerry Powell is a 59 y.o. male who presents to the emergency department with history of CHF, COPD, coronary artery disease who complains of shortness of breath which has been consistent for the last 2 days as well as intermittent right-sided chest pain for the last 2 days. Patient states that he wears 4 L of oxygen chronically at home.   He denies any fevers, chills, heart palpitations. The chest pain is intermittent and last for few minutes at a time and resolves on its own. There has been some radiation to his back. Patient has had some abdominal pain and distention. He states that his appetite has been normal and he has been having normal bowel movements. Patient denies any blood in the stools. He has had no nausea or vomiting. Patient does take a diuretic and states that he has been taking all of his medications as prescribed. He has been urinating without difficulty. He complains of a burning sensation and swelling in his bilateral extremities for the last couple of days. Assessment   Performance deficits / Impairments: Decreased functional mobility ; Decreased ADL status; Decreased strength;Decreased safe awareness;Decreased endurance;Decreased balance;Decreased posture;Decreased cognition  Assessment: Skilled OT is indicated to increase overall safety awareness in function as well as strenght, balance, ADL status, functional mobility, and cognition to improve functional outcomes, I, and return to home.    Prognosis: Good  Decision Making: Medium Complexity  REQUIRES OT FOLLOW-UP: Yes  Activity Tolerance  Activity Tolerance: Patient limited by fatigue;Treatment limited secondary to decreased cognition        Plan   Plan  Times per Week: 4-5x/week, 1-2x/day  Current Treatment Recommendations: Strengthening, Balance training, Functional mobility training, Endurance training, Positioning, Patient/Caregiver education & training, Safety education & training, Self-Care / ADL, Cognitive/Perceptual training     Restrictions  Restrictions/Precautions  Restrictions/Precautions: Fall Risk, General Precautions, Up as Tolerated, Bed Alarm  Position Activity Restriction  Other position/activity restrictions: Rt. UE IV    Subjective   General  Chart Reviewed: Yes  Patient assessed for rehabilitation services?: Yes  Family / Caregiver Present: No     Social/Functional History  Social/Functional History  Lives With: Friend(s)  Type of Home: House  Home Layout: Two level, Able to Live on Main level with bedroom/bathroom  Home Access: Level entry (1/2 step)  Bathroom Shower/Tub: Tub/Shower unit  Bathroom Toilet: Standard  Home Equipment: Oxygen  Has the patient had two or more falls in the past year or any fall with injury in the past year?: Yes (2-3 falls (during seizures))  ADL Assistance: Independent (difficulty with socks/shoes (bending/ breathing))  Homemaking Assistance: Independent  Ambulation Assistance: Independent (furniture walking)  Transfer Assistance: Independent  Active : No  Patient's  Info: friends can drive him  Mode of Transportation: Friends  Occupation: On disability  Type of Occupation: construction  Leisure & Hobbies: tv  Additional Comments: grocery shopping is difficult- uses cart; has assist for this when too SOB       Objective   Heart Rate: 92  Heart Rate Source: Monitor  BP: 116/62  BP Location: Left Arm  MAP (Calculated): 80  Resp: 16  SpO2: 94 %  O2 Device: Nasal cannula          Observation/Palpation  Posture: Good  Observation: agreeable to eval but disengaged with any edu. attempts  Edema: mild LE edema; L ankle> Rt. ankle (pt. states improved since admission)  Safety Devices  Type of Devices: All fall risk precautions in place; Left in bed;Bed alarm in place;Call light within reach;Gait belt;Nurse notified; Patient at risk for falls  Balance  Sitting: Intact  Standing: With support (mostly min A with standing, straight line walking, but having one significant LOB while turning needing min-mod A x 2 to correct. Pt with sensation deficits in feet)  Gait  Overall Level of Assistance: Minimum assistance; Moderate assistance;Assist X1;Assist X2  Interventions: Safety awareness training;Verbal cues;Demonstration  Assistive Device: Walker, rolling (initially ambulating in room distances without AD; very unsteady with one significant LOB.  Trialed with RW and pt did much better: CGA/min A for mob with support. Pt would benefit from either RW or 4ww.)     AROM: Within functional limits  Strength: Within functional limits  Coordination: Within functional limits  Tone: Normal  Sensation: Intact  ADL  Feeding: Independent  Grooming: Stand by assistance  Grooming Skilled Clinical Factors: seated. Pt needing CGA standing d/t balance  UE Bathing: Minimal assistance  LE Bathing: Minimal assistance  UE Dressing: Stand by assistance;Minimal assistance  LE Dressing: Minimal assistance; Moderate assistance  Toileting: Minimal assistance;Contact guard assistance  Additional Comments: pt is limited by dec. activity tolerance, SOB, poor safety awareness. Attempting to ed pt throughout on safety/fall prevention (pt is unsteady in standing), equip needs (recommend shower chair, long handled sponge, reacher, RW or 4ww), breathing techs, and EC/WS techs to use with ADLs/IADLs at home. Pt provided writtent handouts as well as verbal/demo'd education. Pt nods he understands but cannot teach/repeat back and does not appear to fully understand the link between his deficits and the education being given. Dec. insight into balance deficits as well as breathing deficits. Activity Tolerance  Activity Tolerance: Patient limited by endurance;Treatment limited secondary to medical complications  Activity Tolerance Comments: Not tolerating up to chair due to abdominal discomfort/ edema. Possible draining of fluid this admission per pulm.  NP  Bed mobility  Supine to Sit: Contact guard assistance  Sit to Supine: Contact guard assistance  Transfers  Sit to stand: Minimal assistance  Stand to sit: Minimal assistance  Transfer Comments: cued for safety with sit<>stand with hand placement and while trialing RW, with device to fully back up to surface before sitting, reaching back, etc  Vision  Vision: Impaired  Vision Exceptions: Wears glasses for reading  Hearing  Hearing: Within functional limits  Cognition  Overall Cognitive Status: Exceptions  Safety Judgement: Decreased awareness of need for assistance;Decreased awareness of need for safety  Problem Solving: Assistance required to generate solutions;Assistance required to implement solutions;Assistance required to identify errors made;Assistance required to correct errors made  Insights: Decreased awareness of deficits  Cognition Comment: poor safety awareness overall and insight into balance deficits; disengaged with any edu. Orientation  Overall Orientation Status: Within Normal Limits  Perception  Overall Perceptual Status: WFL               Education Given To: Patient  Education Provided: Role of Therapy;Plan of Care;Home Exercise Program;Precautions; ADL Adaptive Strategies;Transfer Training;Energy Conservation; Fall Prevention Strategies; Equipment; Family Education  Education Method: Demonstration;Verbal;Teach Back;Printed Information/Hand-outs  Barriers to Learning: Cognition  Education Outcome: Continued education needed; Unable to demonstrate understanding     Educated patient on breathing exercises: pursed lip breathing, coordinated breathing; deep breathing; stewart cough; and diaphragmatic breathing. Pt education on breathing exercises ability to reduce the workload required to breathe, assists in decreasing anxiety, and reduces shortness of breath. Pursed lip breathing assists in releasing trapped air from the lungs. The stewart exercise can assist in removal of mucus from the air space and it requires less exertion than regular cough exercise. Diaphragmatic exercises works to retrain the large muscle group to assist in breathing and allows for the smaller accessory muscles to rest. Overall benefits of breathing exercises include improved quality of life and reduction of shortness of breath. Breathing ex's should be performed 10 minutes at a time, 3-4x/day.        Written and verbal edu provided to pt on safe ADL completion techniques and tips during bathing/showering, and toileting; EC/WS techniques of pacing, posturing, body mechanics, planning and organizing tasks, avoiding fatigue, and task simplification in regards to ADLs of eating, grooming, bathing/showering, dressing, and IADLs of cooking, meal cleanup, marketing and meal planning, laundry, bed making, and housework. AM-[facility] Inpatient Daily Activity Raw Score: 17 (08/25/22 1445)  AM-[facility] Inpatient ADL T-Scale Score : 37.26 (08/25/22 1445)  ADL Inpatient CMS 0-100% Score: 50.11 (08/25/22 1445)  ADL Inpatient CMS G-Code Modifier : CK (08/25/22 1445)    Tinneti Score       Goals  Short Term Goals  Time Frame for Short term goals: by discharge, pt will  Short Term Goal 1: demo SBA with ADL transfers with approp AD/DME and good safeyt  Short Term Goal 2: demo SBA with functional mob in room distances for ADL completion with good safety/pacing, approp AD  Short Term Goal 3: demo and verb good understanding of ed provided on fall prevention techs, EC/WS techs, equip needs, breathing techs, posture/body mechanics, positioning techs, and d/c recommendations  Short Term Goal 4: demo I with UB ADLs and SBA with LB ADLs with AE/DME as needed and good safety/pacing  Short Term Goal 5: demo SBA with toileting routine with DME as needed  Patient Goals   Patient goals : to go home       Therapy Time   Individual Concurrent Group Co-treatment   Time In 0950         Time Out 1050         Minutes 60          Treatment min: 50  Co-treatment with PT warranted secondary to decreased safety and independence requiring 2 skilled therapy professionals to address individual discipline's goals. OT addressing preparation for ADL transfer, sitting balance for increased ADL performance, sitting/activity tolerance, functional reaching, environmental safety/scanning, fall prevention, functional mobility for ADL transfers, ability to sequence and follow directions, bed mobility tech, and functional UE strength. Sophie Muhammad, OT

## 2022-08-25 NOTE — CARE COORDINATION
Case Management Initial Discharge Plan  [address],         Readmission Risk              Risk of Unplanned Readmission:  18             Met with:patient to discuss discharge plans. Information verified: address, contacts, phone number, , insurance Yes  PCP: Naveen Logan MD  Date of last visit: couple of weeks ago     Insurance Provider: [employer]     Discharge Planning  Current Residence:  1 story with roommates   Living Arrangements:  Friends   Home has 1 stories/0 stairs to climb  Support Systems:  Friends/Neighbors  Current Services PTA:  na  Agency: na   Patient able to perform ADL's:Independent  DME in home:    @4l ([facility]), nebulizer with meds, has POC   DME used to aid ambulation prior to admission:   na   DME used during admission:      Potential Assistance Needed:  N/A    Pharmacy: [facility]    Potential Assistance Purchasing Medications:     Does patient want to participate in local refill/ meds to beds program?  Yes    Patient agreeable to home care: No had  [address] in past   [place] of choice provided:  n/a      Type of Home Care Services:  None  Patient expects to be discharged to:       Prior SNF/Rehab Placement and Facility: no   Agreeable to SNF/Rehab: No  [place] of choice provided: n/a   Evaluation: n/a    Expected Discharge date: Follow Up Appointment: Best Day/ Time:      Transportation provider: friend   Transportation arrangements needed for discharge: No    Discharge Plan: Pt independent. DME- 4l , neb. No needs. Pulm consult. IV steroids/Rocephin. F/U appt made  with pulm. Pt independent, non , lives with roommates-mailing address is [facility] [address] [place] [address] but lives at [address] if would need home care. Has had [address] in past.     He has seen Dr. Kayleen Alexandra for pulm in past but has not followed up recently. He does not have a pox-advised where to purchase.      [facility] momo spoke with Stephanie-appt made with Dr. Thomas Zelaya 9/13 @ 10 am-pt agrees with date/time updated in EPIC.          Electronically signed by Eugenia Guerra RN on 8/25/22 at 9:12 AM EDT

## 2022-08-25 NOTE — RT PROTOCOL NOTE
RT Inhaler-Nebulizer Bronchodilator Protocol Note    There is a bronchodilator order in the chart from a provider indicating to follow the RT Bronchodilator Protocol and there is an Initiate RT Inhaler-Nebulizer Bronchodilator Protocol order as well (see protocol at bottom of note). CXR Findings:  XR CHEST PORTABLE    Result Date: 8/24/2022  No acute cardiopulmonary findings       The findings from the last RT Protocol Assessment were as follows:   History Pulmonary Disease: Chronic pulmonary disease  Respiratory Pattern: Dyspnea on exertion or RR 21-25 bpm  Breath Sounds: Inspiratory and expiratory or bilateral wheezing and/or rhonchi  Cough: Strong, productive  Indication for Bronchodilator Therapy: Decreased or absent breath sounds, On home bronchodilators (q4-6 hrs)  Bronchodilator Assessment Score: 11    Aerosolized bronchodilator medication orders have been revised according to the RT Inhaler-Nebulizer Bronchodilator Protocol below. Respiratory Therapist to perform RT Therapy Protocol Assessment initially then follow the protocol. Repeat RT Therapy Protocol Assessment PRN for score 0-3 or on second treatment, BID, and PRN for scores above 3. No Indications - adjust the frequency to every 6 hours PRN wheezing or bronchospasm, if no treatments needed after 48 hours then discontinue using Per Protocol order mode. If indication present, adjust the RT bronchodilator orders based on the Bronchodilator Assessment Score as indicated below. Use Inhaler orders unless patient has one or more of the following: on home nebulizer, not able to hold breath for 10 seconds, is not alert and oriented, cannot activate and use MDI correctly, or respiratory rate 25 breaths per minute or more, then use the equivalent nebulizer order(s) with same Frequency and PRN reasons based on the score. If a patient is on this medication at home then do not decrease Frequency below that used at home.     0-3 - enter or revise RT bronchodilator order(s) to equivalent RT Bronchodilator order with Frequency of every 4 hours PRN for wheezing or increased work of breathing using Per Protocol order mode. 4-6 - enter or revise RT Bronchodilator order(s) to two equivalent RT bronchodilator orders with one order with BID Frequency and one order with Frequency of every 4 hours PRN wheezing or increased work of breathing using Per Protocol order mode. 7-10 - enter or revise RT Bronchodilator order(s) to two equivalent RT bronchodilator orders with one order with TID Frequency and one order with Frequency of every 4 hours PRN wheezing or increased work of breathing using Per Protocol order mode. 11-13 - enter or revise RT Bronchodilator order(s) to one equivalent RT bronchodilator order with QID Frequency and an Albuterol order with Frequency of every 4 hours PRN wheezing or increased work of breathing using Per Protocol order mode. Greater than 13 - enter or revise RT Bronchodilator order(s) to one equivalent RT bronchodilator order with every 4 hours Frequency and an Albuterol order with Frequency of every 2 hours PRN wheezing or increased work of breathing using Per Protocol order mode. RT to enter RT Home Evaluation for COPD & MDI Assessment order using Per Protocol order mode.     Electronically signed by Wesley Closs, RCP on 8/25/2022 at 7:50 AM

## 2022-08-25 NOTE — PLAN OF CARE
Problem: Discharge Planning  Goal: Discharge to home or other facility with appropriate resources  Outcome: Progressing     Problem: Respiratory - Adult  Goal: Achieves optimal ventilation and oxygenation  8/25/2022 0211 by Deanna Damico RN  Outcome: Progressing  8/24/2022 1629 by Eliseo Robles RCP  Outcome: Progressing  Goal: Clear lung sounds  8/24/2022 1629 by Eliseo Robles RCP  Outcome: Progressing     Problem: Pain  Goal: Verbalizes/displays adequate comfort level or baseline comfort level  Outcome: Progressing     Problem: Safety - Adult  Goal: Free from fall injury  Outcome: Progressing  Flowsheets (Taken 8/24/2022 1955)  Free From Fall Injury: Instruct family/caregiver on patient safety     Problem: Chronic Conditions and Co-morbidities  Goal: Patient's chronic conditions and co-morbidity symptoms are monitored and maintained or improved  Outcome: Progressing

## 2022-08-25 NOTE — PLAN OF CARE
Problem: Discharge Planning  Goal: Discharge to home or other facility with appropriate resources  8/25/2022 1325 by Kvng Luciano RN  Outcome: Progressing  Flowsheets (Taken 8/25/2022 0225 by Kamla Ferrera RN)  Discharge to home or other facility with appropriate resources:   Identify barriers to discharge with patient and caregiver   Arrange for needed discharge resources and transportation as appropriate   Identify discharge learning needs (meds, wound care, etc)   Refer to discharge planning if patient needs post-hospital services based on physician order or complex needs related to functional status, cognitive ability or social support system  8/25/2022 0211 by Kamla Ferrera RN  Outcome: Progressing     Problem: Respiratory - Adult  Goal: Achieves optimal ventilation and oxygenation  8/25/2022 1325 by Kvng Luciano RN  Outcome: Progressing  8/25/2022 0211 by Kamla Ferrera RN  Outcome: Progressing     Problem: Pain  Goal: Verbalizes/displays adequate comfort level or baseline comfort level  8/25/2022 1325 by Kvng Luciano RN  Outcome: Progressing  8/25/2022 0211 by Kamla Ferrera RN  Outcome: Progressing     Problem: Safety - Adult  Goal: Free from fall injury  8/25/2022 1325 by Kvng Luciano RN  Outcome: Progressing  8/25/2022 0211 by Kamla Ferrera RN  Outcome: Progressing  Flowsheets (Taken 8/24/2022 1955)  Free From Fall Injury: Instruct family/caregiver on patient safety     Problem: Chronic Conditions and Co-morbidities  Goal: Patient's chronic conditions and co-morbidity symptoms are monitored and maintained or improved  8/25/2022 1325 by Kvng Luciano RN  Outcome: Progressing  8/25/2022 0211 by Kamla Ferrera RN  Outcome: Progressing

## 2022-08-25 NOTE — PROGRESS NOTES
Pulmonary Critical Care Progress Note  Rik Baptiste MD     Patient seen for the follow up of Acute exacerbation of COPD with asthma ([address] , chronic hypoxic respiratory failure    Subjective:  He sitting up in the bedside chair. Just finished ambulating back from the restroom with therapy. He gets fairly winded with activity. He feels his belly is full/bloated which is giving him difficulties with his breathing. Examination:  Vitals: /63   Pulse 78   Temp 97.5 °F (36.4 °C) (Axillary)   Resp 15   Ht 5' 5\" (1.651 m)   Wt 201 lb 12.8 oz (91.5 kg)   SpO2 96%   BMI 33.58 kg/m²   General appearance: alert and cooperative with exam, up in the chair  Neck: No JVD  Lungs: Diminished, very faint wheeze  Heart: regular rate and rhythm, S1, S2 normal, no gallop  Abdomen: Soft, non tender, + BS  Extremities: no cyanosis or clubbing. Trace edema    LABs:  CBC:   Recent Labs     08/24/22  1125   WBC 12.2*   HGB 15.0   HCT 45.3        BMP:   Recent Labs     08/24/22  1125   *   K 3.9   CO2 27   BUN 9   CREATININE 0.81   LABGLOM >60   GLUCOSE 121*     LIVER PROFILE:  Recent Labs     08/24/22  1125   AST 13   ALT 18   LABALBU 4.4     Radiology:  X-ray chest 8/24/2022      Impression:  Chronic hypoxic respiratory failure   Acute exacerbation of COPD/active smoking  Mild pulmonary hypertension, RVSP 37 mmHg echo 7/12/2021  Suspected obstructive sleep apnea/Obesity  Liver cirrhosis with ascites/hepatitis C  Paroxysmal SVT status post ablation    Recommendations:  Oxygen via nasal cannula, keep SPO2 90% or greater   Incentive spirometry every hour while awake   Continue Rocephin and doxycycline  He had 1 positive blood culture,? Contamination.   Repeat blood cultures  Check procalcitonin  IV solu-medrol 40 mg every 6 hours, decrease dose/frequency  Symbicort 160/4.5, 2 puffs twice daily  Singulair  Daliresp  Albuterol and Ipratropium Q 4 hours and prn  Consult interventional radiology to evaluate for paracentesis  DVT prophylaxis with low molecular weight heparin  Sleep apnea evaluation as an outpatient  Will follow with you    Electronically signed by     Gi Danielle MD on 8/25/2022 at 12:30 PM  Pulmonary Critical Care and Sleep Medicine,  [facility]  Cell: [phone]  Office: [phone]

## 2022-08-25 NOTE — FLOWSHEET NOTE
Patent to IR clinic room; Osei Ortega in & \"U/S of abdomen no fluid for paracentesis noted for procedure to be performed. Dr. Osei Caceres is going to review patients chart. Patient back to room via wheelchair in stable condition.

## 2022-08-26 ENCOUNTER — APPOINTMENT (OUTPATIENT)
Dept: CT IMAGING | Age: 57
DRG: 140 | End: 2022-08-26
Payer: MEDICARE

## 2022-08-26 LAB
PRO-BNP: 47 PG/ML
PROCALCITONIN: 0.03 NG/ML

## 2022-08-26 PROCEDURE — 2580000003 HC RX 258: Performed by: HOSPITALIST

## 2022-08-26 PROCEDURE — 6370000000 HC RX 637 (ALT 250 FOR IP): Performed by: INTERNAL MEDICINE

## 2022-08-26 PROCEDURE — 87070 CULTURE OTHR SPECIMN AEROBIC: CPT

## 2022-08-26 PROCEDURE — 94640 AIRWAY INHALATION TREATMENT: CPT

## 2022-08-26 PROCEDURE — 1200000000 HC SEMI PRIVATE

## 2022-08-26 PROCEDURE — 2700000000 HC OXYGEN THERAPY PER DAY

## 2022-08-26 PROCEDURE — 87205 SMEAR GRAM STAIN: CPT

## 2022-08-26 PROCEDURE — 6360000002 HC RX W HCPCS: Performed by: NURSE PRACTITIONER

## 2022-08-26 PROCEDURE — 36415 COLL VENOUS BLD VENIPUNCTURE: CPT

## 2022-08-26 PROCEDURE — 83880 ASSAY OF NATRIURETIC PEPTIDE: CPT

## 2022-08-26 PROCEDURE — 6370000000 HC RX 637 (ALT 250 FOR IP): Performed by: NURSE PRACTITIONER

## 2022-08-26 PROCEDURE — 6370000000 HC RX 637 (ALT 250 FOR IP): Performed by: HOSPITALIST

## 2022-08-26 PROCEDURE — 94761 N-INVAS EAR/PLS OXIMETRY MLT: CPT

## 2022-08-26 PROCEDURE — 6360000002 HC RX W HCPCS: Performed by: HOSPITALIST

## 2022-08-26 PROCEDURE — 6360000002 HC RX W HCPCS: Performed by: INTERNAL MEDICINE

## 2022-08-26 PROCEDURE — 74176 CT ABD & PELVIS W/O CONTRAST: CPT

## 2022-08-26 RX ORDER — IPRATROPIUM BROMIDE AND ALBUTEROL SULFATE 2.5; .5 MG/3ML; MG/3ML
1 SOLUTION RESPIRATORY (INHALATION) 3 TIMES DAILY
Status: DISCONTINUED | OUTPATIENT
Start: 2022-08-27 | End: 2022-08-27

## 2022-08-26 RX ORDER — IPRATROPIUM BROMIDE AND ALBUTEROL SULFATE 2.5; .5 MG/3ML; MG/3ML
1 SOLUTION RESPIRATORY (INHALATION) 4 TIMES DAILY
Status: DISCONTINUED | OUTPATIENT
Start: 2022-08-26 | End: 2022-08-26

## 2022-08-26 RX ORDER — DIPHENHYDRAMINE HYDROCHLORIDE 50 MG/ML
25 INJECTION INTRAMUSCULAR; INTRAVENOUS ONCE
Status: COMPLETED | OUTPATIENT
Start: 2022-08-26 | End: 2022-08-26

## 2022-08-26 RX ORDER — HYDROMORPHONE HYDROCHLORIDE 1 MG/ML
1 INJECTION, SOLUTION INTRAMUSCULAR; INTRAVENOUS; SUBCUTANEOUS ONCE
Status: COMPLETED | OUTPATIENT
Start: 2022-08-26 | End: 2022-08-26

## 2022-08-26 RX ADMIN — HYDROCODONE BITARTRATE AND ACETAMINOPHEN 2 TABLET: 5; 325 TABLET ORAL at 18:09

## 2022-08-26 RX ADMIN — HYDROCODONE BITARTRATE AND ACETAMINOPHEN 2 TABLET: 5; 325 TABLET ORAL at 12:01

## 2022-08-26 RX ADMIN — PREDNISONE 40 MG: 20 TABLET ORAL at 07:58

## 2022-08-26 RX ADMIN — LEVETIRACETAM 500 MG: 500 TABLET, FILM COATED ORAL at 07:52

## 2022-08-26 RX ADMIN — ROFLUMILAST 500 MCG: 500 TABLET ORAL at 07:52

## 2022-08-26 RX ADMIN — METOPROLOL SUCCINATE 50 MG: 50 TABLET, FILM COATED, EXTENDED RELEASE ORAL at 16:18

## 2022-08-26 RX ADMIN — FOLIC ACID 1 MG: 1 TABLET ORAL at 07:52

## 2022-08-26 RX ADMIN — CEFTRIAXONE SODIUM 1000 MG: 1 INJECTION, POWDER, FOR SOLUTION INTRAMUSCULAR; INTRAVENOUS at 16:21

## 2022-08-26 RX ADMIN — LOSARTAN POTASSIUM 100 MG: 100 TABLET, FILM COATED ORAL at 16:19

## 2022-08-26 RX ADMIN — IPRATROPIUM BROMIDE AND ALBUTEROL SULFATE 1 AMPULE: .5; 3 SOLUTION RESPIRATORY (INHALATION) at 20:26

## 2022-08-26 RX ADMIN — Medication 100 MG: at 07:52

## 2022-08-26 RX ADMIN — BUDESONIDE AND FORMOTEROL FUMARATE DIHYDRATE 2 PUFF: 160; 4.5 AEROSOL RESPIRATORY (INHALATION) at 20:26

## 2022-08-26 RX ADMIN — IPRATROPIUM BROMIDE AND ALBUTEROL SULFATE 1 AMPULE: .5; 3 SOLUTION RESPIRATORY (INHALATION) at 07:37

## 2022-08-26 RX ADMIN — ASPIRIN 81 MG: 81 TABLET, COATED ORAL at 07:52

## 2022-08-26 RX ADMIN — HYDROMORPHONE HYDROCHLORIDE 1 MG: 1 INJECTION, SOLUTION INTRAMUSCULAR; INTRAVENOUS; SUBCUTANEOUS at 23:36

## 2022-08-26 RX ADMIN — DOXYCYCLINE 100 MG: 100 CAPSULE ORAL at 07:51

## 2022-08-26 RX ADMIN — IPRATROPIUM BROMIDE AND ALBUTEROL SULFATE 1 AMPULE: .5; 3 SOLUTION RESPIRATORY (INHALATION) at 14:32

## 2022-08-26 RX ADMIN — METHYLPREDNISOLONE SODIUM SUCCINATE 30 MG: 40 INJECTION, POWDER, FOR SOLUTION INTRAMUSCULAR; INTRAVENOUS at 01:35

## 2022-08-26 RX ADMIN — MORPHINE SULFATE 2 MG: 2 INJECTION, SOLUTION INTRAMUSCULAR; INTRAVENOUS at 21:08

## 2022-08-26 RX ADMIN — BUDESONIDE AND FORMOTEROL FUMARATE DIHYDRATE 2 PUFF: 160; 4.5 AEROSOL RESPIRATORY (INHALATION) at 07:37

## 2022-08-26 RX ADMIN — SENNOSIDES 8.6 MG: 8.6 TABLET, FILM COATED ORAL at 14:35

## 2022-08-26 RX ADMIN — PANTOPRAZOLE SODIUM 40 MG: 40 TABLET, DELAYED RELEASE ORAL at 05:30

## 2022-08-26 RX ADMIN — AMLODIPINE BESYLATE 10 MG: 10 TABLET ORAL at 16:19

## 2022-08-26 RX ADMIN — HYDROCODONE BITARTRATE AND ACETAMINOPHEN 2 TABLET: 5; 325 TABLET ORAL at 06:15

## 2022-08-26 RX ADMIN — FUROSEMIDE 40 MG: 40 TABLET ORAL at 07:52

## 2022-08-26 RX ADMIN — HYDROCHLOROTHIAZIDE 25 MG: 25 TABLET ORAL at 16:18

## 2022-08-26 RX ADMIN — FERROUS SULFATE TAB EC 325 MG (65 MG FE EQUIVALENT) 324 MG: 325 (65 FE) TABLET DELAYED RESPONSE at 07:52

## 2022-08-26 RX ADMIN — ENOXAPARIN SODIUM 40 MG: 100 INJECTION SUBCUTANEOUS at 07:52

## 2022-08-26 RX ADMIN — DIPHENHYDRAMINE HYDROCHLORIDE 25 MG: 50 INJECTION, SOLUTION INTRAMUSCULAR; INTRAVENOUS at 22:45

## 2022-08-26 ASSESSMENT — PAIN DESCRIPTION - PAIN TYPE: TYPE: ACUTE PAIN

## 2022-08-26 ASSESSMENT — PAIN SCALES - GENERAL
PAINLEVEL_OUTOF10: 9
PAINLEVEL_OUTOF10: 7
PAINLEVEL_OUTOF10: 5
PAINLEVEL_OUTOF10: 9

## 2022-08-26 ASSESSMENT — PAIN DESCRIPTION - DESCRIPTORS
DESCRIPTORS: CRAMPING
DESCRIPTORS: ACHING;SHARP

## 2022-08-26 ASSESSMENT — PAIN DESCRIPTION - ORIENTATION
ORIENTATION: RIGHT;LEFT
ORIENTATION: LOWER;ANTERIOR

## 2022-08-26 ASSESSMENT — PAIN DESCRIPTION - FREQUENCY: FREQUENCY: CONTINUOUS

## 2022-08-26 ASSESSMENT — PAIN DESCRIPTION - LOCATION
LOCATION: ABDOMEN
LOCATION: LEG

## 2022-08-26 ASSESSMENT — PAIN DESCRIPTION - ONSET: ONSET: ON-GOING

## 2022-08-26 NOTE — PROGRESS NOTES
[address] [facility]  Occupational Therapy Not Seen    DATE: 2022    NAME: Karla Fernandez  MRN: [id number]   : 1965    Patient not seen this date for Occupational Therapy due to:      [] Cancel by RN or physician due to:    [] Hemodialysis    [] Critical Lab Value Level     [] Blood transfusion in progress    [] Acute or unstable cardiovascular status   _MAP < 55 or more than >115  _HR < 40 or > 130    [] Acute or unstable pulmonary status   -FiO2 > 60%   _RR < 5 or >40    _O2 sats < 85%    [] Strict Bedrest    [] Off Unit for surgery or procedure    [] Off Unit for testing       [] Pending imaging to R/O fracture    [x] Refusal by Patient: Patient reporting not wanting therapy at this time as he is able to get up and do everything himself. Attempted to education and encourage patient for active participation and importance of skilled OT to improve independence and functional strength/safety, however patient continued to refuse OOB activity. [] Other      [] OT being discontinued at this time. Patient independent. No further needs. [] OT being discontinued at this time as the patient has been transferred to hospice care. No further needs.     ELIANA Layne/MILAGROS

## 2022-08-26 NOTE — PROGRESS NOTES
Progress note  [facility].,    Adult Hospitalist      Name: Jerry Powell  MRN: [id number]     Acct: [de-identified]  Room: 2006/2006-02    Admit Date: 8/24/2022 11:08 AM  PCP: Rochelle Gamez MD    Primary Problem  Principal Problem:    Acute exacerbation of COPD with asthma [facility])  Resolved Problems:    * No resolved hospital problems. *        Assesment:     Acute COPD exacerbation  Mild hyponatremia  Mild Leukocytosis  History of alcohol abuse  Liver lesion with history of treated hepatitis C  Hypertension  Mixed hyperlipidemia  Chronic coronary artery disease  Depression with anxiety  Tobacco abuse  Obesity, BMI 30.79        Plan:      Admit to med surge telemetry  O2 maintain oxygen saturation greater than 92%     IV Solu-Medrol  Duo neb   Respiratory pathogen better   Sputum culture  1/2 blood culture positive for staph coagulase negative, likely contaminant  Repeat blood culture no growth so far  IV ceftriaxone and p.o. doxycycline  Monitor respiratory status   Pulmonology consult     Continue to monitor/telemetry/CBC with differential daily/BMP daily  DVT and GI prophylaxis.   Continue aspirin  Continue hydrochlorothiazide, metoprolol, amlodipine  Continue Singulair  Continue Roflumilast  Continue inhalers  Continue Keppra  Continue medications as below      Scheduled Meds:   ipratropium-albuterol  1 ampule Inhalation 4x daily    predniSONE  40 mg Oral Daily    aspirin  81 mg Oral Daily    ferrous sulfate  324 mg Oral Daily with breakfast    folic acid  1 mg Oral Daily    hydroCHLOROthiazide  25 mg Oral Daily    losartan  100 mg Oral Daily    metoprolol succinate  50 mg Oral Daily    montelukast  10 mg Oral Nightly    Roflumilast  500 mcg Oral Daily    enoxaparin  40 mg SubCUTAneous Daily    cefTRIAXone (ROCEPHIN) IV  1,000 mg IntraVENous Q24H    doxycycline monohydrate  100 mg Oral BID    QUEtiapine  200 mg Oral Nightly    vitamin B-1  100 mg Oral Daily    pantoprazole  40 mg Oral Daily budesonide-formoterol  2 puff Inhalation BID    levETIRAcetam  500 mg Oral BID    furosemide  40 mg Oral Daily    amLODIPine  10 mg Oral Daily     Continuous Infusions:    PRN Meds:  calcium carbonate, 500 mg, Q4H PRN  potassium chloride, 40 mEq, PRN   Or  potassium alternative oral replacement, 40 mEq, PRN   Or  potassium chloride, 10 mEq, PRN  magnesium sulfate, 1,000 mg, PRN  ondansetron, 4 mg, Q8H PRN   Or  ondansetron, 4 mg, Q6H PRN  senna, 1 tablet, BID PRN  HYDROcodone 5 mg - acetaminophen, 1 tablet, Q6H PRN  HYDROcodone 5 mg - acetaminophen, 2 tablet, Q6H PRN  morphine, 2 mg, Q4H PRN  albuterol, 2.5 mg, Q2H PRN      Chief Complaint:     Chief Complaint   Patient presents with    Chest Pain     Midsternal to right feels like pressure, has hx of chf and COPD    Shortness of Breath         History of Present Illness:      Karlee Prince is a 59 y.o.  male who presents with Chest Pain (Midsternal to right feels like pressure, has hx of chf and COPD) and Shortness of Breath    Patient seen and examined at bedside and reviewed  last 24 hrs events with nursing staff  No acute events overnight. Patient denies any acute complaints. Afebrile  Patient denies any chest pain, palpitation, headache, dizziness, cough, nausea, vomiting, abdominal pain, pain, changes in urination or bowel habit or rash. Patient continues to have some shortness of breath        HPI  17-year-old gentleman past medical history of CHF, COPD, coronary disease, liver cirrhosis presented to ER complaining of shortness of breath going on for 2 days. He is also complaining of intermittent right-sided chest pain associated with it. He wears 4 L of oxygen at home. Chest pain is moderate, intermittent, sharp and resolved on its own. Patient denies any  Fever, chills, changes in urination, bowel habit or rash. Patient received Solu-Medrol and IV Bumex in the ER. Sodium was 133. WBC was 12. 2. X-ray chest was negative.     I have personally reviewed the past medical history, past surgical history, medications, social history, and family history, and summarized in the note. Review of Systems:     All 10 point system is reviewed and negative otherwise mentioned in HPI. Past Medical History:     Past Medical History:   Diagnosis Date    Arthritis     CAD (coronary artery disease)     Cancer ([facility]     prostate    Cir[facility]ism [facility]     COPD (chronic obstructive pulmonary disease) (HCC)     Depression     BOURGEOIS (dyspnea on exertion)     H/O prostate cancer     Hyperlipidemia     Hypertension     MI (myocardial infarction) ([facility]     Seizures ([facility] 2016    last one over 2 years ago     SVT (supraventricular tachycardia) ([facility]     Tobacco abuse         Past Surgical History:     Past Surgical History:   Procedure Laterality Date    CARDIAC CATHETERIZATION      ablation    INSERTABLE CARDIAC MONITOR  10/03/2018    INTERNAL LOOP RECORDER    PROSTATECTOMY      SINUS SURGERY      TONGUE SURGERY      UPPER GASTROINTESTINAL ENDOSCOPY N/A 6/10/2021    EGD BIOPSY performed by Nova Woodruff MD at [address] [place] [address]  01/03/2022    UPPER GASTROINTESTINAL ENDOSCOPY N/A 1/3/2022    EGD BIOPSY performed by Rell Garnica MD at [address] [place] [address]        Medications Prior to Admission:       Prior to Admission medications    Medication Sig Start Date End Date Taking?  Authorizing Provider   fluticasone-salmeterol (ADVAIR HFA) 230-21 MCG/ACT inhaler Inhale 2 puffs into the lungs 2 times daily   Yes Historical Provider, MD   vitamin B-12 (CYANOCOBALAMIN) 1000 MCG tablet Take 1,000 mcg by mouth daily   Yes Historical Provider, MD   furosemide (LASIX) 40 MG tablet Take 40 mg by mouth daily   Yes Historical Provider, MD   levETIRAcetam (KEPPRA) 500 MG tablet Take 500 mg by mouth 2 times daily   Yes Historical Provider, MD   predniSONE (DELTASONE) 10 MG tablet Take 10 mg by mouth daily   Yes Historical Provider, MD   losartan-hydroCHLOROthiazide (HYZAAR) 100-25 MG per tablet Take 1 tablet by mouth daily   Yes Historical Provider, MD   Roflumilast (DALIRESP) 500 MCG tablet Take 1 tablet by mouth daily 7/16/21   Brigida Carpenter MD   ferrous gluconate (FERGON) 324 (38 Fe) MG tablet Take 324 mg by mouth daily (with breakfast)    Historical Provider, MD   famotidine (PEPCID) 40 MG tablet Take 40 mg by mouth 2 times daily as needed (heartburn)     Historical Provider, MD   amLODIPine (NORVASC) 10 MG tablet Take 10 mg by mouth daily    Historical Provider, MD   albuterol (PROVENTIL) (2.5 MG/3ML) 0.083% nebulizer solution Take 2.5 mg by nebulization every 6 hours as needed for Wheezing    Historical Provider, MD   albuterol sulfate  (90 Base) MCG/ACT inhaler Inhale 2 puffs into the lungs every 4-6 hours as needed for Wheezing    Historical Provider, MD   aspirin 81 MG tablet Take 81 mg by mouth daily    Historical Provider, MD   folic acid (FOLVITE) 1 MG tablet Take 1 mg by mouth daily    Historical Provider, MD   metoprolol succinate (TOPROL XL) 50 MG extended release tablet Take 50 mg by mouth daily    Historical Provider, MD   pantoprazole (PROTONIX) 40 MG tablet Take 40 mg by mouth daily    Historical Provider, MD   QUEtiapine (SEROQUEL) 400 MG tablet Take 200 mg by mouth nightly Takes 1/2 tab (200mg) nightly    Historical Provider, MD   vitamin B-1 (THIAMINE) 100 MG tablet Take 100 mg by mouth daily    Historical Provider, MD   Cholecalciferol (VITAMIN D3) 2000 units CAPS Take 1 capsule by mouth daily    Historical Provider, MD   tiotropium (SPIRIVA RESPIMAT) 2.5 MCG/ACT AERS inhaler Inhale 2 puffs into the lungs daily    Historical Provider, MD        Allergies:       Patient has no known allergies. Social History:     Tobacco:    reports that he has quit smoking. His smoking use included cigarettes. He smoked an average of .1 packs per day. He quit smokeless tobacco use about 8 months ago.   Alcohol:      reports that he does not currently use alcohol. Drug Use:  reports no history of drug use. Family History:     No family history on file.       Physical Exam:     Vitals:  /63   Pulse 79   Temp 97.9 °F (36.6 °C) (Oral)   Resp 17   Ht 5' 5\" (1.651 m)   Wt 203 lb 1.6 oz (92.1 kg)   SpO2 98%   BMI 33.80 kg/m²   Temp (24hrs), Av.7 °F (36.5 °C), Min:97.5 °F (36.4 °C), Max:97.9 °F (36.6 °C)          General appearance - alert, well appearing, and in no acute distress  Mental status - oriented to person, place, and time with normal affect  Head - normocephalic and atraumatic  Eyes - pupils equal and reactive, extraocular eye movements intact, conjunctiva clear  Ears - hearing appears to be intact  Nose - no drainage noted  Mouth - mucous membranes moist  Neck - supple, no carotid bruits, thyroid not palpable  Chest - clear to auscultation, normal effort  Heart - normal rate, regular rhythm, no murmur  Abdomen - soft, nontender, nondistended, bowel sounds present all four quadrants, no masses, hepatomegaly or splenomegaly  Neurological - normal speech, no focal findings or movement disorder noted, cranial nerves II through XII grossly intact  Extremities - peripheral pulses palpable, no pedal edema or calf pain with palpation  Skin - no gross lesions, rashes, or induration noted        Data:     Labs:    Hematology:  Recent Labs     22  1125 22  1224   WBC 12.2*  --    RBC 4.86  --    HGB 15.0  --    HCT 45.3  --    MCV 93.2  --    MCH 30.9  --    MCHC 33.1  --    RDW 12.9  --     400   MPV 9.7  --    INR  --  1.0   DDIMER 0.28  --        Chemistry:  Recent Labs     22  1125 22  1349 22  0613 22  0559   *  --   --   --    K 3.9  --   --   --    CL 93*  --   --   --    CO2 27  --   --   --    GLUCOSE 121*  --   --   --    BUN 9  --   --   --    CREATININE 0.81  --   --   --    ANIONGAP 13  --   --   --    LABGLOM >60  --   --   --    GFRAA >60  --   --   --    CALCIUM 10.4  --   --   -- PROBNP 41  --  83 47   TROPHS 16 14  --   --        Recent Labs     08/24/22  1125   PROT 8.0   LABALBU 4.4   AST 13   ALT 18   ALKPHOS 77   BILITOT 0.51   LIPASE 23         Lab Results   Component Value Date    INR 1.0 08/25/2022    INR 1.0 01/01/2022    INR 1.1 07/13/2021    PROTIME 13.4 08/25/2022    PROTIME 13.2 01/01/2022    PROTIME 13.5 07/13/2021       Lab Results   Component Value Date/Time    SPECIAL 10ML LH 08/25/2022 11:45 AM     Lab Results   Component Value Date/Time    CULTURE NO GROWTH 1 DAY 08/25/2022 11:45 AM       No results found for: POCPH, PHART, PH, POCPCO2, WRX1BZS, PCO2, POCPO2, PO2ART, PO2, POCHCO3, AXX2TUY, HCO3, NBEA, PBEA, BEART, BE, THGBART, THB, ARJ4ESM, ZMKA8LXV, N1OGDABI, O2SAT, FIO2    Radiology:    XR CHEST PORTABLE    Result Date: 8/24/2022  No acute cardiopulmonary findings         All radiological studies reviewed                Code Status:  Full Code    Electronically signed by Tee Romero MD on 8/26/2022 at 3:08 PM     Copy sent to Dr. Lupis Osuna MD    This note was created with the assistance of a speech-recognition program.  Although the intention is to generate a document that actually reflects the content of the visit, no guarantees can be provided that every mistake has been identified and corrected by editing. Note was updated later by me after  physical examination and  completion of the assessment.

## 2022-08-26 NOTE — RT PROTOCOL NOTE
RT Inhaler-Nebulizer Bronchodilator Protocol Note    There is a bronchodilator order in the chart from a provider indicating to follow the RT Bronchodilator Protocol and there is an Initiate RT Inhaler-Nebulizer Bronchodilator Protocol order as well (see protocol at bottom of note). CXR Findings:  XR CHEST PORTABLE    Result Date: 8/24/2022  No acute cardiopulmonary findings       The findings from the last RT Protocol Assessment were as follows:   History Pulmonary Disease: Chronic pulmonary disease  Respiratory Pattern: Mild dyspnea at rest, irregular pattern, or RR 21-25 bpm  Breath Sounds: Intermittent or unilateral wheezes  Cough: Strong, productive  Indication for Bronchodilator Therapy: On home bronchodilators  Bronchodilator Assessment Score: 11    Aerosolized bronchodilator medication orders have been revised according to the RT Inhaler-Nebulizer Bronchodilator Protocol below. Respiratory Therapist to perform RT Therapy Protocol Assessment initially then follow the protocol. Repeat RT Therapy Protocol Assessment PRN for score 0-3 or on second treatment, BID, and PRN for scores above 3. No Indications - adjust the frequency to every 6 hours PRN wheezing or bronchospasm, if no treatments needed after 48 hours then discontinue using Per Protocol order mode. If indication present, adjust the RT bronchodilator orders based on the Bronchodilator Assessment Score as indicated below. Use Inhaler orders unless patient has one or more of the following: on home nebulizer, not able to hold breath for 10 seconds, is not alert and oriented, cannot activate and use MDI correctly, or respiratory rate 25 breaths per minute or more, then use the equivalent nebulizer order(s) with same Frequency and PRN reasons based on the score. If a patient is on this medication at home then do not decrease Frequency below that used at home.     0-3 - enter or revise RT bronchodilator order(s) to equivalent RT

## 2022-08-26 NOTE — PROGRESS NOTES
Physical Therapy  DATE: 2022    NAME: Rush Michael  MRN: [id number]   : 1965    Patient not seen this date for Physical Therapy due to:      [] Cancel by RN or physician due to:    [] Hemodialysis    [] Critical Lab Value Level     [] Blood transfusion in progress    [] Acute or unstable cardiovascular status   _MAP < 55 or more than >115  _HR < 40 or > 130    [] Acute or unstable pulmonary status   -FiO2 > 60%   _RR < 5 or >40    _O2 sats < 85%    [] Strict Bedrest    [] Off Unit for surgery or procedure    [] Off Unit for testing       [] Pending imaging to R/O fracture    [x] Refusal by Patient States he does not wish to do therapy. Educated patient on importance. Patient still declines. [] Other      [] PT being discontinued at this time. Patient independent. No further needs. [] PT being discontinued at this time as the patient has been transferred to hospice care. No further needs.       Ten Montano, PTA

## 2022-08-26 NOTE — PROGRESS NOTES
Pulmonary Critical Care Progress Note  Monalisa Phoenix, MD     Patient seen for the follow up of Acute exacerbation of COPD with asthma ([facility] [address] [address] , chronic hypoxic respiratory failure    Subjective:  He is sitting up in bed, no distress. He feels shortness of breath is not much change. He denies significant cough or chest pain. He continues to feel like his belly is bloated/full giving him more troubles with his breathing. He did not have any ascites to be drained off yesterday. He tells me he had CT of the abdomen yesterday however I do not see any results for that testing at this time. Examination:  Vitals: /64   Pulse 71   Temp 97.5 °F (36.4 °C) (Oral)   Resp 18   Ht 5' 5\" (1.651 m)   Wt 203 lb 1.6 oz (92.1 kg)   SpO2 96%   BMI 33.80 kg/m²   General appearance: alert and cooperative with exam  Neck: No JVD  Lungs: Diminished, very faint wheeze  Heart: regular rate and rhythm, S1, S2 normal, no gallop  Abdomen: Soft, non tender, + BS  Extremities: no cyanosis or clubbing. Trace edema    LABs:  CBC:   Recent Labs     08/24/22  1125 08/25/22  1224   WBC 12.2*  --    HGB 15.0  --    HCT 45.3  --     400     BMP:   Recent Labs     08/24/22  1125   *   K 3.9   CO2 27   BUN 9   CREATININE 0.81   LABGLOM >60   GLUCOSE 121*     LIVER PROFILE:  Recent Labs     08/24/22  1125   AST 13   ALT 18   LABALBU 4.4     Radiology:  X-ray chest 8/24/2022      Impression:  Chronic hypoxic respiratory failure   Acute exacerbation of COPD/active smoking  Mild pulmonary hypertension, RVSP 37 mmHg echo 7/12/2021  Suspected obstructive sleep apnea/Obesity  Liver cirrhosis with ascites/hepatitis C  Paroxysmal SVT status post ablation  Positive blood culture x1,?   Contamination    Recommendations:  Oxygen via nasal cannula, keep SPO2 90% or greater   Incentive spirometry every hour while awake   Continue Rocephin and doxycycline  Repeat blood culture with no growth x10 hours  Procalcitonin pending  IV solu-medrol 30 mg every 8 hours   Symbicort 160/4.5, 2 puffs twice daily  Singulair  Daliresp  Albuterol and Ipratropium Q 4 hours and prn  DVT prophylaxis with low molecular weight heparin  Sleep apnea evaluation as an outpatient  Will follow with you    Electronically signed by   Kemar Veliz MD on 8/26/2022 at 4:10 PM  Pulmonary Critical Care and Sleep Medicine,  [facility]  Cell: [phone]  Office: [phone]

## 2022-08-26 NOTE — PROGRESS NOTES
[facility] NOTE    Room # 2006/2006-02   Name: Rene Krause              Reason for visit: Routine    I visited the patient. Admit Date & Time: 8/24/2022 11:08 AM    Assessment:  Rene Krause is a 59 y.o. male. Upon entering the room patient was sleeping. Intervention:   provided a ministry presence and brief prayer. Outcome:  Patient did not respond. Plan:  Chaplains will remain available to offer spiritual and emotional support as needed. Electronically signed by Chaplain Lorena, on 8/26/2022 at 2:18 PM.  Alex        08/26/22 1415   Encounter Summary   Service Provided For: Patient   Referral/Consult From: Trevor   Last Encounter  08/26/22   Complexity of Encounter Low   Begin Time 0950   End Time  0951   Total Time Calculated 1 min   Encounter    Type Initial Screen/Assessment   Assessment/Intervention/Outcome   Assessment Unable to assess   Intervention Prayer (assurance of)/Isabella;Sustaining Presence/Ministry of presence

## 2022-08-26 NOTE — PLAN OF CARE
Problem: Discharge Planning  Goal: Discharge to home or other facility with appropriate resources  Outcome: Progressing     Problem: Respiratory - Adult  Goal: Achieves optimal ventilation and oxygenation  Outcome: Progressing     Problem: Pain  Goal: Verbalizes/displays adequate comfort level or baseline comfort level  Outcome: Progressing     Problem: Safety - Adult  Goal: Free from fall injury  Outcome: Progressing  Flowsheets (Taken 8/26/2022 5476)  Free From Fall Injury:   Instruct family/caregiver on patient safety   Based on caregiver fall risk screen, instruct family/caregiver to ask for assistance with transferring infant if caregiver noted to have fall risk factors     Problem: Chronic Conditions and Co-morbidities  Goal: Patient's chronic conditions and co-morbidity symptoms are monitored and maintained or improved  Outcome: Progressing     Problem: ABCDS Injury Assessment  Goal: Absence of physical injury  Outcome: Progressing

## 2022-08-26 NOTE — PLAN OF CARE
Problem: Respiratory - Adult  Goal: Achieves optimal ventilation and oxygenation  8/25/2022 2201 by Adrienne Bernal RCP  Outcome: Progressing     Problem: Respiratory - Adult  Goal: Clear lung sounds  Outcome: Progressing    BRONCHOSPASM/BRONCHOCONSTRICTION    IMPROVE  AERATION/BREATHSOUNDS  ADMINISTER BRONCHODILATOR THERAPY AS APPROPRIATE  ASSESS BREATH SOUNDS  PATIENT EDUCATION AS NEEDED

## 2022-08-27 ENCOUNTER — APPOINTMENT (OUTPATIENT)
Dept: CT IMAGING | Age: 57
DRG: 140 | End: 2022-08-27
Payer: MEDICARE

## 2022-08-27 PROBLEM — R93.5 ABNORMAL ABDOMINAL CT SCAN: Status: ACTIVE | Noted: 2022-08-27

## 2022-08-27 PROBLEM — R97.0 ELEVATED CEA: Status: ACTIVE | Noted: 2022-08-27

## 2022-08-27 PROBLEM — Z80.0 FAMILY HISTORY OF COLON CANCER: Status: ACTIVE | Noted: 2022-08-27

## 2022-08-27 PROBLEM — B19.20 HEPATITIS C VIRUS INFECTION WITHOUT HEPATIC COMA: Status: ACTIVE | Noted: 2019-03-09

## 2022-08-27 LAB
ANION GAP SERPL CALCULATED.3IONS-SCNC: 9 MMOL/L (ref 9–17)
BUN BLDV-MCNC: 21 MG/DL (ref 6–20)
BUN/CREAT BLD: 25 (ref 9–20)
CALCIUM SERPL-MCNC: 9.8 MG/DL (ref 8.6–10.4)
CARCINOEMBRYONIC ANTIGEN: 5.2 NG/ML
CHLORIDE BLD-SCNC: 100 MMOL/L (ref 98–107)
CO2: 31 MMOL/L (ref 20–31)
CREAT SERPL-MCNC: 0.84 MG/DL (ref 0.7–1.2)
CULTURE: ABNORMAL
DIRECT EXAM: ABNORMAL
GFR AFRICAN AMERICAN: >60 ML/MIN
GFR NON-AFRICAN AMERICAN: >60 ML/MIN
GFR SERPL CREATININE-BSD FRML MDRD: ABNORMAL ML/MIN/{1.73_M2}
GLUCOSE BLD-MCNC: 106 MG/DL (ref 70–99)
MAGNESIUM: 1.9 MG/DL (ref 1.6–2.6)
POTASSIUM SERPL-SCNC: 3.1 MMOL/L (ref 3.7–5.3)
PRO-BNP: 92 PG/ML
SODIUM BLD-SCNC: 140 MMOL/L (ref 135–144)
SPECIMEN DESCRIPTION: ABNORMAL

## 2022-08-27 PROCEDURE — C9113 INJ PANTOPRAZOLE SODIUM, VIA: HCPCS | Performed by: NURSE PRACTITIONER

## 2022-08-27 PROCEDURE — 6360000002 HC RX W HCPCS: Performed by: NURSE PRACTITIONER

## 2022-08-27 PROCEDURE — 82378 CARCINOEMBRYONIC ANTIGEN: CPT

## 2022-08-27 PROCEDURE — 74177 CT ABD & PELVIS W/CONTRAST: CPT

## 2022-08-27 PROCEDURE — 83880 ASSAY OF NATRIURETIC PEPTIDE: CPT

## 2022-08-27 PROCEDURE — 80048 BASIC METABOLIC PNL TOTAL CA: CPT

## 2022-08-27 PROCEDURE — APPNB30 APP NON BILLABLE TIME 0-30 MINS: Performed by: NURSE PRACTITIONER

## 2022-08-27 PROCEDURE — 6370000000 HC RX 637 (ALT 250 FOR IP): Performed by: HOSPITALIST

## 2022-08-27 PROCEDURE — 1200000000 HC SEMI PRIVATE

## 2022-08-27 PROCEDURE — 2580000003 HC RX 258: Performed by: HOSPITALIST

## 2022-08-27 PROCEDURE — 83735 ASSAY OF MAGNESIUM: CPT

## 2022-08-27 PROCEDURE — 6360000002 HC RX W HCPCS: Performed by: STUDENT IN AN ORGANIZED HEALTH CARE EDUCATION/TRAINING PROGRAM

## 2022-08-27 PROCEDURE — 2580000003 HC RX 258: Performed by: NURSE PRACTITIONER

## 2022-08-27 PROCEDURE — 2700000000 HC OXYGEN THERAPY PER DAY

## 2022-08-27 PROCEDURE — 6360000002 HC RX W HCPCS: Performed by: HOSPITALIST

## 2022-08-27 PROCEDURE — 94640 AIRWAY INHALATION TREATMENT: CPT

## 2022-08-27 PROCEDURE — 6370000000 HC RX 637 (ALT 250 FOR IP): Performed by: INTERNAL MEDICINE

## 2022-08-27 PROCEDURE — 94761 N-INVAS EAR/PLS OXIMETRY MLT: CPT

## 2022-08-27 PROCEDURE — 36415 COLL VENOUS BLD VENIPUNCTURE: CPT

## 2022-08-27 PROCEDURE — 6360000004 HC RX CONTRAST MEDICATION: Performed by: NURSE PRACTITIONER

## 2022-08-27 RX ORDER — KETOROLAC TROMETHAMINE 15 MG/ML
15 INJECTION, SOLUTION INTRAMUSCULAR; INTRAVENOUS EVERY 6 HOURS PRN
Status: DISPENSED | OUTPATIENT
Start: 2022-08-27 | End: 2022-09-01

## 2022-08-27 RX ORDER — MAGNESIUM SULFATE IN WATER 40 MG/ML
2000 INJECTION, SOLUTION INTRAVENOUS ONCE
Status: COMPLETED | OUTPATIENT
Start: 2022-08-27 | End: 2022-08-27

## 2022-08-27 RX ORDER — 0.9 % SODIUM CHLORIDE 0.9 %
80 INTRAVENOUS SOLUTION INTRAVENOUS ONCE
Status: COMPLETED | OUTPATIENT
Start: 2022-08-27 | End: 2022-08-27

## 2022-08-27 RX ORDER — IPRATROPIUM BROMIDE AND ALBUTEROL SULFATE 2.5; .5 MG/3ML; MG/3ML
1 SOLUTION RESPIRATORY (INHALATION) 2 TIMES DAILY
Status: DISCONTINUED | OUTPATIENT
Start: 2022-08-28 | End: 2022-08-28

## 2022-08-27 RX ORDER — HYDROMORPHONE HYDROCHLORIDE 1 MG/ML
1 INJECTION, SOLUTION INTRAMUSCULAR; INTRAVENOUS; SUBCUTANEOUS ONCE
Status: COMPLETED | OUTPATIENT
Start: 2022-08-27 | End: 2022-08-27

## 2022-08-27 RX ORDER — SODIUM CHLORIDE 0.9 % (FLUSH) 0.9 %
10 SYRINGE (ML) INJECTION ONCE
Status: COMPLETED | OUTPATIENT
Start: 2022-08-27 | End: 2022-08-27

## 2022-08-27 RX ADMIN — IOPAMIDOL 100 ML: 755 INJECTION, SOLUTION INTRAVENOUS at 18:23

## 2022-08-27 RX ADMIN — IPRATROPIUM BROMIDE AND ALBUTEROL SULFATE 1 AMPULE: .5; 3 SOLUTION RESPIRATORY (INHALATION) at 08:07

## 2022-08-27 RX ADMIN — DOXYCYCLINE 100 MG: 100 CAPSULE ORAL at 20:00

## 2022-08-27 RX ADMIN — BUDESONIDE AND FORMOTEROL FUMARATE DIHYDRATE 2 PUFF: 160; 4.5 AEROSOL RESPIRATORY (INHALATION) at 08:08

## 2022-08-27 RX ADMIN — CEFTRIAXONE SODIUM 1000 MG: 1 INJECTION, POWDER, FOR SOLUTION INTRAMUSCULAR; INTRAVENOUS at 15:48

## 2022-08-27 RX ADMIN — SODIUM CHLORIDE 80 ML: 9 INJECTION, SOLUTION INTRAVENOUS at 18:23

## 2022-08-27 RX ADMIN — KETOROLAC TROMETHAMINE 15 MG: 15 INJECTION, SOLUTION INTRAMUSCULAR; INTRAVENOUS at 08:30

## 2022-08-27 RX ADMIN — SODIUM CHLORIDE, PRESERVATIVE FREE 10 ML: 5 INJECTION INTRAVENOUS at 18:23

## 2022-08-27 RX ADMIN — KETOROLAC TROMETHAMINE 15 MG: 15 INJECTION, SOLUTION INTRAMUSCULAR; INTRAVENOUS at 15:06

## 2022-08-27 RX ADMIN — IPRATROPIUM BROMIDE AND ALBUTEROL SULFATE 1 AMPULE: .5; 3 SOLUTION RESPIRATORY (INHALATION) at 14:24

## 2022-08-27 RX ADMIN — MAGNESIUM SULFATE HEPTAHYDRATE 2000 MG: 40 INJECTION, SOLUTION INTRAVENOUS at 10:36

## 2022-08-27 RX ADMIN — KETOROLAC TROMETHAMINE 15 MG: 15 INJECTION, SOLUTION INTRAMUSCULAR; INTRAVENOUS at 21:30

## 2022-08-27 RX ADMIN — LEVETIRACETAM 500 MG: 500 TABLET, FILM COATED ORAL at 20:01

## 2022-08-27 RX ADMIN — QUETIAPINE FUMARATE 200 MG: 200 TABLET ORAL at 20:01

## 2022-08-27 RX ADMIN — SODIUM CHLORIDE, PRESERVATIVE FREE 40 MG: 5 INJECTION INTRAVENOUS at 15:45

## 2022-08-27 RX ADMIN — IPRATROPIUM BROMIDE AND ALBUTEROL SULFATE 1 AMPULE: .5; 3 SOLUTION RESPIRATORY (INHALATION) at 20:01

## 2022-08-27 RX ADMIN — HYDROMORPHONE HYDROCHLORIDE 1 MG: 1 INJECTION, SOLUTION INTRAMUSCULAR; INTRAVENOUS; SUBCUTANEOUS at 03:07

## 2022-08-27 RX ADMIN — MONTELUKAST 10 MG: 10 TABLET, FILM COATED ORAL at 20:01

## 2022-08-27 RX ADMIN — BUDESONIDE AND FORMOTEROL FUMARATE DIHYDRATE 2 PUFF: 160; 4.5 AEROSOL RESPIRATORY (INHALATION) at 20:01

## 2022-08-27 ASSESSMENT — PAIN DESCRIPTION - LOCATION: LOCATION: ABDOMEN

## 2022-08-27 ASSESSMENT — PAIN SCALES - GENERAL: PAINLEVEL_OUTOF10: 9

## 2022-08-27 ASSESSMENT — PAIN DESCRIPTION - DESCRIPTORS: DESCRIPTORS: CRAMPING

## 2022-08-27 NOTE — PLAN OF CARE
Problem: Discharge Planning  Goal: Discharge to home or other facility with appropriate resources  8/27/2022 1527 by Jin Gallegos RN  Outcome: Progressing     Problem: Respiratory - Adult  Goal: Achieves optimal ventilation and oxygenation  8/27/2022 1527 by Jin Gallegos RN  Outcome: Progressing     Problem: Pain  Goal: Verbalizes/displays adequate comfort level or baseline comfort level  8/27/2022 1527 by Jin Gallegos RN  Outcome: Progressing     Problem: Safety - Adult  Goal: Free from fall injury  8/27/2022 1527 by Jin Gallegos RN  Outcome: Progressing  Flowsheets (Taken 8/27/2022 3477)  Free From Fall Injury:   Instruct family/caregiver on patient safety   Based on caregiver fall risk screen, instruct family/caregiver to ask for assistance with transferring infant if caregiver noted to have fall risk factors     Problem: Chronic Conditions and Co-morbidities  Goal: Patient's chronic conditions and co-morbidity symptoms are monitored and maintained or improved  8/27/2022 1527 by Jin Gallegos RN  Outcome: Progressing     Problem: ABCDS Injury Assessment  Goal: Absence of physical injury  8/27/2022 1527 by Jin Gallegos RN  Outcome: Progressing  Flowsheets (Taken 8/27/2022 9073)  Absence of Physical Injury: Implement safety measures based on patient assessment

## 2022-08-27 NOTE — PLAN OF CARE
Problem: Discharge Planning  Goal: Discharge to home or other facility with appropriate resources  8/27/2022 0504 by Lori Lange RN  Outcome: Progressing  Flowsheets (Taken 8/26/2022 2020)  Discharge to home or other facility with appropriate resources: Identify barriers to discharge with patient and caregiver  8/26/2022 1711 by Juan Jose Caputo RN  Outcome: Progressing     Problem: Respiratory - Adult  Goal: Achieves optimal ventilation and oxygenation  8/27/2022 0504 by Lori Lange RN  Outcome: Progressing  8/26/2022 2056 by Emma Batista RCP  Outcome: Progressing  8/26/2022 1711 by Juan Jose Caputo RN  Outcome: Progressing  Goal: Clear lung sounds  8/26/2022 2056 by Emma Batista RCP  Outcome: Progressing     Problem: Pain  Goal: Verbalizes/displays adequate comfort level or baseline comfort level  8/27/2022 0504 by Lori Lange RN  Outcome: Progressing  8/26/2022 1711 by Juan Jose Caputo RN  Outcome: Progressing     Problem: Chronic Conditions and Co-morbidities  Goal: Patient's chronic conditions and co-morbidity symptoms are monitored and maintained or improved  8/27/2022 0504 by Lori Lange RN  Outcome: Progressing  8/26/2022 1711 by Juan Jose Caputo RN  Outcome: Progressing     Problem: Safety - Adult  Goal: Free from fall injury  8/27/2022 0504 by Lori Lange RN  Outcome: Progressing  8/26/2022 1711 by Juan Jose Caputo RN  Outcome: Progressing  Flowsheets (Taken 8/26/2022 0745)  Free From Fall Injury:   Instruct family/caregiver on patient safety   Based on caregiver fall risk screen, instruct family/caregiver to ask for assistance with transferring infant if caregiver noted to have fall risk factors     Problem: ABCDS Injury Assessment  Goal: Absence of physical injury  8/27/2022 0504 by Lori Lange RN  Outcome: Progressing  8/26/2022 1711 by Juan Jose Caputo RN  Outcome: Progressing

## 2022-08-27 NOTE — RT PROTOCOL NOTE
RT Inhaler-Nebulizer Bronchodilator Protocol Note    There is a bronchodilator order in the chart from a provider indicating to follow the RT Bronchodilator Protocol and there is an Initiate RT Inhaler-Nebulizer Bronchodilator Protocol order as well (see protocol at bottom of note). CXR Findings:  No results found. The findings from the last RT Protocol Assessment were as follows:   History Pulmonary Disease: Chronic pulmonary disease  Respiratory Pattern: Dyspnea on exertion or RR 21-25 bpm  Breath Sounds: Intermittent or unilateral wheezes  Cough: Strong, productive  Indication for Bronchodilator Therapy: On home bronchodilators  Bronchodilator Assessment Score: 9    Aerosolized bronchodilator medication orders have been revised according to the RT Inhaler-Nebulizer Bronchodilator Protocol below. Respiratory Therapist to perform RT Therapy Protocol Assessment initially then follow the protocol. Repeat RT Therapy Protocol Assessment PRN for score 0-3 or on second treatment, BID, and PRN for scores above 3. No Indications - adjust the frequency to every 6 hours PRN wheezing or bronchospasm, if no treatments needed after 48 hours then discontinue using Per Protocol order mode. If indication present, adjust the RT bronchodilator orders based on the Bronchodilator Assessment Score as indicated below. Use Inhaler orders unless patient has one or more of the following: on home nebulizer, not able to hold breath for 10 seconds, is not alert and oriented, cannot activate and use MDI correctly, or respiratory rate 25 breaths per minute or more, then use the equivalent nebulizer order(s) with same Frequency and PRN reasons based on the score. If a patient is on this medication at home then do not decrease Frequency below that used at home.     0-3 - enter or revise RT bronchodilator order(s) to equivalent RT Bronchodilator order with Frequency of every 4 hours PRN for wheezing or increased work of breathing using Per Protocol order mode. 4-6 - enter or revise RT Bronchodilator order(s) to two equivalent RT bronchodilator orders with one order with BID Frequency and one order with Frequency of every 4 hours PRN wheezing or increased work of breathing using Per Protocol order mode. 7-10 - enter or revise RT Bronchodilator order(s) to two equivalent RT bronchodilator orders with one order with TID Frequency and one order with Frequency of every 4 hours PRN wheezing or increased work of breathing using Per Protocol order mode. 11-13 - enter or revise RT Bronchodilator order(s) to one equivalent RT bronchodilator order with QID Frequency and an Albuterol order with Frequency of every 4 hours PRN wheezing or increased work of breathing using Per Protocol order mode. Greater than 13 - enter or revise RT Bronchodilator order(s) to one equivalent RT bronchodilator order with every 4 hours Frequency and an Albuterol order with Frequency of every 2 hours PRN wheezing or increased work of breathing using Per Protocol order mode. RT to enter RT Home Evaluation for COPD & MDI Assessment order using Per Protocol order mode.     Electronically signed by Rach Butler RCP on 8/27/2022 at 8:13 AM

## 2022-08-27 NOTE — CONSULTS
General Surgery: Consult Note        PATIENT NAME: Otoniel Amor OF BIRTH: 1965    ADMISSION DATE: 8/24/2022 11:08 AM     Admitting Provider: Dr. Jacky Torres Physician: Dr. Ellie Cantrell: 8/27/2022    Consult Regarding:  obstruction    HISTORY OF PRESENT ILLNESS:  The patient is a 59 y.o. male who was admitted on 8/24/2022 for COPD exacerbation. 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 Patient states that his abdomen has been getting more more distended; states that he has been having this issue since this past December when he was admitted for COPD exacerbation. At that time, CT abdomen pelvis showed thickening of the fundus of the stomach with multiple hepatic lesions and subsequently underwent a EGD with GI and found to have mild gastritis and distal esophagitis. Patient did undergo colonoscopy at that time. Patient reports that he has been having alternating bouts of constipation and watery diarrhea and has been having to strain a lot as well. Passing flatus -last passed flatus yesterday while he was attempting to have a bowel movement. Patient does state that he had a prior colonoscopy in the last year and believes that it was at the same time as his EGD but no documentation can be found. States that he is not nauseous and has been tolerating a regular diet and drinking well. Reports that his father had colon cancer around the age of 61 and passed away, sister has breast cancer and believes that his brother has some sort of rectal cancer. CT abdomen pelvis performed shows a narrowing at the splenic flexure of the descending colon with dilation of the transverse and ascending colon; moderate stool burden within the ascending colon. Patient appears nontoxic resting comfortably in bed although he is complaining of some right-sided abdominal pain. Electrolytes normal. Former smoker. Denies personal or family hx of Crohns or UC.       Past Medical History:        Diagnosis Date    Arthritis     CAD (coronary artery disease)     Cancer ([facility]     prostate    Cirrhosis with alcoholism ([facility]     COPD (chronic obstructive pulmonary disease) (HCC)     Depression     BOURGEOIS (dyspnea on exertion)     H/O prostate cancer     Hyperlipidemia     Hypertension     [facility] infarction[facility]     Seizures ([facility] 2016    last one over 2 years ago     SVT (supraventricular tachycardia) ([facility]     Tobacco abuse        Past Surgical History:        Procedure Laterality Date    CARDIAC CATHETERIZATION      ablation    INSERTABLE CARDIAC MONITOR  10/03/2018    INTERNAL LOOP RECORDER    PROSTATECTOMY      SINUS SURGERY      TONGUE SURGERY      UPPER GASTROINTESTINAL ENDOSCOPY N/A 6/10/2021    EGD BIOPSY performed by Sergey Frances MD at [address] [place] [address]  01/03/2022    UPPER GASTROINTESTINAL ENDOSCOPY N/A 1/3/2022    EGD BIOPSY performed by Rudine Gaucher, MD at [address] [place] [address]       Medications Prior to Admission:   Medications Prior to Admission: fluticasone-salmeterol (ADVAIR HFA) 230-21 MCG/ACT inhaler, Inhale 2 puffs into the lungs 2 times daily  vitamin B-12 (CYANOCOBALAMIN) 1000 MCG tablet, Take 1,000 mcg by mouth daily  furosemide (LASIX) 40 MG tablet, Take 40 mg by mouth daily  levETIRAcetam (KEPPRA) 500 MG tablet, Take 500 mg by mouth 2 times daily  predniSONE (DELTASONE) 10 MG tablet, Take 10 mg by mouth daily  losartan-hydroCHLOROthiazide (HYZAAR) 100-25 MG per tablet, Take 1 tablet by mouth daily  Roflumilast (DALIRESP) 500 MCG tablet, Take 1 tablet by mouth daily  [DISCONTINUED] losartan (COZAAR) 100 MG tablet, Take 1 tablet by mouth daily  [DISCONTINUED] hydroCHLOROthiazide (HYDRODIURIL) 25 MG tablet, Take 1 tablet by mouth daily  ferrous gluconate (FERGON) 324 (38 Fe) MG tablet, Take 324 mg by mouth daily (with breakfast)  famotidine (PEPCID) 40 MG tablet, Take 40 mg by mouth 2 times daily as needed (heartburn)   amLODIPine (NORVASC) 10 MG tablet, Take 10 mg by mouth daily  albuterol (PROVENTIL) (2.5 MG/3ML) 0.083% nebulizer solution, Take 2.5 mg by nebulization every 6 hours as needed for Wheezing  [DISCONTINUED] escitalopram (LEXAPRO) 10 MG tablet, Take 10 mg by mouth daily  albuterol sulfate  (90 Base) MCG/ACT inhaler, Inhale 2 puffs into the lungs every 4-6 hours as needed for Wheezing  aspirin 81 MG tablet, Take 81 mg by mouth daily  folic acid (FOLVITE) 1 MG tablet, Take 1 mg by mouth daily  metoprolol succinate (TOPROL XL) 50 MG extended release tablet, Take 50 mg by mouth daily  pantoprazole (PROTONIX) 40 MG tablet, Take 40 mg by mouth daily  QUEtiapine (SEROQUEL) 400 MG tablet, Take 200 mg by mouth nightly Takes 1/2 tab (200mg) nightly  vitamin B-1 (THIAMINE) 100 MG tablet, Take 100 mg by mouth daily  Cholecalciferol (VITAMIN D3) 2000 units CAPS, Take 1 capsule by mouth daily  tiotropium (SPIRIVA RESPIMAT) 2.5 MCG/ACT AERS inhaler, Inhale 2 puffs into the lungs daily  [DISCONTINUED] budesonide-formoterol (SYMBICORT) 160-4.5 MCG/ACT AERO, Inhale 2 puffs into the lungs 2 times daily    Allergies:  Patient has no known allergies.     Social History:   Social History     Socioeconomic History    Marital status:      Spouse name: Not on file    Number of children: Not on file    Years of education: Not on file    Highest education level: Not on file   Occupational History    Not on file   Tobacco Use    Smoking status: Former     Packs/day: 0.10     Types: Cigarettes    Smokeless tobacco: Former     Quit date: 12/5/2021    Tobacco comments:     5/2021 quit    Vaping Use    Vaping Use: Never used   Substance and Sexual Activity    Alcohol use: Not Currently     Comment: last drink at the end of Nov 2021    Drug use: No    Sexual activity: Not on file   Other Topics Concern    Not on file   Social History Narrative    Not on file     Social Determinants of Health     Financial Resource Strain: Not on file   Food Insecurity: Not on file   Transportation Needs: Not on file   Physical Activity: Not on file   Stress: Not on file   Social Connections: Not on file   Intimate Partner Violence: Not on file   Housing Stability: Not on file       Family History:   See HPI    REVIEW OF SYSTEMS:    CONSTITUTIONAL: Denies recent weight loss, fatigue; subjective chills  HEENT: Denies rhinorrhea, dysphagia, odynphagia. CARDIOVASCULAR: Denies history of MI, + chest pain due to abdominal distention  RESPIRATORY: + Shortness of breath worse than baseline  GASTROINTESTINAL: See HPI  GENITOURINARY: Denies increased frequency or dysuria. HEMATOLOGIC/LYMPHATIC: Denies history of anemia or DVTs. ENDOCRINE: Denies history of thyroid problems or diabetes. MSK: Denies rashes or lesions  NEURO: Denies history of CVA, TIA. PHYSICAL EXAM:    VITALS:  /73   Pulse 78   Temp 97.7 °F (36.5 °C) (Oral)   Resp 17   Ht 5' 5\" (1.651 m)   Wt 210 lb 6 oz (95.4 kg)   SpO2 93%   BMI 35.01 kg/m²   INTAKE/OUTPUT:     Intake/Output Summary (Last 24 hours) at 8/27/2022 1101  Last data filed at 8/26/2022 1724  Gross per 24 hour   Intake 27.02 ml   Output 375 ml   Net -347.98 ml       CONSTITUTIONAL:  awake, alert, not distressed and morbidly obese  HEENT: Normocephalic/atraumatic, without obvious abnormality. NECK:  Supple, symmetrical, trachea midline   CARDIOVASCULAR: Regular rate and rhythm   LUNGS: normal effort with symmetric rise and fall of chest wall  ABDOMEN: softly distended and tympanitic to percussion, TTP in R abdomen without guarding or rebound tenderness. MUSCULOSKELETAL: Muscle strength intact in all extremities bilaterally. NEUROLOGIC: CN II- XII intact. Gross motor intact without focal weakness. SKIN: No cyanosis, rashes, or edema noted.    Orientation:   oriented to person, place, and time    Hematology:  Recent Labs     08/24/22  1125 08/25/22  1224   WBC 12.2*  --    RBC 4.86  --    HGB 15.0  --    HCT 45.3  --    MCV 93.2  --    MCH 30.9  --    MCHC 33.1  --    RDW 12.9  --     400   MPV 9.7  --    INR  --  1.0   DDIMER 0.28  --      Chemistry:  Recent Labs     08/24/22  1125 08/24/22  1349 08/25/22  0613 08/26/22  0559 08/27/22  0527   *  --   --   --   --    K 3.9  --   --   --   --    CL 93*  --   --   --   --    CO2 27  --   --   --   --    GLUCOSE 121*  --   --   --   --    BUN 9  --   --   --   --    CREATININE 0.81  --   --   --   --    ANIONGAP 13  --   --   --   --    LABGLOM >60  --   --   --   --    GFRAA >60  --   --   --   --    CALCIUM 10.4  --   --   --   --    PROBNP 41  --  83 47 92   TROPHS 16 14  --   --   --      Recent Labs     08/24/22  1125   PROT 8.0   LABALBU 4.4   AST 13   ALT 18   ALKPHOS 77   BILITOT 0.51   LIPASE 23       Pertinent Radiology:   XR CHEST PORTABLE    Result Date: 8/24/2022  EXAMINATION: ONE XRAY VIEW OF THE CHEST 8/24/2022 11:45 am COMPARISON: Chest x-ray dated 31 December 2021 HISTORY: ORDERING SYSTEM PROVIDED HISTORY: sob TECHNOLOGIST PROVIDED HISTORY: sob Reason for Exam: Chest pain, sob today FINDINGS: Normal cardiomediastinal silhouette. No pneumothorax or pleural effusion. No acute airspace infiltrate. No acute cardiopulmonary findings     IR US GUIDED NEEDLE PLACEMENT    Result Date: 8/25/2022  PROCEDURE: US 4 QUADRANT 8/25/2022 HISTORY: ORDERING SYSTEM PROVIDED HISTORY: Possible ascites TECHNOLOGIST PROVIDED HISTORY: ascites TECHNIQUE: Ultrasound of all 4 quadrants was performed showing no ascites CONTRAST: None used SEDATION: None required FLUOROSCOPY DOSE AND TYPE OR TIME AND EXPOSURES: None used Number of images: Multiple ultrasound images of all 4 quadrants were obtained using grayscale technique. DESCRIPTION OF PROCEDURE: None performed FINDINGS: No significant ascites demonstrated sonographically. No paracentesis performed due to absence of ascites.          ASSESSMENT:  Active Hospital Problems    Diagnosis Date Noted Acute exacerbation of COPD with asthma ([facility] [J44.1, J45.901] 08/24/2022     Priority: Medium       57-year-old male with COPD exacerbation, abdominal distention; CT abdomen pelvis with narrowing of colon at the splenic flexure    Plan:  Continue medical mgmt and supportive care per primary  No acute surgical intervention at this time. Follow-up GI evaluation and recommendations - needs colonoscopy  Follow-up BMP, electrolytes, CEA  Will continue to follow      Electronically signed by Lisa Ureña DO  on 8/27/2022 at 6:28 AM     General Surgery Attending Attestation Note    Patient was seen and examined. I agree with the above resident's exam, assessment and plan. Likely stricture or mass in the splenic flexure of the colon  Will need colonoscopy by GI  If this is a cancerous process suggest also performing biopsy of these liver lesions  Pt has a family history of colon cancer  Will obtain CEA  In general patient is above average risk for surgery using [employer] NSQIP risk calculator. Of note 50.1% risk of serious complications, 92.6% risk of any complications.      Vishal Nunez  [address], [address], [facility]  Office: [phone]  After Hours: Please call answering service

## 2022-08-27 NOTE — PROGRESS NOTES
6      Progress note  [facility]    Adult Hospitalist      Name: Karla Fernandez  MRN: [id number]     Acct: [de-identified]  Room: 2006/2006-02    Admit Date: 8/24/2022 11:08 AM  PCP: Lupis Osuna MD    Primary Problem  Principal Problem:    Acute exacerbation of COPD with asthma [facility])  Resolved Problems:    * No resolved hospital problems. *        Assesment:     Acute COPD exacerbation  Mild hyponatremia  Mild Leukocytosis  History of alcohol abuse  Liver lesion with history of treated hepatitis C  Hypertension  Mixed hyperlipidemia  Chronic coronary artery disease  Depression with anxiety  Tobacco abuse  Obesity, BMI 30.79        Plan:      Admit to med surge telemetry  O2 maintain oxygen saturation greater than 92%     IV Solu-Medrol  Duo neb   Respiratory pathogen better   Sputum culture  1/2 blood culture positive for staph coagulase negative, likely contaminant  Repeat blood culture no growth so far  IV ceftriaxone and p.o. doxycycline  Monitor respiratory status   Pulmonology consult    Patient is complaining of abdominal distention  CT abdomen pelvis of colon at splenic flexure General surgery evaluated the patient, recommended medical management  Triphasic CT ordered per GI  Plan for endoscopy noted    Continue to monitor/telemetry/CBC with differential daily/BMP daily  DVT and GI prophylaxis.   Continue aspirin  Continue hydrochlorothiazide, metoprolol, amlodipine  Continue Singulair  Continue Roflumilast  Continue inhalers  Continue Keppra  Continue medications as below      Scheduled Meds:   magnesium sulfate  2,000 mg IntraVENous Once    ipratropium-albuterol  1 ampule Inhalation TID    predniSONE  40 mg Oral Daily    aspirin  81 mg Oral Daily    ferrous sulfate  324 mg Oral Daily with breakfast    folic acid  1 mg Oral Daily    hydroCHLOROthiazide  25 mg Oral Daily    losartan  100 mg Oral Daily    metoprolol succinate  50 mg Oral Daily    montelukast  10 mg Oral Nightly    Roflumilast  500 mcg oxygen at home. Chest pain is moderate, intermittent, sharp and resolved on its own. Patient denies any  Fever, chills, changes in urination, bowel habit or rash. Patient received Solu-Medrol and IV Bumex in the ER. Sodium was 133. WBC was 12. 2. X-ray chest was negative. I have personally reviewed the past medical history, past surgical history, medications, social history, and family history, and summarized in the note. Review of Systems:     All 10 point system is reviewed and negative otherwise mentioned in HPI. Past Medical History:     Past Medical History:   Diagnosis Date    Arthritis     CAD (coronary artery disease)     Cancer ([facility]     prostate    Cirrhosis with alcoholism ([facility]     COPD (chronic obstructive pulmonary disease) (HCC)     Depression     BOURGEOIS (dyspnea on exertion)     H/O prostate cancer     Hyperlipidemia     Hypertension     MI (myocardial infarction) ([facility]     Seizures ([facility] 2016    last one over 2 years ago     SVT (supraventricular tachycardia) ([facility]     Tobacco abuse         Past Surgical History:     Past Surgical History:   Procedure Laterality Date    CARDIAC CATHETERIZATION      ablation    INSERTABLE CARDIAC MONITOR  10/03/2018    INTERNAL LOOP RECORDER    PROSTATECTOMY      SINUS SURGERY      TONGUE SURGERY      UPPER GASTROINTESTINAL ENDOSCOPY N/A 6/10/2021    EGD BIOPSY performed by Lurene Goldberg, MD at [address] [place] [address]  01/03/2022    UPPER GASTROINTESTINAL ENDOSCOPY N/A 1/3/2022    EGD BIOPSY performed by Rhiannon Corcoran MD at [address] [place] [address]        Medications Prior to Admission:       Prior to Admission medications    Medication Sig Start Date End Date Taking?  Authorizing Provider   fluticasone-salmeterol (ADVAIR HFA) 230-21 MCG/ACT inhaler Inhale 2 puffs into the lungs 2 times daily   Yes Historical Provider, MD   vitamin B-12 (CYANOCOBALAMIN) 1000 MCG tablet Take 1,000 mcg by mouth daily   Yes Historical Provider, MD   furosemide (LASIX) 40 MG tablet Take 40 mg by mouth daily   Yes Historical Provider, MD   levETIRAcetam (KEPPRA) 500 MG tablet Take 500 mg by mouth 2 times daily   Yes Historical Provider, MD   predniSONE (DELTASONE) 10 MG tablet Take 10 mg by mouth daily   Yes Historical Provider, MD   losartan-hydroCHLOROthiazide (HYZAAR) 100-25 MG per tablet Take 1 tablet by mouth daily   Yes Historical Provider, MD   Roflumilast (DALIRESP) 500 MCG tablet Take 1 tablet by mouth daily 7/16/21   Brigida Carpenter MD   ferrous gluconate (FERGON) 324 (38 Fe) MG tablet Take 324 mg by mouth daily (with breakfast)    Historical Provider, MD   famotidine (PEPCID) 40 MG tablet Take 40 mg by mouth 2 times daily as needed (heartburn)     Historical Provider, MD   amLODIPine (NORVASC) 10 MG tablet Take 10 mg by mouth daily    Historical Provider, MD   albuterol (PROVENTIL) (2.5 MG/3ML) 0.083% nebulizer solution Take 2.5 mg by nebulization every 6 hours as needed for Wheezing    Historical Provider, MD   albuterol sulfate  (90 Base) MCG/ACT inhaler Inhale 2 puffs into the lungs every 4-6 hours as needed for Wheezing    Historical Provider, MD   aspirin 81 MG tablet Take 81 mg by mouth daily    Historical Provider, MD   folic acid (FOLVITE) 1 MG tablet Take 1 mg by mouth daily    Historical Provider, MD   metoprolol succinate (TOPROL XL) 50 MG extended release tablet Take 50 mg by mouth daily    Historical Provider, MD   pantoprazole (PROTONIX) 40 MG tablet Take 40 mg by mouth daily    Historical Provider, MD   QUEtiapine (SEROQUEL) 400 MG tablet Take 200 mg by mouth nightly Takes 1/2 tab (200mg) nightly    Historical Provider, MD   vitamin B-1 (THIAMINE) 100 MG tablet Take 100 mg by mouth daily    Historical Provider, MD   Cholecalciferol (VITAMIN D3) 2000 units CAPS Take 1 capsule by mouth daily    Historical Provider, MD   tiotropium (SPIRIVA RESPIMAT) 2.5 MCG/ACT AERS inhaler Inhale 2 puffs into the lungs daily    Historical Provider, MD Allergies:       Patient has no known allergies. Social History:     Tobacco:    reports that he has quit smoking. His smoking use included cigarettes. He smoked an average of .1 packs per day. He quit smokeless tobacco use about 8 months ago. Alcohol:      reports that he does not currently use alcohol. Drug Use:  reports no history of drug use. Family History:     No family history on file.       Physical Exam:     Vitals:  /70   Pulse 66   Temp 97.1 °F (36.2 °C) (Oral)   Resp 16   Ht 5' 5\" (1.651 m)   Wt 210 lb 6 oz (95.4 kg)   SpO2 96%   BMI 35.01 kg/m²   Temp (24hrs), Av.6 °F (36.4 °C), Min:97.1 °F (36.2 °C), Max:97.7 °F (36.5 °C)          General appearance - alert, well appearing, and in no acute distress  Mental status - oriented to person, place, and time with normal affect  Head - normocephalic and atraumatic  Eyes - pupils equal and reactive, extraocular eye movements intact, conjunctiva clear  Ears - hearing appears to be intact  Nose - no drainage noted  Mouth - mucous membranes moist  Neck - supple, no carotid bruits, thyroid not palpable  Chest - clear to auscultation, normal effort  Heart - normal rate, regular rhythm, no murmur  Abdomen - soft, nontender, nondistended, bowel sounds present all four quadrants, no masses, hepatomegaly or splenomegaly  Neurological - normal speech, no focal findings or movement disorder noted, cranial nerves II through XII grossly intact  Extremities - peripheral pulses palpable, no pedal edema or calf pain with palpation  Skin - no gross lesions, rashes, or induration noted        Data:     Labs:    Hematology:  Recent Labs     22  1224      INR 1.0       Chemistry:  Recent Labs     22  1349 22  0613 22  0559 22  0527 22  0751   NA  --   --   --   --  140   K  --   --   --   --  3.1*   CL  --   --   --   --  100   CO2  --   --   --   --  31   GLUCOSE  --   --   --   --  106*   BUN  --   --   -- --  21*   CREATININE  --   --   --   --  0.84   MG  --   --   --   --  1.9   ANIONGAP  --   --   --   --  9   LABGLOM  --   --   --   --  >60   GFRAA  --   --   --   --  >60   CALCIUM  --   --   --   --  9.8   PROBNP  --  83 47 92  --    TROPHS 14  --   --   --   --        No results for input(s): PROT, LABALBU, LABA1C, L7XOLDO, F9ICRFN, FT4, TSH, AST, ALT, LDH, GGT, ALKPHOS, LABGGT, BILITOT, BILIDIR, AMMONIA, AMYLASE, LIPASE, LACTATE, CHOL, HDL, LDLCHOLESTEROL, CHOLHDLRATIO, TRIG, VLDL, LYE57RP, PHENYTOIN, PHENYF, URICACID, POCGLU in the last 72 hours. Lab Results   Component Value Date    INR 1.0 08/25/2022    INR 1.0 01/01/2022    INR 1.1 07/13/2021    PROTIME 13.4 08/25/2022    PROTIME 13.2 01/01/2022    PROTIME 13.5 07/13/2021       Lab Results   Component Value Date/Time    SPECIAL 10ML LH 08/25/2022 11:45 AM     Lab Results   Component Value Date/Time    CULTURE PENDING 08/26/2022 11:49 AM       No results found for: POCPH, PHART, PH, POCPCO2, FKU5IJP, PCO2, POCPO2, PO2ART, PO2, POCHCO3, LHC6GHD, HCO3, NBEA, PBEA, BEART, BE, THGBART, THB, FEK1GBA, FZPT4ALT, W4CRRWLP, O2SAT, FIO2    Radiology:    XR CHEST PORTABLE    Result Date: 8/24/2022  No acute cardiopulmonary findings         All radiological studies reviewed                Code Status:  Full Code    Electronically signed by Andrzej Infante MD on 8/27/2022 at 12:24 PM     Copy sent to Dr. Isaias Romero MD    This note was created with the assistance of a speech-recognition program.  Although the intention is to generate a document that actually reflects the content of the visit, no guarantees can be provided that every mistake has been identified and corrected by editing. Note was updated later by me after  physical examination and  completion of the assessment.

## 2022-08-27 NOTE — PROGRESS NOTES
Pt ambulating in hallway. Pt very unhappy about having to get out of bed. Writer did explain lying in bed will only make current situation worse. PRN Toradol given.

## 2022-08-27 NOTE — PROGRESS NOTES
Pulmonary Critical Care Progress Note       Patient seen for the follow up of Acute exacerbation of COPD with asthma ([address] , chronic hypoxic respiratory failure    Subjective:  He still reports having shortness of breath that he attributes to abdominal distention. He has been on oxygen 4 L nasal cannula  at baseline. Trula Blew He feels shortness of breath is not much change. He denies significant cough or chest pain. He continues to feel like his belly is bloated/full giving him more troubles with his breathing. Examination:  Vitals: /70   Pulse 66   Temp 97.1 °F (36.2 °C) (Oral)   Resp 16   Ht 5' 5\" (1.651 m)   Wt 210 lb 6 oz (95.4 kg)   SpO2 96%   BMI 35.01 kg/m²   General appearance: alert and cooperative with exam  Neck: No JVD  Lungs: Creased breath sound no wheezing  Heart: regular rate and rhythm, S1, S2 normal, no gallop  Abdomen: Soft, non tender, + BS  Extremities: no cyanosis or clubbing. Trace edema    LABs:  CBC:   Recent Labs     08/25/22  1224          BMP:   Recent Labs     08/27/22  0751      K 3.1*   CO2 31   BUN 21*   CREATININE 0.84   LABGLOM >60   GLUCOSE 106*       LIVER PROFILE:  No results for input(s): AST, ALT, LABALBU in the last 72 hours. Radiology:  X-ray chest 8/24/2022      Impression:  Chronic hypoxic respiratory failure   Acute exacerbation of COPD/active smoking  Mild pulmonary hypertension, RVSP 37 mmHg echo 7/12/2021  Suspected obstructive sleep apnea/Obesity  Liver cirrhosis with ascites/hepatitis C  Paroxysmal SVT status post ablation  Positive blood culture x1,?   Contamination    Recommendations:  Oxygen via nasal cannula, keep SPO2 90% or greater   Incentive spirometry every hour while awake   Albuterol by nebulizer  Symbicort  Off IV solu-medrol 30 mg every 8 hours   Prednisone 40 p.o. daily  Singulair  Daliresp  Continue Rocephin and doxycycline  Repeat blood culture with no growth x10 hours  Procalcitonin pending  Outpatient sleep evaluation  DVT prophylaxis with low molecular weight heparin      Electronically signed by   Dodie Perez MD on 8/27/2022 at 2:54 PM  Pulmonary Critical Care and Sleep Medicine,  [facility]: [phone]

## 2022-08-27 NOTE — PLAN OF CARE
PRE CONSULT ROUNDING NOTE  HPI  59year old male with pmh of treated hepatitis c,  cirrhosis alcohol abuse, copd cad htn mi seizures prostate cancer hyperlipidemia depression who presented to the ed for shortness of breath, he is being treated for an exacerbation of copd. Our service is consulted for \"obstruction\". Pt reports increasing abdominal distention with diffuse abdominal pain that is worse with eating. Has not had a BM in the last two days. Symptoms have been occurring \"for a while\". Reports he had flatus yesterday but not today. Ct abd shows     FINDINGS:   Lower Chest: Linear opacities at the lung bases likely represent atelectasis   or scarring. There is no significant pleural effusion. Organs: Multiple low-attenuation liver lesions are again noted. These are   higher attenuation than simple cysts. A lesion in segment 6 measures up to   3.7 cm versus 3 cm previously. A 3.5 cm lesion in segment 5 measuring 2.7 cm   previously. A 1.8 cm lesion in segment 6 measures slightly larger than   before. A 1.8 cm lesion on axial image 38 measures 1.1 cm previously. The   gallbladder is nondistended. GI/Bowel: The colon is dilated with the transverse colon measuring as much as   11.6 cm. There is an abrupt transition at the junction of the transverse   colon and splenic flexure. No thickening is noted in this area. The small   bowel and stomach are unremarkable. Pelvis: The bladder is unremarkable. Peritoneum/Retroperitoneum: There is no lymphadenopathy. There is no fat   stranding, free fluid, free air or focal fluid collection. Bones/Soft Tissues: There are no suspicious bone lesions     He has been evaluated by surgery and no intervention is planned. Wbc 12.2  afp 3.5 on 12/31/21 cea 5.2 lft's  and hgb are normal . Liver lesions were described as hemangiomas in the past. He is unable to have a mri due to a loop recorder.  He has not had fevers chills dysphagia melena hematemesis hematochezia weight loss rash.      Endoscopy 1/3/22 egd (bleibel) ild gastritis and distal esophagitis states he had colonoscopy with removal of benign colon polyps by dr Nate Pennington, no report in epic    Family reports dad had stomach/colon cancer no liver or pancreatic cancer no uc/crohns  Social no smoking no  recent etoh, was heavy drinker in the past no  illicit drugs   /80   Pulse 65   Temp 97.7 °F (36.5 °C) (Oral)   Resp 16   Ht 5' 5\" (1.651 m)   Wt 210 lb 6 oz (95.4 kg)   SpO2 96%   BMI 35.01 kg/m²     ROS as above meds labs imaging and past medical records were reviewed    Exam  General Appearance: alert and oriented to person, place and time, well-developed and well-nourished, in no acute distress  Skin: warm and dry, no rash or erythema  Head: normocephalic and atraumatic  Eyes: pupils equal, round, and reactive to light, extraocular eye movements intact, conjunctivae normal  ENT: hearing grossly normal bilaterally  Neck: neck supple and non tender without mass, no thyromegaly or thyroid nodules, no cervical lymphadenopathy   Pulmonary/Chest: clear to auscultation bilaterally- no wheezes, rales or rhonchi, normal air movement, no respiratory distress  Cardiovascular: normal rate, regular rhythm, normal S1 and S2, no murmurs, rubs, clicks or gallops, distal pulses intact, no carotid bruits  Abdomen: soft, obese mild diffuse tenderness, distended, tympanic bowel sounds, no masses or organomegaly no ascites  Extremities: no cyanosis, clubbing or edema  Musculoskeletal: normal range of motion, no joint swelling, deformity or tenderness  Neurologic: no cranial nerve deficit and muscle strength normal    Assessment  Abdominal pain with abnormal imaging showing narrowing of the colon at the splenic flexure  Elevated cea  Liver lesions  Cirrhosis secondary to etoh abuse  Treated hepatitis c  Leucocytosis  Family hx of colon cancer    Plan  D/w md will plan for colonoscopy tomorrow, will order enema for tomorrow  Keep npo  Cannot have mri due to loop recorder, will get multiphase ct to eval liver lesions  Avoid sedatives and narcotics  Ppi  Surgery following  Formal gi consult to follow  . Carmen Pressley, APRN - CNP

## 2022-08-27 NOTE — PROGRESS NOTES
Patient in bathroom trying to have a bm. Patient complains of lower abdomen cramping. Patient encouraged not to strain. Patient instructed on pain medication side effects. Patient states understanding, and rates pain a 9.

## 2022-08-27 NOTE — PLAN OF CARE
Problem: Respiratory - Adult  Goal: Achieves optimal ventilation and oxygenation  8/27/2022 1959 by Bhavya Hicks RCP  Outcome: Progressing     Problem: Respiratory - Adult  Goal: Clear lung sounds  8/27/2022 1959 by Bhavya Hicks RCP  Outcome: Progressing     Problem: Respiratory - Adult  Goal: Able to breathe comfortably  Outcome: Progressing     Problem: Respiratory - Adult  Goal: Adequate oxygenation  Description: Adequate oxygenation  Outcome: Progressing        BRONCHOSPASM/BRONCHOCONSTRICTION    IMPROVE  AERATION/BREATHSOUNDS  ADMINISTER BRONCHODILATOR THERAPY AS APPROPRIATE  ASSESS BREATH SOUNDS  INITIATE AEROSOL PROTOCOL IF ORDERED TO DO SO  PATIENT EDUCATION AS NEEDED       NONINVASIVE VENTILATION    PROVIDE OPTIMAL VENTILATION/ACCEPTABLE SPO2   IMPLEMENT NONINVASIVE VENTILATION PROTOCOL   MAINTAIN ACCEPTABLE SPO2   ASSESS SKIN INTEGRITY/BREAKDOWN SCORE   PATIENT EDUCATION AS NEEDED   BIPAP AS NEEDED

## 2022-08-27 NOTE — PLAN OF CARE
Problem: Respiratory - Adult  Goal: Achieves optimal ventilation and oxygenation  8/27/2022 0814 by Yesy Barriga RCP  Outcome: Progressing     Problem: Respiratory - Adult  Goal: Clear lung sounds  8/27/2022 0814 by Yesy Barriga RCP  Outcome: Progressing

## 2022-08-28 PROBLEM — K56.609 COLON OBSTRUCTION (HCC): Status: ACTIVE | Noted: 2022-08-28

## 2022-08-28 LAB
ABSOLUTE EOS #: 0.1 K/UL (ref 0–0.44)
ABSOLUTE IMMATURE GRANULOCYTE: 0.1 K/UL (ref 0–0.3)
ABSOLUTE LYMPH #: 1.58 K/UL (ref 1.1–3.7)
ABSOLUTE MONO #: 0.91 K/UL (ref 0.1–1.2)
ANION GAP SERPL CALCULATED.3IONS-SCNC: 8 MMOL/L (ref 9–17)
BASOPHILS # BLD: 0 % (ref 0–2)
BASOPHILS ABSOLUTE: <0.03 K/UL (ref 0–0.2)
BUN BLDV-MCNC: 22 MG/DL (ref 6–20)
BUN/CREAT BLD: 22 (ref 9–20)
CALCIUM SERPL-MCNC: 9.8 MG/DL (ref 8.6–10.4)
CHLORIDE BLD-SCNC: 98 MMOL/L (ref 98–107)
CO2: 35 MMOL/L (ref 20–31)
CREAT SERPL-MCNC: 1 MG/DL (ref 0.7–1.2)
EOSINOPHILS RELATIVE PERCENT: 1 % (ref 1–4)
GFR AFRICAN AMERICAN: >60 ML/MIN
GFR NON-AFRICAN AMERICAN: >60 ML/MIN
GFR SERPL CREATININE-BSD FRML MDRD: ABNORMAL ML/MIN/{1.73_M2}
GLUCOSE BLD-MCNC: 104 MG/DL (ref 70–99)
HCT VFR BLD CALC: 42.9 % (ref 40.7–50.3)
HEMOGLOBIN: 13.4 G/DL (ref 13–17)
IMMATURE GRANULOCYTES: 1 %
LYMPHOCYTES # BLD: 16 % (ref 24–43)
MCH RBC QN AUTO: 30.6 PG (ref 25.2–33.5)
MCHC RBC AUTO-ENTMCNC: 31.2 G/DL (ref 28.4–34.8)
MCV RBC AUTO: 97.9 FL (ref 82.6–102.9)
MONOCYTES # BLD: 9 % (ref 3–12)
NRBC AUTOMATED: 0 PER 100 WBC
PDW BLD-RTO: 12.7 % (ref 11.8–14.4)
PLATELET # BLD: 348 K/UL (ref 138–453)
PMV BLD AUTO: 9.7 FL (ref 8.1–13.5)
POTASSIUM SERPL-SCNC: 3.1 MMOL/L (ref 3.7–5.3)
RBC # BLD: 4.38 M/UL (ref 4.21–5.77)
SEG NEUTROPHILS: 73 % (ref 36–65)
SEGMENTED NEUTROPHILS ABSOLUTE COUNT: 7.46 K/UL (ref 1.5–8.1)
SODIUM BLD-SCNC: 141 MMOL/L (ref 135–144)
WBC # BLD: 10.2 K/UL (ref 3.5–11.3)

## 2022-08-28 PROCEDURE — 85025 COMPLETE CBC W/AUTO DIFF WBC: CPT

## 2022-08-28 PROCEDURE — 6370000000 HC RX 637 (ALT 250 FOR IP): Performed by: INTERNAL MEDICINE

## 2022-08-28 PROCEDURE — 80048 BASIC METABOLIC PNL TOTAL CA: CPT

## 2022-08-28 PROCEDURE — 6370000000 HC RX 637 (ALT 250 FOR IP): Performed by: HOSPITALIST

## 2022-08-28 PROCEDURE — 2580000003 HC RX 258: Performed by: HOSPITALIST

## 2022-08-28 PROCEDURE — 99223 1ST HOSP IP/OBS HIGH 75: CPT | Performed by: INTERNAL MEDICINE

## 2022-08-28 PROCEDURE — 1200000000 HC SEMI PRIVATE

## 2022-08-28 PROCEDURE — 6360000002 HC RX W HCPCS: Performed by: NURSE PRACTITIONER

## 2022-08-28 PROCEDURE — 6360000002 HC RX W HCPCS: Performed by: HOSPITALIST

## 2022-08-28 PROCEDURE — 2700000000 HC OXYGEN THERAPY PER DAY

## 2022-08-28 PROCEDURE — 94640 AIRWAY INHALATION TREATMENT: CPT

## 2022-08-28 PROCEDURE — 94761 N-INVAS EAR/PLS OXIMETRY MLT: CPT

## 2022-08-28 PROCEDURE — 2580000003 HC RX 258: Performed by: NURSE PRACTITIONER

## 2022-08-28 PROCEDURE — C9113 INJ PANTOPRAZOLE SODIUM, VIA: HCPCS | Performed by: NURSE PRACTITIONER

## 2022-08-28 PROCEDURE — 6360000002 HC RX W HCPCS: Performed by: INTERNAL MEDICINE

## 2022-08-28 PROCEDURE — 6360000002 HC RX W HCPCS: Performed by: STUDENT IN AN ORGANIZED HEALTH CARE EDUCATION/TRAINING PROGRAM

## 2022-08-28 PROCEDURE — 36415 COLL VENOUS BLD VENIPUNCTURE: CPT

## 2022-08-28 RX ORDER — IPRATROPIUM BROMIDE AND ALBUTEROL SULFATE 2.5; .5 MG/3ML; MG/3ML
1 SOLUTION RESPIRATORY (INHALATION) 2 TIMES DAILY
Status: DISCONTINUED | OUTPATIENT
Start: 2022-08-29 | End: 2022-08-30

## 2022-08-28 RX ORDER — METHYLPREDNISOLONE SODIUM SUCCINATE 40 MG/ML
20 INJECTION, POWDER, LYOPHILIZED, FOR SOLUTION INTRAMUSCULAR; INTRAVENOUS DAILY
Status: DISCONTINUED | OUTPATIENT
Start: 2022-08-28 | End: 2022-08-30

## 2022-08-28 RX ORDER — IPRATROPIUM BROMIDE AND ALBUTEROL SULFATE 2.5; .5 MG/3ML; MG/3ML
1 SOLUTION RESPIRATORY (INHALATION) 3 TIMES DAILY
Status: DISCONTINUED | OUTPATIENT
Start: 2022-08-28 | End: 2022-08-28

## 2022-08-28 RX ADMIN — LEVETIRACETAM 500 MG: 500 TABLET, FILM COATED ORAL at 08:35

## 2022-08-28 RX ADMIN — KETOROLAC TROMETHAMINE 15 MG: 15 INJECTION, SOLUTION INTRAMUSCULAR; INTRAVENOUS at 15:48

## 2022-08-28 RX ADMIN — SODIUM CHLORIDE, PRESERVATIVE FREE 40 MG: 5 INJECTION INTRAVENOUS at 08:36

## 2022-08-28 RX ADMIN — CEFTRIAXONE SODIUM 1000 MG: 1 INJECTION, POWDER, FOR SOLUTION INTRAMUSCULAR; INTRAVENOUS at 16:15

## 2022-08-28 RX ADMIN — BUDESONIDE AND FORMOTEROL FUMARATE DIHYDRATE 2 PUFF: 160; 4.5 AEROSOL RESPIRATORY (INHALATION) at 07:36

## 2022-08-28 RX ADMIN — MONTELUKAST 10 MG: 10 TABLET, FILM COATED ORAL at 20:24

## 2022-08-28 RX ADMIN — AMLODIPINE BESYLATE 10 MG: 10 TABLET ORAL at 08:35

## 2022-08-28 RX ADMIN — METOPROLOL SUCCINATE 50 MG: 50 TABLET, FILM COATED, EXTENDED RELEASE ORAL at 08:36

## 2022-08-28 RX ADMIN — FUROSEMIDE 40 MG: 40 TABLET ORAL at 08:35

## 2022-08-28 RX ADMIN — POTASSIUM CHLORIDE 40 MEQ: 1500 TABLET, EXTENDED RELEASE ORAL at 16:13

## 2022-08-28 RX ADMIN — LEVETIRACETAM 500 MG: 500 TABLET, FILM COATED ORAL at 20:25

## 2022-08-28 RX ADMIN — DOXYCYCLINE 100 MG: 100 CAPSULE ORAL at 20:24

## 2022-08-28 RX ADMIN — FOLIC ACID 1 MG: 1 TABLET ORAL at 08:35

## 2022-08-28 RX ADMIN — Medication 100 MG: at 08:35

## 2022-08-28 RX ADMIN — KETOROLAC TROMETHAMINE 15 MG: 15 INJECTION, SOLUTION INTRAMUSCULAR; INTRAVENOUS at 09:31

## 2022-08-28 RX ADMIN — BUDESONIDE AND FORMOTEROL FUMARATE DIHYDRATE 2 PUFF: 160; 4.5 AEROSOL RESPIRATORY (INHALATION) at 20:22

## 2022-08-28 RX ADMIN — IPRATROPIUM BROMIDE AND ALBUTEROL SULFATE 1 AMPULE: .5; 3 SOLUTION RESPIRATORY (INHALATION) at 20:22

## 2022-08-28 RX ADMIN — LOSARTAN POTASSIUM 100 MG: 100 TABLET, FILM COATED ORAL at 08:35

## 2022-08-28 RX ADMIN — IPRATROPIUM BROMIDE AND ALBUTEROL SULFATE 1 AMPULE: .5; 3 SOLUTION RESPIRATORY (INHALATION) at 14:34

## 2022-08-28 RX ADMIN — ROFLUMILAST 500 MCG: 500 TABLET ORAL at 08:35

## 2022-08-28 RX ADMIN — METHYLPREDNISOLONE SODIUM SUCCINATE 20 MG: 40 INJECTION, POWDER, FOR SOLUTION INTRAMUSCULAR; INTRAVENOUS at 16:14

## 2022-08-28 RX ADMIN — QUETIAPINE FUMARATE 200 MG: 200 TABLET ORAL at 20:24

## 2022-08-28 RX ADMIN — HYDROCHLOROTHIAZIDE 25 MG: 25 TABLET ORAL at 08:35

## 2022-08-28 RX ADMIN — IPRATROPIUM BROMIDE AND ALBUTEROL SULFATE 1 AMPULE: .5; 3 SOLUTION RESPIRATORY (INHALATION) at 07:34

## 2022-08-28 RX ADMIN — KETOROLAC TROMETHAMINE 15 MG: 15 INJECTION, SOLUTION INTRAMUSCULAR; INTRAVENOUS at 03:49

## 2022-08-28 ASSESSMENT — PAIN DESCRIPTION - LOCATION
LOCATION: ABDOMEN
LOCATION: ABDOMEN

## 2022-08-28 ASSESSMENT — PAIN DESCRIPTION - ORIENTATION: ORIENTATION: ANTERIOR

## 2022-08-28 ASSESSMENT — PAIN SCALES - GENERAL
PAINLEVEL_OUTOF10: 7
PAINLEVEL_OUTOF10: 7

## 2022-08-28 ASSESSMENT — PAIN DESCRIPTION - DESCRIPTORS: DESCRIPTORS: ACHING

## 2022-08-28 NOTE — PROGRESS NOTES
General Surgery:  Daily Progress Note          PATIENT NAME: Pebbles Alves     TODAY'S DATE: 8/28/2022, 7:39 AM    SUBJECTIVE:     Pt seen and examined at bedside. No acute overnight events. Enema given this morning for colonoscopy today. Had some stool. Continues to pass some flatus. ROS:   General: No fevers or chills  Cardiac: No chest pain or palpitations  Respiratory: No shortness of breath or wheezing  Abdomen: No nausea or emesis; +R abdominal pain  Neuro: No headache, dizziness, syncope    OBJECTIVE:   VITALS:  /75   Pulse 72   Temp 97.9 °F (36.6 °C) (Oral)   Resp 16   Ht 5' 5\" (1.651 m)   Wt 208 lb 14.4 oz (94.8 kg)   SpO2 95%   BMI 34.76 kg/m²      INTAKE/OUTPUT:      Intake/Output Summary (Last 24 hours) at 8/28/2022 0739  Last data filed at 8/27/2022 1619  Gross per 24 hour   Intake 98.17 ml   Output --   Net 98.17 ml       PHYSICAL EXAM:  General Appearance: awake, alert, oriented, in no acute distress  HEENT:  Normocephalic, atraumatic, mucus membranes moist  Heart: Regular rate and rhythm  Lungs: normal effort with symmetric rise and fall of chest wall  Abdomen: softly distended, TTP in right abdomen without guarding or rebound tenderness   Extremities: No cyanosis, pitting edema, rashes noted. Skin: Skin color, texture, turgor normal. No rashes or lesions. Data:  CBC:   Recent Labs     08/25/22  1224        Chemistry:   Recent Labs     08/26/22  0559 08/27/22  0527 08/27/22  0751   NA  --   --  140   K  --   --  3.1*   CL  --   --  100   CO2  --   --  31   GLUCOSE  --   --  106*   BUN  --   --  21*   CREATININE  --   --  0.84   MG  --   --  1.9   ANIONGAP  --   --  9   LABGLOM  --   --  >60   GFRAA  --   --  >60   CALCIUM  --   --  9.8   PROBNP 47 92  --      Hepatic: No results for input(s): AST, ALT, ALB, ALKPHOS, BILITOT, BILIDIR in the last 72 hours.   Coagulation:   Recent Labs     08/25/22  1224   APTT 29.8   INR 1.0         Radiology Review:    XR CHEST PORTABLE    Result Date: 8/24/2022  EXAMINATION: ONE XRAY VIEW OF THE CHEST 8/24/2022 11:45 am COMPARISON: Chest x-ray dated 31 December 2021 HISTORY: ORDERING SYSTEM PROVIDED HISTORY: sob TECHNOLOGIST PROVIDED HISTORY: sob Reason for Exam: Chest pain, sob today FINDINGS: Normal cardiomediastinal silhouette. No pneumothorax or pleural effusion. No acute airspace infiltrate. No acute cardiopulmonary findings     IR US GUIDED NEEDLE PLACEMENT    Result Date: 8/25/2022  PROCEDURE: US 4 QUADRANT 8/25/2022 HISTORY: ORDERING SYSTEM PROVIDED HISTORY: Possible ascites TECHNOLOGIST PROVIDED HISTORY: ascites TECHNIQUE: Ultrasound of all 4 quadrants was performed showing no ascites CONTRAST: None used SEDATION: None required FLUOROSCOPY DOSE AND TYPE OR TIME AND EXPOSURES: None used Number of images: Multiple ultrasound images of all 4 quadrants were obtained using grayscale technique. DESCRIPTION OF PROCEDURE: None performed FINDINGS: No significant ascites demonstrated sonographically. No paracentesis performed due to absence of ascites. ASSESSMENT:  Active Hospital Problems    Diagnosis Date Noted    Abnormal abdominal CT scan [R93.5] 08/27/2022     Priority: Medium    Family history of colon cancer [Z80.0] 08/27/2022     Priority: Medium    Elevated CEA [R97.0] 08/27/2022     Priority: Medium    Acute exacerbation of COPD with asthma ([facility] [address] [J44.1, J45.901] 08/24/2022     Priority: Medium    Abdominal pain [R10.9]     Hepatitis C virus infection without hepatic coma [B19.20] 03/09/2019       59 y.o. male with COPD exacerbation, abdominal distention; CT abdomen pelvis with narrowing of colon at the splenic flexure      Plan:  Continue medical management and supportive care per primary team  Continue conservative management at this time.   Colonoscopy today with GI  Will follow up results of scope  Repeat CT shows benign liver hemangiomas  Continue to encourage ambulation/activity w/ PT/OT  Will continue to follow    Electronically signed by Yaz Johnson DO  on 8/28/2022 at 7:39 AM     General Surgery Attending Attestation Note    Patient was seen and examined. I agree with the above resident's exam, assessment and plan.      For colonoscopy today with GI    Jenna Roberts, [address], [address], [facility]  Office: [phone]  After Hours: Please call answering service

## 2022-08-28 NOTE — PLAN OF CARE
Problem: Discharge Planning  Goal: Discharge to home or other facility with appropriate resources  8/28/2022 0334 by Brian Pearson RN  Outcome: Progressing  Flowsheets (Taken 8/27/2022 1950)  Discharge to home or other facility with appropriate resources:   Identify barriers to discharge with patient and caregiver   Arrange for needed discharge resources and transportation as appropriate   Identify discharge learning needs (meds, wound care, etc)   Refer to discharge planning if patient needs post-hospital services based on physician order or complex needs related to functional status, cognitive ability or social support system  8/27/2022 1527 by Maninder Montiel RN  Outcome: Progressing     Problem: Respiratory - Adult  Goal: Achieves optimal ventilation and oxygenation  8/28/2022 0334 by Brian Pearson RN  Outcome: Progressing  8/27/2022 1959 by Justus Crowley RCP  Outcome: Progressing  8/27/2022 1527 by Maninder Montiel RN  Outcome: Progressing  Goal: Clear lung sounds  8/27/2022 1959 by Justus Crowley RCP  Outcome: Progressing  Goal: Able to breathe comfortably  8/27/2022 1959 by Justus Crowley RCP  Outcome: Progressing  Goal: Adequate oxygenation  Description: Adequate oxygenation  8/27/2022 1959 by Justus Crowley RCP  Outcome: Progressing     Problem: Pain  Goal: Verbalizes/displays adequate comfort level or baseline comfort level  8/28/2022 0334 by Brian Pearson RN  Outcome: Progressing  8/27/2022 1527 by Maninder Montiel RN  Outcome: Progressing     Problem: Chronic Conditions and Co-morbidities  Goal: Patient's chronic conditions and co-morbidity symptoms are monitored and maintained or improved  8/28/2022 0334 by Brian Pearson RN  Outcome: Progressing  Flowsheets (Taken 8/27/2022 1950)  Care Plan - Patient's Chronic Conditions and Co-Morbidity Symptoms are Monitored and Maintained or Improved:   Monitor and assess patient's chronic conditions and comorbid symptoms for stability, deterioration, or improvement   Collaborate with multidisciplinary team to address chronic and comorbid conditions and prevent exacerbation or deterioration   Update acute care plan with appropriate goals if chronic or comorbid symptoms are exacerbated and prevent overall improvement and discharge  8/27/2022 1527 by Vargas Melgar RN  Outcome: Progressing     Problem: ABCDS Injury Assessment  Goal: Absence of physical injury  8/28/2022 0334 by Long Londono RN  Outcome: Progressing  8/27/2022 1527 by Vargas Melgar RN  Outcome: Progressing  Flowsheets (Taken 8/27/2022 5849)  Absence of Physical Injury: Implement safety measures based on patient assessment

## 2022-08-28 NOTE — PLAN OF CARE
Problem: Discharge Planning  Goal: Discharge to home or other facility with appropriate resources  8/28/2022 0947 by Mindy Flannery RN  Outcome: Progressing     Problem: Respiratory - Adult  Goal: Achieves optimal ventilation and oxygenation  8/28/2022 0947 by Mindy Flannery RN  Outcome: Progressing     Problem: Safety - Adult  Goal: Free from fall injury  8/28/2022 0947 by Mindy Flannery RN  Outcome: Progressing  Flowsheets (Taken 8/28/2022 0216)  Free From Fall Injury:   Instruct family/caregiver on patient safety   Based on caregiver fall risk screen, instruct family/caregiver to ask for assistance with transferring infant if caregiver noted to have fall risk factors     Problem: ABCDS Injury Assessment  Goal: Absence of physical injury  8/28/2022 0947 by Mindy Flannery RN  Outcome: Progressing  Flowsheets (Taken 8/28/2022 1023)  Absence of Physical Injury: Implement safety measures based on patient assessment

## 2022-08-28 NOTE — PROGRESS NOTES
6      Progress note  [facility]    Adult Hospitalist      Name: Ev Ba  MRN: [id number]     Acct: [de-identified]  Room: 2006/2006-02    Admit Date: 8/24/2022 11:08 AM  PCP: Darci Hutton MD    Primary Problem  Principal Problem:    Acute exacerbation of COPD with asthma [facility])  Active Problems:    Abnormal abdominal CT scan    Family history of colon cancer    Elevated CEA    Colon obstruction ([address]    Hepatitis C virus infection without hepatic coma    Lesion of liver    Abdominal pain  Resolved Problems:    * No resolved hospital problems. *        Assesment:     Acute COPD exacerbation  Mild hyponatremia  Mild Leukocytosis  History of alcohol abuse  Liver lesion with history of treated hepatitis C  Hypertension  Mixed hyperlipidemia  Chronic coronary artery disease  Depression with anxiety  Tobacco abuse  Obesity, BMI 30.79        Plan:      Admit to med surge telemetry  O2 maintain oxygen saturation greater than 92%     IV Solu-Medrol  Duo neb   Respiratory pathogen better   Sputum culture  1/2 blood culture positive for staph coagulase negative, likely contaminant  Repeat blood culture no growth so far  IV ceftriaxone and p.o. doxycycline  Monitor respiratory status   Pulmonology consult    Patient is complaining of abdominal distention  CT abdomen pelvis of colon at splenic flexure General surgery evaluated the patient, recommended medical management  Triphasic CT ordered per GI  Plan for endoscopy noted    Continue to monitor/telemetry/CBC with differential daily/BMP daily  DVT and GI prophylaxis.   Continue aspirin  Continue hydrochlorothiazide, metoprolol, amlodipine  Continue Singulair  Continue Roflumilast  Continue inhalers  Continue Keppra  Continue medications as below      Scheduled Meds:   methylPREDNISolone  20 mg IntraVENous Daily    ipratropium-albuterol  1 ampule Inhalation TID    pantoprazole (PROTONIX) 40 mg injection  40 mg IntraVENous Daily    [Held by provider] aspirin  81 mg Oral Daily    ferrous sulfate  324 mg Oral Daily with breakfast    folic acid  1 mg Oral Daily    hydroCHLOROthiazide  25 mg Oral Daily    losartan  100 mg Oral Daily    metoprolol succinate  50 mg Oral Daily    montelukast  10 mg Oral Nightly    Roflumilast  500 mcg Oral Daily    [Held by provider] enoxaparin  40 mg SubCUTAneous Daily    doxycycline monohydrate  100 mg Oral BID    QUEtiapine  200 mg Oral Nightly    vitamin B-1  100 mg Oral Daily    budesonide-formoterol  2 puff Inhalation BID    levETIRAcetam  500 mg Oral BID    furosemide  40 mg Oral Daily    amLODIPine  10 mg Oral Daily     Continuous Infusions:    PRN Meds:  ketorolac, 15 mg, Q6H PRN  calcium carbonate, 500 mg, Q4H PRN  potassium chloride, 40 mEq, PRN   Or  potassium alternative oral replacement, 40 mEq, PRN   Or  potassium chloride, 10 mEq, PRN  magnesium sulfate, 1,000 mg, PRN  ondansetron, 4 mg, Q8H PRN   Or  ondansetron, 4 mg, Q6H PRN  senna, 1 tablet, BID PRN  HYDROcodone 5 mg - acetaminophen, 1 tablet, Q6H PRN  HYDROcodone 5 mg - acetaminophen, 2 tablet, Q6H PRN  morphine, 2 mg, Q4H PRN  albuterol, 2.5 mg, Q2H PRN      Chief Complaint:     Chief Complaint   Patient presents with    Chest Pain     Midsternal to right feels like pressure, has hx of chf and COPD    Shortness of Breath         History of Present Illness:      Hira Escalante is a 59 y.o.  male who presents with Chest Pain (Midsternal to right feels like pressure, has hx of chf and COPD) and Shortness of Breath    Patient seen and examined at bedside and reviewed  last 24 hrs events with nursing staff  No acute events overnight. Complaining of lower abdominal pain, moderate to severe  Afebrile  Patient denies any chest pain, palpitation, headache, dizziness, cough, nausea, vomiting, abdominal pain, pain, changes in urination or bowel habit or rash.   Patient continues to have some shortness of breath        HPI  53-year-old gentleman past medical history of CHF, COPD, coronary disease, liver cirrhosis presented to ER complaining of shortness of breath going on for 2 days. He is also complaining of intermittent right-sided chest pain associated with it. He wears 4 L of oxygen at home. Chest pain is moderate, intermittent, sharp and resolved on its own. Patient denies any  Fever, chills, changes in urination, bowel habit or rash. Patient received Solu-Medrol and IV Bumex in the ER. Sodium was 133. WBC was 12. 2. X-ray chest was negative. I have personally reviewed the past medical history, past surgical history, medications, social history, and family history, and summarized in the note. Review of Systems:     All 10 point system is reviewed and negative otherwise mentioned in HPI. Past Medical History:     Past Medical History:   Diagnosis Date    Arthritis     CAD (coronary artery disease)     Cancer ([facility]     prostate    Cirrhosis with alcoholism ([facility]     COPD (chronic obstructive pulmonary disease) (HCC)     Depression     BOURGEOIS (dyspnea on exertion)     H/O prostate cancer     Hyperlipidemia     Hypertension     MI (myocardial infarction) ([address]     Seizures ([facility] 2016    last one over 2 years ago     SVT (supraventricular tachycardia) ([facility]     Tobacco abuse         Past Surgical History:     Past Surgical History:   Procedure Laterality Date    CARDIAC CATHETERIZATION      ablation    INSERTABLE CARDIAC MONITOR  10/03/2018    INTERNAL LOOP RECORDER    PROSTATECTOMY      SINUS SURGERY      TONGUE SURGERY      UPPER GASTROINTESTINAL ENDOSCOPY N/A 6/10/2021    EGD BIOPSY performed by Kecia Deshpande MD at [address] [place] [address]  01/03/2022    UPPER GASTROINTESTINAL ENDOSCOPY N/A 1/3/2022    EGD BIOPSY performed by Tex Dejesus MD at [address] [place] [address]        Medications Prior to Admission:       Prior to Admission medications    Medication Sig Start Date End Date Taking?  Authorizing Provider   fluticasone-salmeterol (ADVAIR HFA) 230-21 MCG/ACT inhaler Inhale 2 puffs into the lungs 2 times daily   Yes Historical Provider, MD   vitamin B-12 (CYANOCOBALAMIN) 1000 MCG tablet Take 1,000 mcg by mouth daily   Yes Historical Provider, MD   furosemide (LASIX) 40 MG tablet Take 40 mg by mouth daily   Yes Historical Provider, MD   levETIRAcetam (KEPPRA) 500 MG tablet Take 500 mg by mouth 2 times daily   Yes Historical Provider, MD   predniSONE (DELTASONE) 10 MG tablet Take 10 mg by mouth daily   Yes Historical Provider, MD   losartan-hydroCHLOROthiazide (HYZAAR) 100-25 MG per tablet Take 1 tablet by mouth daily   Yes Historical Provider, MD   Roflumilast (DALIRESP) 500 MCG tablet Take 1 tablet by mouth daily 7/16/21   Mihir Escalona, MD   ferrous gluconate (FERGON) 324 (38 Fe) MG tablet Take 324 mg by mouth daily (with breakfast)    Historical Provider, MD   famotidine (PEPCID) 40 MG tablet Take 40 mg by mouth 2 times daily as needed (heartburn)     Historical Provider, MD   amLODIPine (NORVASC) 10 MG tablet Take 10 mg by mouth daily    Historical Provider, MD   albuterol (PROVENTIL) (2.5 MG/3ML) 0.083% nebulizer solution Take 2.5 mg by nebulization every 6 hours as needed for Wheezing    Historical Provider, MD   albuterol sulfate  (90 Base) MCG/ACT inhaler Inhale 2 puffs into the lungs every 4-6 hours as needed for Wheezing    Historical Provider, MD   aspirin 81 MG tablet Take 81 mg by mouth daily    Historical Provider, MD   folic acid (FOLVITE) 1 MG tablet Take 1 mg by mouth daily    Historical Provider, MD   metoprolol succinate (TOPROL XL) 50 MG extended release tablet Take 50 mg by mouth daily    Historical Provider, MD   pantoprazole (PROTONIX) 40 MG tablet Take 40 mg by mouth daily    Historical Provider, MD   QUEtiapine (SEROQUEL) 400 MG tablet Take 200 mg by mouth nightly Takes 1/2 tab (200mg) nightly    Historical Provider, MD   vitamin B-1 (THIAMINE) 100 MG tablet Take 100 mg by mouth daily    Historical Provider, MD   Cholecalciferol (VITAMIN D3) 2000 units CAPS Take 1 capsule by mouth daily    Historical Provider, MD   tiotropium (SPIRIVA RESPIMAT) 2.5 MCG/ACT AERS inhaler Inhale 2 puffs into the lungs daily    Historical Provider, MD        Allergies:       Patient has no known allergies. Social History:     Tobacco:    reports that he has quit smoking. His smoking use included cigarettes. He smoked an average of .1 packs per day. He quit smokeless tobacco use about 8 months ago. Alcohol:      reports that he does not currently use alcohol. Drug Use:  reports no history of drug use. Family History:     No family history on file.       Physical Exam:     Vitals:  /70   Pulse 73   Temp 98.2 °F (36.8 °C) (Oral)   Resp 16   Ht 5' 5\" (1.651 m)   Wt 208 lb 14.4 oz (94.8 kg)   SpO2 96%   BMI 34.76 kg/m²   Temp (24hrs), Av.9 °F (36.6 °C), Min:97.7 °F (36.5 °C), Max:98.2 °F (36.8 °C)          General appearance - alert, well appearing, and in no acute distress  Mental status - oriented to person, place, and time with normal affect  Head - normocephalic and atraumatic  Eyes - pupils equal and reactive, extraocular eye movements intact, conjunctiva clear  Ears - hearing appears to be intact  Nose - no drainage noted  Mouth - mucous membranes moist  Neck - supple, no carotid bruits, thyroid not palpable  Chest - clear to auscultation, normal effort  Heart - normal rate, regular rhythm, no murmur  Abdomen - soft, nontender, nondistended, bowel sounds present all four quadrants, no masses, hepatomegaly or splenomegaly  Neurological - normal speech, no focal findings or movement disorder noted, cranial nerves II through XII grossly intact  Extremities - peripheral pulses palpable, no pedal edema or calf pain with palpation  Skin - no gross lesions, rashes, or induration noted        Data:     Labs:    Hematology:  Recent Labs     22  1423   WBC 10.2   RBC 4.38   HGB 13.4   HCT 42.9   MCV 97.9   MCH 30.6   MCHC 31.2   RDW 12.7      MPV 9.7 Chemistry:  Recent Labs     08/26/22  0559 08/27/22  0527 08/27/22  0751 08/28/22  1423   NA  --   --  140 141   K  --   --  3.1* 3.1*   CL  --   --  100 98   CO2  --   --  31 35*   GLUCOSE  --   --  106* 104*   BUN  --   --  21* 22*   CREATININE  --   --  0.84 1.00   MG  --   --  1.9  --    ANIONGAP  --   --  9 8*   LABGLOM  --   --  >60 >60   GFRAA  --   --  >60 >60   CALCIUM  --   --  9.8 9.8   PROBNP 47 92  --   --        No results for input(s): PROT, LABALBU, LABA1C, G6YYJDL, O2ZOQMK, FT4, TSH, AST, ALT, LDH, GGT, ALKPHOS, LABGGT, BILITOT, BILIDIR, AMMONIA, AMYLASE, LIPASE, LACTATE, CHOL, HDL, LDLCHOLESTEROL, CHOLHDLRATIO, TRIG, VLDL, VCH88EF, PHENYTOIN, PHENYF, URICACID, POCGLU in the last 72 hours. Lab Results   Component Value Date    INR 1.0 08/25/2022    INR 1.0 01/01/2022    INR 1.1 07/13/2021    PROTIME 13.4 08/25/2022    PROTIME 13.2 01/01/2022    PROTIME 13.5 07/13/2021       Lab Results   Component Value Date/Time    SPECIAL 10ML LH 08/25/2022 11:45 AM     Lab Results   Component Value Date/Time    CULTURE NORMAL RESPIRATORY GANGA MODERATE GROWTH 08/26/2022 11:49 AM       No results found for: POCPH, PHART, PH, POCPCO2, STW7XTK, PCO2, POCPO2, PO2ART, PO2, POCHCO3, TDD1ENP, HCO3, NBEA, PBEA, BEART, BE, THGBART, THB, TNX9MGZ, XXUH1HEH, G5ZMOEOG, O2SAT, FIO2    Radiology:    XR CHEST PORTABLE    Result Date: 8/24/2022  No acute cardiopulmonary findings         All radiological studies reviewed                Code Status:  Full Code    Electronically signed by Joseluis Robertson MD on 8/28/2022 at 5:44 PM     Copy sent to Dr. Ed Lopez MD    This note was created with the assistance of a speech-recognition program.  Although the intention is to generate a document that actually reflects the content of the visit, no guarantees can be provided that every mistake has been identified and corrected by editing.      Note was updated later by me after  physical examination and  completion of the assessment.

## 2022-08-28 NOTE — FLOWSHEET NOTE
08/28/22 0836   Enema   Enema Type Tap water   Amount Instilled 500 mL   Preparation Positions left lat recumb   Result Minimal stool   How tolerated?  Tolerated well

## 2022-08-28 NOTE — PROGRESS NOTES
Patient instructed on tap water enema, and states understanding. 1 liter tap water enema administered, patient tolerated well, but unable to hold longer than 1 minute.

## 2022-08-28 NOTE — CONSULTS
Gastroenterology Consult Note      Patient: Shaggy Guillaume  : 1965  Acct#:  [de-identified]     Date:  2022    Subjective:       History of Present Illness  Patient is a 59 y.o.  male admitted with COPD exacerbation ([facility] [J44.1]  Acute exacerbation of COPD with asthma ([facility] [J44.1, J45.901] who is seen in consult for colon obstruction  This is a 47-year-old gentleman with history of liver cirrhosis related to hepatitis C and alcohol his hepatitis C is being treated, he has multiple other medical issues, came to the emergency room because of shortness of breath, distention of his abdomen, he was found to be in exacerbation of COPD, his imaging suggestive of a transition point and obstruction for which we are consulted. Has been complaining of increasing abdominal girth with abdominal distention and diffuse abdominal pain which is worse after eating, he think it is contributing to his shortness of breath. Did not have a BM in the last 2 days  The symptoms have been happening for on and off  He does report flatus yesterday but not today  Surgery saw the patient and did not suggest any surgical intervention. CAT scan of the abdomen, showing multiple low-attenuation liver lesions  High attenuation than simple cyst  Another lesion in segment 7 measures up to 3.7 cm  Was 3 cm previously, 3.5 cm lesion which was 1.8 cm in the past etc.  The colon is dilated with transverse colon measuring as much as 11.6 cm  With an abrupt transition at the junction of the transverse colon and the splenic flexure  No thickening is noted in this area  The small bowel are unremarkable, no lymphadenopathy.     His blood work-up showing white count of 12.2  Alpha-fetoprotein 3.5  CEA 5.2  Normal hemoglobin  Normal LFTs      Endoscopy 1/3/22 egd (bleibel) ild gastritis and distal esophagitis states he had colonoscopy with removal of benign colon polyps by dr Pollo Sky, no report in epic          Past Medical History: Diagnosis Date    Arthritis     CAD (coronary artery disease)     Cancer (HCC)     prostate    Cirrhosis with alcoholism ([facility]     COPD (chronic obstructive pulmonary disease) (HCC)     Depression     BOURGEOIS (dyspnea on exertion)     H/O prostate cancer     Hyperlipidemia     Hypertension     MI (myocardial infarction) ([facility]     Seizures ([facility] 2016    last one over 2 years ago     SVT (supraventricular tachycardia) ([facility]     Tobacco abuse       Past Surgical History:   Procedure Laterality Date    CARDIAC CATHETERIZATION      ablation    INSERTABLE CARDIAC MONITOR  10/03/2018    INTERNAL LOOP RECORDER    PROSTATECTOMY      SINUS SURGERY      TONGUE SURGERY      UPPER GASTROINTESTINAL ENDOSCOPY N/A 6/10/2021    EGD BIOPSY performed by Katie Clifofrd MD at [address]  01/03/2022    UPPER GASTROINTESTINAL ENDOSCOPY N/A 1/3/2022    EGD BIOPSY performed by Raji Hamlin MD at [address] [facility]      Past Endoscopic History as above    Admission Meds  No current facility-administered medications on file prior to encounter.      Current Outpatient Medications on File Prior to Encounter   Medication Sig Dispense Refill    fluticasone-salmeterol (ADVAIR HFA) 230-21 MCG/ACT inhaler Inhale 2 puffs into the lungs 2 times daily      vitamin B-12 (CYANOCOBALAMIN) 1000 MCG tablet Take 1,000 mcg by mouth daily      furosemide (LASIX) 40 MG tablet Take 40 mg by mouth daily      levETIRAcetam (KEPPRA) 500 MG tablet Take 500 mg by mouth 2 times daily      predniSONE (DELTASONE) 10 MG tablet Take 10 mg by mouth daily      losartan-hydroCHLOROthiazide (HYZAAR) 100-25 MG per tablet Take 1 tablet by mouth daily      Roflumilast (DALIRESP) 500 MCG tablet Take 1 tablet by mouth daily 30 tablet 0    ferrous gluconate (FERGON) 324 (38 Fe) MG tablet Take 324 mg by mouth daily (with breakfast)      famotidine (PEPCID) 40 MG tablet Take 40 mg by mouth 2 times daily as needed (heartburn)       amLODIPine (NORVASC) 10 MG tablet Take 10 mg by mouth daily      albuterol (PROVENTIL) (2.5 MG/3ML) 0.083% nebulizer solution Take 2.5 mg by nebulization every 6 hours as needed for Wheezing      albuterol sulfate  (90 Base) MCG/ACT inhaler Inhale 2 puffs into the lungs every 4-6 hours as needed for Wheezing      aspirin 81 MG tablet Take 81 mg by mouth daily      folic acid (FOLVITE) 1 MG tablet Take 1 mg by mouth daily      metoprolol succinate (TOPROL XL) 50 MG extended release tablet Take 50 mg by mouth daily      pantoprazole (PROTONIX) 40 MG tablet Take 40 mg by mouth daily      QUEtiapine (SEROQUEL) 400 MG tablet Take 200 mg by mouth nightly Takes 1/2 tab (200mg) nightly      vitamin B-1 (THIAMINE) 100 MG tablet Take 100 mg by mouth daily      Cholecalciferol (VITAMIN D3) 2000 units CAPS Take 1 capsule by mouth daily      tiotropium (SPIRIVA RESPIMAT) 2.5 MCG/ACT AERS inhaler Inhale 2 puffs into the lungs daily         Patient   Does Use ASA, NSAID No  Allergies  No Known Allergies     Social   Social History     Tobacco Use    Smoking status: Former     Packs/day: 0.10     Types: Cigarettes    Smokeless tobacco: Former     Quit date: 12/5/2021    Tobacco comments:     5/2021 quit    Substance Use Topics    Alcohol use: Not Currently     Comment: last drink at the end of Nov 2021        PSYCH HISTORY:  Depression no  Anxiety no  Suicide no      No family history on file. No family history of colon cancer, Crohn's disease, or ulcerative colitis. Review of Systems  Constitutional: negative  Eyes: negative  Ears, nose, mouth, throat, and face: negative  Respiratory: negative  Cardiovascular: negative  Gastrointestinal: negative  Genitourinary:negative  Integument/breast: negative  Hematologic/lymphatic: negative  Musculoskeletal:negative  Endocrine: negative           Physical Exam  Blood pressure 120/72, pulse 80, temperature 97.7 °F (36.5 °C), temperature source Oral, resp.  rate 20, height 5' 5\" (1.651 m), weight 208 lb 14.4 oz (94.8 kg), SpO2 90 %. General Appearance: alert and oriented to person, place and time, well-developed and well-nourished, in no acute distress  Skin: warm and dry, no rash or erythema  Head: normocephalic and atraumatic  Eyes: pupils equal, round, and reactive to light, extraocular eye movements intact, conjunctivae normal  ENT: hearing grossly normal bilaterally  Neck: neck supple and non tender without mass, no thyromegaly or thyroid nodules, no cervical lymphadenopathy   Pulmonary/Chest: clear to auscultation bilaterally- no wheezes, rales or rhonchi, normal air movement, no respiratory distress  Cardiovascular: normal rate, regular rhythm, normal S1 and S2, no murmurs, rubs, clicks or gallops, distal pulses intact, no carotid bruits  Abdomen: Distended, tympanic and tender mildly, normal bowel sounds, no masses or organomegaly  Extremities: no cyanosis, clubbing or edema  Musculoskeletal: normal range of motion, no joint swelling, deformity or tenderness  Neurologic: no cranial nerve deficit and muscle strength normal    Data Review:    Recent Labs     08/25/22  1224        Recent Labs     08/27/22  0751      K 3.1*      CO2 31   BUN 21*   CREATININE 0.84     No results for input(s): AST, ALT, ALB, BILIDIR, BILITOT, ALKPHOS in the last 72 hours. No results for input(s): LIPASE, AMYLASE in the last 72 hours. Recent Labs     08/25/22  1224   PROTIME 13.4   INR 1.0     No results for input(s): PTT in the last 72 hours. No results for input(s): OCCULTBLD in the last 72 hours.   CEA:    Lab Results   Component Value Date/Time    CEA 5.2 08/27/2022 07:51 AM     Ca 125:  No results found for:   Ca 19-9:  No results found for:   Ca 15-3:  No results found for:   AFP:  No components found for: AFAFP  Beta HCG:  No components found for: BHCG  Neuron Specific Enolase:  No results found for: NSE  Imaging Studies:                           All appropriate imaging studies and reports reviewed: Yes                 Assessment:     Principal Problem:    Acute exacerbation of COPD with asthma ([address]  Active Problems:    Abnormal abdominal CT scan    Family history of colon cancer    Elevated CEA    Hepatitis C virus infection without hepatic coma    Abdominal pain  Resolved Problems:    * No resolved hospital problems. 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 Erwin Bernard    Recommendations:   Deflation colonoscopy tomorrow  As surgery is not planning any surgery on this patient and he is very uncomfortable and distended  We will make sure he does not have malignancy  We cannot do MRI to further evaluate the liver enzyme because of a loop recorder  Will get multiphase CT to evaluate the liver lesions better  PPI  Surgery following  May use Dulcolax and enemas  To relieve the obstruction and in preparation and improve the colonoscopy quality tomorrow                      Thank you for allowing me to participate in the care of your patient. Please feel free to contact me with any questions or concerns.      Salima Centeno MD

## 2022-08-28 NOTE — RT PROTOCOL NOTE
RT Inhaler-Nebulizer Bronchodilator Protocol Note    There is a bronchodilator order in the chart from a provider indicating to follow the RT Bronchodilator Protocol and there is an Initiate RT Inhaler-Nebulizer Bronchodilator Protocol order as well (see protocol at bottom of note). CXR Findings:  No results found. The findings from the last RT Protocol Assessment were as follows:   History Pulmonary Disease: Chronic pulmonary disease  Respiratory Pattern: Dyspnea on exertion or RR 21-25 bpm (Pt states SOB is due to abdominal bloating.)  Breath Sounds: Slightly diminished and/or crackles  Cough: Strong, spontaneous, non-productive  Indication for Bronchodilator Therapy: On home bronchodilators  Bronchodilator Assessment Score: 6    Aerosolized bronchodilator medication orders have been revised according to the RT Inhaler-Nebulizer Bronchodilator Protocol below. Respiratory Therapist to perform RT Therapy Protocol Assessment initially then follow the protocol. Repeat RT Therapy Protocol Assessment PRN for score 0-3 or on second treatment, BID, and PRN for scores above 3. No Indications - adjust the frequency to every 6 hours PRN wheezing or bronchospasm, if no treatments needed after 48 hours then discontinue using Per Protocol order mode. If indication present, adjust the RT bronchodilator orders based on the Bronchodilator Assessment Score as indicated below. Use Inhaler orders unless patient has one or more of the following: on home nebulizer, not able to hold breath for 10 seconds, is not alert and oriented, cannot activate and use MDI correctly, or respiratory rate 25 breaths per minute or more, then use the equivalent nebulizer order(s) with same Frequency and PRN reasons based on the score. If a patient is on this medication at home then do not decrease Frequency below that used at home.     0-3 - enter or revise RT bronchodilator order(s) to equivalent RT Bronchodilator order with Frequency of every 4 hours PRN for wheezing or increased work of breathing using Per Protocol order mode. 4-6 - enter or revise RT Bronchodilator order(s) to two equivalent RT bronchodilator orders with one order with BID Frequency and one order with Frequency of every 4 hours PRN wheezing or increased work of breathing using Per Protocol order mode. 7-10 - enter or revise RT Bronchodilator order(s) to two equivalent RT bronchodilator orders with one order with TID Frequency and one order with Frequency of every 4 hours PRN wheezing or increased work of breathing using Per Protocol order mode. 11-13 - enter or revise RT Bronchodilator order(s) to one equivalent RT bronchodilator order with QID Frequency and an Albuterol order with Frequency of every 4 hours PRN wheezing or increased work of breathing using Per Protocol order mode. Greater than 13 - enter or revise RT Bronchodilator order(s) to one equivalent RT bronchodilator order with every 4 hours Frequency and an Albuterol order with Frequency of every 2 hours PRN wheezing or increased work of breathing using Per Protocol order mode. RT to enter RT Home Evaluation for COPD & MDI Assessment order using Per Protocol order mode.     Electronically signed by Sandra Sánchez RCP on 8/27/2022 at 8:06 PM

## 2022-08-28 NOTE — PLAN OF CARE
Problem: Respiratory - Adult  Goal: Achieves optimal ventilation and oxygenation  8/28/2022 0939 by Karolina Prado RCP  Outcome: Progressing     Problem: Respiratory - Adult  Goal: Clear lung sounds  8/28/2022 0939 by Karolina Prado RCP  Outcome: Progressing     Problem: Respiratory - Adult  Goal: Adequate oxygenation  Description: Adequate oxygenation  8/28/2022 0939 by Karolina Prado RCP  Outcome: Progressing

## 2022-08-28 NOTE — CONSULTS
Gastroenterology Consult Note      Patient: Lnydsey Reid  : 1965  Acct#:  [de-identified]     Date:  2022    Subjective:       History of Present Illness  We are waiting on the OR time all day today, they are and the on-call team are been busy from 8:00 this morning until now and do still have another surgery to be done for which they would not give us time for this procedure. The patient is comfortable did not have any vomiting did not have any fever or chills, passing some flatus, but no bowel movement. Past Medical History:   Diagnosis Date    Arthritis     CAD (coronary artery disease)     Cancer ([facility]     prostate    Cirrhosis with alcoholism ([facility]     COPD (chronic obstructive pulmonary disease) (HCC)     Depression     BOURGEOIS (dyspnea on exertion)     H/O prostate cancer     Hyperlipidemia     Hypertension     MI (myocardial infarction) ([facility] [facility]     Seizures ([facility] 2016    last one over 2 years ago     SVT (supraventricular tachycardia) ([facility] [facility]     Tobacco abuse       Past Surgical History:   Procedure Laterality Date    CARDIAC CATHETERIZATION      ablation    INSERTABLE CARDIAC MONITOR  10/03/2018    INTERNAL LOOP RECORDER    PROSTATECTOMY      SINUS SURGERY      TONGUE SURGERY      UPPER GASTROINTESTINAL ENDOSCOPY N/A 6/10/2021    EGD BIOPSY performed by Brianna Corey MD at [address] [place] [address]  2022    UPPER GASTROINTESTINAL ENDOSCOPY N/A 1/3/2022    EGD BIOPSY performed by Karen Gallegos MD at [address] [place] [address]      Past Endoscopic History as above    Admission Meds  No current facility-administered medications on file prior to encounter.      Current Outpatient Medications on File Prior to Encounter   Medication Sig Dispense Refill    fluticasone-salmeterol (ADVAIR HFA) 230-21 MCG/ACT inhaler Inhale 2 puffs into the lungs 2 times daily      vitamin B-12 (CYANOCOBALAMIN) 1000 MCG tablet Take 1,000 mcg by mouth daily      furosemide (LASIX) 40 MG tablet Take 40 mg by mouth daily      levETIRAcetam (KEPPRA) 500 MG tablet Take 500 mg by mouth 2 times daily      predniSONE (DELTASONE) 10 MG tablet Take 10 mg by mouth daily      losartan-hydroCHLOROthiazide (HYZAAR) 100-25 MG per tablet Take 1 tablet by mouth daily      Roflumilast (DALIRESP) 500 MCG tablet Take 1 tablet by mouth daily 30 tablet 0    ferrous gluconate (FERGON) 324 (38 Fe) MG tablet Take 324 mg by mouth daily (with breakfast)      famotidine (PEPCID) 40 MG tablet Take 40 mg by mouth 2 times daily as needed (heartburn)       amLODIPine (NORVASC) 10 MG tablet Take 10 mg by mouth daily      albuterol (PROVENTIL) (2.5 MG/3ML) 0.083% nebulizer solution Take 2.5 mg by nebulization every 6 hours as needed for Wheezing      albuterol sulfate  (90 Base) MCG/ACT inhaler Inhale 2 puffs into the lungs every 4-6 hours as needed for Wheezing      aspirin 81 MG tablet Take 81 mg by mouth daily      folic acid (FOLVITE) 1 MG tablet Take 1 mg by mouth daily      metoprolol succinate (TOPROL XL) 50 MG extended release tablet Take 50 mg by mouth daily      pantoprazole (PROTONIX) 40 MG tablet Take 40 mg by mouth daily      QUEtiapine (SEROQUEL) 400 MG tablet Take 200 mg by mouth nightly Takes 1/2 tab (200mg) nightly      vitamin B-1 (THIAMINE) 100 MG tablet Take 100 mg by mouth daily      Cholecalciferol (VITAMIN D3) 2000 units CAPS Take 1 capsule by mouth daily      tiotropium (SPIRIVA RESPIMAT) 2.5 MCG/ACT AERS inhaler Inhale 2 puffs into the lungs daily         Patient   Does Use ASA, NSAID No  Allergies  No Known Allergies     Social   Social History     Tobacco Use    Smoking status: Former     Packs/day: 0.10     Types: Cigarettes    Smokeless tobacco: Former     Quit date: 12/5/2021    Tobacco comments:     5/2021 quit    Substance Use Topics    Alcohol use: Not Currently     Comment: last drink at the end of Nov 2021        PSYCH HISTORY:  Depression no  Anxiety no  Suicide no      No family history on file.    No family history of colon cancer, Crohn's disease, or ulcerative colitis. Review of Systems  Constitutional: negative  Eyes: negative  Ears, nose, mouth, throat, and face: negative  Respiratory: negative  Cardiovascular: negative  Gastrointestinal: negative  Genitourinary:negative  Integument/breast: negative  Hematologic/lymphatic: negative  Musculoskeletal:negative  Endocrine: negative           Physical Exam  Blood pressure 116/69, pulse 75, temperature 97.8 °F (36.6 °C), temperature source Oral, resp. rate 17, height 5' 5\" (1.651 m), weight 208 lb 14.4 oz (94.8 kg), SpO2 96 %. General Appearance: alert and oriented to person, place and time, well-developed and well-nourished, in no acute distress  Skin: warm and dry, no rash or erythema  Head: normocephalic and atraumatic  Eyes: pupils equal, round, and reactive to light, extraocular eye movements intact, conjunctivae normal  ENT: hearing grossly normal bilaterally  Neck: neck supple and non tender without mass, no thyromegaly or thyroid nodules, no cervical lymphadenopathy   Pulmonary/Chest: clear to auscultation bilaterally- no wheezes, rales or rhonchi, normal air movement, no respiratory distress  Cardiovascular: normal rate, regular rhythm, normal S1 and S2, no murmurs, rubs, clicks or gallops, distal pulses intact, no carotid bruits  Abdomen: Distended, tympanic and tender mildly, normal bowel sounds, no masses or organomegaly  Extremities: no cyanosis, clubbing or edema  Musculoskeletal: normal range of motion, no joint swelling, deformity or tenderness  Neurologic: no cranial nerve deficit and muscle strength normal    Data Review:    No results for input(s): WBC, HGB, HCT, MCV, PLT in the last 72 hours. Recent Labs     08/27/22  0751      K 3.1*      CO2 31   BUN 21*   CREATININE 0.84       No results for input(s): AST, ALT, ALB, BILIDIR, BILITOT, ALKPHOS in the last 72 hours.   No results for input(s): LIPASE, AMYLASE in the last 72 hours. No results for input(s): PROTIME, INR in the last 72 hours. No results for input(s): PTT in the last 72 hours. No results for input(s): OCCULTBLD in the last 72 hours. CEA:    Lab Results   Component Value Date/Time    CEA 5.2 08/27/2022 07:51 AM     Ca 125:  No results found for:   Ca 19-9:  No results found for:   Ca 15-3:  No results found for:   AFP:  No components found for: AFAFP  Beta HCG:  No components found for: BHCG  Neuron Specific Enolase:  No results found for: NSE  Imaging Studies:                           All appropriate imaging studies and reports reviewed: Yes                 Assessment:     Principal Problem:    Acute exacerbation of COPD with asthma ([address]  Active Problems:    Abnormal abdominal CT scan    Family history of colon cancer    Elevated CEA    Colon obstruction (HCC)    Hepatitis C virus infection without hepatic coma    Lesion of liver    Abdominal pain  Resolved Problems:    * No resolved hospital problems. 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 Zac Mancera    Recommendations:   Deflation colonoscopy tomorrow as mentioned above he was scheduled for today but the OR and the on-call team is been busy all day and they would not give me OR time all day even at this time the still have 1 more surgery going on the expected couple hours at least, for which talk to the patient and we both agreed to watch him tonight very carefully and postpone the colonoscopy deflation till the morning.       We cannot do MRI to further evaluate the liver enzyme because of a loop recorder  Will get multiphase CT to evaluate the liver lesions better  PPI  Surgery following  May use Dulcolax and enemas  To relieve the obstruction and in preparation and improve the colonoscopy quality tomorrow                      Thank you for allowing me to participate in the care of your patient. Please feel free to contact me with any questions or concerns.      David Rausch MD

## 2022-08-28 NOTE — PROGRESS NOTES
Pulmonary Critical Care Progress Note       Patient seen for the follow up of Acute exacerbation of COPD with asthma ([address] , chronic hypoxic respiratory failure    Subjective:  He still reports having shortness of breath that he attributes to abdominal distention. He has been on oxygen 4 L nasal cannula  at baseline. Angel Jordone He feels shortness of breath is not much change. He denies significant cough or chest pain. He continues to feel like his belly is bloated/full giving him more troubles with his breathing. CT abdomen pelvis was done yesterday. Examination:  Vitals: /69   Pulse 75   Temp 97.8 °F (36.6 °C) (Oral)   Resp 17   Ht 5' 5\" (1.651 m)   Wt 208 lb 14.4 oz (94.8 kg)   SpO2 96%   BMI 34.76 kg/m²   General appearance: alert and cooperative with exam  Neck: No JVD  Lungs: Creased breath sound no wheezing  Heart: regular rate and rhythm, S1, S2 normal, no gallop  Abdomen: Soft, non tender, + BS  Extremities: no cyanosis or clubbing. Trace edema    LABs:  CBC:   No results for input(s): WBC, HGB, HCT, PLT in the last 72 hours. BMP:   Recent Labs     08/27/22  0751      K 3.1*   CO2 31   BUN 21*   CREATININE 0.84   LABGLOM >60   GLUCOSE 106*           Radiology:    CT abdomen pelvis 8/27  1. Previously described liver lesions are demonstrated as benign hemangiomas   on this exam.  No suspicious liver lesion identified. 2.  Moderate gaseous distention of the transverse colon without etiology on   this exam.  Moderate stool burden. X-ray chest 8/24/2022      Impression:  Chronic hypoxic respiratory failure   Acute exacerbation of COPD/active smoking  Mild pulmonary hypertension, RVSP 37 mmHg echo 7/12/2021  Suspected obstructive sleep apnea/Obesity  Liver cirrhosis with ascites/hepatitis C  Paroxysmal SVT status post ablation  Positive blood culture x1,?   Contamination    Recommendations:  Oxygen via nasal cannula, keep SPO2 90% or greater   Incentive spirometry every hour while awake   Albuterol by nebulizer  Symbicort  Replace potassium/check labs today  N.p.o./switch prednisone 40 p.o. daily to Solu-Medrol 20 every 12 hours  Kim Bianchi  Continue Rocephin and doxycycline  Surgery on consult  Colonoscopy today per GI  Outpatient sleep evaluation  DVT prophylaxis with low molecular weight heparin      Electronically signed by   Lupe Chaudhary MD on 8/28/2022 at 1:48 PM  Pulmonary Critical Care and Sleep Medicine,  [facility]: [phone]

## 2022-08-28 NOTE — RT PROTOCOL NOTE
RT Inhaler-Nebulizer Bronchodilator Protocol Note    There is a bronchodilator order in the chart from a provider indicating to follow the RT Bronchodilator Protocol and there is an Initiate RT Inhaler-Nebulizer Bronchodilator Protocol order as well (see protocol at bottom of note). CXR Findings:  No results found. The findings from the last RT Protocol Assessment were as follows:   History Pulmonary Disease: Chronic pulmonary disease  Respiratory Pattern: Dyspnea on exertion or RR 21-25 bpm  Breath Sounds: Intermittent or unilateral wheezes  Cough: Strong, spontaneous, non-productive  Indication for Bronchodilator Therapy: On home bronchodilators  Bronchodilator Assessment Score: 8    Aerosolized bronchodilator medication orders have been revised according to the RT Inhaler-Nebulizer Bronchodilator Protocol below. Respiratory Therapist to perform RT Therapy Protocol Assessment initially then follow the protocol. Repeat RT Therapy Protocol Assessment PRN for score 0-3 or on second treatment, BID, and PRN for scores above 3. No Indications - adjust the frequency to every 6 hours PRN wheezing or bronchospasm, if no treatments needed after 48 hours then discontinue using Per Protocol order mode. If indication present, adjust the RT bronchodilator orders based on the Bronchodilator Assessment Score as indicated below. Use Inhaler orders unless patient has one or more of the following: on home nebulizer, not able to hold breath for 10 seconds, is not alert and oriented, cannot activate and use MDI correctly, or respiratory rate 25 breaths per minute or more, then use the equivalent nebulizer order(s) with same Frequency and PRN reasons based on the score. If a patient is on this medication at home then do not decrease Frequency below that used at home.     0-3 - enter or revise RT bronchodilator order(s) to equivalent RT Bronchodilator order with Frequency of every 4 hours PRN for wheezing or increased work of breathing using Per Protocol order mode. 4-6 - enter or revise RT Bronchodilator order(s) to two equivalent RT bronchodilator orders with one order with BID Frequency and one order with Frequency of every 4 hours PRN wheezing or increased work of breathing using Per Protocol order mode. 7-10 - enter or revise RT Bronchodilator order(s) to two equivalent RT bronchodilator orders with one order with TID Frequency and one order with Frequency of every 4 hours PRN wheezing or increased work of breathing using Per Protocol order mode. 11-13 - enter or revise RT Bronchodilator order(s) to one equivalent RT bronchodilator order with QID Frequency and an Albuterol order with Frequency of every 4 hours PRN wheezing or increased work of breathing using Per Protocol order mode. Greater than 13 - enter or revise RT Bronchodilator order(s) to one equivalent RT bronchodilator order with every 4 hours Frequency and an Albuterol order with Frequency of every 2 hours PRN wheezing or increased work of breathing using Per Protocol order mode. RT to enter RT Home Evaluation for COPD & MDI Assessment order using Per Protocol order mode.     Electronically signed by Lynn Parker RCP on 8/28/2022 at 7:38 AM

## 2022-08-29 ENCOUNTER — ANESTHESIA EVENT (OUTPATIENT)
Dept: OPERATING ROOM | Age: 57
DRG: 140 | End: 2022-08-29
Payer: MEDICARE

## 2022-08-29 ENCOUNTER — ANESTHESIA (OUTPATIENT)
Dept: OPERATING ROOM | Age: 57
DRG: 140 | End: 2022-08-29
Payer: MEDICARE

## 2022-08-29 LAB
ABSOLUTE EOS #: <0.03 K/UL (ref 0–0.44)
ABSOLUTE IMMATURE GRANULOCYTE: 0.11 K/UL (ref 0–0.3)
ABSOLUTE LYMPH #: 1 K/UL (ref 1.1–3.7)
ABSOLUTE MONO #: 0.53 K/UL (ref 0.1–1.2)
ANION GAP SERPL CALCULATED.3IONS-SCNC: 10 MMOL/L (ref 9–17)
BASOPHILS # BLD: 0 % (ref 0–2)
BASOPHILS ABSOLUTE: <0.03 K/UL (ref 0–0.2)
BUN BLDV-MCNC: 23 MG/DL (ref 6–20)
BUN/CREAT BLD: 27 (ref 9–20)
CALCIUM SERPL-MCNC: 9.8 MG/DL (ref 8.6–10.4)
CHLORIDE BLD-SCNC: 99 MMOL/L (ref 98–107)
CO2: 32 MMOL/L (ref 20–31)
CREAT SERPL-MCNC: 0.86 MG/DL (ref 0.7–1.2)
CULTURE: NORMAL
EOSINOPHILS RELATIVE PERCENT: 0 % (ref 1–4)
GFR AFRICAN AMERICAN: >60 ML/MIN
GFR NON-AFRICAN AMERICAN: >60 ML/MIN
GFR SERPL CREATININE-BSD FRML MDRD: ABNORMAL ML/MIN/{1.73_M2}
GLUCOSE BLD-MCNC: 123 MG/DL (ref 70–99)
HCT VFR BLD CALC: 40.8 % (ref 40.7–50.3)
HEMOGLOBIN: 13.2 G/DL (ref 13–17)
IMMATURE GRANULOCYTES: 1 %
LYMPHOCYTES # BLD: 10 % (ref 24–43)
Lab: NORMAL
MCH RBC QN AUTO: 30.8 PG (ref 25.2–33.5)
MCHC RBC AUTO-ENTMCNC: 32.4 G/DL (ref 28.4–34.8)
MCV RBC AUTO: 95.3 FL (ref 82.6–102.9)
MONOCYTES # BLD: 5 % (ref 3–12)
NRBC AUTOMATED: 0 PER 100 WBC
PDW BLD-RTO: 12.5 % (ref 11.8–14.4)
PLATELET # BLD: 352 K/UL (ref 138–453)
PMV BLD AUTO: 9.5 FL (ref 8.1–13.5)
POTASSIUM SERPL-SCNC: 3.7 MMOL/L (ref 3.7–5.3)
RBC # BLD: 4.28 M/UL (ref 4.21–5.77)
SEG NEUTROPHILS: 84 % (ref 36–65)
SEGMENTED NEUTROPHILS ABSOLUTE COUNT: 8.88 K/UL (ref 1.5–8.1)
SODIUM BLD-SCNC: 141 MMOL/L (ref 135–144)
SPECIMEN DESCRIPTION: NORMAL
WBC # BLD: 10.5 K/UL (ref 3.5–11.3)

## 2022-08-29 PROCEDURE — 6360000002 HC RX W HCPCS: Performed by: NURSE PRACTITIONER

## 2022-08-29 PROCEDURE — 6360000002 HC RX W HCPCS: Performed by: STUDENT IN AN ORGANIZED HEALTH CARE EDUCATION/TRAINING PROGRAM

## 2022-08-29 PROCEDURE — 3700000000 HC ANESTHESIA ATTENDED CARE: Performed by: INTERNAL MEDICINE

## 2022-08-29 PROCEDURE — 2500000003 HC RX 250 WO HCPCS: Performed by: STUDENT IN AN ORGANIZED HEALTH CARE EDUCATION/TRAINING PROGRAM

## 2022-08-29 PROCEDURE — 6370000000 HC RX 637 (ALT 250 FOR IP): Performed by: INTERNAL MEDICINE

## 2022-08-29 PROCEDURE — 7100000001 HC PACU RECOVERY - ADDTL 15 MIN: Performed by: INTERNAL MEDICINE

## 2022-08-29 PROCEDURE — 6370000000 HC RX 637 (ALT 250 FOR IP): Performed by: FAMILY MEDICINE

## 2022-08-29 PROCEDURE — 36415 COLL VENOUS BLD VENIPUNCTURE: CPT

## 2022-08-29 PROCEDURE — 6370000000 HC RX 637 (ALT 250 FOR IP): Performed by: STUDENT IN AN ORGANIZED HEALTH CARE EDUCATION/TRAINING PROGRAM

## 2022-08-29 PROCEDURE — 2700000000 HC OXYGEN THERAPY PER DAY

## 2022-08-29 PROCEDURE — 94761 N-INVAS EAR/PLS OXIMETRY MLT: CPT

## 2022-08-29 PROCEDURE — 3700000001 HC ADD 15 MINUTES (ANESTHESIA): Performed by: INTERNAL MEDICINE

## 2022-08-29 PROCEDURE — 97116 GAIT TRAINING THERAPY: CPT

## 2022-08-29 PROCEDURE — 94640 AIRWAY INHALATION TREATMENT: CPT

## 2022-08-29 PROCEDURE — 97530 THERAPEUTIC ACTIVITIES: CPT

## 2022-08-29 PROCEDURE — 0DJD8ZZ INSPECTION OF LOWER INTESTINAL TRACT, VIA NATURAL OR ARTIFICIAL OPENING ENDOSCOPIC: ICD-10-PCS | Performed by: INTERNAL MEDICINE

## 2022-08-29 PROCEDURE — 7100000000 HC PACU RECOVERY - FIRST 15 MIN: Performed by: INTERNAL MEDICINE

## 2022-08-29 PROCEDURE — 6360000002 HC RX W HCPCS: Performed by: INTERNAL MEDICINE

## 2022-08-29 PROCEDURE — 88305 TISSUE EXAM BY PATHOLOGIST: CPT

## 2022-08-29 PROCEDURE — 80048 BASIC METABOLIC PNL TOTAL CA: CPT

## 2022-08-29 PROCEDURE — 1200000000 HC SEMI PRIVATE

## 2022-08-29 PROCEDURE — 3609010300 HC COLONOSCOPY W/BIOPSY SINGLE/MULTIPLE: Performed by: INTERNAL MEDICINE

## 2022-08-29 PROCEDURE — 99232 SBSQ HOSP IP/OBS MODERATE 35: CPT | Performed by: INTERNAL MEDICINE

## 2022-08-29 PROCEDURE — A4216 STERILE WATER/SALINE, 10 ML: HCPCS | Performed by: NURSE PRACTITIONER

## 2022-08-29 PROCEDURE — 85025 COMPLETE CBC W/AUTO DIFF WBC: CPT

## 2022-08-29 PROCEDURE — 6370000000 HC RX 637 (ALT 250 FOR IP): Performed by: HOSPITALIST

## 2022-08-29 PROCEDURE — 2580000003 HC RX 258: Performed by: NURSE PRACTITIONER

## 2022-08-29 PROCEDURE — 2709999900 HC NON-CHARGEABLE SUPPLY: Performed by: INTERNAL MEDICINE

## 2022-08-29 PROCEDURE — 45380 COLONOSCOPY AND BIOPSY: CPT | Performed by: INTERNAL MEDICINE

## 2022-08-29 PROCEDURE — C9113 INJ PANTOPRAZOLE SODIUM, VIA: HCPCS | Performed by: NURSE PRACTITIONER

## 2022-08-29 PROCEDURE — 0DBH8ZX EXCISION OF CECUM, VIA NATURAL OR ARTIFICIAL OPENING ENDOSCOPIC, DIAGNOSTIC: ICD-10-PCS | Performed by: INTERNAL MEDICINE

## 2022-08-29 RX ORDER — PROPOFOL 10 MG/ML
INJECTION, EMULSION INTRAVENOUS PRN
Status: DISCONTINUED | OUTPATIENT
Start: 2022-08-29 | End: 2022-08-29 | Stop reason: SDUPTHER

## 2022-08-29 RX ORDER — POTASSIUM CHLORIDE 20 MEQ/1
40 TABLET, EXTENDED RELEASE ORAL
Status: COMPLETED | OUTPATIENT
Start: 2022-08-29 | End: 2022-08-29

## 2022-08-29 RX ORDER — DICYCLOMINE HYDROCHLORIDE 10 MG/1
10 CAPSULE ORAL
Status: DISCONTINUED | OUTPATIENT
Start: 2022-08-29 | End: 2022-09-02 | Stop reason: HOSPADM

## 2022-08-29 RX ORDER — LIDOCAINE HYDROCHLORIDE 20 MG/ML
INJECTION, SOLUTION EPIDURAL; INFILTRATION; INTRACAUDAL; PERINEURAL PRN
Status: DISCONTINUED | OUTPATIENT
Start: 2022-08-29 | End: 2022-08-29 | Stop reason: SDUPTHER

## 2022-08-29 RX ADMIN — PROPOFOL 30 MG: 10 INJECTION, EMULSION INTRAVENOUS at 18:54

## 2022-08-29 RX ADMIN — FOLIC ACID 1 MG: 1 TABLET ORAL at 07:58

## 2022-08-29 RX ADMIN — METOPROLOL SUCCINATE 50 MG: 50 TABLET, FILM COATED, EXTENDED RELEASE ORAL at 07:59

## 2022-08-29 RX ADMIN — DICYCLOMINE HYDROCHLORIDE 10 MG: 10 CAPSULE ORAL at 22:33

## 2022-08-29 RX ADMIN — Medication 100 MG: at 08:18

## 2022-08-29 RX ADMIN — ROFLUMILAST 500 MCG: 500 TABLET ORAL at 07:59

## 2022-08-29 RX ADMIN — DOXYCYCLINE 100 MG: 100 CAPSULE ORAL at 07:59

## 2022-08-29 RX ADMIN — POTASSIUM CHLORIDE 40 MEQ: 1500 TABLET, EXTENDED RELEASE ORAL at 06:44

## 2022-08-29 RX ADMIN — MONTELUKAST 10 MG: 10 TABLET, FILM COATED ORAL at 21:24

## 2022-08-29 RX ADMIN — LIDOCAINE HYDROCHLORIDE 100 MG: 20 INJECTION, SOLUTION EPIDURAL; INFILTRATION; INTRACAUDAL at 18:41

## 2022-08-29 RX ADMIN — FERROUS SULFATE TAB EC 325 MG (65 MG FE EQUIVALENT) 324 MG: 325 (65 FE) TABLET DELAYED RESPONSE at 08:00

## 2022-08-29 RX ADMIN — FUROSEMIDE 40 MG: 40 TABLET ORAL at 07:59

## 2022-08-29 RX ADMIN — QUETIAPINE FUMARATE 200 MG: 200 TABLET ORAL at 21:24

## 2022-08-29 RX ADMIN — METHYLPREDNISOLONE SODIUM SUCCINATE 20 MG: 40 INJECTION, POWDER, FOR SOLUTION INTRAMUSCULAR; INTRAVENOUS at 08:06

## 2022-08-29 RX ADMIN — IPRATROPIUM BROMIDE AND ALBUTEROL SULFATE 1 AMPULE: 2.5; .5 SOLUTION RESPIRATORY (INHALATION) at 07:41

## 2022-08-29 RX ADMIN — PROPOFOL 80 MG: 10 INJECTION, EMULSION INTRAVENOUS at 18:41

## 2022-08-29 RX ADMIN — IPRATROPIUM BROMIDE AND ALBUTEROL SULFATE 1 AMPULE: 2.5; .5 SOLUTION RESPIRATORY (INHALATION) at 19:59

## 2022-08-29 RX ADMIN — BUDESONIDE AND FORMOTEROL FUMARATE DIHYDRATE 2 PUFF: 160; 4.5 AEROSOL RESPIRATORY (INHALATION) at 07:42

## 2022-08-29 RX ADMIN — POTASSIUM CHLORIDE 40 MEQ: 1500 TABLET, EXTENDED RELEASE ORAL at 08:00

## 2022-08-29 RX ADMIN — HYDROCHLOROTHIAZIDE 25 MG: 25 TABLET ORAL at 07:59

## 2022-08-29 RX ADMIN — PROPOFOL 30 MG: 10 INJECTION, EMULSION INTRAVENOUS at 18:58

## 2022-08-29 RX ADMIN — LEVETIRACETAM 500 MG: 500 TABLET, FILM COATED ORAL at 07:59

## 2022-08-29 RX ADMIN — AMLODIPINE BESYLATE 10 MG: 10 TABLET ORAL at 07:59

## 2022-08-29 RX ADMIN — PROPOFOL 30 MG: 10 INJECTION, EMULSION INTRAVENOUS at 19:01

## 2022-08-29 RX ADMIN — PROPOFOL 40 MG: 10 INJECTION, EMULSION INTRAVENOUS at 18:50

## 2022-08-29 RX ADMIN — LOSARTAN POTASSIUM 100 MG: 100 TABLET, FILM COATED ORAL at 07:58

## 2022-08-29 RX ADMIN — HYDROCODONE BITARTRATE AND ACETAMINOPHEN 2 TABLET: 5; 325 TABLET ORAL at 22:29

## 2022-08-29 RX ADMIN — PROPOFOL 40 MG: 10 INJECTION, EMULSION INTRAVENOUS at 18:43

## 2022-08-29 RX ADMIN — LEVETIRACETAM 500 MG: 500 TABLET, FILM COATED ORAL at 21:24

## 2022-08-29 RX ADMIN — SODIUM CHLORIDE, PRESERVATIVE FREE 40 MG: 5 INJECTION INTRAVENOUS at 08:04

## 2022-08-29 RX ADMIN — BUDESONIDE AND FORMOTEROL FUMARATE DIHYDRATE 2 PUFF: 160; 4.5 AEROSOL RESPIRATORY (INHALATION) at 19:59

## 2022-08-29 RX ADMIN — KETOROLAC TROMETHAMINE 15 MG: 15 INJECTION, SOLUTION INTRAMUSCULAR; INTRAVENOUS at 15:57

## 2022-08-29 RX ADMIN — KETOROLAC TROMETHAMINE 15 MG: 15 INJECTION, SOLUTION INTRAMUSCULAR; INTRAVENOUS at 09:17

## 2022-08-29 RX ADMIN — KETOROLAC TROMETHAMINE 15 MG: 15 INJECTION, SOLUTION INTRAMUSCULAR; INTRAVENOUS at 01:36

## 2022-08-29 RX ADMIN — PROPOFOL 40 MG: 10 INJECTION, EMULSION INTRAVENOUS at 18:46

## 2022-08-29 ASSESSMENT — PAIN DESCRIPTION - ORIENTATION: ORIENTATION: LOWER

## 2022-08-29 ASSESSMENT — PAIN DESCRIPTION - DESCRIPTORS
DESCRIPTORS: DISCOMFORT
DESCRIPTORS: CRAMPING

## 2022-08-29 ASSESSMENT — PAIN SCALES - GENERAL
PAINLEVEL_OUTOF10: 1
PAINLEVEL_OUTOF10: 5
PAINLEVEL_OUTOF10: 7

## 2022-08-29 ASSESSMENT — PAIN DESCRIPTION - LOCATION
LOCATION: ABDOMEN
LOCATION: ABDOMEN

## 2022-08-29 NOTE — RT PROTOCOL NOTE
RT Inhaler-Nebulizer Bronchodilator Protocol Note    There is a bronchodilator order in the chart from a provider indicating to follow the RT Bronchodilator Protocol and there is an Initiate RT Inhaler-Nebulizer Bronchodilator Protocol order as well (see protocol at bottom of note). CXR Findings:  No results found. The findings from the last RT Protocol Assessment were as follows:   History Pulmonary Disease: Chronic pulmonary disease  Respiratory Pattern: Dyspnea on exertion or RR 21-25 bpm  Breath Sounds: Slightly diminished and/or crackles  Cough: Strong, spontaneous, non-productive  Indication for Bronchodilator Therapy: Decreased or absent breath sounds  Bronchodilator Assessment Score: 6    Aerosolized bronchodilator medication orders have been revised according to the RT Inhaler-Nebulizer Bronchodilator Protocol below. Respiratory Therapist to perform RT Therapy Protocol Assessment initially then follow the protocol. Repeat RT Therapy Protocol Assessment PRN for score 0-3 or on second treatment, BID, and PRN for scores above 3. No Indications - adjust the frequency to every 6 hours PRN wheezing or bronchospasm, if no treatments needed after 48 hours then discontinue using Per Protocol order mode. If indication present, adjust the RT bronchodilator orders based on the Bronchodilator Assessment Score as indicated below. Use Inhaler orders unless patient has one or more of the following: on home nebulizer, not able to hold breath for 10 seconds, is not alert and oriented, cannot activate and use MDI correctly, or respiratory rate 25 breaths per minute or more, then use the equivalent nebulizer order(s) with same Frequency and PRN reasons based on the score. If a patient is on this medication at home then do not decrease Frequency below that used at home.     0-3 - enter or revise RT bronchodilator order(s) to equivalent RT Bronchodilator order with Frequency of every 4 hours PRN for wheezing or increased work of breathing using Per Protocol order mode. 4-6 - enter or revise RT Bronchodilator order(s) to two equivalent RT bronchodilator orders with one order with BID Frequency and one order with Frequency of every 4 hours PRN wheezing or increased work of breathing using Per Protocol order mode. 7-10 - enter or revise RT Bronchodilator order(s) to two equivalent RT bronchodilator orders with one order with TID Frequency and one order with Frequency of every 4 hours PRN wheezing or increased work of breathing using Per Protocol order mode. 11-13 - enter or revise RT Bronchodilator order(s) to one equivalent RT bronchodilator order with QID Frequency and an Albuterol order with Frequency of every 4 hours PRN wheezing or increased work of breathing using Per Protocol order mode. Greater than 13 - enter or revise RT Bronchodilator order(s) to one equivalent RT bronchodilator order with every 4 hours Frequency and an Albuterol order with Frequency of every 2 hours PRN wheezing or increased work of breathing using Per Protocol order mode. RT to enter RT Home Evaluation for COPD & MDI Assessment order using Per Protocol order mode.     Electronically signed by Tash Boateng RCP on 8/28/2022 at 8:27 PM

## 2022-08-29 NOTE — CARE COORDINATION
Pt scheduled for colonoscopy today per Dr. Yasmin Lau. Follow post op. Remains on IV Solu Medrol for COPD. Surgery, GI and pulm continue to follow.

## 2022-08-29 NOTE — PLAN OF CARE
Problem: Respiratory - Adult  Goal: Achieves optimal ventilation and oxygenation  8/29/2022 0745 by Gema Estes RCP  Outcome: Progressing  8/29/2022 0428 by Clare De La Garza RN  Outcome: Progressing  8/28/2022 2026 by Thuy Hoskins RCP  Outcome: Progressing  Goal: Clear lung sounds  8/29/2022 0745 by CAROLIN BelloP  Outcome: Progressing  8/28/2022 2026 by Thuy Hoskins RCP  Outcome: Progressing  Goal: Able to breathe comfortably  8/29/2022 0745 by Gema Estes RCP  Outcome: Progressing  8/28/2022 2026 by Thuy Hoskins RCP  Outcome: Progressing  Goal: Adequate oxygenation  Description: Adequate oxygenation  8/29/2022 0745 by Gema Estes RCP  Outcome: Progressing  8/28/2022 2026 by Thuy Hoskins RCP  Outcome: Progressing

## 2022-08-29 NOTE — PROGRESS NOTES
DATE: 2022    NAME: Manda Peterson  MRN: [id number]   : 1965    Patient not seen this date for Occupational Therapy due to:      [] Cancel by RN or physician due to:    [] Hemodialysis    [] Critical Lab Value Level     [] Blood transfusion in progress    [] Acute or unstable cardiovascular status   _MAP < 55 or more than >115  _HR < 40 or > 130    [] Acute or unstable pulmonary status   -FiO2 > 60%   _RR < 5 or >40    _O2 sats < 85%    [] Strict Bedrest    [] Off Unit for surgery or procedure    [] Off Unit for testing       [] Pending imaging to R/O fracture    [x] Refusal by Patient : Pt. Declined treatment in p.m. stating his stomach didn't feel good and he was waiting for procedure. OT will cont. To follow. [] Other      [] OT being discontinued at this time. Patient independent. No further needs. [] OT being discontinued at this time as the patient has been transferred to hospice care. No further needs.       Iliana Mena TRISTIAN

## 2022-08-29 NOTE — PROGRESS NOTES
General Surgery:  Daily Progress Note          PATIENT NAME: Jose Cruz Lilly     TODAY'S DATE: 8/29/2022, 5:58 AM    SUBJECTIVE:     Pt seen and examined at bedside. No acute overnight events. Passing flatus, denies any nausea or vomiting. Anxiously awaiting colonoscopy today. ROS:   General: No fevers or chills  Cardiac: No chest pain or palpitations  Respiratory: No shortness of breath or wheezing  Abdomen: No nausea or emesis; +R abdominal pain  Neuro: No headache, dizziness, syncope    OBJECTIVE:   VITALS:  /77   Pulse 75   Temp 98.2 °F (36.8 °C) (Oral)   Resp 16   Ht 5' 5\" (1.651 m)   Wt 206 lb (93.4 kg)   SpO2 95%   BMI 34.28 kg/m²      INTAKE/OUTPUT:      Intake/Output Summary (Last 24 hours) at 8/29/2022 0558  Last data filed at 8/28/2022 1749  Gross per 24 hour   Intake 43.04 ml   Output --   Net 43.04 ml       PHYSICAL EXAM:  General Appearance: awake, alert, oriented, in no acute distress  HEENT:  Normocephalic, atraumatic, mucus membranes moist  Heart: Regular rate and rhythm  Lungs: normal effort with symmetric rise and fall of chest wall  Abdomen: softly distended, TTP in right abdomen without guarding or rebound tenderness   Extremities: No cyanosis, pitting edema, rashes noted. Skin: Skin color, texture, turgor normal. No rashes or lesions. Data:  CBC:   Recent Labs     08/28/22  1423 08/29/22  0546   WBC 10.2 10.5   HGB 13.4 13.2    352     Chemistry:   Recent Labs     08/26/22  0559 08/27/22  0527 08/27/22  0751 08/28/22  1423   NA  --   --  140 141   K  --   --  3.1* 3.1*   CL  --   --  100 98   CO2  --   --  31 35*   GLUCOSE  --   --  106* 104*   BUN  --   --  21* 22*   CREATININE  --   --  0.84 1.00   MG  --   --  1.9  --    ANIONGAP  --   --  9 8*   LABGLOM  --   --  >60 >60   GFRAA  --   --  >60 >60   CALCIUM  --   --  9.8 9.8   PROBNP 47 92  --   --      Hepatic: No results for input(s): AST, ALT, ALB, ALKPHOS, BILITOT, BILIDIR in the last 72 hours.   Coagulation: No results for input(s): APTT, PROT, INR in the last 72 hours. Radiology Review:    No new imaging to review      ASSESSMENT:  Active Hospital Problems    Diagnosis Date Noted    Colon obstruction ([facility] [C47.195] 08/28/2022     Priority: Medium    Abnormal abdominal CT scan [R93.5] 08/27/2022     Priority: Medium    Family history of colon cancer [Z80.0] 08/27/2022     Priority: Medium    Elevated CEA [R97.0] 08/27/2022     Priority: Medium    Acute exacerbation of COPD with asthma ([facility] [J44.1, J45.901] 08/24/2022     Priority: Medium    Abdominal pain [R10.9]     Lesion of liver [K76.9]     Hepatitis C virus infection without hepatic coma [B19.20] 03/09/2019       59 y.o. male with COPD exacerbation, abdominal distention; CT abdomen pelvis with narrowing of colon at the splenic flexure      Plan:  Continue medical management and supportive care per primary team  Continue conservative management at this time. Colonoscopy today with GI  Will follow up results of scope  Repeat CT shows benign liver hemangiomas  Replace electrolytes - keep K >4, Mg >2, Phos >3  Continue to encourage ambulation/activity w/ PT/OT  Will continue to follow    Electronically signed by Roshni Aguirre DO  on 8/29/2022 at 5:58 AM     General Surgery Attending Attestation Note    Patient was seen and examined. I agree with the above resident's exam, assessment and plan. For colonoscopy today. Passing flatus and having small bowel movements.  Nausea improved    Monalisa Fraser, [address], [place], [facility], [address]  Office: [phone]  After Hours: Please call answering service

## 2022-08-29 NOTE — PROGRESS NOTES
Comprehensive Nutrition Assessment    Type and Reason for Visit:  NPO/Clear Liquid    Nutrition Recommendations/Plan:   Diet NPO  Monitor diet advancement, GI function and labs  Consider need for nutrition support if diet can not be advanced in the next 24 hours     Malnutrition Assessment:  Malnutrition Status: At risk for malnutrition (Comment) (08/29/22 5350)        Nutrition Assessment:    Patient assessed for NPO/Clear liquid diet for 5 days. Patient admission is related to COPD exacerbation with asthma. Patient is NPO for colonoscopy today. Will monitor diet advancement, GI function and labs. If diet can not be advanced in the next 24 hours consider starting nutrition support. Nutrition Related Findings:    Edema: trace BLE, active bowel sounds. Abdominal pain. Enema on 8/28 Wound Type: None       Current Nutrition Intake & Therapies:    Average Meal Intake: NPO  Average Supplements Intake: NPO  Diet NPO    Anthropometric Measures:  Height: 5' 5\" (165.1 cm)  Ideal Body Weight (IBW): 136 lbs (62 kg)       Current Body Weight: 206 lb (93.4 kg), 151.5 % IBW. Weight Source: Bed Scale  Current BMI (kg/m2): 34.3                          BMI Categories: Obese Class 1 (BMI 30.0-34. 9)    Estimated Daily Nutrient Needs:  Energy Requirements Based On: Kcal/kg  Weight Used for Energy Requirements: Current  Energy (kcal/day): 3799-0881 kcal (16-18 kcal/kg)  Weight Used for Protein Requirements: Ideal  Protein (g/day): 74-81 gm of protein (1.2-1.3 gm/kg)       Nutrition Diagnosis:   Altered GI function related to altered GI function as evidenced by GI abnormality, constipation    Nutrition Interventions:   Food and/or Nutrient Delivery: Continue NPO  Nutrition Education/Counseling: Education not indicated  Coordination of Nutrition Care: Continue to monitor while inpatient       Goals:     Goals: Initiate PO diet       Nutrition Monitoring and Evaluation:      Food/Nutrient Intake Outcomes: Diet Advancement/Tolerance  Physical Signs/Symptoms Outcomes: Biochemical Data, Fluid Status or Edema, Skin, Weight, Constipation, GI Status    Discharge Planning:     Too soon to determine           Mirza PEARCE, RDN, LDN  Lead Clinical Dietitian  RD Office Phone [phone]

## 2022-08-29 NOTE — PLAN OF CARE
Problem: Discharge Planning  Goal: Discharge to home or other facility with appropriate resources  Outcome: Progressing     Problem: Respiratory - Adult  Goal: Achieves optimal ventilation and oxygenation  8/29/2022 0428 by Jenny Lara RN  Outcome: Progressing  8/28/2022 2026 by Joy Hoyos RCP  Outcome: Progressing  Goal: Clear lung sounds  8/28/2022 2026 by Joy Hoyos RCP  Outcome: Progressing  Goal: Able to breathe comfortably  8/28/2022 2026 by Joy Hoyos RCP  Outcome: Progressing  Goal: Adequate oxygenation  Description: Adequate oxygenation  8/28/2022 2026 by Joy Hoyos RCP  Outcome: Progressing     Problem: Pain  Goal: Verbalizes/displays adequate comfort level or baseline comfort level  Outcome: Progressing     Problem: Safety - Adult  Goal: Free from fall injury  Outcome: Progressing     Problem: Chronic Conditions and Co-morbidities  Goal: Patient's chronic conditions and co-morbidity symptoms are monitored and maintained or improved  Outcome: Progressing

## 2022-08-29 NOTE — PLAN OF CARE
Problem: Discharge Planning  Goal: Discharge to home or other facility with appropriate resources  8/29/2022 1613 by Jimi Garcia RN  Outcome: Progressing  Flowsheets (Taken 8/29/2022 0759)  Discharge to home or other facility with appropriate resources: Identify barriers to discharge with patient and caregiver  8/29/2022 0428 by Kiko Marie RN  Outcome: Progressing     Problem: Respiratory - Adult  Goal: Achieves optimal ventilation and oxygenation  8/29/2022 1613 by Jimi Garcia RN  Outcome: Progressing  Flowsheets (Taken 8/29/2022 0759)  Achieves optimal ventilation and oxygenation: Assess for changes in respiratory status  8/29/2022 0745 by Carlyn Kanner, RCP  Outcome: Progressing  8/29/2022 0428 by Kiko Marie RN  Outcome: Progressing  Goal: Clear lung sounds  8/29/2022 0745 by Carlyn Kanner, RCP  Outcome: Progressing  Goal: Able to breathe comfortably  8/29/2022 0745 by Carlyn Kanner, RCP  Outcome: Progressing  Goal: Adequate oxygenation  Description: Adequate oxygenation  8/29/2022 0745 by Carlyn Kanner, RCP  Outcome: Progressing     Problem: Pain  Goal: Verbalizes/displays adequate comfort level or baseline comfort level  8/29/2022 1613 by Jimi Garcia RN  Outcome: Progressing  8/29/2022 0428 by Kiko Marie RN  Outcome: Progressing     Problem: Safety - Adult  Goal: Free from fall injury  8/29/2022 1613 by Jimi Garcia RN  Outcome: Progressing  8/29/2022 0428 by Kiko Marie RN  Outcome: Progressing     Problem: Chronic Conditions and Co-morbidities  Goal: Patient's chronic conditions and co-morbidity symptoms are monitored and maintained or improved  8/29/2022 1613 by Jimi Garcia RN  Outcome: Progressing  Flowsheets (Taken 8/29/2022 0759)  Care Plan - Patient's Chronic Conditions and Co-Morbidity Symptoms are Monitored and Maintained or Improved: Monitor and assess patient's chronic conditions and comorbid symptoms for stability, deterioration, or improvement  8/29/2022 0428 by Marlen Fan Atul Ordonez RN  Outcome: Progressing     Problem: ABCDS Injury Assessment  Goal: Absence of physical injury  8/29/2022 1613 by Marielena Suarez RN  Outcome: Progressing  8/29/2022 0428 by Kendy Wilde RN  Outcome: Progressing     Problem: Musculoskeletal - Adult  Goal: Return mobility to safest level of function  Outcome: Progressing     Problem: Gastrointestinal - Adult  Goal: Minimal or absence of nausea and vomiting  Outcome: Progressing  Flowsheets (Taken 8/29/2022 5981)  Minimal or absence of nausea and vomiting: Administer IV fluids as ordered to ensure adequate hydration

## 2022-08-29 NOTE — PROGRESS NOTES
Pulmonary Critical Care Progress Note       Patient seen for the follow up of Acute exacerbation of COPD with asthma ([address] , chronic hypoxic respiratory failure    Subjective:  Colonoscopy was postponed yesterday . he still reports having shortness of breath that he attributes to abdominal distention. He has been on oxygen 4 L nasal cannula  at baseline. Lawernce Roughen He feels shortness of breath is not much change. He denies significant cough or chest pain. Examination:  Vitals: /70   Pulse 73   Temp 98.2 °F (36.8 °C) (Oral)   Resp 15   Ht 5' 5\" (1.651 m)   Wt 206 lb (93.4 kg)   SpO2 97%   BMI 34.28 kg/m²   General appearance: alert and cooperative with exam  Neck: No JVD  Lungs: Creased breath sound no wheezing  Heart: regular rate and rhythm, S1, S2 normal, no gallop  Abdomen: Soft, non tender, + BS  Extremities: no cyanosis or clubbing. Trace edema    LABs:  CBC:   Recent Labs     08/28/22  1423 08/29/22  0546   WBC 10.2 10.5   HGB 13.4 13.2   HCT 42.9 40.8    352       BMP:   Recent Labs     08/28/22  1423 08/29/22  0546    141   K 3.1* 3.7   CO2 35* 32*   BUN 22* 23*   CREATININE 1.00 0.86   LABGLOM >60 >60   GLUCOSE 104* 123*       No results for input(s): AST, ALT, LABALBU in the last 72 hours. Radiology:    CT abdomen pelvis 8/27  1. Previously described liver lesions are demonstrated as benign hemangiomas   on this exam.  No suspicious liver lesion identified. 2.  Moderate gaseous distention of the transverse colon without etiology on   this exam.  Moderate stool burden. X-ray chest 8/24/2022      Impression:  Chronic hypoxic respiratory failure   Acute exacerbation of COPD/active smoking  Mild pulmonary hypertension, RVSP 37 mmHg echo 7/12/2021  Suspected obstructive sleep apnea/Obesity  Liver cirrhosis with ascites/hepatitis C  Paroxysmal SVT status post ablation  Positive blood culture x1,?   Contamination    Recommendations:  Oxygen via nasal cannula, keep SPO2 90%

## 2022-08-29 NOTE — PROGRESS NOTES
Physical Therapy  Facility/Department: [facility]  Rehabilitation Physical Therapy Treatment Note    NAME: Heydi Mcclendon  : 1965 (59 y.o.)  MRN: [id number]  CODE STATUS: Full Code    Date of Service: 22   Due to recent hospitalization and medical condition, pt would benefit from additional intermittent skilled therapy at time of discharge. Please refer to the AM-PAC score for current functional status. Restrictions:  Restrictions/Precautions: Fall Risk;General Precautions; Up as Tolerated; Bed Alarm  Position Activity Restriction  Other position/activity restrictions: Rt. UE IV     SUBJECTIVE  Subjective  Subjective: Patient agreeable to walk in room and CLAUDIA seated exercises only due to having a Colonoscopy later today. RN Bita Henson states that patient is appropriate for PT treatment     OBJECTIVE  Cognition  Overall Cognitive Status: Exceptions  Safety Judgement: Decreased awareness of need for assistance;Decreased awareness of need for safety  Problem Solving: Assistance required to generate solutions;Assistance required to implement solutions;Assistance required to identify errors made;Assistance required to correct errors made  Insights: Decreased awareness of deficits  Cognition Comment: poor safety awareness overall and insight into balance deficits; disengaged with any edu. Orientation  Overall Orientation Status: Within Normal Limits    Functional Mobility  Bed Mobility  Overall Assistance Level: Modified Independent  Roll Left  Assistance Level: Modified independent  Roll Right  Assistance Level: Modified independent  Sit to Supine  Assistance Level: Modified independent  Supine to Sit  Assistance Level: Modified independent  Scooting  Assistance Level: Modified independent  Skilled Clinical Factors: Patient with good bed mobility techniques and requires only Min vc's for progression techniques. Patient with good seated balance and stability noted.   Patient does requrie increased time due to abdominal pain. Educated patient on log roll technique for comfort  Balance  Sitting Balance: Independent  Standing Balance: Supervision  Standing Balance  Comments: Patient stood EOB for 3 minutes without and assistive device talking with therapist with no noted LOB. Patient educated on postural alignment however due to increased discomfort unable to complete self correct this date. Transfers  Surface: To bed;From bed  Additional Factors: Verbal cues; Hand placement cues; Increased time to complete  Sit to Stand  Assistance Level: Supervision  Stand to Sit  Assistance Level: Supervision      Environmental Mobility  Ambulation  Surface: Level surface  Distance: 15 feet x1. 30 feet x1  in room  Activity: Within Room  Activity Comments: Patient agreeable to ambulate in room only this date. Patient with abdominal discomfort decreasing patients endurance and tolerance to activities. Patient ambulates to rest room and then performs 1 lap in room this date. Additional Factors: Verbal cues; Increased time to complete  Assistance Level: Supervision  Gait Deviations: Slow ailyn      Neuromuscular Education  NDT Treatment: Gait ;Lower extremity; Sitting;Standing      PT Exercises  Exercise Treatment: Edu. to keep UEs/ LEs moving as able in bed/ chair. Good demo and understanding. ASSESSMENT/PROGRESS TOWARDS GOALS     AM-[facility] Inpatient Mobility Raw Score  19   [facility]  45.44   Mobility Inpatient CMS 0-100% Score 41.77   Mobility Inpatient CMS G-Code Modifier  CK       Assessment  Assessment: Patient due to abdominal discomfort with decreased aerobic capacity. Patient requries supervision for ambulation and MIN vc's for safety awareness.   Patient would benefit from contined skilled PT treatment to maximize return to PLOF  Activity Tolerance: Patient limited by endurance;Treatment limited secondary to medical complications    Goals  Patient Goals   Patient goals : breathe better  Short Term Goals  Time Frame for Short term goals: 12 visits:  Short term goal 1: Pt. to be indep with bed mob. Short term goal 2: Pt. to be SBA for sit to stand tranfser with approp AD with good immediate standing balance  Short term goal 3: Pt. to amb. 50ft (household distance) with approp. O2 and AD, SBA  Short term goal 4: Pt to trial rollator vs. RW and arrange with D/C planner to get for home if d/c home  Short term goal 5: Pt. to tolerate 25+ min. of PT daily for ther ex/ gait/ balance training/ endurance training/ energy conservation edu. PLAN OF CARE/SAFETY  Plan  Plan: 5-7 times per week  Specific Instructions for Next Treatment: Progress OOB mobility; assess tolerance to up to chair;  show Rollator and compare to RW- have d/c planner get for home if d/c home  Current Treatment Recommendations: Strengthening;Balance training;Functional mobility training;Transfer training; Endurance training;Gait training  Safety Devices  Type of Devices: All fall risk precautions in place; Left in bed;Call light within reach;Nurse notified    EDUCATION  Education  Education Given To: Patient  Education Provided: Role of Therapy; Energy Conservation;Transfer Training;Mobility Training  Education Method: Verbal;Teach Back  Barriers to Learning: None  Education Outcome: Verbalized understanding    Therapy Time   Individual Concurrent Group Co-treatment   Time In 1805         Time Out 1233         Minutes [address] [place], [place], 08/29/22 at 1:06 PM

## 2022-08-29 NOTE — RT PROTOCOL NOTE
RT Inhaler-Nebulizer Bronchodilator Protocol Note    There is a bronchodilator order in the chart from a provider indicating to follow the RT Bronchodilator Protocol and there is an Initiate RT Inhaler-Nebulizer Bronchodilator Protocol order as well (see protocol at bottom of note). CXR Findings:  No results found. The findings from the last RT Protocol Assessment were as follows:   History Pulmonary Disease: Chronic pulmonary disease  Respiratory Pattern: Dyspnea on exertion or RR 21-25 bpm  Breath Sounds: Slightly diminished and/or crackles  Cough: Strong, spontaneous, non-productive  Indication for Bronchodilator Therapy: On home bronchodilators  Bronchodilator Assessment Score: 6    Aerosolized bronchodilator medication orders have been revised according to the RT Inhaler-Nebulizer Bronchodilator Protocol below. Respiratory Therapist to perform RT Therapy Protocol Assessment initially then follow the protocol. Repeat RT Therapy Protocol Assessment PRN for score 0-3 or on second treatment, BID, and PRN for scores above 3. No Indications - adjust the frequency to every 6 hours PRN wheezing or bronchospasm, if no treatments needed after 48 hours then discontinue using Per Protocol order mode. If indication present, adjust the RT bronchodilator orders based on the Bronchodilator Assessment Score as indicated below. Use Inhaler orders unless patient has one or more of the following: on home nebulizer, not able to hold breath for 10 seconds, is not alert and oriented, cannot activate and use MDI correctly, or respiratory rate 25 breaths per minute or more, then use the equivalent nebulizer order(s) with same Frequency and PRN reasons based on the score. If a patient is on this medication at home then do not decrease Frequency below that used at home.     0-3 - enter or revise RT bronchodilator order(s) to equivalent RT Bronchodilator order with Frequency of every 4 hours PRN for wheezing or increased work of breathing using Per Protocol order mode. 4-6 - enter or revise RT Bronchodilator order(s) to two equivalent RT bronchodilator orders with one order with BID Frequency and one order with Frequency of every 4 hours PRN wheezing or increased work of breathing using Per Protocol order mode. 7-10 - enter or revise RT Bronchodilator order(s) to two equivalent RT bronchodilator orders with one order with TID Frequency and one order with Frequency of every 4 hours PRN wheezing or increased work of breathing using Per Protocol order mode. 11-13 - enter or revise RT Bronchodilator order(s) to one equivalent RT bronchodilator order with QID Frequency and an Albuterol order with Frequency of every 4 hours PRN wheezing or increased work of breathing using Per Protocol order mode. Greater than 13 - enter or revise RT Bronchodilator order(s) to one equivalent RT bronchodilator order with every 4 hours Frequency and an Albuterol order with Frequency of every 2 hours PRN wheezing or increased work of breathing using Per Protocol order mode. RT to enter RT Home Evaluation for COPD & MDI Assessment order using Per Protocol order mode.     Electronically signed by Gelaico Chiu RCP on 8/29/2022 at 7:45 AM

## 2022-08-29 NOTE — ANESTHESIA PRE PROCEDURE
Department of Anesthesiology  Preprocedure Note       Name:  Leesa Felix   Age:  59 y.o.  :  1965                                          MRN:  [id number]         Date:  2022      Surgeon: Tay Rod):  Jarrod Spaulding MD    Procedure: Procedure(s):  COLONOSCOPY DIAGNOSTIC    Medications prior to admission:   Prior to Admission medications    Medication Sig Start Date End Date Taking?  Authorizing Provider   fluticasone-salmeterol (ADVAIR HFA) 230-21 MCG/ACT inhaler Inhale 2 puffs into the lungs 2 times daily   Yes Historical Provider, MD   vitamin B-12 (CYANOCOBALAMIN) 1000 MCG tablet Take 1,000 mcg by mouth daily   Yes Historical Provider, MD   furosemide (LASIX) 40 MG tablet Take 40 mg by mouth daily   Yes Historical Provider, MD   levETIRAcetam (KEPPRA) 500 MG tablet Take 500 mg by mouth 2 times daily   Yes Historical Provider, MD   predniSONE (DELTASONE) 10 MG tablet Take 10 mg by mouth daily   Yes Historical Provider, MD   losartan-hydroCHLOROthiazide (HYZAAR) 100-25 MG per tablet Take 1 tablet by mouth daily   Yes Historical Provider, MD   Roflumilast (DALIRESP) 500 MCG tablet Take 1 tablet by mouth daily 21   Hakeem Fisher MD   ferrous gluconate (FERGON) 324 (38 Fe) MG tablet Take 324 mg by mouth daily (with breakfast)    Historical Provider, MD   famotidine (PEPCID) 40 MG tablet Take 40 mg by mouth 2 times daily as needed (heartburn)     Historical Provider, MD   amLODIPine (NORVASC) 10 MG tablet Take 10 mg by mouth daily    Historical Provider, MD   albuterol (PROVENTIL) (2.5 MG/3ML) 0.083% nebulizer solution Take 2.5 mg by nebulization every 6 hours as needed for Wheezing    Historical Provider, MD   albuterol sulfate  (90 Base) MCG/ACT inhaler Inhale 2 puffs into the lungs every 4-6 hours as needed for Wheezing    Historical Provider, MD   aspirin 81 MG tablet Take 81 mg by mouth daily    Historical Provider, MD   folic acid (FOLVITE) 1 MG tablet Take 1 mg by mouth daily Historical Provider, MD   metoprolol succinate (TOPROL XL) 50 MG extended release tablet Take 50 mg by mouth daily    Historical Provider, MD   pantoprazole (PROTONIX) 40 MG tablet Take 40 mg by mouth daily    Historical Provider, MD   QUEtiapine (SEROQUEL) 400 MG tablet Take 200 mg by mouth nightly Takes 1/2 tab (200mg) nightly    Historical Provider, MD   vitamin B-1 (THIAMINE) 100 MG tablet Take 100 mg by mouth daily    Historical Provider, MD   Cholecalciferol (VITAMIN D3) 2000 units CAPS Take 1 capsule by mouth daily    Historical Provider, MD   tiotropium (SPIRIVA RESPIMAT) 2.5 MCG/ACT AERS inhaler Inhale 2 puffs into the lungs daily    Historical Provider, MD       Current medications:    Current Facility-Administered Medications   Medication Dose Route Frequency Provider Last Rate Last Admin    methylPREDNISolone sodium (SOLU-MEDROL) injection 20 mg  20 mg IntraVENous Daily Dorys Arauz MD   20 mg at 08/29/22 0806    ipratropium-albuterol (DUONEB) nebulizer solution 1 ampule  1 ampule Inhalation BID Ana Dobson MD   1 ampule at 08/29/22 0741    ketorolac (TORADOL) injection 15 mg  15 mg IntraVENous Q6H PRN Juan Jean DO   15 mg at 08/29/22 1557    pantoprazole (PROTONIX) 40 mg in sodium chloride (PF) 10 mL injection  40 mg IntraVENous Daily Pretty Ramirez, APRN - CNP   40 mg at 08/29/22 0804    calcium carbonate (TUMS) chewable tablet 500 mg  500 mg Oral Q4H PRN Eliot Bence Lump, APRN - CNP   500 mg at 08/25/22 2327    [Held by provider] aspirin EC tablet 81 mg  81 mg Oral Daily Jaylon Hanley MD   81 mg at 08/26/22 0752    ferrous sulfate (FE TABS 325) EC tablet 324 mg  324 mg Oral Daily with breakfast Jaylon Hanley MD   324 mg at 58/48/06 9508    folic acid (FOLVITE) tablet 1 mg  1 mg Oral Daily Jaylon Hanley MD   1 mg at 08/29/22 0758    hydroCHLOROthiazide (HYDRODIURIL) tablet 25 mg  25 mg Oral Daily Jaylon Hanley MD   25 mg at 08/29/22 0759    losartan (COZAAR) tablet 100 mg 100 mg Oral Daily Joseluis Robertson MD   100 mg at 08/29/22 0758    metoprolol succinate (TOPROL XL) extended release tablet 50 mg  50 mg Oral Daily Joseluis Robertson MD   50 mg at 08/29/22 0759    montelukast (SINGULAIR) tablet 10 mg  10 mg Oral Nightly Joseluis Robertson MD   10 mg at 08/28/22 2024    Roflumilast (DALIRESP) tablet 500 mcg  500 mcg Oral Daily Joseluis Robertson MD   500 mcg at 08/29/22 0759    potassium chloride (KLOR-CON M) extended release tablet 40 mEq  40 mEq Oral PRN Joseluis Robertson MD   40 mEq at 08/28/22 1613    Or    potassium bicarb-citric acid (EFFER-K) effervescent tablet 40 mEq  40 mEq Oral PRN Joseluis Robertson MD        Or   Elijah Hindu potassium chloride 10 mEq/100 mL IVPB (Peripheral Line)  10 mEq IntraVENous PRN Joseluis Robertson MD        magnesium sulfate 1000 mg in dextrose 5% 100 mL IVPB  1,000 mg IntraVENous PRN Joseluis Robertson MD        [Held by provider] enoxaparin (LOVENOX) injection 40 mg  40 mg SubCUTAneous Daily Joseluis Robertson MD   40 mg at 08/26/22 0752    ondansetron (ZOFRAN-ODT) disintegrating tablet 4 mg  4 mg Oral Q8H PRN Joseluis Robertson MD   4 mg at 08/25/22 2101    Or    ondansetron (ZOFRAN) injection 4 mg  4 mg IntraVENous Q6H PRN Joseluis Robertson MD        senna (SENOKOT) tablet 8.6 mg  1 tablet Oral BID PRN Joseluis Robertson MD   8.6 mg at 08/26/22 1435    doxycycline monohydrate (MONODOX) capsule 100 mg  100 mg Oral BID Joseluis Robertson MD   100 mg at 08/29/22 0759    QUEtiapine (SEROQUEL) tablet 200 mg  200 mg Oral Nightly Joseluis Robertson MD   200 mg at 08/28/22 2024    thiamine mononitrate tablet 100 mg  100 mg Oral Daily Joseluis Robertson MD   100 mg at 08/29/22 0818    budesonide-formoterol (SYMBICORT) 160-4.5 MCG/ACT inhaler 2 puff  2 puff Inhalation BID Olga Morris MD   2 puff at 08/29/22 0742    levETIRAcetam (KEPPRA) tablet 500 mg  500 mg Oral BID Joseluis Robertson MD   500 mg at 08/29/22 0759    furosemide (LASIX) tablet 40 mg  40 mg Oral Daily Jim Perez MD   40 mg at 08/29/22 0759    amLODIPine (NORVASC) tablet 10 mg  10 mg Oral Daily Jim Perez MD   10 mg at 08/29/22 0759    HYDROcodone-acetaminophen (NORCO) 5-325 MG per tablet 1 tablet  1 tablet Oral Q6H PRN Cherlynn Gutter, APRN - CNP        HYDROcodone-acetaminophen (NORCO) 5-325 MG per tablet 2 tablet  2 tablet Oral Q6H PRN Gail Gutter, APRN - CNP   2 tablet at 08/26/22 1809    morphine (PF) injection 2 mg  2 mg IntraVENous Q4H PRN Gail Gutter, APRN - CNP   2 mg at 08/26/22 2108    albuterol (PROVENTIL) nebulizer solution 2.5 mg  2.5 mg Nebulization Q2H PRN Manuel Hawkins MD           Allergies:  No Known Allergies    Problem List:    Patient Active Problem List   Diagnosis Code    Respiratory failure ([facility] J96.90    Pneumonia J18.9    Essential hypertension I10    Diarrhea R19.7    H/O paroxysmal supraventricular tachycardia Z86.79    Status post placement of implantable loop recorder Z95.818    Smoker F17.200    COPD (chronic obstructive pulmonary disease) (HCC) J44.9    Hepatitis C virus infection without hepatic coma B19.20    COPD exacerbation ([facility] J44.1    Lesion of liver K76.9    Abdominal pain R10.9    Alcohol abuse F10.10    Acute exacerbation of COPD with asthma ([facility] J44.1, J45.901    Abnormal abdominal CT scan R93.5    Family history of colon cancer Z80.0    Elevated CEA R97.0    Colon obstruction ([facility] K56.609       Past Medical History:        Diagnosis Date    Arthritis     CAD (coronary artery disease)     Cancer ([facility]     prostate    Cirrhosis with alcoholism ([facility]     COPD (chronic obstructive pulmonary disease) ([facility]     Depression     BOURGEOIS (dyspnea on exertion)     H/O prostate cancer     Hyperlipidemia     Hypertension     MI (myocardial infarction) ([facility]     Seizures ([facility] 2016    last one over 2 years ago     SVT (supraventricular tachycardia) ([facility]     Tobacco abuse        Past Surgical History:        Procedure Laterality Date    CARDIAC CATHETERIZATION      ablation    INSERTABLE CARDIAC MONITOR  10/03/2018    INTERNAL LOOP RECORDER    PROSTATECTOMY      SINUS SURGERY      TONGUE SURGERY      UPPER GASTROINTESTINAL ENDOSCOPY N/A 6/10/2021    EGD BIOPSY performed by Zofia Lange MD at [address]  01/03/2022    UPPER GASTROINTESTINAL ENDOSCOPY N/A 1/3/2022    EGD BIOPSY performed by Sheri Garibay MD at [place] [address] History:    Social History     Tobacco Use    Smoking status: Former     Packs/day: 0.10     Types: Cigarettes    Smokeless tobacco: Former     Quit date: 12/5/2021    Tobacco comments:     5/2021 quit    Substance Use Topics    Alcohol use: Not Currently     Comment: last drink at the end of Nov 2021                                Counseling given: Not Answered  Tobacco comments: 5/2021 quit       Vital Signs (Current):   Vitals:    08/29/22 0737 08/29/22 0741 08/29/22 1137 08/29/22 1530   BP: 114/69  113/70 112/70   Pulse: 70  72 73   Resp: 17  16 15   Temp: 98.1 °F (36.7 °C)  98.2 °F (36.8 °C) 98.2 °F (36.8 °C)   TempSrc: Oral  Oral Oral   SpO2: 97% 97% 97% 97%   Weight:       Height:                                                  BP Readings from Last 3 Encounters:   08/29/22 112/70   01/04/22 136/79   01/03/22 130/85       NPO Status:                                                                                 BMI:   Wt Readings from Last 3 Encounters:   08/29/22 206 lb (93.4 kg)   01/04/22 186 lb 12.8 oz (84.7 kg)   07/15/21 185 lb 3.2 oz (84 kg)     Body mass index is 34.28 kg/m².     CBC:   Lab Results   Component Value Date/Time    WBC 10.5 08/29/2022 05:46 AM    RBC 4.28 08/29/2022 05:46 AM    RBC 4.83 04/18/2012 05:20 AM    HGB 13.2 08/29/2022 05:46 AM    HCT 40.8 08/29/2022 05:46 AM    MCV 95.3 08/29/2022 05:46 AM    RDW 12.5 08/29/2022 05:46 AM     08/29/2022 05:46 AM     04/18/2012 05:20 AM       CMP:   Lab Results   Component Value Date/Time  08/29/2022 05:46 AM    K 3.7 08/29/2022 05:46 AM    CL 99 08/29/2022 05:46 AM    CO2 32 08/29/2022 05:46 AM    BUN 23 08/29/2022 05:46 AM    CREATININE 0.86 08/29/2022 05:46 AM    GFRAA >60 08/29/2022 05:46 AM    LABGLOM >60 08/29/2022 05:46 AM    GLUCOSE 123 08/29/2022 05:46 AM    GLUCOSE 87 04/18/2012 05:20 AM    PROT 8.0 08/24/2022 11:25 AM    CALCIUM 9.8 08/29/2022 05:46 AM    BILITOT 0.51 08/24/2022 11:25 AM    ALKPHOS 77 08/24/2022 11:25 AM    AST 13 08/24/2022 11:25 AM    ALT 18 08/24/2022 11:25 AM       POC Tests: No results for input(s): POCGLU, POCNA, POCK, POCCL, POCBUN, POCHEMO, POCHCT in the last 72 hours.     Coags:   Lab Results   Component Value Date/Time    PROTIME 13.4 08/25/2022 12:24 PM    INR 1.0 08/25/2022 12:24 PM    APTT 29.8 08/25/2022 12:24 PM       HCG (If Applicable): No results found for: PREGTESTUR, PREGSERUM, HCG, HCGQUANT     ABGs: No results found for: PHART, PO2ART, GKC0VEQ, WZL5UIN, BEART, V6NKNHUK     Type & Screen (If Applicable):  No results found for: LABABO, LABRH    Drug/Infectious Status (If Applicable):  Lab Results   Component Value Date/Time    HEPCAB REACTIVE 05/14/2016 10:10 AM       COVID-19 Screening (If Applicable):   Lab Results   Component Value Date/Time    COVID19 Not Detected 08/24/2022 12:00 PM    COVID19 Not Detected 06/06/2021 10:20 AM           Anesthesia Evaluation   no history of anesthetic complications:   Airway: Mallampati: II  TM distance: >3 FB   Neck ROM: full  Mouth opening: > = 3 FB   Dental:    (+) upper dentures and lower dentures      Pulmonary:   (+) COPD (with exacerbation):  asthma:                            Cardiovascular:  Exercise tolerance: no interval change,   (+) hypertension:, past MI:, CAD:, dysrhythmias: SVT, hyperlipidemia      ECG reviewed      Echocardiogram reviewed         Beta Blocker:  Dose within 24 Hrs         Neuro/Psych:   (+) seizures: well controlled, depression/anxiety             GI/Hepatic/Renal:   (+) hepatitis:, liver disease (cirrhosis):,          ROS comment: Concern for bowel blockage. Endo/Other:    (+) malignancy/cancer. (-) diabetes mellitus               Abdominal:             Vascular: Other Findings:           Anesthesia Plan      MAC     ASA 3       Induction: intravenous. Anesthetic plan and risks discussed with patient.                         Ivan Pulido MD   8/29/2022

## 2022-08-29 NOTE — PROGRESS NOTES
Progress note  [facility].,    Adult Hospitalist      Name: Harish Issa  MRN: [id number]     Acct: [[id number]]  Room: [facility] OR Pool/NONE    Admit Date: 8/24/2022 11:08 AM  PCP: Danny Rubio MD    Primary Problem  Principal Problem:    Acute exacerbation of COPD with asthma [facility])  Active Problems:    Abnormal abdominal CT scan    Family history of colon cancer    Elevated CEA    Colon obstruction ([address]    Hepatitis C virus infection without hepatic coma    Lesion of liver    Abdominal pain  Resolved Problems:    * No resolved hospital problems. *        Assesment:     Acute COPD exacerbation  Chronic hypoxic respiratory failure  Mild hyponatremia  Mild Leukocytosis  History of alcohol abuse  Liver lesion with history of treated hepatitis C  Essential hypertension  Mixed hyperlipidemia  Chronic coronary artery disease  Depression with anxiety  Tobacco abuse  Mild pulmonary hypertension  Suspected obstructive sleep apnea  Paroxysmal SVT status post ablation  Obesity, BMI 30.79  Positive blood culture 1 out of 2 question contamination        Plan:      Admit to med surge telemetry  O2 maintain oxygen saturation greater than 92%     IV Solu-Medrol  Duo neb   Respiratory pathogen better   Sputum culture  1/2 blood culture positive for staph coagulase negative, likely contaminant  Repeat blood culture no growth so far  IV ceftriaxone and p.o. doxycycline  Monitor respiratory status   Pulmonology consult    Patient is complaining of abdominal distention  CT abdomen pelvis of colon at splenic flexure General surgery evaluated the patient, recommended medical management  Triphasic CT ordered per GI  Plan for endoscopy    Continue to monitor/telemetry/CBC with differential daily/BMP daily  DVT and GI prophylaxis.   Continue aspirin  Continue hydrochlorothiazide, metoprolol, amlodipine  Continue Singulair  Continue Roflumilast  Continue inhalers  Continue Keppra  Bentyl as needed for abdominal pain  Continue medications as below      Scheduled Meds:   [MAR Hold] methylPREDNISolone  20 mg IntraVENous Daily    [MAR Hold] ipratropium-albuterol  1 ampule Inhalation BID    [MAR Hold] pantoprazole (PROTONIX) 40 mg injection  40 mg IntraVENous Daily    [Held by provider] aspirin  81 mg Oral Daily    [MAR Hold] ferrous sulfate  324 mg Oral Daily with breakfast    [MAR Hold] folic acid  1 mg Oral Daily    [MAR Hold] hydroCHLOROthiazide  25 mg Oral Daily    [MAR Hold] losartan  100 mg Oral Daily    [MAR Hold] metoprolol succinate  50 mg Oral Daily    [MAR Hold] montelukast  10 mg Oral Nightly    [MAR Hold] Roflumilast  500 mcg Oral Daily    [Held by provider] enoxaparin  40 mg SubCUTAneous Daily    doxycycline monohydrate  100 mg Oral BID    [MAR Hold] QUEtiapine  200 mg Oral Nightly    [MAR Hold] vitamin B-1  100 mg Oral Daily    [MAR Hold] budesonide-formoterol  2 puff Inhalation BID    [MAR Hold] levETIRAcetam  500 mg Oral BID    [MAR Hold] furosemide  40 mg Oral Daily    [MAR Hold] amLODIPine  10 mg Oral Daily     Continuous Infusions:    PRN Meds:  [MAR Hold] ketorolac, 15 mg, Q6H PRN  [MAR Hold] calcium carbonate, 500 mg, Q4H PRN  [MAR Hold] potassium chloride, 40 mEq, PRN   Or  [MAR Hold] potassium alternative oral replacement, 40 mEq, PRN   Or  [MAR Hold] potassium chloride, 10 mEq, PRN  [MAR Hold] magnesium sulfate, 1,000 mg, PRN  [MAR Hold] ondansetron, 4 mg, Q8H PRN   Or  [MAR Hold] ondansetron, 4 mg, Q6H PRN  [MAR Hold] senna, 1 tablet, BID PRN  [MAR Hold] HYDROcodone 5 mg - acetaminophen, 1 tablet, Q6H PRN  [MAR Hold] HYDROcodone 5 mg - acetaminophen, 2 tablet, Q6H PRN  [MAR Hold] morphine, 2 mg, Q4H PRN  [MAR Hold] albuterol, 2.5 mg, Q2H PRN      Chief Complaint:     Chief Complaint   Patient presents with    Chest Pain     Midsternal to right feels like pressure, has hx of chf and COPD    Shortness of Breath         History of Present Illness:      Lyndsey Reid is a 59 y.o.  male who presents with Chest Pain (Midsternal to right feels like pressure, has hx of chf and COPD) and Shortness of Breath    Patient seen and examined at bedside and reviewed  last 24 hrs events with nursing staff  No acute events overnight. Complaining of lower abdominal pain, moderate to severe  Discussed medication options  Concern that stronger narcotic medication could suppress respirations  Patient verbalizes understanding  Afebrile  Patient denies any chest pain, palpitation, headache, dizziness, cough, nausea, vomiting, changes in urination or bowel habit or rash. Patient continues to have some shortness of breath        HPI  57-year-old gentleman past medical history of CHF, COPD, coronary disease, liver cirrhosis presented to ER complaining of shortness of breath going on for 2 days. He is also complaining of intermittent right-sided chest pain associated with it. He wears 4 L of oxygen at home. Chest pain is moderate, intermittent, sharp and resolved on its own. Patient denies any  Fever, chills, changes in urination, bowel habit or rash. Patient received Solu-Medrol and IV Bumex in the ER. Sodium was 133. WBC was 12. 2. X-ray chest was negative. I have personally reviewed the past medical history, past surgical history, medications, social history, and family history, and summarized in the note. Review of Systems:     All 10 point system is reviewed and negative otherwise mentioned in HPI.       Past Medical History:     Past Medical History:   Diagnosis Date    Arthritis     CAD (coronary artery disease)     Cancer ([facility]     prostate    Cirrhosis with alcoholism ([facility]     COPD (chronic obstructive pulmonary disease) ([address]     Depression     BOURGEOIS (dyspnea on exertion)     H/O prostate cancer     Hyperlipidemia     Hypertension     MI (myocardial infarction) ([address]     Seizures ([address] 2016    last one over 2 years ago     SVT (supraventricular tachycardia) ([address]     Tobacco abuse         Past Surgical History:     Past as needed for Wheezing    Historical Provider, MD   aspirin 81 MG tablet Take 81 mg by mouth daily    Historical Provider, MD   folic acid (FOLVITE) 1 MG tablet Take 1 mg by mouth daily    Historical Provider, MD   metoprolol succinate (TOPROL XL) 50 MG extended release tablet Take 50 mg by mouth daily    Historical Provider, MD   pantoprazole (PROTONIX) 40 MG tablet Take 40 mg by mouth daily    Historical Provider, MD   QUEtiapine (SEROQUEL) 400 MG tablet Take 200 mg by mouth nightly Takes 1/2 tab (200mg) nightly    Historical Provider, MD   vitamin B-1 (THIAMINE) 100 MG tablet Take 100 mg by mouth daily    Historical Provider, MD   Cholecalciferol (VITAMIN D3) 2000 units CAPS Take 1 capsule by mouth daily    Historical Provider, MD   tiotropium (SPIRIVA RESPIMAT) 2.5 MCG/ACT AERS inhaler Inhale 2 puffs into the lungs daily    Historical Provider, MD        Allergies:       Patient has no known allergies. Social History:     Tobacco:    reports that he has quit smoking. His smoking use included cigarettes. He smoked an average of .1 packs per day. He quit smokeless tobacco use about 8 months ago. Alcohol:      reports that he does not currently use alcohol. Drug Use:  reports no history of drug use. Family History:     No family history on file.       Physical Exam:     Vitals:  /64   Pulse 72   Temp 97.7 °F (36.5 °C) (Oral)   Resp 18   Ht 5' 5\" (1.651 m)   Wt 206 lb (93.4 kg)   SpO2 98%   BMI 34.28 kg/m²   Temp (24hrs), Av °F (36.7 °C), Min:97.7 °F (36.5 °C), Max:98.2 °F (36.8 °C)          General appearance - alert, well appearing, and in no acute distress  Mental status - oriented to person, place, and time with normal affect  Head - normocephalic and atraumatic  Eyes - pupils equal and reactive, extraocular eye movements intact, conjunctiva clear  Ears - hearing appears to be intact  Nose - no drainage noted  Mouth - mucous membranes moist  Neck - supple, no carotid bruits, thyroid not palpable  Chest - clear to auscultation, normal effort  Heart - normal rate, regular rhythm, no murmur  Abdomen - soft, nontender, nondistended, bowel sounds present all four quadrants, no masses, hepatomegaly or splenomegaly  Neurological - normal speech, no focal findings or movement disorder noted, cranial nerves II through XII grossly intact  Extremities - peripheral pulses palpable, no pedal edema or calf pain with palpation  Skin - no gross lesions, rashes, or induration noted        Data:     Labs:    Hematology:  Recent Labs     08/28/22  1423 08/29/22  0546   WBC 10.2 10.5   RBC 4.38 4.28   HGB 13.4 13.2   HCT 42.9 40.8   MCV 97.9 95.3   MCH 30.6 30.8   MCHC 31.2 32.4   RDW 12.7 12.5    352   MPV 9.7 9.5       Chemistry:  Recent Labs     08/27/22  0527 08/27/22  0751 08/28/22  1423 08/29/22  0546   NA  --  140 141 141   K  --  3.1* 3.1* 3.7   CL  --  100 98 99   CO2  --  31 35* 32*   GLUCOSE  --  106* 104* 123*   BUN  --  21* 22* 23*   CREATININE  --  0.84 1.00 0.86   MG  --  1.9  --   --    ANIONGAP  --  9 8* 10   LABGLOM  --  >60 >60 >60   GFRAA  --  >60 >60 >60   CALCIUM  --  9.8 9.8 9.8   PROBNP 92  --   --   --        No results for input(s): PROT, LABALBU, LABA1C, W1DFUTW, B3HDQWA, FT4, TSH, AST, ALT, LDH, GGT, ALKPHOS, LABGGT, BILITOT, BILIDIR, AMMONIA, AMYLASE, LIPASE, LACTATE, CHOL, HDL, LDLCHOLESTEROL, CHOLHDLRATIO, TRIG, VLDL, SJL21SJ, PHENYTOIN, PHENYF, URICACID, POCGLU in the last 72 hours.       Lab Results   Component Value Date    INR 1.0 08/25/2022    INR 1.0 01/01/2022    INR 1.1 07/13/2021    PROTIME 13.4 08/25/2022    PROTIME 13.2 01/01/2022    PROTIME 13.5 07/13/2021       Lab Results   Component Value Date/Time    SPECIAL 10ML LH 08/25/2022 11:45 AM     Lab Results   Component Value Date/Time    CULTURE NORMAL RESPIRATORY GANGA MODERATE GROWTH 08/26/2022 11:49 AM       No results found for: POCPH, PHART, PH, POCPCO2, OXM1IGV, PCO2, POCPO2, PO2ART, PO2, POCHCO3, XKD7VUQ, HCO3, NBEA, PBEA, BEART, BE, THGBART, THB, HSK7DQP, WUKN7TBT, V4QSUOHC, O2SAT, FIO2    Radiology:    XR CHEST PORTABLE    Result Date: 8/24/2022  No acute cardiopulmonary findings         All radiological studies reviewed                Code Status:  Full Code    Electronically signed by Jordyn Segura MD on 8/29/2022 at 7:42 PM     Copy sent to Dr. Lady Del Real MD    This note was created with the assistance of a speech-recognition program.  Although the intention is to generate a document that actually reflects the content of the visit, no guarantees can be provided that every mistake has been identified and corrected by editing. Note was updated later by me after  physical examination and  completion of the assessment.

## 2022-08-29 NOTE — ANESTHESIA POSTPROCEDURE EVALUATION
Department of Anesthesiology  Postprocedure Note    Patient: Vivek Cornell  MRN: [id number]  YOB: 1965  Date of evaluation: 8/29/2022      Procedure Summary     Date: 08/29/22 Room / Location: [place] [id number] / [facility] - INPATIENT    Anesthesia Start: R Aixa Burt 70 Anesthesia Stop: 1912    Procedure: COLONOSCOPY WITH BIOPSY Diagnosis:       Colonic mass      (POSSIBLE COLON MASS)    Surgeons: Morro Dubon MD Responsible Provider: Pierce Hernandez MD    Anesthesia Type: MAC ASA Status: 3          Anesthesia Type: No value filed.     Donna Phase I: Donna Score: 10    Donna Phase II: Donna Score: 9      Anesthesia Post Evaluation    Patient location during evaluation: PACU  Patient participation: complete - patient participated  Level of consciousness: awake  Airway patency: patent  Nausea & Vomiting: no nausea and no vomiting  Complications: no  Cardiovascular status: hemodynamically stable  Respiratory status: acceptable  Hydration status: stable  Multimodal analgesia pain management approach

## 2022-08-29 NOTE — OP NOTE
PROCEDURE NOTE    DATE OF PROCEDURE: 8/29/2022    SURGEON: Khoi Montoya MD  Facility : [facility]  ASSISTANT: None  Anesthesia: MAC  PREOPERATIVE DIAGNOSIS:   Obstruction of the colon this is for deflation    POSTOPERATIVE DIAGNOSIS: as described below    OPERATION: Total colonoscopy     ANESTHESIA: Moderate Sedation    ESTIMATED BLOOD LOSS: less than 50     COMPLICATIONS: None. SPECIMENS:  Was Obtained: There is a small area in the transverse colon which look ulcerated and ischemic I think this is resolved not the cause of the obstruction gentle biopsies were taken, it  is very soft          HISTORY: The patient is a 59y.o. year old male with history of above preop diagnosis. I recommended colonoscopy with possible biopsy or polypectomy and I explained the risk, benefits, expected outcome, and alternatives to the procedure. Risks included but are not limited to bleeding, infection, respiratory distress, hypotension, and perforation of the colon and possibility of missing a lesion. The patient understands and is in agreement. The patient was counseled at length about the risks of stefania Covid-19 during their perioperative period and any recovery window from their procedure. The patient was made aware that stefania Covid-19  may worsen their prognosis for recovering from their procedure  and lend to a higher morbidity and/or mortality risk. All material risks, benefits, and reasonable alternatives including postponing the procedure were discussed. The patient does wish to proceed with the procedure at this time. PROCEDURE: The patient was given IV conscious sedation. The patient's SPO2 remained above 90% throughout the procedure. The colonoscope was inserted per rectum and advanced under direct vision to the cecum without difficulty. Post sedation note : The patient's SPO2 remained above 90% throughout the procedure. the vital signs remained stable , and no immediate complication form the procedure noted, patient will be ready for d/c when criteria is met . The prep was poor. Findings:      Cecum/Ascending colon:   There is a small area in the transverse colon which look ulcerated and ischemic I think this is resolved not the cause of the obstruction gentle biopsies were taken is very soft        The patient had a redundant sigmoid colon and 1 area was little bit narrow after we passed it the transverse colon and the right colon were dilated I went ahead all the way to the cecum and start deflating  And suctioning on the way out    The patient abdomen felt prolapse softer  The patient is not prepped pathology could be missed but I do not see anything besides the area described above    He does have rare diverticuli    And he had internal hemorrhoids    Withdrawal Time was (minutes): 12    The colon was decompressed and the scope was removed. The patient tolerated the procedure well.      Recommendations/Plan:   My give laxatives  And try liquid diet if no surgical intervention is planned  F/U Biopsies  Surgical follow-up    Electronically signed by Mariajose Cruz MD  on 8/29/2022 at 7:02 PM

## 2022-08-30 LAB
ABSOLUTE EOS #: 0.07 K/UL (ref 0–0.44)
ABSOLUTE IMMATURE GRANULOCYTE: 0.13 K/UL (ref 0–0.3)
ABSOLUTE LYMPH #: 1.77 K/UL (ref 1.1–3.7)
ABSOLUTE MONO #: 0.79 K/UL (ref 0.1–1.2)
ANION GAP SERPL CALCULATED.3IONS-SCNC: 8 MMOL/L (ref 9–17)
BASOPHILS # BLD: 0 % (ref 0–2)
BASOPHILS ABSOLUTE: <0.03 K/UL (ref 0–0.2)
BUN BLDV-MCNC: 25 MG/DL (ref 6–20)
BUN/CREAT BLD: 28 (ref 9–20)
CALCIUM SERPL-MCNC: 9.4 MG/DL (ref 8.6–10.4)
CHLORIDE BLD-SCNC: 105 MMOL/L (ref 98–107)
CO2: 31 MMOL/L (ref 20–31)
CREAT SERPL-MCNC: 0.89 MG/DL (ref 0.7–1.2)
CULTURE: NORMAL
CULTURE: NORMAL
EOSINOPHILS RELATIVE PERCENT: 1 % (ref 1–4)
GFR AFRICAN AMERICAN: >60 ML/MIN
GFR NON-AFRICAN AMERICAN: >60 ML/MIN
GFR SERPL CREATININE-BSD FRML MDRD: ABNORMAL ML/MIN/{1.73_M2}
GLUCOSE BLD-MCNC: 82 MG/DL (ref 70–99)
HCT VFR BLD CALC: 37.8 % (ref 40.7–50.3)
HEMOGLOBIN: 11.9 G/DL (ref 13–17)
IMMATURE GRANULOCYTES: 1 %
LYMPHOCYTES # BLD: 17 % (ref 24–43)
Lab: NORMAL
Lab: NORMAL
MAGNESIUM: 2.2 MG/DL (ref 1.6–2.6)
MCH RBC QN AUTO: 30.6 PG (ref 25.2–33.5)
MCHC RBC AUTO-ENTMCNC: 31.5 G/DL (ref 28.4–34.8)
MCV RBC AUTO: 97.2 FL (ref 82.6–102.9)
MONOCYTES # BLD: 8 % (ref 3–12)
NRBC AUTOMATED: 0 PER 100 WBC
PDW BLD-RTO: 12.5 % (ref 11.8–14.4)
PHOSPHORUS: 3.1 MG/DL (ref 2.5–4.5)
PLATELET # BLD: 293 K/UL (ref 138–453)
PMV BLD AUTO: 9.3 FL (ref 8.1–13.5)
POTASSIUM SERPL-SCNC: 3.6 MMOL/L (ref 3.7–5.3)
RBC # BLD: 3.89 M/UL (ref 4.21–5.77)
SEG NEUTROPHILS: 73 % (ref 36–65)
SEGMENTED NEUTROPHILS ABSOLUTE COUNT: 7.58 K/UL (ref 1.5–8.1)
SODIUM BLD-SCNC: 144 MMOL/L (ref 135–144)
SPECIMEN DESCRIPTION: NORMAL
SPECIMEN DESCRIPTION: NORMAL
WBC # BLD: 10.4 K/UL (ref 3.5–11.3)

## 2022-08-30 PROCEDURE — 6360000002 HC RX W HCPCS: Performed by: INTERNAL MEDICINE

## 2022-08-30 PROCEDURE — 94761 N-INVAS EAR/PLS OXIMETRY MLT: CPT

## 2022-08-30 PROCEDURE — 6370000000 HC RX 637 (ALT 250 FOR IP): Performed by: FAMILY MEDICINE

## 2022-08-30 PROCEDURE — 83735 ASSAY OF MAGNESIUM: CPT

## 2022-08-30 PROCEDURE — 99232 SBSQ HOSP IP/OBS MODERATE 35: CPT | Performed by: INTERNAL MEDICINE

## 2022-08-30 PROCEDURE — 84100 ASSAY OF PHOSPHORUS: CPT

## 2022-08-30 PROCEDURE — APPSS30 APP SPLIT SHARED TIME 16-30 MINUTES: Performed by: NURSE PRACTITIONER

## 2022-08-30 PROCEDURE — 94640 AIRWAY INHALATION TREATMENT: CPT

## 2022-08-30 PROCEDURE — 97116 GAIT TRAINING THERAPY: CPT

## 2022-08-30 PROCEDURE — 85025 COMPLETE CBC W/AUTO DIFF WBC: CPT

## 2022-08-30 PROCEDURE — 6370000000 HC RX 637 (ALT 250 FOR IP): Performed by: INTERNAL MEDICINE

## 2022-08-30 PROCEDURE — 80048 BASIC METABOLIC PNL TOTAL CA: CPT

## 2022-08-30 PROCEDURE — 6370000000 HC RX 637 (ALT 250 FOR IP): Performed by: STUDENT IN AN ORGANIZED HEALTH CARE EDUCATION/TRAINING PROGRAM

## 2022-08-30 PROCEDURE — 2700000000 HC OXYGEN THERAPY PER DAY

## 2022-08-30 PROCEDURE — 36415 COLL VENOUS BLD VENIPUNCTURE: CPT

## 2022-08-30 PROCEDURE — 1200000000 HC SEMI PRIVATE

## 2022-08-30 PROCEDURE — 2580000003 HC RX 258: Performed by: INTERNAL MEDICINE

## 2022-08-30 PROCEDURE — C9113 INJ PANTOPRAZOLE SODIUM, VIA: HCPCS | Performed by: INTERNAL MEDICINE

## 2022-08-30 RX ORDER — SENNA PLUS 8.6 MG/1
1 TABLET ORAL 2 TIMES DAILY
Status: DISCONTINUED | OUTPATIENT
Start: 2022-08-30 | End: 2022-09-02 | Stop reason: HOSPADM

## 2022-08-30 RX ORDER — PREDNISONE 20 MG/1
40 TABLET ORAL DAILY
Status: DISCONTINUED | OUTPATIENT
Start: 2022-08-31 | End: 2022-09-02 | Stop reason: HOSPADM

## 2022-08-30 RX ORDER — POLYETHYLENE GLYCOL 3350 17 G/17G
17 POWDER, FOR SOLUTION ORAL DAILY
Status: DISCONTINUED | OUTPATIENT
Start: 2022-08-30 | End: 2022-09-02 | Stop reason: HOSPADM

## 2022-08-30 RX ORDER — METOCLOPRAMIDE 10 MG/1
10 TABLET ORAL
Status: DISCONTINUED | OUTPATIENT
Start: 2022-08-30 | End: 2022-08-30

## 2022-08-30 RX ORDER — IPRATROPIUM BROMIDE AND ALBUTEROL SULFATE 2.5; .5 MG/3ML; MG/3ML
1 SOLUTION RESPIRATORY (INHALATION) 3 TIMES DAILY
Status: DISCONTINUED | OUTPATIENT
Start: 2022-08-31 | End: 2022-08-31

## 2022-08-30 RX ORDER — DOCUSATE SODIUM 100 MG/1
100 CAPSULE, LIQUID FILLED ORAL 2 TIMES DAILY
Status: DISCONTINUED | OUTPATIENT
Start: 2022-08-30 | End: 2022-09-02 | Stop reason: HOSPADM

## 2022-08-30 RX ORDER — PANTOPRAZOLE SODIUM 40 MG/1
40 TABLET, DELAYED RELEASE ORAL
Status: DISCONTINUED | OUTPATIENT
Start: 2022-08-31 | End: 2022-09-02 | Stop reason: HOSPADM

## 2022-08-30 RX ADMIN — Medication 100 MG: at 08:47

## 2022-08-30 RX ADMIN — AMLODIPINE BESYLATE 10 MG: 10 TABLET ORAL at 08:47

## 2022-08-30 RX ADMIN — HYDROCODONE BITARTRATE AND ACETAMINOPHEN 2 TABLET: 5; 325 TABLET ORAL at 18:49

## 2022-08-30 RX ADMIN — IPRATROPIUM BROMIDE AND ALBUTEROL SULFATE 1 AMPULE: 2.5; .5 SOLUTION RESPIRATORY (INHALATION) at 22:13

## 2022-08-30 RX ADMIN — DOCUSATE SODIUM 100 MG: 100 CAPSULE, LIQUID FILLED ORAL at 20:43

## 2022-08-30 RX ADMIN — HYDROCHLOROTHIAZIDE 25 MG: 25 TABLET ORAL at 08:46

## 2022-08-30 RX ADMIN — IPRATROPIUM BROMIDE AND ALBUTEROL SULFATE 1 AMPULE: 2.5; .5 SOLUTION RESPIRATORY (INHALATION) at 09:41

## 2022-08-30 RX ADMIN — LOSARTAN POTASSIUM 100 MG: 100 TABLET, FILM COATED ORAL at 08:47

## 2022-08-30 RX ADMIN — FUROSEMIDE 40 MG: 40 TABLET ORAL at 08:47

## 2022-08-30 RX ADMIN — DICYCLOMINE HYDROCHLORIDE 10 MG: 10 CAPSULE ORAL at 12:38

## 2022-08-30 RX ADMIN — DICYCLOMINE HYDROCHLORIDE 10 MG: 10 CAPSULE ORAL at 16:00

## 2022-08-30 RX ADMIN — FERROUS SULFATE TAB EC 325 MG (65 MG FE EQUIVALENT) 324 MG: 325 (65 FE) TABLET DELAYED RESPONSE at 08:47

## 2022-08-30 RX ADMIN — SODIUM CHLORIDE, PRESERVATIVE FREE 40 MG: 5 INJECTION INTRAVENOUS at 08:46

## 2022-08-30 RX ADMIN — BUDESONIDE AND FORMOTEROL FUMARATE DIHYDRATE 2 PUFF: 160; 4.5 AEROSOL RESPIRATORY (INHALATION) at 09:42

## 2022-08-30 RX ADMIN — DICYCLOMINE HYDROCHLORIDE 10 MG: 10 CAPSULE ORAL at 06:23

## 2022-08-30 RX ADMIN — QUETIAPINE FUMARATE 200 MG: 200 TABLET ORAL at 20:43

## 2022-08-30 RX ADMIN — METOPROLOL SUCCINATE 50 MG: 50 TABLET, FILM COATED, EXTENDED RELEASE ORAL at 08:48

## 2022-08-30 RX ADMIN — SENNOSIDES 8.6 MG: 8.6 TABLET, FILM COATED ORAL at 08:47

## 2022-08-30 RX ADMIN — LEVETIRACETAM 500 MG: 500 TABLET, FILM COATED ORAL at 20:43

## 2022-08-30 RX ADMIN — FOLIC ACID 1 MG: 1 TABLET ORAL at 08:47

## 2022-08-30 RX ADMIN — BUDESONIDE AND FORMOTEROL FUMARATE DIHYDRATE 2 PUFF: 160; 4.5 AEROSOL RESPIRATORY (INHALATION) at 22:13

## 2022-08-30 RX ADMIN — SENNOSIDES 8.6 MG: 8.6 TABLET, FILM COATED ORAL at 20:43

## 2022-08-30 RX ADMIN — POLYETHYLENE GLYCOL 3350 17 G: 17 POWDER, FOR SOLUTION ORAL at 08:47

## 2022-08-30 RX ADMIN — METHYLPREDNISOLONE SODIUM SUCCINATE 20 MG: 40 INJECTION, POWDER, FOR SOLUTION INTRAMUSCULAR; INTRAVENOUS at 08:46

## 2022-08-30 RX ADMIN — DOCUSATE SODIUM 100 MG: 100 CAPSULE, LIQUID FILLED ORAL at 08:47

## 2022-08-30 RX ADMIN — MONTELUKAST 10 MG: 10 TABLET, FILM COATED ORAL at 20:43

## 2022-08-30 RX ADMIN — KETOROLAC TROMETHAMINE 15 MG: 15 INJECTION, SOLUTION INTRAMUSCULAR; INTRAVENOUS at 20:49

## 2022-08-30 RX ADMIN — ROFLUMILAST 500 MCG: 500 TABLET ORAL at 08:47

## 2022-08-30 RX ADMIN — LEVETIRACETAM 500 MG: 500 TABLET, FILM COATED ORAL at 08:47

## 2022-08-30 ASSESSMENT — PAIN SCALES - GENERAL
PAINLEVEL_OUTOF10: 7
PAINLEVEL_OUTOF10: 5
PAINLEVEL_OUTOF10: 2

## 2022-08-30 ASSESSMENT — PAIN DESCRIPTION - LOCATION: LOCATION: ABDOMEN

## 2022-08-30 ASSESSMENT — PAIN DESCRIPTION - ORIENTATION: ORIENTATION: ANTERIOR

## 2022-08-30 ASSESSMENT — PAIN - FUNCTIONAL ASSESSMENT: PAIN_FUNCTIONAL_ASSESSMENT: PREVENTS OR INTERFERES SOME ACTIVE ACTIVITIES AND ADLS

## 2022-08-30 ASSESSMENT — PAIN DESCRIPTION - DESCRIPTORS: DESCRIPTORS: ACHING;SHARP

## 2022-08-30 NOTE — PROGRESS NOTES
[facility]    Gastroenterology Daily Progress Note      Patient:   Krissy Starr   :    1965   Facility:   [facility]  Date:     2022  Consultant:   GONZÁLEZ Garibay CNP, CNP      SUBJECTIVE  59 y.o. male admitted 2022 with COPD exacerbation ([facility].) [J44.1]  Acute exacerbation of COPD with asthma ([facility] [J44.1, Q72.605] and seen for .colon obstruction. And cirrhosis The pt was seen and examined. He is s/p deflation colonoscopy yesterday that is discussed below. Reports mild diffuse abdominal pain, no emesis. Had one small non bloody stool after procedure yesterday, passing flatus today.          OBJECTIVE  Scheduled Meds:   polyethylene glycol  17 g Oral Daily    docusate sodium  100 mg Oral BID    senna  1 tablet Oral BID    dicyclomine  10 mg Oral TID AC    methylPREDNISolone  20 mg IntraVENous Daily    ipratropium-albuterol  1 ampule Inhalation BID    pantoprazole (PROTONIX) 40 mg injection  40 mg IntraVENous Daily    [Held by provider] aspirin  81 mg Oral Daily    ferrous sulfate  324 mg Oral Daily with breakfast    folic acid  1 mg Oral Daily    hydroCHLOROthiazide  25 mg Oral Daily    losartan  100 mg Oral Daily    metoprolol succinate  50 mg Oral Daily    montelukast  10 mg Oral Nightly    Roflumilast  500 mcg Oral Daily    [Held by provider] enoxaparin  40 mg SubCUTAneous Daily    QUEtiapine  200 mg Oral Nightly    vitamin B-1  100 mg Oral Daily    budesonide-formoterol  2 puff Inhalation BID    levETIRAcetam  500 mg Oral BID    furosemide  40 mg Oral Daily    amLODIPine  10 mg Oral Daily       Vital Signs:  /61   Pulse 69   Temp 97.3 °F (36.3 °C) (Oral)   Resp 16   Ht 5' 5\" (1.651 m)   Wt 206 lb (93.4 kg)   SpO2 98%   BMI 34.28 kg/m²      Physical Exam:     General Appearance: alert and oriented to person, place and time, well-developed and well-nourished, in no acute distress  Skin: warm and dry, no rash or erythema  Head: normocephalic and atraumatic  Eyes: pupils equal, round, and reactive to light, extraocular eye movements intact, conjunctivae normal  ENT: hearing grossly normal bilaterally  Neck: neck supple and non tender without mass, no thyromegaly or thyroid nodules, no cervical lymphadenopathy   Pulmonary/Chest: clear to auscultation bilaterally- no wheezes, rales or rhonchi, normal air movement, no respiratory distress  Cardiovascular: normal rate, regular rhythm, normal S1 and S2, no murmurs, rubs, clicks or gallops, distal pulses intact, no carotid bruits  Abdomen: soft, non-tender, mildly -distended, normal bowel sounds, no masses or organomegaly  Extremities: no cyanosis, clubbing or edema  Musculoskeletal: normal range of motion, no joint swelling, deformity or tenderness  Neurologic: no cranial nerve deficit and muscle strength normal    Lab and Imaging Review     CBC  Recent Labs     08/28/22  1423 08/29/22  0546 08/30/22  0601   WBC 10.2 10.5 10.4   HGB 13.4 13.2 11.9*   HCT 42.9 40.8 37.8*   MCV 97.9 95.3 97.2    352 293       BMP  Recent Labs     08/28/22  1423 08/29/22  0546 08/30/22  0824    141 144   K 3.1* 3.7 3.6*   CL 98 99 105   CO2 35* 32* 31   BUN 22* 23* 25*   CREATININE 1.00 0.86 0.89   GLUCOSE 104* 123* 82   CALCIUM 9.8 9.8 9.4     FINDINGS:ct abd 8/27/22   Lower Chest: Dependent subsegmental atelectasis. No consolidation, evidence   for edema or effusion. Organs: Non cirrhotic liver morphology. Previously described hypoattenuating   liver lesions demonstrate findings compatible with hemangiomas. The larger   liver lesions demonstrate discontinuous peripheral nodular enhancement where   as the smaller lesion demonstrate more flash filling and retention of   contrast following blood pool. No suspicious liver lesion identified. When   remeasured, these liver lesions are stable in size dating back to at least   2021. The gallbladder is contracted. No biliary dilatation.   The pancreas, spleen, adrenals and kidneys reveal no acute findings. GI/Bowel: There is no bowel dilatation or wall thickening identified. Moderate gaseous distension of the transverse colon without etiology   demonstrated. There is retention of oral contrast in the proximal and distal   colon along with moderate stool burden. Pelvis: Trace pelvic free fluid. Peritoneum/Retroperitoneum: No free air. No abdominal ascites. No enlarged   or suspicious-appearing lymph nodes. Bones/Soft Tissues: Left sacral neurostimulator in place. No acute osseous   abnormality identified. Impression   1. Previously described liver lesions are demonstrated as benign hemangiomas   on this exam.  No suspicious liver lesion identified. 2.  Moderate gaseous distention of the transverse colon without etiology on   this exam.  Moderate stool burden. Colonoscopy dr Yonatan Carr 8/29/22  The prep was poor.        Findings:        Cecum/Ascending colon:   There is a small area in the transverse colon which look ulcerated and ischemic I think this is resolved not the cause of the obstruction gentle biopsies were taken is very soft           The patient had a redundant sigmoid colon and 1 area was little bit narrow after we passed it the transverse colon and the right colon were dilated I went ahead all the way to the cecum and start deflating  And suctioning on the way out     The patient abdomen felt prolapse softer  The patient is not prepped pathology could be missed but I do not see anything besides the area described above     He does have rare diverticuli     And he had internal hemorrhoids        ASSESSMENT/plan  Obstruction of colon s/p deflation colonoscopy yesterday  -f/u bx results from ulceration of transverse colon  -surgery following  ppi  Laxatives prn    2.cirrhosis secondary to etoh abuse  2gm low salt diet  Avoid narcotics    3.treated hepatitis c hemangiomas per imaging          Time spent reviewing chart assessing patient and d/w attending md around 30 minutes      This plan was formulated in collaboration with Dr. Sydney Brown  . Electronically signed by: GONZÁLEZ Sanchez CNP on 8/30/2022 at 10:12 AM        Attending Physician Statement          I have discussed the care of Ev Ba and   I have examined the patient myselft independently, and taken ros and hpi , including pertinent history and exam findings,  with the author of this note . I have reviewed the key elements of all parts of the encounter with the nurse practitioner/resident. I agree with the assessment, plan and orders as documented by the above health care provider     More than 50% of the time on this visit was spent by me   hysical Exam  Blood pressure 111/61, pulse 77, temperature 98.1 °F (36.7 °C), temperature source Oral, resp. rate 18, height 5' 5\" (1.651 m), weight 206 lb (93.4 kg), SpO2 94 %.          General Appearance: alert and oriented to person, place and time, well-developed and well-nourished, in no acute distress  Skin: warm and dry, no rash or erythema  Head: normocephalic and atraumatic  Eyes: pupils equal, round, and reactive to light, extraocular eye movements intact, conjunctivae normal  ENT: hearing grossly normal bilaterally  Neck: neck supple and non tender without mass, no thyromegaly or thyroid nodules, no cervical lymphadenopathy   Pulmonary/Chest: clear to auscultation bilaterally- no wheezes, rales or rhonchi, normal air movement, no respiratory distress  Cardiovascular: normal rate, regular rhythm, normal S1 and S2, no murmurs, rubs, clicks or gallops, distal pulses intact, no carotid bruits  Abdomen: soft, non-tender, non-distended, normal bowel sounds, no masses or organomegaly  Extremities: no cyanosis, clubbing or edema  Musculoskeletal: normal range of motion, no joint swelling, deformity or tenderness  Neurologic: no cranial nerve deficit and muscle strength normal    Data Review: Recent Labs     08/28/22  1423 08/29/22  0546 08/30/22  0601   WBC 10.2 10.5 10.4   HGB 13.4 13.2 11.9*   HCT 42.9 40.8 37.8*   MCV 97.9 95.3 97.2    352 293     Recent Labs     08/28/22  1423 08/29/22  0546 08/30/22  0824    141 144   K 3.1* 3.7 3.6*   CL 98 99 105   CO2 35* 32* 31   PHOS  --   --  3.1   BUN 22* 23* 25*   CREATININE 1.00 0.86 0.89     No results for input(s): AST, ALT, ALB, BILIDIR, BILITOT, ALKPHOS in the last 72 hours. No results for input(s): LIPASE, AMYLASE in the last 72 hours. No results for input(s): PROTIME, INR in the last 72 hours. No results for input(s): PTT in the last 72 hours. No results for input(s): OCCULTBLD in the last 72 hours.   CEA:    Lab Results   Component Value Date/Time    CEA 5.2 08/27/2022 07:51 AM     Ca 125:  No results found for:   Ca 19-9:  No results found for:   Ca 15-3:  No results found for:   AFP:  No components found for: AFAFP  Beta HCG:  No components found for: BHCG  Neuron Specific Enolase:  No results found for: NSE      Additional :    Tolerating diet  Having flatus and small bowel movement  Await the biopsy  If he is able to eat we can see him as an outpatient and follow on the biopsies  Surgery is also following await further recommendation      Electronically signed by Lani Christian MD

## 2022-08-30 NOTE — PROGRESS NOTES
Physical Therapy  Facility/Department: [facility]  Daily Treatment Note  NAME: Ev Ba  : 1965  MRN: [id number]    Date of Service: 2022    Discharge Recommendations:  Patient would benefit from continued therapy after discharge    AM-PAC Score: 22    Patient Diagnosis(es): The primary encounter diagnosis was COPD exacerbation ([facility] A diagnosis of Colonic mass was also pertinent to this visit. Assessment   Assessment: Pt increased ambulation distance, no AD, modified independent. Pt SOB post ambulation. No additional tx due to abdominal pain. Continue with skilled PT to maximized return to PLOF. Activity Tolerance: Patient limited by endurance; Patient tolerated treatment well;Patient limited by pain     Plan    Plan  Plan: 5-7 times per week  Current Treatment Recommendations: Strengthening;Balance training;Functional mobility training;Transfer training; Endurance training;Gait training     Restrictions  Restrictions/Precautions  Restrictions/Precautions: Fall Risk, General Precautions, Up as Tolerated, Bed Alarm  Position Activity Restriction  Other position/activity restrictions: Rt. UE IV     Subjective    Subjective  Subjective: RAY Khanna states pt medically stable for tx at this time. Pt sitting EOB and is agreeable to tx. Pt c/o abdominal pain and bloating. Orientation  Overall Orientation Status: Within Normal Limits  Orientation Level: Oriented X4  Cognition  Overall Cognitive Status: WFL  Arousal/Alertness: Appropriate responses to stimuli  Following Commands:  Follows all commands without difficulty  Attention Span: Appears intact  Memory: Appears intact  Safety Judgement: Decreased awareness of need for assistance;Decreased awareness of need for safety  Problem Solving: Able to problem solve independently  Insights: Decreased awareness of deficits  Initiation: Does not require cues  Sequencing: Does not require cues     Objective   Balance  Sitting: Intact  Standing: Intact  Transfer Training  Transfer Training: Yes  Overall Level of Assistance: Modified independent (Pt transfered from EOB to standing with no AD)  Sit to Stand: Modified independent  Stand to Sit: Modified independent  Gait Training  Gait Training: Yes  Gait  Overall Level of Assistance: Modified independent (Pt ambulated around unit with no AD, good upright posture, and no LOB. Pt SOB post ambulation and required seated rest break with O2.)  Distance (ft): 200 Feet  Assistive Device: Gait belt      Exercise: pt declined exercise due to abdominal discomfort     Safety Devices  Type of Devices: Left in bed;Gait belt;Call light within reach  Restraints  Restraints Initially in Place: No       Goals  Short Term Goals  Time Frame for Short term goals: 12 visits:  Short term goal 1: Pt. to be indep with bed mob. Short term goal 2: Pt. to be SBA for sit to stand tranfser with approp AD with good immediate standing balance  Short term goal 3: Pt. to amb. 50ft (household distance) with approp. O2 and AD, SBA  Short term goal 4: Pt to trial rollator vs. RW and arrange with D/C planner to get for home if d/c home  Short term goal 5: Pt. to tolerate 25+ min. of PT daily for ther ex/ gait/ balance training/ endurance training/ energy conservation edu.   Patient Goals   Patient goals : breathe better    Education  Patient Education  Education Given To: Patient  Education Provided: Role of Therapy;Plan of Care  Education Provided Comments: Pt educated on importance of being up and OOB  Education Method: Verbal  Barriers to Learning: None  Education Outcome: Verbalized understanding    Therapy Time   Individual Concurrent Group Co-treatment   Time In 1400 [facility]         Time Out 1355         Minutes 10                 Jaspreet Renee, student physical therapy assistant under supervision of Raguel Bumpers, [place]

## 2022-08-30 NOTE — PLAN OF CARE
Problem: Respiratory - Adult  Goal: Achieves optimal ventilation and oxygenation  8/30/2022 0040 by Abdi Yoder RN  Outcome: Progressing  Note: Patient has 4 liters nasal canula which patient uses at home   8/29/2022 1613 by Duke Gautam RN  Outcome: Progressing  Flowsheets (Taken 8/29/2022 0759)  Achieves optimal ventilation and oxygenation: Assess for changes in respiratory status     Problem: Pain  Goal: Verbalizes/displays adequate comfort level or baseline comfort level  8/30/2022 0040 by Abdi Yoder RN  Outcome: Progressing  Note: Patient has as needed pain control. 8/29/2022 1613 by Duke Gautam RN  Outcome: Progressing     Problem: Safety - Adult  Goal: Free from fall injury  8/30/2022 0040 by Abdi Yoder RN  Outcome: Progressing  Note: Patient is free from injury this shift. 8/29/2022 1613 by Duke Gauatm RN  Outcome: Progressing     Problem: Gastrointestinal - Adult  Goal: Minimal or absence of nausea and vomiting  8/30/2022 0040 by Abdi Yoder RN  Outcome: Progressing  Note: Patient has not had any N/V this shift.    8/29/2022 1613 by Duke Gautam RN  Outcome: Progressing  Flowsheets (Taken 8/29/2022 0759)  Minimal or absence of nausea and vomiting: Administer IV fluids as ordered to ensure adequate hydration

## 2022-08-30 NOTE — PROGRESS NOTES
DATE: 2022    NAME: Shaggy Guillaume  MRN: [id number]   : 1965    Patient not seen this date for Occupational Therapy due to:      [] Cancel by RN or physician due to:    [] Hemodialysis    [] Critical Lab Value Level     [] Blood transfusion in progress    [] Acute or unstable cardiovascular status   _MAP < 55 or more than >115  _HR < 40 or > 130    [] Acute or unstable pulmonary status   -FiO2 > 60%   _RR < 5 or >40    _O2 sats < 85%    [] Strict Bedrest    [] Off Unit for surgery or procedure    [] Off Unit for testing       [] Pending imaging to R/O fracture    [x] Refusal by Patient : Pt. Declined treatment stating \"My arms are fine. Neisha Fanning Neisha Fanning Neisha Fanning I don't need therapy\". OT will cont to monitor. [] Other      [] OT being discontinued at this time. Patient independent. No further needs. [] OT being discontinued at this time as the patient has been transferred to hospice care. No further needs.       Emerson Mark TRISTIAN

## 2022-08-30 NOTE — PLAN OF CARE
Problem: Discharge Planning  Goal: Discharge to home or other facility with appropriate resources  Outcome: Progressing  Flowsheets (Taken 8/30/2022 0800)  Discharge to home or other facility with appropriate resources:   Arrange for needed discharge resources and transportation as appropriate   Identify barriers to discharge with patient and caregiver     Problem: Discharge Planning  Goal: Discharge to home or other facility with appropriate resources  Outcome: Progressing  Flowsheets (Taken 8/30/2022 0800)  Discharge to home or other facility with appropriate resources:   Arrange for needed discharge resources and transportation as appropriate   Identify barriers to discharge with patient and caregiver     Problem: Respiratory - Adult  Goal: Achieves optimal ventilation and oxygenation  8/30/2022 1213 by Roman Woodard RN  Outcome: Progressing  8/30/2022 0943 by Octavia Allen RCP  Outcome: Progressing  Flowsheets (Taken 8/30/2022 0800 by Roman Woodard RN)  Achieves optimal ventilation and oxygenation:   Assess for changes in respiratory status   Position to facilitate oxygenation and minimize respiratory effort   Oxygen supplementation based on oxygen saturation or arterial blood gases  8/30/2022 0040 by Subhash Head RN  Outcome: Progressing  Note: Patient has 4 liters nasal canula which patient uses at home   Goal: Clear lung sounds  8/30/2022 0943 by Markus Ortega RCP  Outcome: Progressing  Goal: Able to breathe comfortably  8/30/2022 0943 by Markus Ortega RCP  Outcome: Progressing  Goal: Adequate oxygenation  Description: Adequate oxygenation  8/30/2022 0943 by Markus Ortega RCP  Outcome: Progressing     Problem: Respiratory - Adult  Goal: Achieves optimal ventilation and oxygenation  8/30/2022 1213 by Roman Woodard RN  Outcome: Progressing  8/30/2022 0943 by Octavia Allen RCP  Outcome: Progressing  Flowsheets (Taken 8/30/2022 0800 by Roman Woodard RN)  Achieves optimal ventilation and oxygenation:   Assess for changes in respiratory status   Position to facilitate oxygenation and minimize respiratory effort   Oxygen supplementation based on oxygen saturation or arterial blood gases  8/30/2022 0040 by Dotty Szymanski RN  Outcome: Progressing  Note: Patient has 4 liters nasal canula which patient uses at home      Problem: Pain  Goal: Verbalizes/displays adequate comfort level or baseline comfort level  8/30/2022 1213 by Terrence Deal RN  Outcome: Progressing  8/30/2022 0040 by Dotty Szymanski RN  Outcome: Progressing  Note: Patient has as needed pain control. Problem: Safety - Adult  Goal: Free from fall injury  8/30/2022 1213 by Terrence Deal RN  Outcome: Progressing  8/30/2022 0124 by Dotty Szymanski RN  Outcome: Progressing  Note: Patient will be free from falls this shift and is aware of personal limits. 8/30/2022 0040 by Dotty Szymanski RN  Outcome: Progressing  Note: Patient is free from injury this shift.

## 2022-08-30 NOTE — PROGRESS NOTES
Progress note  [facility]    Adult Hospitalist      Name: Edgar Encarnacion  MRN: [id number]     Acct: [de-identified]  Room: 2006/2006-02    Admit Date: 8/24/2022 11:08 AM  PCP: Jeanne Castillo MD    Primary Problem  Principal Problem:    Acute exacerbation of COPD with asthma [facility])  Active Problems:    Abnormal abdominal CT scan    Family history of colon cancer    Elevated CEA    Colon obstruction ([address]    Hepatitis C virus infection without hepatic coma    Lesion of liver    Abdominal pain  Resolved Problems:    * No resolved hospital problems. *        Assesment:     Acute COPD exacerbation  Chronic hypoxic respiratory failure  Mild hyponatremia  Mild Leukocytosis  History of alcohol abuse  Liver lesion with history of treated hepatitis C  Essential hypertension  Mixed hyperlipidemia  Chronic coronary artery disease  Depression with anxiety  Tobacco abuse  Mild pulmonary hypertension  Suspected obstructive sleep apnea  Paroxysmal SVT status post ablation  Obesity, BMI 30.79  Positive blood culture 1 out of 2 question contamination        Plan:      Admit to med surge telemetry  O2 maintain oxygen saturation greater than 92%     IV Solu-Medrol  Duo neb   Respiratory pathogen better   Sputum culture  1/2 blood culture positive for staph coagulase negative, likely contaminant  Repeat blood culture no growth so far  IV ceftriaxone and p.o. doxycycline  Monitor respiratory status   Pulmonology consult    Patient is complaining of abdominal distention  CT abdomen pelvis of colon at splenic flexure General surgery evaluated the patient, recommended medical management  Triphasic CT ordered per GI  Plan for endoscopy    Continue to monitor/telemetry/CBC with differential daily/BMP daily  DVT and GI prophylaxis.   Continue aspirin  Continue hydrochlorothiazide, metoprolol, amlodipine  Continue Singulair  Continue Roflumilast  Continue inhalers  Continue Keppra  Bentyl as needed for abdominal pain  Advanced diet  Continue medications as below      Scheduled Meds:   polyethylene glycol  17 g Oral Daily    docusate sodium  100 mg Oral BID    senna  1 tablet Oral BID    [START ON 8/31/2022] pantoprazole  40 mg Oral QAM AC    [START ON 8/31/2022] predniSONE  40 mg Oral Daily    dicyclomine  10 mg Oral TID AC    ipratropium-albuterol  1 ampule Inhalation BID    aspirin  81 mg Oral Daily    ferrous sulfate  324 mg Oral Daily with breakfast    folic acid  1 mg Oral Daily    hydroCHLOROthiazide  25 mg Oral Daily    losartan  100 mg Oral Daily    metoprolol succinate  50 mg Oral Daily    montelukast  10 mg Oral Nightly    Roflumilast  500 mcg Oral Daily    enoxaparin  40 mg SubCUTAneous Daily    QUEtiapine  200 mg Oral Nightly    vitamin B-1  100 mg Oral Daily    budesonide-formoterol  2 puff Inhalation BID    levETIRAcetam  500 mg Oral BID    furosemide  40 mg Oral Daily    amLODIPine  10 mg Oral Daily     Continuous Infusions:    PRN Meds:  ketorolac, 15 mg, Q6H PRN  calcium carbonate, 500 mg, Q4H PRN  potassium chloride, 40 mEq, PRN   Or  potassium alternative oral replacement, 40 mEq, PRN   Or  potassium chloride, 10 mEq, PRN  magnesium sulfate, 1,000 mg, PRN  ondansetron, 4 mg, Q8H PRN   Or  ondansetron, 4 mg, Q6H PRN  HYDROcodone 5 mg - acetaminophen, 1 tablet, Q6H PRN  HYDROcodone 5 mg - acetaminophen, 2 tablet, Q6H PRN  morphine, 2 mg, Q4H PRN  albuterol, 2.5 mg, Q2H PRN      Chief Complaint:     Chief Complaint   Patient presents with    Chest Pain     Midsternal to right feels like pressure, has hx of chf and COPD    Shortness of Breath         History of Present Illness:      Robert Adkins is a 59 y.o.  male who presents with Chest Pain (Midsternal to right feels like pressure, has hx of chf and COPD) and Shortness of Breath    Patient seen and examined at bedside and reviewed  last 24 hrs events with nursing staff  No acute events overnight.   Passing flatus  Concern that stronger narcotic medication could suppress respirations    Afebrile  Patient denies any chest pain, palpitation, headache, dizziness, cough, nausea, vomiting, changes in urination or rash. HPI  61-year-old gentleman past medical history of CHF, COPD, coronary disease, liver cirrhosis presented to ER complaining of shortness of breath going on for 2 days. He is also complaining of intermittent right-sided chest pain associated with it. He wears 4 L of oxygen at home. Chest pain is moderate, intermittent, sharp and resolved on its own. Patient denies any  Fever, chills, changes in urination, bowel habit or rash. Patient received Solu-Medrol and IV Bumex in the ER. Sodium was 133. WBC was 12. 2. X-ray chest was negative. I have personally reviewed the past medical history, past surgical history, medications, social history, and family history, and summarized in the note. Review of Systems:     All 10 point system is reviewed and negative otherwise mentioned in HPI.       Past Medical History:     Past Medical History:   Diagnosis Date    Arthritis     CAD (coronary artery disease)     Cancer ([address]     prostate    Cirrhosis with alcoholism ([address]     COPD (chronic obstructive pulmonary disease) (HCC)     Depression     BOURGEOIS (dyspnea on exertion)     H/O prostate cancer     Hyperlipidemia     Hypertension     MI (myocardial infarction) ([address]     Seizures ([address] 2016    last one over 2 years ago     SVT (supraventricular tachycardia) ([address]     Tobacco abuse         Past Surgical History:     Past Surgical History:   Procedure Laterality Date    CARDIAC CATHETERIZATION      ablation    COLONOSCOPY N/A 8/29/2022    COLONOSCOPY WITH BIOPSY performed by Laura Chong MD at [address] [facility]  10/03/2018    INTERNAL LOOP RECORDER    PROSTATECTOMY      SINUS SURGERY      TONGUE SURGERY      UPPER GASTROINTESTINAL ENDOSCOPY N/A 6/10/2021    EGD BIOPSY performed by Sheree Alvares MD at [address] [facility] 01/03/2022    UPPER GASTROINTESTINAL ENDOSCOPY N/A 1/3/2022    EGD BIOPSY performed by Twan Rizzo MD at [address] [facility]        Medications Prior to Admission:       Prior to Admission medications    Medication Sig Start Date End Date Taking?  Authorizing Provider   fluticasone-salmeterol (ADVAIR HFA) 230-21 MCG/ACT inhaler Inhale 2 puffs into the lungs 2 times daily   Yes Historical Provider, MD   vitamin B-12 (CYANOCOBALAMIN) 1000 MCG tablet Take 1,000 mcg by mouth daily   Yes Historical Provider, MD   furosemide (LASIX) 40 MG tablet Take 40 mg by mouth daily   Yes Historical Provider, MD   levETIRAcetam (KEPPRA) 500 MG tablet Take 500 mg by mouth 2 times daily   Yes Historical Provider, MD   predniSONE (DELTASONE) 10 MG tablet Take 10 mg by mouth daily   Yes Historical Provider, MD   losartan-hydroCHLOROthiazide (HYZAAR) 100-25 MG per tablet Take 1 tablet by mouth daily   Yes Historical Provider, MD   Roflumilast (DALIRESP) 500 MCG tablet Take 1 tablet by mouth daily 7/16/21   Reyes Karvonen, MD   ferrous gluconate (FERGON) 324 (38 Fe) MG tablet Take 324 mg by mouth daily (with breakfast)    Historical Provider, MD   famotidine (PEPCID) 40 MG tablet Take 40 mg by mouth 2 times daily as needed (heartburn)     Historical Provider, MD   amLODIPine (NORVASC) 10 MG tablet Take 10 mg by mouth daily    Historical Provider, MD   albuterol (PROVENTIL) (2.5 MG/3ML) 0.083% nebulizer solution Take 2.5 mg by nebulization every 6 hours as needed for Wheezing    Historical Provider, MD   albuterol sulfate  (90 Base) MCG/ACT inhaler Inhale 2 puffs into the lungs every 4-6 hours as needed for Wheezing    Historical Provider, MD   aspirin 81 MG tablet Take 81 mg by mouth daily    Historical Provider, MD   folic acid (FOLVITE) 1 MG tablet Take 1 mg by mouth daily    Historical Provider, MD   metoprolol succinate (TOPROL XL) 50 MG extended release tablet Take 50 mg by mouth daily    Historical Provider, MD   pantoprazole (PROTONIX) 40 MG tablet Take 40 mg by mouth daily    Historical Provider, MD   QUEtiapine (SEROQUEL) 400 MG tablet Take 200 mg by mouth nightly Takes 1/2 tab (200mg) nightly    Historical Provider, MD   vitamin B-1 (THIAMINE) 100 MG tablet Take 100 mg by mouth daily    Historical Provider, MD   Cholecalciferol (VITAMIN D3) 2000 units CAPS Take 1 capsule by mouth daily    Historical Provider, MD   tiotropium (SPIRIVA RESPIMAT) 2.5 MCG/ACT AERS inhaler Inhale 2 puffs into the lungs daily    Historical Provider, MD        Allergies:       Patient has no known allergies. Social History:     Tobacco:    reports that he has quit smoking. His smoking use included cigarettes. He smoked an average of .1 packs per day. He quit smokeless tobacco use about 8 months ago. Alcohol:      reports that he does not currently use alcohol. Drug Use:  reports no history of drug use. Family History:     No family history on file.       Physical Exam:     Vitals:  /61   Pulse 77   Temp 98.1 °F (36.7 °C) (Oral)   Resp 16   Ht 5' 5\" (1.651 m)   Wt 206 lb (93.4 kg)   SpO2 94%   BMI 34.28 kg/m²   Temp (24hrs), Av.8 °F (36.6 °C), Min:97.3 °F (36.3 °C), Max:98.1 °F (36.7 °C)          General appearance - alert, well appearing, and in no acute distress  Mental status - oriented to person, place, and time with normal affect  Head - normocephalic and atraumatic  Eyes - pupils equal and reactive, extraocular eye movements intact, conjunctiva clear  Ears - hearing appears to be intact  Nose - no drainage noted  Mouth - mucous membranes moist  Neck - supple, no carotid bruits, thyroid not palpable  Chest - clear to auscultation, normal effort  Heart - normal rate, regular rhythm, no murmur  Abdomen - soft, nontender, nondistended, bowel sounds present all four quadrants, no masses, hepatomegaly or splenomegaly  Neurological - normal speech, no focal findings or movement disorder noted, cranial nerves II through XII grossly intact  Extremities - peripheral pulses palpable, no pedal edema or calf pain with palpation  Skin - no gross lesions, rashes, or induration noted        Data:     Labs:    Hematology:  Recent Labs     08/28/22  1423 08/29/22  0546 08/30/22  0601   WBC 10.2 10.5 10.4   RBC 4.38 4.28 3.89*   HGB 13.4 13.2 11.9*   HCT 42.9 40.8 37.8*   MCV 97.9 95.3 97.2   MCH 30.6 30.8 30.6   MCHC 31.2 32.4 31.5   RDW 12.7 12.5 12.5    352 293   MPV 9.7 9.5 9.3       Chemistry:  Recent Labs     08/28/22  1423 08/29/22  0546 08/30/22  0824    141 144   K 3.1* 3.7 3.6*   CL 98 99 105   CO2 35* 32* 31   GLUCOSE 104* 123* 82   BUN 22* 23* 25*   CREATININE 1.00 0.86 0.89   MG  --   --  2.2   ANIONGAP 8* 10 8*   LABGLOM >60 >60 >60   GFRAA >60 >60 >60   CALCIUM 9.8 9.8 9.4   PHOS  --   --  3.1       No results for input(s): PROT, LABALBU, LABA1C, J2GCAMK, O4CJHWN, FT4, TSH, AST, ALT, LDH, GGT, ALKPHOS, LABGGT, BILITOT, BILIDIR, AMMONIA, AMYLASE, LIPASE, LACTATE, CHOL, HDL, LDLCHOLESTEROL, CHOLHDLRATIO, TRIG, VLDL, HHB53MT, PHENYTOIN, PHENYF, URICACID, POCGLU in the last 72 hours.       Lab Results   Component Value Date    INR 1.0 08/25/2022    INR 1.0 01/01/2022    INR 1.1 07/13/2021    PROTIME 13.4 08/25/2022    PROTIME 13.2 01/01/2022    PROTIME 13.5 07/13/2021       Lab Results   Component Value Date/Time    SPECIAL 10ML LH 08/25/2022 11:45 AM     Lab Results   Component Value Date/Time    CULTURE NORMAL RESPIRATORY GANGA MODERATE GROWTH 08/26/2022 11:49 AM       No results found for: POCPH, PHART, PH, POCPCO2, PPS2ULM, PCO2, POCPO2, PO2ART, PO2, POCHCO3, PQD8FGR, HCO3, NBEA, PBEA, BEART, BE, THGBART, THB, CNB5UAL, NWEO4RPS, R5XZYTNK, O2SAT, FIO2    Radiology:    XR CHEST PORTABLE    Result Date: 8/24/2022  No acute cardiopulmonary findings         All radiological studies reviewed                Code Status:  Full Code    Electronically signed by Sonu Guadarrama MD on 8/30/2022 at 7:52 PM     Copy sent to Dr. Cem Chacon, MD    This note was created with the assistance of a speech-recognition program.  Although the intention is to generate a document that actually reflects the content of the visit, no guarantees can be provided that every mistake has been identified and corrected by editing. Note was updated later by me after  physical examination and  completion of the assessment.

## 2022-08-30 NOTE — PROGRESS NOTES
General Surgery:  Daily Progress Note          PATIENT NAME: Lyndsey Reid     TODAY'S DATE: 8/30/2022, 6:17 AM    SUBJECTIVE:     Pt seen and examined at bedside. No acute overnight events. Passing minimal flatus and had a small bowel movement yesterday. Underwent colonoscopy yesterday with decompression. Patient has significant to have something more to eat. Tolerated clear liquids yesterday. Denies any nausea or vomiting. States that his abdominal distention is a bit improved from yesterday before the colonoscopy. ROS:   General: No fevers or chills  Cardiac: No chest pain or palpitations  Respiratory: No shortness of breath or wheezing  Abdomen: No nausea or emesis; +R abdominal pain  Neuro: No headache, dizziness, syncope    OBJECTIVE:   VITALS:  BP (!) 93/57   Pulse 66   Temp 97.9 °F (36.6 °C) (Oral)   Resp 18   Ht 5' 5\" (1.651 m)   Wt 206 lb (93.4 kg)   SpO2 96%   BMI 34.28 kg/m²      INTAKE/OUTPUT:    No intake or output data in the 24 hours ending 08/30/22 0617      PHYSICAL EXAM:  General Appearance: awake, alert, oriented, in no acute distress  HEENT:  Normocephalic, atraumatic, mucus membranes moist  Heart: Regular rate and rhythm  Lungs: normal effort with symmetric rise and fall of chest wall  Abdomen: softly distended though improved from yesterday. Slight tender to palpation in right abdomen  Extremities: No cyanosis, pitting edema, rashes noted. Skin: Skin color, texture, turgor normal. No rashes or lesions.     Data:  CBC:   Recent Labs     08/28/22  1423 08/29/22  0546 08/30/22  0601   WBC 10.2 10.5 10.4   HGB 13.4 13.2 11.9*    352 293     Chemistry:   Recent Labs     08/27/22  0751 08/28/22  1423 08/29/22  0546    141 141   K 3.1* 3.1* 3.7    98 99   CO2 31 35* 32*   GLUCOSE 106* 104* 123*   BUN 21* 22* 23*   CREATININE 0.84 1.00 0.86   MG 1.9  --   --    ANIONGAP 9 8* 10   LABGLOM >60 >60 >60   GFRAA >60 >60 >60   CALCIUM 9.8 9.8 9.8     Hepatic: No results for input(s): AST, ALT, ALB, ALKPHOS, BILITOT, BILIDIR in the last 72 hours. Coagulation:   No results for input(s): APTT, PROT, INR in the last 72 hours. Radiology Review:    No new imaging to review      ASSESSMENT:  Active Hospital Problems    Diagnosis Date Noted    Colon obstruction ([facility] [R27.855] 08/28/2022     Priority: Medium    Abnormal abdominal CT scan [R93.5] 08/27/2022     Priority: Medium    Family history of colon cancer [Z80.0] 08/27/2022     Priority: Medium    Elevated CEA [R97.0] 08/27/2022     Priority: Medium    Acute exacerbation of COPD with asthma ([facility] [J44.1, J45.901] 08/24/2022     Priority: Medium    Abdominal pain [R10.9]     Lesion of liver [K76.9]     Hepatitis C virus infection without hepatic coma [B19.20] 03/09/2019       59 y.o. male with COPD exacerbation, abdominal distention; CT abdomen pelvis with narrowing of colon at the splenic flexure  Status post colonoscopy -ulcerated and ischemic transverse colon; decompression of cecum and transverse colon      Plan:  Continue medical management and supportive care per primary team  Continue conservative management at this time. Aggressive bowel regimen with MiraLAX, Colace twice daily, senna twice daily  Follow-up colonoscopy biopsies  Repeat CT shows benign liver hemangiomas  Replace electrolytes - keep K >4, Mg >2, Phos >3  Continue to encourage ambulation/activity w/ PT/OT  Will continue to follow for now. No surgical intervention planned at this time. We will see how patient does with bowel regimen    Electronically signed by Tania Mahoney DO  on 8/30/2022 at 6:17 AM   Attending Physician Statement  I have discussed the case, including pertinent history and exam findings with the resident. I agree with the assessment, plan and orders as documented by the resident.     OK to advance diet  Encourage ambulation and avoid narcotic use

## 2022-08-30 NOTE — RT PROTOCOL NOTE
RT Inhaler-Nebulizer Bronchodilator Protocol Note    There is a bronchodilator order in the chart from a provider indicating to follow the RT Bronchodilator Protocol and there is an Initiate RT Inhaler-Nebulizer Bronchodilator Protocol order as well (see protocol at bottom of note). CXR Findings:  No results found. The findings from the last RT Protocol Assessment were as follows:   History Pulmonary Disease: Chronic pulmonary disease  Respiratory Pattern: Dyspnea on exertion or RR 21-25 bpm  Breath Sounds: Slightly diminished and/or crackles  Cough: Strong, spontaneous, non-productive  Indication for Bronchodilator Therapy: On home bronchodilators  Bronchodilator Assessment Score: 6    Aerosolized bronchodilator medication orders have been revised according to the RT Inhaler-Nebulizer Bronchodilator Protocol below. Respiratory Therapist to perform RT Therapy Protocol Assessment initially then follow the protocol. Repeat RT Therapy Protocol Assessment PRN for score 0-3 or on second treatment, BID, and PRN for scores above 3. No Indications - adjust the frequency to every 6 hours PRN wheezing or bronchospasm, if no treatments needed after 48 hours then discontinue using Per Protocol order mode. If indication present, adjust the RT bronchodilator orders based on the Bronchodilator Assessment Score as indicated below. Use Inhaler orders unless patient has one or more of the following: on home nebulizer, not able to hold breath for 10 seconds, is not alert and oriented, cannot activate and use MDI correctly, or respiratory rate 25 breaths per minute or more, then use the equivalent nebulizer order(s) with same Frequency and PRN reasons based on the score. If a patient is on this medication at home then do not decrease Frequency below that used at home.     0-3 - enter or revise RT bronchodilator order(s) to equivalent RT Bronchodilator order with Frequency of every 4 hours PRN for wheezing or increased work of breathing using Per Protocol order mode. 4-6 - enter or revise RT Bronchodilator order(s) to two equivalent RT bronchodilator orders with one order with BID Frequency and one order with Frequency of every 4 hours PRN wheezing or increased work of breathing using Per Protocol order mode. 7-10 - enter or revise RT Bronchodilator order(s) to two equivalent RT bronchodilator orders with one order with TID Frequency and one order with Frequency of every 4 hours PRN wheezing or increased work of breathing using Per Protocol order mode. 11-13 - enter or revise RT Bronchodilator order(s) to one equivalent RT bronchodilator order with QID Frequency and an Albuterol order with Frequency of every 4 hours PRN wheezing or increased work of breathing using Per Protocol order mode. Greater than 13 - enter or revise RT Bronchodilator order(s) to one equivalent RT bronchodilator order with every 4 hours Frequency and an Albuterol order with Frequency of every 2 hours PRN wheezing or increased work of breathing using Per Protocol order mode. RT to enter RT Home Evaluation for COPD & MDI Assessment order using Per Protocol order mode.     Electronically signed by kendy estes RCP on 8/30/2022 at 9:43 AM

## 2022-08-30 NOTE — PROGRESS NOTES
Pulmonary Critical Care Progress Note       Patient seen for the follow up of Acute exacerbation of COPD with asthma ([address] , chronic hypoxic respiratory failure    Subjective:  Colonoscopy was done today with no obvious obstruction ulcerated ischemic area noted. Gentle biopsies done. Noble Beal He still reports having shortness of breath that he attributes to abdominal distention. He has been on oxygen 4 L nasal cannula  at baseline. Noble Beal He feels shortness of breath is not much change. He denies significant cough or chest pain. Examination:  Vitals: /61   Pulse 77   Temp 98.1 °F (36.7 °C) (Oral)   Resp 16   Ht 5' 5\" (1.651 m)   Wt 206 lb (93.4 kg)   SpO2 94%   BMI 34.28 kg/m²   General appearance: alert and cooperative with exam  Neck: No JVD  Lungs: Creased breath sound no wheezing  Heart: regular rate and rhythm, S1, S2 normal, no gallop  Abdomen: Soft, non tender, + BS  Extremities: no cyanosis or clubbing. Trace edema    LABs:  CBC:   Recent Labs     08/29/22  0546 08/30/22  0601   WBC 10.5 10.4   HGB 13.2 11.9*   HCT 40.8 37.8*    293       BMP:   Recent Labs     08/29/22  0546 08/30/22  0824    144   K 3.7 3.6*   CO2 32* 31   BUN 23* 25*   CREATININE 0.86 0.89   LABGLOM >60 >60   GLUCOSE 123* 82       No results for input(s): AST, ALT, LABALBU in the last 72 hours. Radiology:    CT abdomen pelvis 8/27  1. Previously described liver lesions are demonstrated as benign hemangiomas   on this exam.  No suspicious liver lesion identified. 2.  Moderate gaseous distention of the transverse colon without etiology on   this exam.  Moderate stool burden. X-ray chest 8/24/2022      Impression:  Chronic hypoxic respiratory failure   Acute exacerbation of COPD/active smoking  Mild pulmonary hypertension, RVSP 37 mmHg echo 7/12/2021  Suspected obstructive sleep apnea/Obesity  Liver cirrhosis with ascites/hepatitis C  Paroxysmal SVT status post ablation  Positive blood culture x1,?

## 2022-08-30 NOTE — PROGRESS NOTES
Protonix changed from IV to PO per [facility] approved policy. Basic Criteria (please refer to hospital policy for details):  1. functioning GI tract  2. tolerating PO/NG routine medications    Thank you.   Tsering Sinha, PharmD  Inpatient Pharmacist  8/30/2022 3:17 PM

## 2022-08-30 NOTE — PLAN OF CARE
Problem: Respiratory - Adult  Goal: Achieves optimal ventilation and oxygenation  8/30/2022 0943 by Ron Ortega RCP  Outcome: Progressing  8/30/2022 0040 by Avery Marquez RN  Outcome: Progressing  Note: Patient has 4 liters nasal canula which patient uses at home   Goal: Clear lung sounds  Outcome: Progressing  Goal: Able to breathe comfortably  Outcome: Progressing  Goal: Adequate oxygenation  Description: Adequate oxygenation  Outcome: Progressing

## 2022-08-31 LAB
ABSOLUTE EOS #: 0.12 K/UL (ref 0–0.44)
ABSOLUTE IMMATURE GRANULOCYTE: 0.19 K/UL (ref 0–0.3)
ABSOLUTE LYMPH #: 2.44 K/UL (ref 1.1–3.7)
ABSOLUTE MONO #: 1.23 K/UL (ref 0.1–1.2)
BASOPHILS # BLD: 0 % (ref 0–2)
BASOPHILS ABSOLUTE: <0.03 K/UL (ref 0–0.2)
EOSINOPHILS RELATIVE PERCENT: 1 % (ref 1–4)
HCT VFR BLD CALC: 36.3 % (ref 40.7–50.3)
HEMOGLOBIN: 11.6 G/DL (ref 13–17)
IMMATURE GRANULOCYTES: 1 %
LYMPHOCYTES # BLD: 18 % (ref 24–43)
MCH RBC QN AUTO: 31 PG (ref 25.2–33.5)
MCHC RBC AUTO-ENTMCNC: 32 G/DL (ref 28.4–34.8)
MCV RBC AUTO: 97.1 FL (ref 82.6–102.9)
MONOCYTES # BLD: 9 % (ref 3–12)
NRBC AUTOMATED: 0 PER 100 WBC
PDW BLD-RTO: 12.3 % (ref 11.8–14.4)
PLATELET # BLD: 313 K/UL (ref 138–453)
PMV BLD AUTO: 9.3 FL (ref 8.1–13.5)
RBC # BLD: 3.74 M/UL (ref 4.21–5.77)
SEG NEUTROPHILS: 71 % (ref 36–65)
SEGMENTED NEUTROPHILS ABSOLUTE COUNT: 9.36 K/UL (ref 1.5–8.1)
SURGICAL PATHOLOGY REPORT: NORMAL
WBC # BLD: 13.4 K/UL (ref 3.5–11.3)

## 2022-08-31 PROCEDURE — 6370000000 HC RX 637 (ALT 250 FOR IP): Performed by: INTERNAL MEDICINE

## 2022-08-31 PROCEDURE — 36415 COLL VENOUS BLD VENIPUNCTURE: CPT

## 2022-08-31 PROCEDURE — 6370000000 HC RX 637 (ALT 250 FOR IP): Performed by: STUDENT IN AN ORGANIZED HEALTH CARE EDUCATION/TRAINING PROGRAM

## 2022-08-31 PROCEDURE — 94640 AIRWAY INHALATION TREATMENT: CPT

## 2022-08-31 PROCEDURE — 85025 COMPLETE CBC W/AUTO DIFF WBC: CPT

## 2022-08-31 PROCEDURE — 2700000000 HC OXYGEN THERAPY PER DAY

## 2022-08-31 PROCEDURE — 2500000003 HC RX 250 WO HCPCS: Performed by: FAMILY MEDICINE

## 2022-08-31 PROCEDURE — 1200000000 HC SEMI PRIVATE

## 2022-08-31 PROCEDURE — APPSS30 APP SPLIT SHARED TIME 16-30 MINUTES: Performed by: NURSE PRACTITIONER

## 2022-08-31 PROCEDURE — 6360000002 HC RX W HCPCS: Performed by: HOSPITALIST

## 2022-08-31 PROCEDURE — 6370000000 HC RX 637 (ALT 250 FOR IP): Performed by: HOSPITALIST

## 2022-08-31 PROCEDURE — 6360000002 HC RX W HCPCS: Performed by: INTERNAL MEDICINE

## 2022-08-31 PROCEDURE — 99232 SBSQ HOSP IP/OBS MODERATE 35: CPT | Performed by: INTERNAL MEDICINE

## 2022-08-31 PROCEDURE — 6370000000 HC RX 637 (ALT 250 FOR IP): Performed by: FAMILY MEDICINE

## 2022-08-31 PROCEDURE — 6360000002 HC RX W HCPCS: Performed by: FAMILY MEDICINE

## 2022-08-31 RX ORDER — PREDNISONE 20 MG/1
40 TABLET ORAL DAILY
Qty: 8 TABLET | Refills: 0 | Status: SHIPPED | OUTPATIENT
Start: 2022-09-01 | End: 2022-09-05

## 2022-08-31 RX ORDER — IPRATROPIUM BROMIDE AND ALBUTEROL SULFATE 2.5; .5 MG/3ML; MG/3ML
1 SOLUTION RESPIRATORY (INHALATION) 2 TIMES DAILY
Status: DISCONTINUED | OUTPATIENT
Start: 2022-08-31 | End: 2022-09-02 | Stop reason: HOSPADM

## 2022-08-31 RX ORDER — METRONIDAZOLE 500 MG/1
500 TABLET ORAL 3 TIMES DAILY
Qty: 30 TABLET | Refills: 0 | Status: SHIPPED | OUTPATIENT
Start: 2022-08-31 | End: 2022-09-10

## 2022-08-31 RX ORDER — CIPROFLOXACIN 500 MG/1
500 TABLET, FILM COATED ORAL 2 TIMES DAILY
Qty: 20 TABLET | Refills: 0 | Status: SHIPPED | OUTPATIENT
Start: 2022-08-31 | End: 2022-09-10

## 2022-08-31 RX ORDER — OXYCODONE HYDROCHLORIDE 5 MG/1
5 TABLET ORAL 3 TIMES DAILY PRN
Status: DISCONTINUED | OUTPATIENT
Start: 2022-08-31 | End: 2022-09-02 | Stop reason: HOSPADM

## 2022-08-31 RX ORDER — METRONIDAZOLE 500 MG/100ML
500 INJECTION, SOLUTION INTRAVENOUS EVERY 8 HOURS
Status: DISCONTINUED | OUTPATIENT
Start: 2022-08-31 | End: 2022-09-02 | Stop reason: HOSPADM

## 2022-08-31 RX ORDER — CIPROFLOXACIN 2 MG/ML
400 INJECTION, SOLUTION INTRAVENOUS EVERY 12 HOURS
Status: DISCONTINUED | OUTPATIENT
Start: 2022-08-31 | End: 2022-09-02 | Stop reason: HOSPADM

## 2022-08-31 RX ADMIN — BUDESONIDE AND FORMOTEROL FUMARATE DIHYDRATE 2 PUFF: 160; 4.5 AEROSOL RESPIRATORY (INHALATION) at 21:59

## 2022-08-31 RX ADMIN — MONTELUKAST 10 MG: 10 TABLET, FILM COATED ORAL at 20:33

## 2022-08-31 RX ADMIN — HYDROCHLOROTHIAZIDE 25 MG: 25 TABLET ORAL at 08:36

## 2022-08-31 RX ADMIN — BUDESONIDE AND FORMOTEROL FUMARATE DIHYDRATE 2 PUFF: 160; 4.5 AEROSOL RESPIRATORY (INHALATION) at 07:17

## 2022-08-31 RX ADMIN — FERROUS SULFATE TAB EC 325 MG (65 MG FE EQUIVALENT) 324 MG: 325 (65 FE) TABLET DELAYED RESPONSE at 08:37

## 2022-08-31 RX ADMIN — ENOXAPARIN SODIUM 40 MG: 100 INJECTION SUBCUTANEOUS at 08:37

## 2022-08-31 RX ADMIN — SENNOSIDES 8.6 MG: 8.6 TABLET, FILM COATED ORAL at 08:36

## 2022-08-31 RX ADMIN — METRONIDAZOLE 500 MG: 500 INJECTION, SOLUTION INTRAVENOUS at 17:35

## 2022-08-31 RX ADMIN — HYDROCODONE BITARTRATE AND ACETAMINOPHEN 2 TABLET: 5; 325 TABLET ORAL at 03:16

## 2022-08-31 RX ADMIN — HYDROCODONE BITARTRATE AND ACETAMINOPHEN 2 TABLET: 5; 325 TABLET ORAL at 10:50

## 2022-08-31 RX ADMIN — Medication 100 MG: at 08:37

## 2022-08-31 RX ADMIN — OXYCODONE 5 MG: 5 TABLET ORAL at 18:23

## 2022-08-31 RX ADMIN — KETOROLAC TROMETHAMINE 15 MG: 15 INJECTION, SOLUTION INTRAMUSCULAR; INTRAVENOUS at 16:23

## 2022-08-31 RX ADMIN — KETOROLAC TROMETHAMINE 15 MG: 15 INJECTION, SOLUTION INTRAMUSCULAR; INTRAVENOUS at 05:15

## 2022-08-31 RX ADMIN — METOPROLOL SUCCINATE 50 MG: 50 TABLET, FILM COATED, EXTENDED RELEASE ORAL at 08:36

## 2022-08-31 RX ADMIN — IPRATROPIUM BROMIDE AND ALBUTEROL SULFATE 1 AMPULE: .5; 3 SOLUTION RESPIRATORY (INHALATION) at 07:17

## 2022-08-31 RX ADMIN — CIPROFLOXACIN 400 MG: 2 INJECTION, SOLUTION INTRAVENOUS at 16:11

## 2022-08-31 RX ADMIN — PREDNISONE 40 MG: 20 TABLET ORAL at 08:36

## 2022-08-31 RX ADMIN — DICYCLOMINE HYDROCHLORIDE 10 MG: 10 CAPSULE ORAL at 05:15

## 2022-08-31 RX ADMIN — POLYETHYLENE GLYCOL 3350 17 G: 17 POWDER, FOR SOLUTION ORAL at 08:37

## 2022-08-31 RX ADMIN — PANTOPRAZOLE SODIUM 40 MG: 40 TABLET, DELAYED RELEASE ORAL at 05:15

## 2022-08-31 RX ADMIN — ASPIRIN 81 MG: 81 TABLET, COATED ORAL at 08:37

## 2022-08-31 RX ADMIN — QUETIAPINE FUMARATE 200 MG: 200 TABLET ORAL at 20:33

## 2022-08-31 RX ADMIN — DICYCLOMINE HYDROCHLORIDE 10 MG: 10 CAPSULE ORAL at 16:12

## 2022-08-31 RX ADMIN — IPRATROPIUM BROMIDE AND ALBUTEROL SULFATE 1 AMPULE: 2.5; .5 SOLUTION RESPIRATORY (INHALATION) at 21:57

## 2022-08-31 RX ADMIN — DOCUSATE SODIUM 100 MG: 100 CAPSULE, LIQUID FILLED ORAL at 08:36

## 2022-08-31 RX ADMIN — LEVETIRACETAM 500 MG: 500 TABLET, FILM COATED ORAL at 08:36

## 2022-08-31 RX ADMIN — ROFLUMILAST 500 MCG: 500 TABLET ORAL at 08:37

## 2022-08-31 RX ADMIN — DOCUSATE SODIUM 100 MG: 100 CAPSULE, LIQUID FILLED ORAL at 20:33

## 2022-08-31 RX ADMIN — SENNOSIDES 8.6 MG: 8.6 TABLET, FILM COATED ORAL at 20:33

## 2022-08-31 RX ADMIN — DICYCLOMINE HYDROCHLORIDE 10 MG: 10 CAPSULE ORAL at 10:50

## 2022-08-31 RX ADMIN — FUROSEMIDE 40 MG: 40 TABLET ORAL at 08:37

## 2022-08-31 RX ADMIN — LOSARTAN POTASSIUM 100 MG: 100 TABLET, FILM COATED ORAL at 08:36

## 2022-08-31 RX ADMIN — LEVETIRACETAM 500 MG: 500 TABLET, FILM COATED ORAL at 20:33

## 2022-08-31 RX ADMIN — AMLODIPINE BESYLATE 10 MG: 10 TABLET ORAL at 08:37

## 2022-08-31 RX ADMIN — FOLIC ACID 1 MG: 1 TABLET ORAL at 08:37

## 2022-08-31 ASSESSMENT — PAIN SCALES - GENERAL
PAINLEVEL_OUTOF10: 7
PAINLEVEL_OUTOF10: 5
PAINLEVEL_OUTOF10: 7

## 2022-08-31 ASSESSMENT — PAIN DESCRIPTION - DESCRIPTORS
DESCRIPTORS: BURNING;CRAMPING
DESCRIPTORS: ACHING;CRAMPING

## 2022-08-31 ASSESSMENT — PAIN DESCRIPTION - LOCATION
LOCATION: ABDOMEN
LOCATION: ABDOMEN

## 2022-08-31 NOTE — PLAN OF CARE
Problem: Respiratory - Adult  Goal: Achieves optimal ventilation and oxygenation  8/31/2022 0353 by Padma Soliz RN  Outcome: Progressing     Problem: Respiratory - Adult  Goal: Able to breathe comfortably  8/30/2022 2210 by Mohit Del Angel RCP  Outcome: Progressing     Problem: Safety - Adult  Goal: Free from fall injury  Outcome: Progressing  Flowsheets (Taken 8/30/2022 2043)  Free From Fall Injury: Instruct family/caregiver on patient safety

## 2022-08-31 NOTE — PROGRESS NOTES
Progress note  [facility]    Adult Hospitalist      Name: Margy Marquis  MRN: [id number]     Acct: [de-identified]  Room: 2006/2006-02    Admit Date: 8/24/2022 11:08 AM  PCP: Darryle Norris, MD    Primary Problem  Principal Problem:    Acute exacerbation of COPD with asthma [facility])  Active Problems:    Abnormal abdominal CT scan    Family history of colon cancer    Elevated CEA    Colon obstruction ([address]    Hepatitis C virus infection without hepatic coma    Lesion of liver    Abdominal pain  Resolved Problems:    * No resolved hospital problems. *        Assesment:     Acute COPD exacerbation  Chronic hypoxic respiratory failure  Mild hyponatremia  Mild Leukocytosis  History of alcohol abuse  Liver lesion with history of treated hepatitis C  Essential hypertension  Mixed hyperlipidemia  Coronary artery disease, native vessel  Depression with anxiety  Tobacco abuse  Mild pulmonary hypertension  Suspected obstructive sleep apnea  Paroxysmal SVT status post ablation  Obesity, BMI 33  Positive blood culture 1 out of 2 question contamination        Plan:      Admit to med surge telemetry  O2 maintain oxygen saturation greater than 92%     IV Solu-Medrol- taper per Pulm  Duo neb   Respiratory pathogen better   Sputum culture  1/2 blood culture positive for staph coagulase negative, likely contaminant  Repeat blood culture no growth so far  IV ceftriaxone and p.o. doxycycline  Monitor respiratory status   Pulmonology consult    Patient is complaining of abdominal distention  CT abdomen pelvis of colon at splenic flexure General surgery evaluated the patient, recommended medical management  Triphasic CT ordered per GI  Plan for endoscopy    Continue to monitor/telemetry/CBC with differential daily/BMP daily  DVT and GI prophylaxis.   Continue aspirin  Continue hydrochlorothiazide, metoprolol, amlodipine  Continue Singulair  Continue Roflumilast  Continue inhalers  Continue Keppra  Bentyl as needed for abdominal pain  Advanced diet  DC planning once OK w all  Continue medications as below      Scheduled Meds:   ipratropium-albuterol  1 ampule Inhalation BID    polyethylene glycol  17 g Oral Daily    docusate sodium  100 mg Oral BID    senna  1 tablet Oral BID    pantoprazole  40 mg Oral QAM AC    predniSONE  40 mg Oral Daily    dicyclomine  10 mg Oral TID AC    aspirin  81 mg Oral Daily    ferrous sulfate  324 mg Oral Daily with breakfast    folic acid  1 mg Oral Daily    hydroCHLOROthiazide  25 mg Oral Daily    losartan  100 mg Oral Daily    metoprolol succinate  50 mg Oral Daily    montelukast  10 mg Oral Nightly    Roflumilast  500 mcg Oral Daily    enoxaparin  40 mg SubCUTAneous Daily    QUEtiapine  200 mg Oral Nightly    vitamin B-1  100 mg Oral Daily    budesonide-formoterol  2 puff Inhalation BID    levETIRAcetam  500 mg Oral BID    furosemide  40 mg Oral Daily    amLODIPine  10 mg Oral Daily     Continuous Infusions:    PRN Meds:  ketorolac, 15 mg, Q6H PRN  calcium carbonate, 500 mg, Q4H PRN  potassium chloride, 40 mEq, PRN   Or  potassium alternative oral replacement, 40 mEq, PRN   Or  potassium chloride, 10 mEq, PRN  magnesium sulfate, 1,000 mg, PRN  ondansetron, 4 mg, Q8H PRN   Or  ondansetron, 4 mg, Q6H PRN  HYDROcodone 5 mg - acetaminophen, 1 tablet, Q6H PRN  HYDROcodone 5 mg - acetaminophen, 2 tablet, Q6H PRN  morphine, 2 mg, Q4H PRN  albuterol, 2.5 mg, Q2H PRN      Chief Complaint:     Chief Complaint   Patient presents with    Chest Pain     Midsternal to right feels like pressure, has hx of chf and COPD    Shortness of Breath         History of Present Illness:      Vivek Cornell is a 59 y.o.  male who presents with Chest Pain (Midsternal to right feels like pressure, has hx of chf and COPD) and Shortness of Breath    Patient seen and examined at bedside and reviewed  last 24 hrs events with nursing staff  No acute events overnight.   Passing flatus  Concern that stronger narcotic medication could suppress respirations  Abd distention improved    Afebrile  Patient denies any chest pain, palpitation, headache, dizziness, cough, nausea, vomiting, changes in urination or rash. HPI  61-year-old gentleman past medical history of CHF, COPD, coronary disease, liver cirrhosis presented to ER complaining of shortness of breath going on for 2 days. He is also complaining of intermittent right-sided chest pain associated with it. He wears 4 L of oxygen at home. Chest pain is moderate, intermittent, sharp and resolved on its own. Patient denies any  Fever, chills, changes in urination, bowel habit or rash. Patient received Solu-Medrol and IV Bumex in the ER. Sodium was 133. WBC was 12. 2. X-ray chest was negative. I have personally reviewed the past medical history, past surgical history, medications, social history, and family history, and summarized in the note. Review of Systems:     All 10 point system is reviewed and negative otherwise mentioned in HPI.       Past Medical History:     Past Medical History:   Diagnosis Date    Arthritis     CAD (coronary artery disease)     Cancer ([address]     prostate    Cirrhosis with alcoholism ([address]     COPD (chronic obstructive pulmonary disease) (HCC)     Depression     BOURGEOIS (dyspnea on exertion)     H/O prostate cancer     Hyperlipidemia     Hypertension     MI (myocardial infarction) ([address]     Seizures ([address] 2016    last one over 2 years ago     SVT (supraventricular tachycardia) ([address]     Tobacco abuse         Past Surgical History:     Past Surgical History:   Procedure Laterality Date    CARDIAC CATHETERIZATION      ablation    COLONOSCOPY N/A 8/29/2022    COLONOSCOPY WITH BIOPSY performed by Khoi Montoya MD at [facility]  10/03/2018    INTERNAL LOOP RECORDER    PROSTATECTOMY      SINUS SURGERY      TONGUE SURGERY      UPPER GASTROINTESTINAL ENDOSCOPY N/A 6/10/2021    EGD BIOPSY performed by Wendy Rosario MD at [address] [facility]  GASTROINTESTINAL ENDOSCOPY  01/03/2022    UPPER GASTROINTESTINAL ENDOSCOPY N/A 1/3/2022    EGD BIOPSY performed by Elsie Snellen, MD at [facility]        Medications Prior to Admission:       Prior to Admission medications    Medication Sig Start Date End Date Taking?  Authorizing Provider   fluticasone-salmeterol (ADVAIR HFA) 230-21 MCG/ACT inhaler Inhale 2 puffs into the lungs 2 times daily   Yes Historical Provider, MD   vitamin B-12 (CYANOCOBALAMIN) 1000 MCG tablet Take 1,000 mcg by mouth daily   Yes Historical Provider, MD   furosemide (LASIX) 40 MG tablet Take 40 mg by mouth daily   Yes Historical Provider, MD   levETIRAcetam (KEPPRA) 500 MG tablet Take 500 mg by mouth 2 times daily   Yes Historical Provider, MD   predniSONE (DELTASONE) 10 MG tablet Take 10 mg by mouth daily   Yes Historical Provider, MD   losartan-hydroCHLOROthiazide (HYZAAR) 100-25 MG per tablet Take 1 tablet by mouth daily   Yes Historical Provider, MD   Roflumilast (DALIRESP) 500 MCG tablet Take 1 tablet by mouth daily 7/16/21   Dannielle Fermin MD   ferrous gluconate (FERGON) 324 (38 Fe) MG tablet Take 324 mg by mouth daily (with breakfast)    Historical Provider, MD   famotidine (PEPCID) 40 MG tablet Take 40 mg by mouth 2 times daily as needed (heartburn)     Historical Provider, MD   amLODIPine (NORVASC) 10 MG tablet Take 10 mg by mouth daily    Historical Provider, MD   albuterol (PROVENTIL) (2.5 MG/3ML) 0.083% nebulizer solution Take 2.5 mg by nebulization every 6 hours as needed for Wheezing    Historical Provider, MD   albuterol sulfate  (90 Base) MCG/ACT inhaler Inhale 2 puffs into the lungs every 4-6 hours as needed for Wheezing    Historical Provider, MD   aspirin 81 MG tablet Take 81 mg by mouth daily    Historical Provider, MD   folic acid (FOLVITE) 1 MG tablet Take 1 mg by mouth daily    Historical Provider, MD   metoprolol succinate (TOPROL XL) 50 MG extended release tablet Take 50 mg by mouth daily    Historical Provider, MD   pantoprazole (PROTONIX) 40 MG tablet Take 40 mg by mouth daily    Historical Provider, MD   QUEtiapine (SEROQUEL) 400 MG tablet Take 200 mg by mouth nightly Takes 1/2 tab (200mg) nightly    Historical Provider, MD   vitamin B-1 (THIAMINE) 100 MG tablet Take 100 mg by mouth daily    Historical Provider, MD   Cholecalciferol (VITAMIN D3) 2000 units CAPS Take 1 capsule by mouth daily    Historical Provider, MD   tiotropium (SPIRIVA RESPIMAT) 2.5 MCG/ACT AERS inhaler Inhale 2 puffs into the lungs daily    Historical Provider, MD        Allergies:       Patient has no known allergies. Social History:     Tobacco:    reports that he has quit smoking. His smoking use included cigarettes. He smoked an average of .1 packs per day. He quit smokeless tobacco use about 8 months ago. Alcohol:      reports that he does not currently use alcohol. Drug Use:  reports no history of drug use. Family History:     No family history on file.       Physical Exam:     Vitals:  /60   Pulse 75   Temp 97.7 °F (36.5 °C) (Oral)   Resp 16   Ht 5' 5\" (1.651 m)   Wt 201 lb (91.2 kg)   SpO2 93%   BMI 33.45 kg/m²   Temp (24hrs), Av.8 °F (36.6 °C), Min:97.7 °F (36.5 °C), Max:98.1 °F (36.7 °C)          General appearance - alert, well appearing, and in no acute distress  Mental status - oriented to person, place, and time with normal affect  Head - normocephalic and atraumatic  Eyes - pupils equal and reactive, extraocular eye movements intact, conjunctiva clear  Ears - hearing appears to be intact  Nose - no drainage noted  Mouth - mucous membranes moist  Neck - supple, no carotid bruits, thyroid not palpable  Chest - clear to auscultation, normal effort  Heart - normal rate, regular rhythm, no murmur  Abdomen - soft, nontender, nondistended, bowel sounds present all four quadrants, no masses, hepatomegaly or splenomegaly  Neurological - normal speech, no focal findings or movement disorder noted, cranial nerves II through XII grossly intact  Extremities - peripheral pulses palpable, no pedal edema or calf pain with palpation  Skin - no gross lesions, rashes, or induration noted        Data:     Labs:    Hematology:  Recent Labs     08/29/22  0546 08/30/22  0601 08/31/22  0707   WBC 10.5 10.4 13.4*   RBC 4.28 3.89* 3.74*   HGB 13.2 11.9* 11.6*   HCT 40.8 37.8* 36.3*   MCV 95.3 97.2 97.1   MCH 30.8 30.6 31.0   MCHC 32.4 31.5 32.0   RDW 12.5 12.5 12.3    293 313   MPV 9.5 9.3 9.3       Chemistry:  Recent Labs     08/29/22  0546 08/30/22  0824    144   K 3.7 3.6*   CL 99 105   CO2 32* 31   GLUCOSE 123* 82   BUN 23* 25*   CREATININE 0.86 0.89   MG  --  2.2   ANIONGAP 10 8*   LABGLOM >60 >60   GFRAA >60 >60   CALCIUM 9.8 9.4   PHOS  --  3.1       No results for input(s): PROT, LABALBU, LABA1C, T7UQASB, C7RJMUH, FT4, TSH, AST, ALT, LDH, GGT, ALKPHOS, LABGGT, BILITOT, BILIDIR, AMMONIA, AMYLASE, LIPASE, LACTATE, CHOL, HDL, LDLCHOLESTEROL, CHOLHDLRATIO, TRIG, VLDL, AFA06RQ, PHENYTOIN, PHENYF, URICACID, POCGLU in the last 72 hours.       Lab Results   Component Value Date    INR 1.0 08/25/2022    INR 1.0 01/01/2022    INR 1.1 07/13/2021    PROTIME 13.4 08/25/2022    PROTIME 13.2 01/01/2022    PROTIME 13.5 07/13/2021       Lab Results   Component Value Date/Time    SPECIAL 10ML LH 08/25/2022 11:45 AM     Lab Results   Component Value Date/Time    CULTURE NORMAL RESPIRATORY GANGA MODERATE GROWTH 08/26/2022 11:49 AM       No results found for: POCPH, PHART, PH, POCPCO2, IKH6BWX, PCO2, POCPO2, PO2ART, PO2, POCHCO3, CFN0RYS, HCO3, NBEA, PBEA, BEART, BE, THGBART, THB, HPD8YOI, THVJ0HET, Q2QJPULA, O2SAT, FIO2    Radiology:    XR CHEST PORTABLE    Result Date: 8/24/2022  No acute cardiopulmonary findings         All radiological studies reviewed                Code Status:  Full Code    Electronically signed by Milo Chavez MD on 8/31/2022 at 2:59 PM     Copy sent to Dr. Danny Rubio MD    This note was created with the assistance of a speech-recognition program.  Although the intention is to generate a document that actually reflects the content of the visit, no guarantees can be provided that every mistake has been identified and corrected by editing. Note was updated later by me after  physical examination and  completion of the assessment.

## 2022-08-31 NOTE — PROGRESS NOTES
General Surgery:  Daily Progress Note          PATIENT NAME: Chrissy Castillo     TODAY'S DATE: 8/31/2022, 6:55 AM    SUBJECTIVE:     Pt seen and examined at bedside. No acute overnight events. Passing lots of flatus, no BM. Abd distention better than yesterday but still distended. Tolerating regular diet. ROS:   General: No fevers or chills  Cardiac: No chest pain or palpitations  Respiratory: No shortness of breath or wheezing  Abdomen: No nausea or emesis; +distention (improving)  Neuro: No headache, dizziness, syncope    OBJECTIVE:   VITALS:  /67   Pulse 68   Temp 97.7 °F (36.5 °C) (Oral)   Resp 14   Ht 5' 5\" (1.651 m)   Wt 201 lb (91.2 kg)   SpO2 97%   BMI 33.45 kg/m²      INTAKE/OUTPUT:    No intake or output data in the 24 hours ending 08/31/22 0655      PHYSICAL EXAM:  General Appearance: awake, alert, oriented, in no acute distress  HEENT:  Normocephalic, atraumatic, mucus membranes moist  Heart: Regular rate and rhythm  Lungs: normal effort with symmetric rise and fall of chest wall  Abdomen: softly distended though improved from yesterday. Extremities: No cyanosis, pitting edema, rashes noted. Skin: Skin color, texture, turgor normal. No rashes or lesions. Data:  CBC:   Recent Labs     08/28/22  1423 08/29/22  0546 08/30/22  0601   WBC 10.2 10.5 10.4   HGB 13.4 13.2 11.9*    352 293     Chemistry:   Recent Labs     08/28/22  1423 08/29/22  0546 08/30/22  0824    141 144   K 3.1* 3.7 3.6*   CL 98 99 105   CO2 35* 32* 31   GLUCOSE 104* 123* 82   BUN 22* 23* 25*   CREATININE 1.00 0.86 0.89   MG  --   --  2.2   ANIONGAP 8* 10 8*   LABGLOM >60 >60 >60   GFRAA >60 >60 >60   CALCIUM 9.8 9.8 9.4   PHOS  --   --  3.1     Hepatic: No results for input(s): AST, ALT, ALB, ALKPHOS, BILITOT, BILIDIR in the last 72 hours. Coagulation:   No results for input(s): APTT, PROT, INR in the last 72 hours.         Radiology Review:    No new imaging to review      ASSESSMENT:  Active

## 2022-08-31 NOTE — PROGRESS NOTES
DATE: 2022    NAME: Jose Cruz Lilly  MRN: [id number]   : 1965    Patient not seen this date for Occupational Therapy due to:      [] Cancel by RN or physician due to:    [] Hemodialysis    [] Critical Lab Value Level     [] Blood transfusion in progress    [] Acute or unstable cardiovascular status   _MAP < 55 or more than >115  _HR < 40 or > 130    [] Acute or unstable pulmonary status   -FiO2 > 60%   _RR < 5 or >40    _O2 sats < 85%    [] Strict Bedrest    [] Off Unit for surgery or procedure    [] Off Unit for testing       [] Pending imaging to R/O fracture    [x] Refusal by Patient : Pt. Declined treatment stating \"Not now maybe later\". OT will check back if time permits. [] Other      [] OT being discontinued at this time. Patient independent. No further needs. [] OT being discontinued at this time as the patient has been transferred to hospice care. No further needs.       Lilliam Rivas, TRISTIAN

## 2022-08-31 NOTE — PLAN OF CARE
Problem: Respiratory - Adult  Goal: Achieves optimal ventilation and oxygenation  8/31/2022 0722 by Eliseo Robles RCP  Outcome: Progressing     Problem: Respiratory - Adult  Goal: Clear lung sounds  8/31/2022 0722 by Eliseo Robles RCP  Outcome: Progressing     Problem: Respiratory - Adult  Goal: Able to breathe comfortably  8/31/2022 0722 by Eliseo Robles RCP  Outcome: Progressing     Problem: Respiratory - Adult  Goal: Adequate oxygenation  Description: Adequate oxygenation  8/31/2022 0722 by Eliseo Robles RCP  Outcome: Progressing

## 2022-08-31 NOTE — PLAN OF CARE
Problem: Discharge Planning  Goal: Discharge to home or other facility with appropriate resources  8/31/2022 1122 by Roman Woodard RN  Outcome: Progressing  8/31/2022 0353 by Osman Woodward RN  Outcome: Progressing     Problem: Respiratory - Adult  Goal: Achieves optimal ventilation and oxygenation  8/31/2022 1122 by Roman Woodard RN  Outcome: Progressing  Flowsheets (Taken 8/31/2022 0800)  Achieves optimal ventilation and oxygenation:   Assess for changes in respiratory status   Assess for changes in mentation and behavior   Oxygen supplementation based on oxygen saturation or arterial blood gases   Position to facilitate oxygenation and minimize respiratory effort  8/31/2022 0722 by Aleah Beverly RCP  Outcome: Progressing  8/31/2022 0353 by Osman Woodward RN  Outcome: Progressing  8/30/2022 2210 by Maikel Hsieh RCP  Outcome: Progressing  Goal: Clear lung sounds  8/31/2022 0722 by Aleah Beverly RCP  Outcome: Progressing  8/30/2022 2210 by Maikel Hsieh RCP  Outcome: Progressing  Goal: Able to breathe comfortably  8/31/2022 0722 by Aleah Beverly RCP  Outcome: Progressing  8/30/2022 2210 by Maikel Hsieh RCP  Outcome: Progressing  Goal: Adequate oxygenation  Description: Adequate oxygenation  8/31/2022 0722 by Aleah Beverly RCP  Outcome: Progressing  8/30/2022 2210 by Maikel Hsieh RCP  Outcome: Progressing     Problem: Respiratory - Adult  Goal: Clear lung sounds  8/31/2022 0722 by Aleah Beverly RCP  Outcome: Progressing  8/30/2022 2210 by Maikel Hsieh RCP  Outcome: Progressing     Problem: Respiratory - Adult  Goal: Able to breathe comfortably  8/31/2022 0722 by Aleah Beverly RCP  Outcome: Progressing  8/30/2022 2210 by Maikel Hsieh RCP  Outcome: Progressing

## 2022-08-31 NOTE — PROGRESS NOTES
Dr. Wilson Cheese notified of bx results as well as GI and surgery. Pt will stay and cancel dc today.  Reevaluate in the am

## 2022-08-31 NOTE — PROGRESS NOTES
CLINICAL PHARMACY NOTE: MEDS TO BEDS    Total # of Prescriptions Filled: 0   The following medications were delivered to the patient:  x    Additional Documentation:    Dr Allan Xavier fax failed yet aagain.   Need printed rx faxed or send to home pharm

## 2022-08-31 NOTE — RT PROTOCOL NOTE
RT Inhaler-Nebulizer Bronchodilator Protocol Note    There is a bronchodilator order in the chart from a provider indicating to follow the RT Bronchodilator Protocol and there is an Initiate RT Inhaler-Nebulizer Bronchodilator Protocol order as well (see protocol at bottom of note). CXR Findings:  No results found. The findings from the last RT Protocol Assessment were as follows:   History Pulmonary Disease: Chronic pulmonary disease  Respiratory Pattern: Dyspnea on exertion or RR 21-25 bpm  Breath Sounds: Slightly diminished and/or crackles  Cough: Strong, spontaneous, non-productive  Indication for Bronchodilator Therapy: On home bronchodilators, Decreased or absent breath sounds  Bronchodilator Assessment Score: 6    Aerosolized bronchodilator medication orders have been revised according to the RT Inhaler-Nebulizer Bronchodilator Protocol below. Respiratory Therapist to perform RT Therapy Protocol Assessment initially then follow the protocol. Repeat RT Therapy Protocol Assessment PRN for score 0-3 or on second treatment, BID, and PRN for scores above 3. No Indications - adjust the frequency to every 6 hours PRN wheezing or bronchospasm, if no treatments needed after 48 hours then discontinue using Per Protocol order mode. If indication present, adjust the RT bronchodilator orders based on the Bronchodilator Assessment Score as indicated below. Use Inhaler orders unless patient has one or more of the following: on home nebulizer, not able to hold breath for 10 seconds, is not alert and oriented, cannot activate and use MDI correctly, or respiratory rate 25 breaths per minute or more, then use the equivalent nebulizer order(s) with same Frequency and PRN reasons based on the score. If a patient is on this medication at home then do not decrease Frequency below that used at home.     0-3 - enter or revise RT bronchodilator order(s) to equivalent RT Bronchodilator order with Frequency of every 4 hours PRN for wheezing or increased work of breathing using Per Protocol order mode. 4-6 - enter or revise RT Bronchodilator order(s) to two equivalent RT bronchodilator orders with one order with BID Frequency and one order with Frequency of every 4 hours PRN wheezing or increased work of breathing using Per Protocol order mode. 7-10 - enter or revise RT Bronchodilator order(s) to two equivalent RT bronchodilator orders with one order with TID Frequency and one order with Frequency of every 4 hours PRN wheezing or increased work of breathing using Per Protocol order mode. 11-13 - enter or revise RT Bronchodilator order(s) to one equivalent RT bronchodilator order with QID Frequency and an Albuterol order with Frequency of every 4 hours PRN wheezing or increased work of breathing using Per Protocol order mode. Greater than 13 - enter or revise RT Bronchodilator order(s) to one equivalent RT bronchodilator order with every 4 hours Frequency and an Albuterol order with Frequency of every 2 hours PRN wheezing or increased work of breathing using Per Protocol order mode. RT to enter RT Home Evaluation for COPD & MDI Assessment order using Per Protocol order mode.     Electronically signed by Juan Miramontes RCP on 8/31/2022 at 7:20 AM

## 2022-08-31 NOTE — PROGRESS NOTES
Nutrition Assessment     Type and Reason for Visit: Reassess    Nutrition Recommendations/Plan:   Continue ADULT DIET; Regular; Low Sodium (2 gm)  Monitor p.o intakes and labs     Malnutrition Assessment:  Malnutrition Status: At risk for malnutrition (Comment)    Nutrition Assessment:  Patient's diet advanced to Low Sodium and tolerated. Will monitor p.o intakes and labs. Patient will likely discharge today. Estimated Daily Nutrient Needs:  Energy (kcal):  6582-0920 kcal (16-18 kcal/kg) Weight Used for Energy Requirements: Current     Protein (g):  74-81 gm of protein (1.2-1.3 gm/kg) Weight Used for Protein Requirements: Ideal          Nutrition Related Findings:   Edema: trace BLE High-pitched bowel sounds and bloating Wound Type: None    Current Nutrition Therapies:    ADULT DIET; Regular;  Low Sodium (2 gm)    Anthropometric Measures:  Height: 5' 5\" (165.1 cm)  Current Body Wt: 201 lb (91.2 kg)   BMI: 33.4    Nutrition Diagnosis:   Altered GI function related to altered GI function as evidenced by GI abnormality, constipation    Nutrition Interventions:   Food and/or Nutrient Delivery: Continue Current Diet  Nutrition Education/Counseling: Education not indicated  Coordination of Nutrition Care: Continue to monitor while inpatient       Goals:  Previous Goal Met: Progressing toward Goal(s)  Goals: PO intake 75% or greater       Nutrition Monitoring and Evaluation:      Food/Nutrient Intake Outcomes: Food and Nutrient Intake  Physical Signs/Symptoms Outcomes: Biochemical Data, Fluid Status or Edema, Skin, Weight    Discharge Planning:    Continue current diet         Park PEARCE, RDN, LDN  Lead Clinical Dietitian  RD Office Phone [phone]

## 2022-08-31 NOTE — DISCHARGE INSTR - COC
Continuity of Care Form    Patient Name: Shaggy Guillaume   :  1965  MRN:  [id number]    Admit date:  2022  Discharge date:  2022    Code Status Order: Full Code   Advance Directives:     Admitting Physician:  Patti Wilkins MD  PCP: Matthew Frost MD    Discharging Nurse: [facility] Unit/Room#:   Discharging Unit Phone Number: [phone]    Emergency Contact:   Extended Emergency Contact Information  Primary Emergency Contact: [address] [employer]  Mobile Phone: [phone]  Relation: Brother/Sister  Secondary Emergency Contact: [address] Phone: [phone]  Relation: Niece/Nephew    Past Surgical History:  Past Surgical History:   Procedure Laterality Date    CARDIAC CATHETERIZATION      ablation    COLONOSCOPY N/A 2022    COLONOSCOPY WITH BIOPSY performed by David Rausch MD at [facility]  10/03/2018    INTERNAL LOOP RECORDER    PROSTATECTOMY      SINUS SURGERY      TONGUE SURGERY      UPPER GASTROINTESTINAL ENDOSCOPY N/A 6/10/2021    EGD BIOPSY performed by Marivel Velez MD at [address] [place] [address]  2022    UPPER GASTROINTESTINAL ENDOSCOPY N/A 1/3/2022    EGD BIOPSY performed by Andreea Navarro MD at [address] [place] [address] History:   Immunization History   Administered Date(s) Administered    COVID-19, J&J, (age 18y+), IM, 0.5 mL 05/10/2021    Influenza, FLUARIX, FLULAVAL, (age 10 mo+) AND AFLURIA, FLUZONE (age 1 y+), PF 10/13/2017, 10/23/2019, 2022    Influenza, Tennie Mock, 6 mo and older, IM, PF (Flulaval, Fluarix) 2018       Active Problems:  Patient Active Problem List   Diagnosis Code    Respiratory failure ([facility] J96.90    Pneumonia J18.9    Essential hypertension I10    Diarrhea R19.7    H/O paroxysmal supraventricular tachycardia Z86.79    Status post placement of implantable loop recorder Z95.818    Smoker F17.200    COPD (chronic obstructive pulmonary disease) ([facility] J44.9    Hepatitis C virus infection without hepatic coma B19.20    COPD exacerbation (HCC) J44.1    Lesion of liver K76.9    Abdominal pain R10.9    Alcohol abuse F10.10    Acute exacerbation of COPD with asthma ([facility] [address] J44.1, J45.901    Abnormal abdominal CT scan R93.5    Family history of colon cancer Z80.0    Elevated CEA R97.0    Colon obstruction ([facility] K56.609       Isolation/Infection:   Isolation            No Isolation          Patient Infection Status       Infection Onset Added Last Indicated Last Indicated By Review Planned Expiration Resolved Resolved By    None active    Resolved    COVID-19 (Rule Out) 08/24/22 08/24/22 08/24/22 COVID-19, Rapid (Ordered)   08/24/22 Rule-Out Test Resulted    COVID-19 (Rule Out) 01/03/22 01/03/22 01/03/22 COVID-19, Rapid (Ordered)   01/03/22 Rule-Out Test Resulted    COVID-19 (Rule Out) 12/31/21 12/31/21 12/31/21 COVID-19, Rapid (Ordered)   12/31/21 Rule-Out Test Resulted    COVID-19 (Rule Out) 07/12/21 07/12/21 07/12/21 COVID-19, Rapid (Ordered)   07/12/21 Rule-Out Test Resulted    COVID-19 (Rule Out) 06/04/21 06/04/21 06/06/21 COVID-19 (Ordered)   06/07/21 Rule-Out Test Resulted            Nurse Assessment:  Last Vital Signs: /60   Pulse 75   Temp 97.7 °F (36.5 °C) (Oral)   Resp 16   Ht 5' 5\" (1.651 m)   Wt 201 lb (91.2 kg)   SpO2 93%   BMI 33.45 kg/m²     Last documented pain score (0-10 scale): Pain Level: 7  Last Weight:   Wt Readings from Last 1 Encounters:   08/31/22 201 lb (91.2 kg)     Mental Status:  oriented and alert    IV Access:  - None    Nursing Mobility/ADLs:  Walking   Independent  Transfer  Independent  Bathing  Independent  Dressing  Independent  Toileting  Independent  Feeding  Independent  Med Admin  Independent  Med Delivery   whole    Wound Care Documentation and Therapy:  Incision 10/03/18 Chest Anterior; Left (Active)   Number of days: 1428        Elimination:  Continence:    Bowel: Yes  Bladder: Yes  Urinary Catheter: None   Colostomy/Ileostomy/Ileal Conduit: No       Date of Last BM: 9/2/2022  No intake or output data in the 24 hours ending 08/31/22 1503  No intake/output data recorded. Safety Concerns:     History of Seizures    Impairments/Disabilities:      None    Nutrition Therapy:  Current Nutrition Therapy:   - Oral Diet:  General    Routes of Feeding: Oral  Liquids: No Restrictions  Daily Fluid Restriction: no  Last Modified Barium Swallow with Video (Video Swallowing Test): not done    Treatments at the Time of Hospital Discharge:   Respiratory Treatments:    budesonide-formoterol (SYMBICORT) 160-4.5 MCG/ACT inhaler 2 puff BID; ipratropium-albuterol (DUONEB) nebulizer solution 1 ampule BID; albuterol (PROVENTIL) nebulizer solution 2.5 mg Q2 HRS PRN        Oxygen Therapy:  is on oxygen at 4 L/min per nasal cannula. Ventilator:    - No ventilator support    Rehab Therapies: N/A  Weight Bearing Status/Restrictions: No weight bearing restrictions  Other Medical Equipment (for information only, NOT a DME order):  N/A  Other Treatments: N/A    Patient's personal belongings (please select all that are sent with patient):  None    RN SIGNATURE:  Electronically signed by Debrah Schlatter, RN on 9/2/22 at 11:23 AM EDT    CASE MANAGEMENT/SOCIAL WORK SECTION    Inpatient Status Date: ***    Readmission Risk Assessment Score:  Readmission Risk              Risk of Unplanned Readmission:  20           Discharging to Facility/ Agency   Name:   Address:  Phone:  Fax:    Dialysis Facility (if applicable)   Name:  Address:  Dialysis Schedule:  Phone:  Fax:    / signature: {Esignature:[id number]}    PHYSICIAN SECTION    Prognosis: Fair    Condition at Discharge: Stable    Rehab Potential (if transferring to Rehab):  Fair    Recommended Labs or Other Treatments After Discharge:     Physician Certification: I certify the above information and transfer of Ashley White  is necessary for the continuing treatment of the diagnosis listed and that he requires Home Care for greater 30 days.      Update Admission H&P: No change in H&P    PHYSICIAN SIGNATURE:  Electronically signed by Deon Bob MD on 8/31/22 at 3:03 PM EDT

## 2022-08-31 NOTE — PROGRESS NOTES
[facility]    Gastroenterology Daily Progress Note      Patient:   Shaggy Guillaume   :    1965   Facility:   [facility]  Date:     2022  Consultant:   GONZÁLEZ Morris - CNP, CNP      SUBJECTIVE  59 y.o. male admitted 2022 with COPD exacerbation ([facility] [J44.1]  Acute exacerbation of COPD with asthma ([facility] [J44.1, F34.717] and seen for colon obstruction and cirrhosis. The pt was seen and examined. Has had several small non bloody loose bm's. Tolerating a diet with no nausea and minimal generalized abdominal pain.  .        OBJECTIVE  Scheduled Meds:   ipratropium-albuterol  1 ampule Inhalation BID    polyethylene glycol  17 g Oral Daily    docusate sodium  100 mg Oral BID    senna  1 tablet Oral BID    pantoprazole  40 mg Oral QAM AC    predniSONE  40 mg Oral Daily    dicyclomine  10 mg Oral TID AC    aspirin  81 mg Oral Daily    ferrous sulfate  324 mg Oral Daily with breakfast    folic acid  1 mg Oral Daily    hydroCHLOROthiazide  25 mg Oral Daily    losartan  100 mg Oral Daily    metoprolol succinate  50 mg Oral Daily    montelukast  10 mg Oral Nightly    Roflumilast  500 mcg Oral Daily    enoxaparin  40 mg SubCUTAneous Daily    QUEtiapine  200 mg Oral Nightly    vitamin B-1  100 mg Oral Daily    budesonide-formoterol  2 puff Inhalation BID    levETIRAcetam  500 mg Oral BID    furosemide  40 mg Oral Daily    amLODIPine  10 mg Oral Daily       Vital Signs:  /60   Pulse 75   Temp 97.7 °F (36.5 °C) (Oral)   Resp 16   Ht 5' 5\" (1.651 m)   Wt 201 lb (91.2 kg)   SpO2 93%   BMI 33.45 kg/m²      Physical Exam:     General Appearance: alert and oriented to person, place and time, well-developed and well-nourished, in no acute distress  Skin: warm and dry, no rash or erythema  Head: normocephalic and atraumatic  Eyes: pupils equal, round, and reactive to light, extraocular eye movements intact, conjunctivae normal  ENT: hearing grossly normal bilaterally  Neck: neck supple and non tender without mass, no thyromegaly or thyroid nodules, no cervical lymphadenopathy   Pulmonary/Chest: clear to auscultation bilaterally- no wheezes, rales or rhonchi, normal air movement, no respiratory distress  Cardiovascular: normal rate, regular rhythm, normal S1 and S2, no murmurs, rubs, clicks or gallops, distal pulses intact, no carotid bruits  Abdomen: soft, non-tender, mildly distended, normal bowel sounds, no masses or organomegaly  Extremities: no cyanosis, clubbing or edema  Musculoskeletal: normal range of motion, no joint swelling, deformity or tenderness  Neurologic: no cranial nerve deficit and muscle strength normal    Lab and Imaging Review     CBC  Recent Labs     08/29/22  0546 08/30/22  0601 08/31/22  0707   WBC 10.5 10.4 13.4*   HGB 13.2 11.9* 11.6*   HCT 40.8 37.8* 36.3*   MCV 95.3 97.2 97.1    293 313       BMP  Recent Labs     08/28/22  1423 08/29/22  0546 08/30/22  0824    141 144   K 3.1* 3.7 3.6*   CL 98 99 105   CO2 35* 32* 31   BUN 22* 23* 25*   CREATININE 1.00 0.86 0.89   GLUCOSE 104* 123* 82   CALCIUM 9.8 9.8 9.4     FINDINGS:ct abd 8/27/22   Lower Chest: Dependent subsegmental atelectasis. No consolidation, evidence   for edema or effusion. Organs: Non cirrhotic liver morphology. Previously described hypoattenuating   liver lesions demonstrate findings compatible with hemangiomas. The larger   liver lesions demonstrate discontinuous peripheral nodular enhancement where   as the smaller lesion demonstrate more flash filling and retention of   contrast following blood pool. No suspicious liver lesion identified. When   remeasured, these liver lesions are stable in size dating back to at least   2021. The gallbladder is contracted. No biliary dilatation. The pancreas, spleen,   adrenals and kidneys reveal no acute findings. GI/Bowel: There is no bowel dilatation or wall thickening identified.    Moderate gaseous distension of the signed by: GONZÁLEZ Sanchez CNP on 8/31/2022 at 9:56 AM        Attending Physician Statement          I have discussed the care of Ev Ba and   I have examined the patient myselft independently, and taken ros and hpi , including pertinent history and exam findings,  with the author of this note . I have reviewed the key elements of all parts of the encounter with the nurse practitioner/resident. I agree with the assessment, plan and orders as documented by the above health care provider     More than 50% of the time on this visit was spent by me   hysical Exam  Blood pressure 105/60, pulse 75, temperature 97.7 °F (36.5 °C), temperature source Oral, resp. rate 16, height 5' 5\" (1.651 m), weight 201 lb (91.2 kg), SpO2 93 %.          General Appearance: alert and oriented to person, place and time, well-developed and well-nourished, in no acute distress  Skin: warm and dry, no rash or erythema  Head: normocephalic and atraumatic  Eyes: pupils equal, round, and reactive to light, extraocular eye movements intact, conjunctivae normal  ENT: hearing grossly normal bilaterally  Neck: neck supple and non tender without mass, no thyromegaly or thyroid nodules, no cervical lymphadenopathy   Pulmonary/Chest: clear to auscultation bilaterally- no wheezes, rales or rhonchi, normal air movement, no respiratory distress  Cardiovascular: normal rate, regular rhythm, normal S1 and S2, no murmurs, rubs, clicks or gallops, distal pulses intact, no carotid bruits  Abdomen: soft, non-tender, non-distended, normal bowel sounds, no masses or organomegaly  Extremities: no cyanosis, clubbing or edema  Musculoskeletal: normal range of motion, no joint swelling, deformity or tenderness  Neurologic: no cranial nerve deficit and muscle strength normal    Data Review:    Recent Labs     08/29/22  0546 08/30/22  0601 08/31/22  0707   WBC 10.5 10.4 13.4*   HGB 13.2 11.9* 11.6*   HCT 40.8 37.8* 36.3*   MCV 95.3 97.2 97.1

## 2022-08-31 NOTE — RT PROTOCOL NOTE
RT Inhaler-Nebulizer Bronchodilator Protocol Note    There is a bronchodilator order in the chart from a provider indicating to follow the RT Bronchodilator Protocol and there is an Initiate RT Inhaler-Nebulizer Bronchodilator Protocol order as well (see protocol at bottom of note). CXR Findings:  No results found. The findings from the last RT Protocol Assessment were as follows:   History Pulmonary Disease: Chronic pulmonary disease  Respiratory Pattern: Dyspnea on exertion or RR 21-25 bpm  Breath Sounds: Intermittent or unilateral wheezes  Cough: Strong, spontaneous, non-productive  Indication for Bronchodilator Therapy: On home bronchodilators  Bronchodilator Assessment Score: 8    Aerosolized bronchodilator medication orders have been revised according to the RT Inhaler-Nebulizer Bronchodilator Protocol below. Respiratory Therapist to perform RT Therapy Protocol Assessment initially then follow the protocol. Repeat RT Therapy Protocol Assessment PRN for score 0-3 or on second treatment, BID, and PRN for scores above 3. No Indications - adjust the frequency to every 6 hours PRN wheezing or bronchospasm, if no treatments needed after 48 hours then discontinue using Per Protocol order mode. If indication present, adjust the RT bronchodilator orders based on the Bronchodilator Assessment Score as indicated below. Use Inhaler orders unless patient has one or more of the following: on home nebulizer, not able to hold breath for 10 seconds, is not alert and oriented, cannot activate and use MDI correctly, or respiratory rate 25 breaths per minute or more, then use the equivalent nebulizer order(s) with same Frequency and PRN reasons based on the score. If a patient is on this medication at home then do not decrease Frequency below that used at home.     0-3 - enter or revise RT bronchodilator order(s) to equivalent RT Bronchodilator order with Frequency of every 4 hours PRN for wheezing or increased work of breathing using Per Protocol order mode. 4-6 - enter or revise RT Bronchodilator order(s) to two equivalent RT bronchodilator orders with one order with BID Frequency and one order with Frequency of every 4 hours PRN wheezing or increased work of breathing using Per Protocol order mode. 7-10 - enter or revise RT Bronchodilator order(s) to two equivalent RT bronchodilator orders with one order with TID Frequency and one order with Frequency of every 4 hours PRN wheezing or increased work of breathing using Per Protocol order mode. 11-13 - enter or revise RT Bronchodilator order(s) to one equivalent RT bronchodilator order with QID Frequency and an Albuterol order with Frequency of every 4 hours PRN wheezing or increased work of breathing using Per Protocol order mode. Greater than 13 - enter or revise RT Bronchodilator order(s) to one equivalent RT bronchodilator order with every 4 hours Frequency and an Albuterol order with Frequency of every 2 hours PRN wheezing or increased work of breathing using Per Protocol order mode. RT to enter RT Home Evaluation for COPD & MDI Assessment order using Per Protocol order mode.     Electronically signed by Macrina Almanzar RCP on 8/30/2022 at 10:18 PM

## 2022-08-31 NOTE — PROGRESS NOTES
Pulmonary Critical Care Progress Note  Narinder Acuna MD     Patient seen for the follow up of Acute exacerbation of COPD with asthma ([facility] [address] , chronic hypoxic respiratory failure    Subjective:  Patient had uneventful night. His shortness of breath is doing better. He remains on his baseline of oxygen at 4 L nasal cannula. He denies significant cough or shortness of breath. He is anxious to go home. Examination:  Vitals: /60   Pulse 75   Temp 97.7 °F (36.5 °C) (Oral)   Resp 16   Ht 5' 5\" (1.651 m)   Wt 201 lb (91.2 kg)   SpO2 93%   BMI 33.45 kg/m²   General appearance: alert and cooperative with exam  Neck: No JVD  Lungs: Decreased air exchange, no crackles or wheezing  Heart: regular rate and rhythm, S1, S2 normal, no gallop  Abdomen: Soft, non tender, + BS  Extremities: no cyanosis or clubbing. Trace edema    LABs:  CBC:   Recent Labs     08/30/22  0601 08/31/22  0707   WBC 10.4 13.4*   HGB 11.9* 11.6*   HCT 37.8* 36.3*    313     BMP:   Recent Labs     08/29/22  0546 08/30/22  0824    144   K 3.7 3.6*   CO2 32* 31   BUN 23* 25*   CREATININE 0.86 0.89   LABGLOM >60 >60   GLUCOSE 123* 82     Radiology:  X-ray chest 8/24/2022      CT abdomen pelvis 8/27/2022:  Previously described liver lesions are demonstrated benign hemangiomas on this exam.  No suspicious liver lesion identified. Moderate gaseous distention of the transverse colon without etiology on this exam.  Moderate stool burden    Impression:  Chronic hypoxic respiratory failure   Acute exacerbation of COPD/active smoking  Mild pulmonary hypertension, RVSP 37 mmHg echo 7/12/2021  Suspected obstructive sleep apnea/Obesity  Liver cirrhosis with ascites/hepatitis C  Paroxysmal SVT status post ablation  Positive blood culture x1,? Contamination    Recommendations:  Oxygen via nasal cannula, keep SPO2 90% or greater   Incentive spirometry every hour while awake   Albuterol by nebulizer  Symbicort  Prednisone 40 p.o.

## 2022-09-01 PROCEDURE — 1200000000 HC SEMI PRIVATE

## 2022-09-01 PROCEDURE — 6360000002 HC RX W HCPCS: Performed by: INTERNAL MEDICINE

## 2022-09-01 PROCEDURE — 94761 N-INVAS EAR/PLS OXIMETRY MLT: CPT

## 2022-09-01 PROCEDURE — 6360000002 HC RX W HCPCS: Performed by: FAMILY MEDICINE

## 2022-09-01 PROCEDURE — 6370000000 HC RX 637 (ALT 250 FOR IP): Performed by: STUDENT IN AN ORGANIZED HEALTH CARE EDUCATION/TRAINING PROGRAM

## 2022-09-01 PROCEDURE — 2500000003 HC RX 250 WO HCPCS: Performed by: FAMILY MEDICINE

## 2022-09-01 PROCEDURE — 6370000000 HC RX 637 (ALT 250 FOR IP): Performed by: INTERNAL MEDICINE

## 2022-09-01 PROCEDURE — 6360000002 HC RX W HCPCS: Performed by: HOSPITALIST

## 2022-09-01 PROCEDURE — 2700000000 HC OXYGEN THERAPY PER DAY

## 2022-09-01 PROCEDURE — 94640 AIRWAY INHALATION TREATMENT: CPT

## 2022-09-01 PROCEDURE — 6370000000 HC RX 637 (ALT 250 FOR IP): Performed by: HOSPITALIST

## 2022-09-01 PROCEDURE — 6370000000 HC RX 637 (ALT 250 FOR IP): Performed by: FAMILY MEDICINE

## 2022-09-01 RX ADMIN — METOPROLOL SUCCINATE 50 MG: 50 TABLET, FILM COATED, EXTENDED RELEASE ORAL at 08:07

## 2022-09-01 RX ADMIN — ROFLUMILAST 500 MCG: 500 TABLET ORAL at 08:06

## 2022-09-01 RX ADMIN — LOSARTAN POTASSIUM 100 MG: 100 TABLET, FILM COATED ORAL at 08:09

## 2022-09-01 RX ADMIN — OXYCODONE 5 MG: 5 TABLET ORAL at 02:52

## 2022-09-01 RX ADMIN — FOLIC ACID 1 MG: 1 TABLET ORAL at 08:08

## 2022-09-01 RX ADMIN — DOCUSATE SODIUM 100 MG: 100 CAPSULE, LIQUID FILLED ORAL at 21:05

## 2022-09-01 RX ADMIN — DOCUSATE SODIUM 100 MG: 100 CAPSULE, LIQUID FILLED ORAL at 08:18

## 2022-09-01 RX ADMIN — DICYCLOMINE HYDROCHLORIDE 10 MG: 10 CAPSULE ORAL at 11:05

## 2022-09-01 RX ADMIN — QUETIAPINE FUMARATE 200 MG: 200 TABLET ORAL at 21:05

## 2022-09-01 RX ADMIN — BUDESONIDE AND FORMOTEROL FUMARATE DIHYDRATE 2 PUFF: 160; 4.5 AEROSOL RESPIRATORY (INHALATION) at 09:47

## 2022-09-01 RX ADMIN — FERROUS SULFATE TAB EC 325 MG (65 MG FE EQUIVALENT) 324 MG: 325 (65 FE) TABLET DELAYED RESPONSE at 08:08

## 2022-09-01 RX ADMIN — OXYCODONE 5 MG: 5 TABLET ORAL at 11:05

## 2022-09-01 RX ADMIN — DICYCLOMINE HYDROCHLORIDE 10 MG: 10 CAPSULE ORAL at 15:21

## 2022-09-01 RX ADMIN — ENOXAPARIN SODIUM 40 MG: 100 INJECTION SUBCUTANEOUS at 08:08

## 2022-09-01 RX ADMIN — OXYCODONE 5 MG: 5 TABLET ORAL at 19:34

## 2022-09-01 RX ADMIN — ASPIRIN 81 MG: 81 TABLET, COATED ORAL at 08:06

## 2022-09-01 RX ADMIN — DICYCLOMINE HYDROCHLORIDE 10 MG: 10 CAPSULE ORAL at 04:33

## 2022-09-01 RX ADMIN — PREDNISONE 40 MG: 20 TABLET ORAL at 08:07

## 2022-09-01 RX ADMIN — LEVETIRACETAM 500 MG: 500 TABLET, FILM COATED ORAL at 21:05

## 2022-09-01 RX ADMIN — IPRATROPIUM BROMIDE AND ALBUTEROL SULFATE 1 AMPULE: 2.5; .5 SOLUTION RESPIRATORY (INHALATION) at 09:47

## 2022-09-01 RX ADMIN — POLYETHYLENE GLYCOL 3350 17 G: 17 POWDER, FOR SOLUTION ORAL at 08:06

## 2022-09-01 RX ADMIN — CIPROFLOXACIN 400 MG: 2 INJECTION, SOLUTION INTRAVENOUS at 15:24

## 2022-09-01 RX ADMIN — Medication 100 MG: at 08:08

## 2022-09-01 RX ADMIN — MONTELUKAST 10 MG: 10 TABLET, FILM COATED ORAL at 21:05

## 2022-09-01 RX ADMIN — SENNOSIDES 8.6 MG: 8.6 TABLET, FILM COATED ORAL at 21:05

## 2022-09-01 RX ADMIN — KETOROLAC TROMETHAMINE 15 MG: 15 INJECTION, SOLUTION INTRAMUSCULAR; INTRAVENOUS at 00:09

## 2022-09-01 RX ADMIN — PANTOPRAZOLE SODIUM 40 MG: 40 TABLET, DELAYED RELEASE ORAL at 04:34

## 2022-09-01 RX ADMIN — METRONIDAZOLE 500 MG: 500 INJECTION, SOLUTION INTRAVENOUS at 00:06

## 2022-09-01 RX ADMIN — HYDROCHLOROTHIAZIDE 25 MG: 25 TABLET ORAL at 08:06

## 2022-09-01 RX ADMIN — METRONIDAZOLE 500 MG: 500 INJECTION, SOLUTION INTRAVENOUS at 17:56

## 2022-09-01 RX ADMIN — AMLODIPINE BESYLATE 10 MG: 10 TABLET ORAL at 08:08

## 2022-09-01 RX ADMIN — SENNOSIDES 8.6 MG: 8.6 TABLET, FILM COATED ORAL at 08:08

## 2022-09-01 RX ADMIN — LEVETIRACETAM 500 MG: 500 TABLET, FILM COATED ORAL at 08:06

## 2022-09-01 RX ADMIN — METRONIDAZOLE 500 MG: 500 INJECTION, SOLUTION INTRAVENOUS at 08:12

## 2022-09-01 RX ADMIN — FUROSEMIDE 40 MG: 40 TABLET ORAL at 08:08

## 2022-09-01 RX ADMIN — CIPROFLOXACIN 400 MG: 2 INJECTION, SOLUTION INTRAVENOUS at 04:35

## 2022-09-01 ASSESSMENT — PAIN - FUNCTIONAL ASSESSMENT
PAIN_FUNCTIONAL_ASSESSMENT: PREVENTS OR INTERFERES SOME ACTIVE ACTIVITIES AND ADLS
PAIN_FUNCTIONAL_ASSESSMENT: PREVENTS OR INTERFERES SOME ACTIVE ACTIVITIES AND ADLS

## 2022-09-01 ASSESSMENT — PAIN SCALES - GENERAL
PAINLEVEL_OUTOF10: 0
PAINLEVEL_OUTOF10: 7
PAINLEVEL_OUTOF10: 7
PAINLEVEL_OUTOF10: 4
PAINLEVEL_OUTOF10: 6
PAINLEVEL_OUTOF10: 4

## 2022-09-01 ASSESSMENT — PAIN DESCRIPTION - FREQUENCY
FREQUENCY: CONTINUOUS
FREQUENCY: CONTINUOUS

## 2022-09-01 ASSESSMENT — PAIN DESCRIPTION - PAIN TYPE
TYPE: ACUTE PAIN
TYPE: ACUTE PAIN

## 2022-09-01 ASSESSMENT — PAIN DESCRIPTION - LOCATION
LOCATION: ABDOMEN

## 2022-09-01 ASSESSMENT — PAIN DESCRIPTION - DESCRIPTORS
DESCRIPTORS: ACHING
DESCRIPTORS: ACHING

## 2022-09-01 ASSESSMENT — PAIN DESCRIPTION - ONSET
ONSET: ON-GOING
ONSET: ON-GOING

## 2022-09-01 NOTE — RT PROTOCOL NOTE
RT Inhaler-Nebulizer Bronchodilator Protocol Note    There is a bronchodilator order in the chart from a provider indicating to follow the RT Bronchodilator Protocol and there is an Initiate RT Inhaler-Nebulizer Bronchodilator Protocol order as well (see protocol at bottom of note). CXR Findings:  No results found. The findings from the last RT Protocol Assessment were as follows:   History Pulmonary Disease: Chronic pulmonary disease  Respiratory Pattern: Dyspnea on exertion or RR 21-25 bpm  Breath Sounds: Slightly diminished and/or crackles  Cough: Strong, spontaneous, non-productive  Indication for Bronchodilator Therapy: On home bronchodilators, Decreased or absent breath sounds  Bronchodilator Assessment Score: 6    Aerosolized bronchodilator medication orders have been revised according to the RT Inhaler-Nebulizer Bronchodilator Protocol below. Respiratory Therapist to perform RT Therapy Protocol Assessment initially then follow the protocol. Repeat RT Therapy Protocol Assessment PRN for score 0-3 or on second treatment, BID, and PRN for scores above 3. No Indications - adjust the frequency to every 6 hours PRN wheezing or bronchospasm, if no treatments needed after 48 hours then discontinue using Per Protocol order mode. If indication present, adjust the RT bronchodilator orders based on the Bronchodilator Assessment Score as indicated below. Use Inhaler orders unless patient has one or more of the following: on home nebulizer, not able to hold breath for 10 seconds, is not alert and oriented, cannot activate and use MDI correctly, or respiratory rate 25 breaths per minute or more, then use the equivalent nebulizer order(s) with same Frequency and PRN reasons based on the score. If a patient is on this medication at home then do not decrease Frequency below that used at home.     0-3 - enter or revise RT bronchodilator order(s) to equivalent RT Bronchodilator order with Frequency of every 4 hours PRN for wheezing or increased work of breathing using Per Protocol order mode. 4-6 - enter or revise RT Bronchodilator order(s) to two equivalent RT bronchodilator orders with one order with BID Frequency and one order with Frequency of every 4 hours PRN wheezing or increased work of breathing using Per Protocol order mode. 7-10 - enter or revise RT Bronchodilator order(s) to two equivalent RT bronchodilator orders with one order with TID Frequency and one order with Frequency of every 4 hours PRN wheezing or increased work of breathing using Per Protocol order mode. 11-13 - enter or revise RT Bronchodilator order(s) to one equivalent RT bronchodilator order with QID Frequency and an Albuterol order with Frequency of every 4 hours PRN wheezing or increased work of breathing using Per Protocol order mode. Greater than 13 - enter or revise RT Bronchodilator order(s) to one equivalent RT bronchodilator order with every 4 hours Frequency and an Albuterol order with Frequency of every 2 hours PRN wheezing or increased work of breathing using Per Protocol order mode. RT to enter RT Home Evaluation for COPD & MDI Assessment order using Per Protocol order mode.     Electronically signed by kendy estes RCP on 9/1/2022 at 9:48 AM

## 2022-09-01 NOTE — PROGRESS NOTES
Pulmonary Critical Care Progress Note  Marylene Shaker, MD     Patient seen for the follow up of Acute exacerbation of COPD with asthma ([facility] [address] [address] , chronic hypoxic respiratory failure    Subjective:  Patient had uneventful night. His shortness of breath is doing better. He remains on his baseline of oxygen at 4 L nasal cannula. He denies significant cough or shortness of breath. He is passing gas and had a bowel movement. Examination:  Vitals: /77   Pulse 61   Temp 97.3 °F (36.3 °C) (Oral)   Resp 16   Ht 5' 5\" (1.651 m)   Wt 215 lb 5 oz (97.7 kg)   SpO2 99%   BMI 35.83 kg/m²   General appearance: alert and cooperative with exam  Neck: No JVD  Lungs: Decreased air exchange, no crackles or wheezing  Heart: regular rate and rhythm, S1, S2 normal, no gallop  Abdomen: Soft, non tender, + BS  Extremities: no cyanosis or clubbing. Trace edema    LABs:  CBC:   Recent Labs     08/30/22  0601 08/31/22  0707   WBC 10.4 13.4*   HGB 11.9* 11.6*   HCT 37.8* 36.3*    313     BMP:   Recent Labs     08/30/22  0824      K 3.6*   CO2 31   BUN 25*   CREATININE 0.89   LABGLOM >60   GLUCOSE 82     Radiology:  X-ray chest 8/24/2022      CT abdomen pelvis 8/27/2022:  Previously described liver lesions are demonstrated benign hemangiomas on this exam.  No suspicious liver lesion identified. Moderate gaseous distention of the transverse colon without etiology on this exam.  Moderate stool burden    Impression:  Chronic hypoxic respiratory failure   Acute exacerbation of COPD/active smoking  Mild pulmonary hypertension, RVSP 37 mmHg echo 7/12/2021  Suspected obstructive sleep apnea/Obesity  Liver cirrhosis with ascites/hepatitis C  Paroxysmal SVT status post ablation  Positive blood culture x1,?   Contamination    Recommendations:  Oxygen via nasal cannula, keep SPO2 90% or greater   Incentive spirometry every hour while awake   Albuterol by nebulizer  Symbicort  Prednisone 40 p.o. daily  Singulair  Daliresp  Oral Lasix 40 mg daily  On IV Cipro and Flagyl for colitis  Status post Rocephin and doxycycline  Surgery on consult  Status post deflation colonoscopy 8/30/2022 by GI status post gentle biopsies from ulceration of transverse colon, positive for colitis  DVT prophylaxis, Lovenox  Outpatient sleep evaluation  Discharge planning when okay with all other services. No objection from pulmonary standpoint.   Patient has follow-up with Dr. Emerson Del Cid on 9/13/2022 at 10 AM.  Discussed with RN  Will follow with you      Electronically signed by     Leonel Jimenez MD on 9/1/2022 at 1:38 PM  Pulmonary Critical Care and Sleep Medicine,  [facility]  Cell: [phone]  Office: [phone]

## 2022-09-01 NOTE — PLAN OF CARE
Problem: Discharge Planning  Goal: Discharge to home or other facility with appropriate resources  9/1/2022 1658 by Miriam Ye RN  Outcome: Progressing  Flowsheets (Taken 9/1/2022 0806)  Discharge to home or other facility with appropriate resources:   Identify barriers to discharge with patient and caregiver   Refer to discharge planning if patient needs post-hospital services based on physician order or complex needs related to functional status, cognitive ability or social support system  9/1/2022 0505 by Nunu Iglseias RN  Outcome: Progressing     Problem: Respiratory - Adult  Goal: Achieves optimal ventilation and oxygenation  9/1/2022 1658 by Miriam Ye RN  Outcome: Progressing  9/1/2022 0949 by Chela Officer RCDANIKA  Outcome: Progressing  Flowsheets (Taken 9/1/2022 0806 by Miriam Ye RN)  Achieves optimal ventilation and oxygenation: Assess for changes in respiratory status  9/1/2022 0505 by Nunu Iglesias RN  Outcome: Progressing  Goal: Clear lung sounds  9/1/2022 0949 by Arianna Ortega RCP  Outcome: Progressing  Goal: Able to breathe comfortably  9/1/2022 0949 by Arianna Ortega RCP  Outcome: Progressing  Goal: Adequate oxygenation  Description: Adequate oxygenation  9/1/2022 0949 by Arianna Ortega RCP  Outcome: Progressing     Problem: Safety - Adult  Goal: Free from fall injury  9/1/2022 1658 by Miriam Ye RN  Outcome: Progressing  9/1/2022 0505 by Nunu Iglesias RN  Outcome: Progressing  Flowsheets (Taken 8/31/2022 1945)  Free From Fall Injury: Instruct family/caregiver on patient safety     Problem: Pain  Goal: Verbalizes/displays adequate comfort level or baseline comfort level  9/1/2022 1658 by Miriam Ye RN  Outcome: Progressing  9/1/2022 0505 by Nunu Iglesias RN  Outcome: Progressing     Problem: Chronic Conditions and Co-morbidities  Goal: Patient's chronic conditions and co-morbidity symptoms are monitored and maintained or improved  9/1/2022 1658 by Ne Magana RN  Outcome: Progressing  Flowsheets (Taken 9/1/2022 0806)  Care Plan - Patient's Chronic Conditions and Co-Morbidity Symptoms are Monitored and Maintained or Improved: Monitor and assess patient's chronic conditions and comorbid symptoms for stability, deterioration, or improvement  9/1/2022 0505 by Karley Marquez RN  Outcome: Progressing     Problem: ABCDS Injury Assessment  Goal: Absence of physical injury  9/1/2022 1658 by Ne Magana RN  Outcome: Progressing  9/1/2022 0505 by Karley Marquez RN  Outcome: Progressing  Flowsheets (Taken 8/31/2022 1945)  Absence of Physical Injury: Implement safety measures based on patient assessment     Problem: Musculoskeletal - Adult  Goal: Return mobility to safest level of function  9/1/2022 1658 by Ne Magana RN  Outcome: Progressing  Flowsheets (Taken 9/1/2022 0806)  Return Mobility to Safest Level of Function: Assess patient stability and activity tolerance for standing, transferring and ambulating with or without assistive devices  9/1/2022 0505 by Karley Marquez RN  Outcome: Progressing     Problem: Gastrointestinal - Adult  Goal: Minimal or absence of nausea and vomiting  9/1/2022 1658 by Ne Magana RN  Outcome: Progressing  Flowsheets (Taken 9/1/2022 0806)  Minimal or absence of nausea and vomiting: Administer IV fluids as ordered to ensure adequate hydration  9/1/2022 0505 by Karley Marquez RN  Outcome: Progressing     Problem: Nutrition Deficit:  Goal: Optimize nutritional status  9/1/2022 1658 by Ne Magana RN  Outcome: Progressing  9/1/2022 0505 by Karley Marquez RN  Outcome: Progressing  Flowsheets (Taken 8/31/2022 1749 by Brittaney Jo RD, LD)  Nutrient intake appropriate for improving, restoring, or maintaining nutritional needs: Monitor oral intake, labs, and treatment plans

## 2022-09-01 NOTE — PROGRESS NOTES
General Surgery:  Daily Progress Note          PATIENT NAME: Ashley White     TODAY'S DATE: 9/1/2022, 6:43 AM    SUBJECTIVE:     Pt seen and examined at bedside. No acute overnight events. Passing lots of flatus, and had one bowel movement. Abd distention better than yesterday but still distended. Tolerating regular diet. ROS:   General: No fevers or chills  Cardiac: No chest pain or palpitations  Respiratory: No shortness of breath or wheezing  Abdomen: No nausea or emesis; +distention (improving)  Neuro: No headache, dizziness, syncope    OBJECTIVE:   VITALS:  /63   Pulse 75   Temp 97.5 °F (36.4 °C) (Oral)   Resp 18   Ht 5' 5\" (1.651 m)   Wt 215 lb 5 oz (97.7 kg)   SpO2 95%   BMI 35.83 kg/m²      INTAKE/OUTPUT:      Intake/Output Summary (Last 24 hours) at 9/1/2022 3012  Last data filed at 9/1/2022 0606  Gross per 24 hour   Intake 396.97 ml   Output --   Net 396.97 ml     PHYSICAL EXAM:  General Appearance: awake, alert, oriented, in no acute distress  HEENT:  Normocephalic, atraumatic, mucus membranes moist  Heart: Regular rate and rhythm  Lungs: normal effort with symmetric rise and fall of chest wall  Abdomen: softly distended though improved from yesterday. Extremities: No cyanosis, pitting edema, rashes noted. Skin: Skin color, texture, turgor normal. No rashes or lesions. Data:  CBC:   Recent Labs     08/30/22  0601 08/31/22  0707   WBC 10.4 13.4*   HGB 11.9* 11.6*    313       Chemistry:   Recent Labs     08/30/22  0824      K 3.6*      CO2 31   GLUCOSE 82   BUN 25*   CREATININE 0.89   MG 2.2   ANIONGAP 8*   LABGLOM >60   GFRAA >60   CALCIUM 9.4   PHOS 3.1       Hepatic: No results for input(s): AST, ALT, ALB, ALKPHOS, BILITOT, BILIDIR in the last 72 hours. Coagulation:   No results for input(s): APTT, PROT, INR in the last 72 hours.         Radiology Review:    No new imaging to review      ASSESSMENT:  Active Hospital Problems    Diagnosis Date Noted Colon obstruction ([facility] [Y16.851] 08/28/2022     Priority: Medium    Abnormal abdominal CT scan [R93.5] 08/27/2022     Priority: Medium    Family history of colon cancer [Z80.0] 08/27/2022     Priority: Medium    Elevated CEA [R97.0] 08/27/2022     Priority: Medium    Acute exacerbation of COPD with asthma ([facility] [J44.1, J45.901] 08/24/2022     Priority: Medium    Abdominal pain [R10.9]     Lesion of liver [K76.9]     Hepatitis C virus infection without hepatic coma [B19.20] 03/09/2019       59 y.o. male with COPD exacerbation, abdominal distention; CT abdomen pelvis with narrowing of colon at the splenic flexure  Status post colonoscopy - ulcerated and ischemic transverse colon; decompression of cecum and transverse colon      Plan:  Continue medical management and supportive care per primary team  Continue conservative management at this time. Recommend observing for one more day and then d/c home. Continue Cipro and Flagyl  Aggressive bowel regimen with MiraLAX, Colace twice daily, senna twice daily  Follow-up colonoscopy biopsies  Repeat CT shows benign liver hemangiomas  Replace electrolytes - keep K >4, Mg >2, Phos >3  Continue to encourage ambulation/activity w/ PT/OT  No surgical intervention planned. OP follow up    Electronically signed by Susu Kowalski DO  on 9/1/2022 at 6:43 AM     Attending Physician Statement  I have discussed the case, including pertinent history and exam findings with the resident. I agree with the assessment, plan and orders as documented by the resident. Patient seen and examined. Agree with above. Continue antibiotics. Supportive care.

## 2022-09-01 NOTE — PLAN OF CARE
Problem: Respiratory - Adult  Goal: Achieves optimal ventilation and oxygenation  9/1/2022 0949 by Jazmin Ortega RCP  Outcome: Progressing  Flowsheets (Taken 9/1/2022 0806 by Marielena Suarez RN)  Achieves optimal ventilation and oxygenation: Assess for changes in respiratory status  9/1/2022 0505 by Mariaelena Eugene RN  Outcome: Progressing  Goal: Clear lung sounds  Outcome: Progressing  Goal: Able to breathe comfortably  Outcome: Progressing  Goal: Adequate oxygenation  Description: Adequate oxygenation  Outcome: Progressing

## 2022-09-01 NOTE — PROGRESS NOTES
CLINICAL PHARMACY NOTE: MEDS TO BEDS    Total # of Prescriptions Filled: 1   The following medications were delivered to the patient:  Prednisone 5mg    Additional Documentation:

## 2022-09-01 NOTE — PROGRESS NOTES
Occupational Therapy  DATE: 2022    NAME: Karlee Prince  MRN: [id number]   : 1965  [facility]  Occupational Therapy Not Seen    DATE: 2022    NAME: Karlee Prince  MRN: [id number]   : 1965    Patient not seen this date for Occupational Therapy due to:      [x] Refusal by Patient: RN and patient stating pt ind and with home discharge. Pt declining OT at this time. Will d/c OT services. Reorder/contact therapy if needs change and pt needs an eval. Pt demo understanding of edu provided and has reached PLOF and safety in occupational performance. Discharge pt from OT services, will resume skilled OT services if needed.       TRISTIAN Paiz

## 2022-09-01 NOTE — PROGRESS NOTES
Physical Therapy  DATE: 2022    NAME: Hira Escalante  MRN: [id number]   : 1965    Patient not seen this date for Physical Therapy due to:      [] Cancel by RN or physician due to:    [] Hemodialysis    [] Critical Lab Value Level     [] Blood transfusion in progress    [] Acute or unstable cardiovascular status   _MAP < 55 or more than >115  _HR < 40 or > 130    [] Acute or unstable pulmonary status   -FiO2 > 60%   _RR < 5 or >40    _O2 sats < 85%    [] Strict Bedrest    [] Off Unit for surgery or procedure    [] Off Unit for testing       [] Pending imaging to R/O fracture    [x] Refusal by Patient Pt states \"Im not going to mess with it today\" refusing all therapy, pt not receptive to education on importance of mobility etc.       [] Other      [] PT being discontinued at this time. Patient independent. No further needs. [] PT being discontinued at this time as the patient has been transferred to hospice care. No further needs.       ALEXA JONES, PTA

## 2022-09-01 NOTE — PLAN OF CARE
Problem: Pain  Goal: Verbalizes/displays adequate comfort level or baseline comfort level  Outcome: Progressing     Problem: Safety - Adult  Goal: Free from fall injury  Outcome: Progressing  Flowsheets (Taken 8/31/2022 1945)  Free From Fall Injury: Instruct family/caregiver on patient safety

## 2022-09-01 NOTE — PROGRESS NOTES
Progress note  [facility]    Adult Hospitalist      Name: Margy Marquis  MRN: [id number]     Acct: [de-identified]  Room: 2006/2006-02    Admit Date: 8/24/2022 11:08 AM  PCP: Darryle Norris, MD    Primary Problem  Principal Problem:    Acute exacerbation of COPD with asthma [facility])  Active Problems:    Abnormal abdominal CT scan    Family history of colon cancer    Elevated CEA    Colon obstruction ([address]    Hepatitis C virus infection without hepatic coma    Lesion of liver    Abdominal pain  Resolved Problems:    * No resolved hospital problems. *        Assesment:     Acute COPD exacerbation  Chronic hypoxic respiratory failure  Mild hyponatremia  Mild Leukocytosis  History of alcohol abuse  Liver lesion with history of treated hepatitis C  Essential hypertension  Mixed hyperlipidemia  Coronary artery disease, native vessel  Depression with anxiety  Tobacco abuse  Mild pulmonary hypertension  Suspected obstructive sleep apnea  Paroxysmal SVT status post ablation  Obesity, BMI 33  Positive blood culture 1 out of 2 question contamination  Ischemic colitis        Plan:      Admit to med surge telemetry  O2 maintain oxygen saturation greater than 92%     IV Solu-Medrol- taper per Pulm  Duo neb   Respiratory pathogen better   Sputum culture  1/2 blood culture positive for staph coagulase negative, likely contaminant  Repeat blood culture no growth so far  IV ceftriaxone and p.o. doxycycline  Monitor respiratory status   Pulmonology consult    Patient is complaining of abdominal distention  CT abdomen pelvis of colon at splenic flexure General surgery evaluated the patient, recommended medical management  Triphasic CT ordered per GI  Plan for endoscopy    Continue to monitor/telemetry/CBC with differential daily/BMP daily  DVT and GI prophylaxis.   Continue aspirin  Continue hydrochlorothiazide, metoprolol, amlodipine  Continue Singulair  Continue Roflumilast  Continue inhalers  Continue Keppra  Bentyl as needed for abdominal pain  Advanced diet  Cipro/Flagyl per surgery  Prednisone per pulmonology  DC planning in a.m. if okay with all  Continue medications as below      Scheduled Meds:   ipratropium-albuterol  1 ampule Inhalation BID    ciprofloxacin  400 mg IntraVENous Q12H    metroNIDAZOLE  500 mg IntraVENous Q8H    polyethylene glycol  17 g Oral Daily    docusate sodium  100 mg Oral BID    senna  1 tablet Oral BID    pantoprazole  40 mg Oral QAM AC    predniSONE  40 mg Oral Daily    dicyclomine  10 mg Oral TID AC    aspirin  81 mg Oral Daily    ferrous sulfate  324 mg Oral Daily with breakfast    folic acid  1 mg Oral Daily    hydroCHLOROthiazide  25 mg Oral Daily    losartan  100 mg Oral Daily    metoprolol succinate  50 mg Oral Daily    montelukast  10 mg Oral Nightly    Roflumilast  500 mcg Oral Daily    enoxaparin  40 mg SubCUTAneous Daily    QUEtiapine  200 mg Oral Nightly    vitamin B-1  100 mg Oral Daily    budesonide-formoterol  2 puff Inhalation BID    levETIRAcetam  500 mg Oral BID    furosemide  40 mg Oral Daily    amLODIPine  10 mg Oral Daily     Continuous Infusions:    PRN Meds:  oxyCODONE, 5 mg, TID PRN  ketorolac, 15 mg, Q6H PRN  calcium carbonate, 500 mg, Q4H PRN  potassium chloride, 40 mEq, PRN   Or  potassium alternative oral replacement, 40 mEq, PRN   Or  potassium chloride, 10 mEq, PRN  magnesium sulfate, 1,000 mg, PRN  ondansetron, 4 mg, Q8H PRN   Or  ondansetron, 4 mg, Q6H PRN  morphine, 2 mg, Q4H PRN  albuterol, 2.5 mg, Q2H PRN      Chief Complaint:     Chief Complaint   Patient presents with    Chest Pain     Midsternal to right feels like pressure, has hx of chf and COPD    Shortness of Breath         History of Present Illness:      Zainab Luz is a 59 y.o.  male who presents with Chest Pain (Midsternal to right feels like pressure, has hx of chf and COPD) and Shortness of Breath    Patient seen and examined at bedside and reviewed  last 24 hrs events with nursing staff  Patient feels the best today since his admission  Says abdominal distention has improved  Abdominal pain is better  He has been able to eat better  Denies nausea or vomiting    Afebrile  Patient denies any chest pain, palpitation, headache, dizziness, cough, nausea, vomiting, changes in urination or rash. HPI  20-year-old gentleman past medical history of CHF, COPD, coronary disease, liver cirrhosis presented to ER complaining of shortness of breath going on for 2 days. He is also complaining of intermittent right-sided chest pain associated with it. He wears 4 L of oxygen at home. Chest pain is moderate, intermittent, sharp and resolved on its own. Patient denies any  Fever, chills, changes in urination, bowel habit or rash. Patient received Solu-Medrol and IV Bumex in the ER. Sodium was 133. WBC was 12. 2. X-ray chest was negative. I have personally reviewed the past medical history, past surgical history, medications, social history, and family history, and summarized in the note. Review of Systems:     All 10 point system is reviewed and negative otherwise mentioned in HPI.       Past Medical History:     Past Medical History:   Diagnosis Date    Arthritis     CAD (coronary artery disease)     Cancer ([address]     prostate    Cirrhosis with alcoholism ([address]     COPD (chronic obstructive pulmonary disease) ([address]     Depression     BOURGEOIS (dyspnea on exertion)     H/O prostate cancer     Hyperlipidemia     Hypertension     MI (myocardial infarction) ([address]     Seizures ([address] 2016    last one over 2 years ago     SVT (supraventricular tachycardia) ([address]     Tobacco abuse         Past Surgical History:     Past Surgical History:   Procedure Laterality Date    CARDIAC CATHETERIZATION      ablation    COLONOSCOPY N/A 8/29/2022    COLONOSCOPY WITH BIOPSY performed by Flor Harriosn MD at [address] [facility]  10/03/2018    [id number] UPPER GASTROINTESTINAL ENDOSCOPY N/A 6/10/2021    EGD BIOPSY performed by Ruth Allred MD at [address]  01/03/2022    UPPER GASTROINTESTINAL ENDOSCOPY N/A 1/3/2022    EGD BIOPSY performed by Laureano Friedman MD at [address] [facility]        Medications Prior to Admission:       Prior to Admission medications    Medication Sig Start Date End Date Taking? Authorizing Provider   predniSONE (DELTASONE) 20 MG tablet Take 2 tablets by mouth daily for 4 days Then 30 mg daily for 4 days then 20 mg daily for 4 days then 10 mg daily for 4 days then 5 mg daily for 4 days then 2.5 mg daily for 4 days then stop.  9/1/22 9/5/22 Yes Zay Vidales MD   ciprofloxacin (CIPRO) 500 MG tablet Take 1 tablet by mouth 2 times daily for 10 days 8/31/22 9/10/22 Yes Willie Hayden, DO   metroNIDAZOLE (FLAGYL) 500 MG tablet Take 1 tablet by mouth 3 times daily for 10 days 8/31/22 9/10/22 Yes Willie Hayden, DO   fluticasone-salmeterol (ADVAIR HFA) 230-21 MCG/ACT inhaler Inhale 2 puffs into the lungs 2 times daily   Yes Historical Provider, MD   vitamin B-12 (CYANOCOBALAMIN) 1000 MCG tablet Take 1,000 mcg by mouth daily   Yes Historical Provider, MD   furosemide (LASIX) 40 MG tablet Take 40 mg by mouth daily   Yes Historical Provider, MD   levETIRAcetam (KEPPRA) 500 MG tablet Take 500 mg by mouth 2 times daily   Yes Historical Provider, MD   losartan-hydroCHLOROthiazide (HYZAAR) 100-25 MG per tablet Take 1 tablet by mouth daily   Yes Historical Provider, MD   Roflumilast (DALIRESP) 500 MCG tablet Take 1 tablet by mouth daily 7/16/21   Janice Jean MD   ferrous gluconate (FERGON) 324 (38 Fe) MG tablet Take 324 mg by mouth daily (with breakfast)    Historical Provider, MD   famotidine (PEPCID) 40 MG tablet Take 40 mg by mouth 2 times daily as needed (heartburn)     Historical Provider, MD   amLODIPine (NORVASC) 10 MG tablet Take 10 mg by mouth daily    Historical Provider, MD   albuterol (PROVENTIL) (2.5 MG/3ML) 0.083% nebulizer solution Take 2.5 mg by nebulization every 6 hours as needed for Wheezing    Historical Provider, MD   albuterol sulfate  (90 Base) MCG/ACT inhaler Inhale 2 puffs into the lungs every 4-6 hours as needed for Wheezing    Historical Provider, MD   aspirin 81 MG tablet Take 81 mg by mouth daily    Historical Provider, MD   folic acid (FOLVITE) 1 MG tablet Take 1 mg by mouth daily    Historical Provider, MD   metoprolol succinate (TOPROL XL) 50 MG extended release tablet Take 50 mg by mouth daily    Historical Provider, MD   pantoprazole (PROTONIX) 40 MG tablet Take 40 mg by mouth daily    Historical Provider, MD   QUEtiapine (SEROQUEL) 400 MG tablet Take 200 mg by mouth nightly Takes 1/2 tab (200mg) nightly    Historical Provider, MD   vitamin B-1 (THIAMINE) 100 MG tablet Take 100 mg by mouth daily    Historical Provider, MD   Cholecalciferol (VITAMIN D3) 2000 units CAPS Take 1 capsule by mouth daily    Historical Provider, MD   tiotropium (SPIRIVA RESPIMAT) 2.5 MCG/ACT AERS inhaler Inhale 2 puffs into the lungs daily    Historical Provider, MD        Allergies:       Patient has no known allergies. Social History:     Tobacco:    reports that he has quit smoking. His smoking use included cigarettes. He smoked an average of .1 packs per day. He quit smokeless tobacco use about 8 months ago. Alcohol:      reports that he does not currently use alcohol. Drug Use:  reports no history of drug use. Family History:     No family history on file.       Physical Exam:     Vitals:  /77   Pulse 61   Temp 97.3 °F (36.3 °C) (Oral)   Resp 16   Ht 5' 5\" (1.651 m)   Wt 215 lb 5 oz (97.7 kg)   SpO2 99%   BMI 35.83 kg/m²   Temp (24hrs), Av.4 °F (36.3 °C), Min:97.3 °F (36.3 °C), Max:97.5 °F (36.4 °C)          General appearance - alert, well appearing, and in no acute distress  Mental status - oriented to person, place, and time with normal affect  Head - normocephalic and atraumatic  Eyes - pupils equal and reactive, extraocular eye movements intact, conjunctiva clear  Ears - hearing appears to be intact  Nose - no drainage noted  Mouth - mucous membranes moist  Neck - supple, no carotid bruits, thyroid not palpable  Chest - clear to auscultation, normal effort  Heart - normal rate, regular rhythm, no murmur  Abdomen - soft, nontender, nondistended, bowel sounds present all four quadrants, no masses, hepatomegaly or splenomegaly  Neurological - normal speech, no focal findings or movement disorder noted, cranial nerves II through XII grossly intact  Extremities - peripheral pulses palpable, no pedal edema or calf pain with palpation  Skin - no gross lesions, rashes, or induration noted        Data:     Labs:    Hematology:  Recent Labs     08/30/22  0601 08/31/22  0707   WBC 10.4 13.4*   RBC 3.89* 3.74*   HGB 11.9* 11.6*   HCT 37.8* 36.3*   MCV 97.2 97.1   MCH 30.6 31.0   MCHC 31.5 32.0   RDW 12.5 12.3    313   MPV 9.3 9.3       Chemistry:  Recent Labs     08/30/22  0824      K 3.6*      CO2 31   GLUCOSE 82   BUN 25*   CREATININE 0.89   MG 2.2   ANIONGAP 8*   LABGLOM >60   GFRAA >60   CALCIUM 9.4   PHOS 3.1       No results for input(s): PROT, LABALBU, LABA1C, B9GMSDW, R3NJVPB, FT4, TSH, AST, ALT, LDH, GGT, ALKPHOS, LABGGT, BILITOT, BILIDIR, AMMONIA, AMYLASE, LIPASE, LACTATE, CHOL, HDL, LDLCHOLESTEROL, CHOLHDLRATIO, TRIG, VLDL, PGF24WM, PHENYTOIN, PHENYF, URICACID, POCGLU in the last 72 hours.       Lab Results   Component Value Date    INR 1.0 08/25/2022    INR 1.0 01/01/2022    INR 1.1 07/13/2021    PROTIME 13.4 08/25/2022    PROTIME 13.2 01/01/2022    PROTIME 13.5 07/13/2021       Lab Results   Component Value Date/Time    SPECIAL 10ML LH 08/25/2022 11:45 AM     Lab Results   Component Value Date/Time    CULTURE NORMAL RESPIRATORY GANGA MODERATE GROWTH 08/26/2022 11:49 AM       No results found for: POCPH, PHART, PH, POCPCO2, RLA3YOK, PCO2, POCPO2, PO2ART, PO2, POCHCO3, BBB2IAC, HCO3, NBEA, PBEA, BEART, BE, THGBART, THB, OSE7PVM, SOSW3DKC, T9IMOZUY, O2SAT, FIO2    Radiology:    XR CHEST PORTABLE    Result Date: 8/24/2022  No acute cardiopulmonary findings         All radiological studies reviewed                Code Status:  Full Code    Electronically signed by Lashell Parker MD on 9/1/2022 at 7:54 AM     Copy sent to Dr. Brionna Alexandra MD    This note was created with the assistance of a speech-recognition program.  Although the intention is to generate a document that actually reflects the content of the visit, no guarantees can be provided that every mistake has been identified and corrected by editing. Note was updated later by me after  physical examination and  completion of the assessment.

## 2022-09-02 ENCOUNTER — APPOINTMENT (OUTPATIENT)
Dept: GENERAL RADIOLOGY | Age: 57
DRG: 140 | End: 2022-09-02
Payer: MEDICARE

## 2022-09-02 VITALS
HEIGHT: 65 IN | DIASTOLIC BLOOD PRESSURE: 61 MMHG | TEMPERATURE: 97.5 F | OXYGEN SATURATION: 94 % | RESPIRATION RATE: 19 BRPM | SYSTOLIC BLOOD PRESSURE: 118 MMHG | BODY MASS INDEX: 36.02 KG/M2 | WEIGHT: 216.2 LBS | HEART RATE: 75 BPM

## 2022-09-02 LAB
ABSOLUTE EOS #: 0.14 K/UL (ref 0–0.4)
ABSOLUTE IMMATURE GRANULOCYTE: 0 K/UL (ref 0–0.3)
ABSOLUTE LYMPH #: 3.02 K/UL (ref 1–4.8)
ABSOLUTE MONO #: 1.44 K/UL (ref 0.2–0.8)
ANION GAP SERPL CALCULATED.3IONS-SCNC: 9 MMOL/L (ref 9–17)
BASOPHILS # BLD: 1 %
BASOPHILS ABSOLUTE: 0.14 K/UL (ref 0–0.2)
BUN BLDV-MCNC: 14 MG/DL (ref 6–20)
BUN/CREAT BLD: 16 (ref 9–20)
CALCIUM SERPL-MCNC: 9.7 MG/DL (ref 8.6–10.4)
CHLORIDE BLD-SCNC: 99 MMOL/L (ref 98–107)
CO2: 29 MMOL/L (ref 20–31)
CREAT SERPL-MCNC: 0.89 MG/DL (ref 0.7–1.2)
EOSINOPHILS RELATIVE PERCENT: 1 % (ref 1–4)
GFR AFRICAN AMERICAN: >60 ML/MIN
GFR NON-AFRICAN AMERICAN: >60 ML/MIN
GFR SERPL CREATININE-BSD FRML MDRD: NORMAL ML/MIN/{1.73_M2}
GLUCOSE BLD-MCNC: 77 MG/DL (ref 70–99)
HCT VFR BLD CALC: 40.2 % (ref 40.7–50.3)
HEMOGLOBIN: 13 G/DL (ref 13–17)
IMMATURE GRANULOCYTES: 0 %
LYMPHOCYTES # BLD: 21 % (ref 24–44)
MCH RBC QN AUTO: 30.8 PG (ref 25.2–33.5)
MCHC RBC AUTO-ENTMCNC: 32.3 G/DL (ref 28.4–34.8)
MCV RBC AUTO: 95.3 FL (ref 82.6–102.9)
MONOCYTES # BLD: 10 % (ref 1–7)
NRBC AUTOMATED: 0 PER 100 WBC
PDW BLD-RTO: 12.4 % (ref 11.8–14.4)
PLATELET # BLD: 348 K/UL (ref 138–453)
PMV BLD AUTO: 9.7 FL (ref 8.1–13.5)
POTASSIUM SERPL-SCNC: 3.9 MMOL/L (ref 3.7–5.3)
RBC # BLD: 4.22 M/UL (ref 4.21–5.77)
SEG NEUTROPHILS: 67 % (ref 36–66)
SEGMENTED NEUTROPHILS ABSOLUTE COUNT: 9.66 K/UL (ref 1.8–7.7)
SODIUM BLD-SCNC: 137 MMOL/L (ref 135–144)
WBC # BLD: 14.4 K/UL (ref 3.5–11.3)

## 2022-09-02 PROCEDURE — 2500000003 HC RX 250 WO HCPCS: Performed by: FAMILY MEDICINE

## 2022-09-02 PROCEDURE — 85025 COMPLETE CBC W/AUTO DIFF WBC: CPT

## 2022-09-02 PROCEDURE — 6370000000 HC RX 637 (ALT 250 FOR IP): Performed by: FAMILY MEDICINE

## 2022-09-02 PROCEDURE — 6370000000 HC RX 637 (ALT 250 FOR IP): Performed by: STUDENT IN AN ORGANIZED HEALTH CARE EDUCATION/TRAINING PROGRAM

## 2022-09-02 PROCEDURE — 80048 BASIC METABOLIC PNL TOTAL CA: CPT

## 2022-09-02 PROCEDURE — 2700000000 HC OXYGEN THERAPY PER DAY

## 2022-09-02 PROCEDURE — 6370000000 HC RX 637 (ALT 250 FOR IP): Performed by: INTERNAL MEDICINE

## 2022-09-02 PROCEDURE — 74018 RADEX ABDOMEN 1 VIEW: CPT

## 2022-09-02 PROCEDURE — 94761 N-INVAS EAR/PLS OXIMETRY MLT: CPT

## 2022-09-02 PROCEDURE — 6370000000 HC RX 637 (ALT 250 FOR IP): Performed by: HOSPITALIST

## 2022-09-02 PROCEDURE — 36415 COLL VENOUS BLD VENIPUNCTURE: CPT

## 2022-09-02 PROCEDURE — 94640 AIRWAY INHALATION TREATMENT: CPT

## 2022-09-02 PROCEDURE — 6360000002 HC RX W HCPCS: Performed by: FAMILY MEDICINE

## 2022-09-02 PROCEDURE — 6360000002 HC RX W HCPCS: Performed by: HOSPITALIST

## 2022-09-02 RX ORDER — OXYCODONE HYDROCHLORIDE 5 MG/1
5 TABLET ORAL 3 TIMES DAILY PRN
Qty: 15 TABLET | Refills: 0 | Status: SHIPPED | OUTPATIENT
Start: 2022-09-02 | End: 2022-09-02 | Stop reason: SDUPTHER

## 2022-09-02 RX ORDER — OXYCODONE HYDROCHLORIDE 5 MG/1
5 TABLET ORAL 3 TIMES DAILY PRN
Qty: 15 TABLET | Refills: 0 | Status: SHIPPED | OUTPATIENT
Start: 2022-09-02 | End: 2022-09-07

## 2022-09-02 RX ADMIN — DICYCLOMINE HYDROCHLORIDE 10 MG: 10 CAPSULE ORAL at 11:26

## 2022-09-02 RX ADMIN — FOLIC ACID 1 MG: 1 TABLET ORAL at 07:58

## 2022-09-02 RX ADMIN — BUDESONIDE AND FORMOTEROL FUMARATE DIHYDRATE 2 PUFF: 160; 4.5 AEROSOL RESPIRATORY (INHALATION) at 08:14

## 2022-09-02 RX ADMIN — DICYCLOMINE HYDROCHLORIDE 10 MG: 10 CAPSULE ORAL at 05:29

## 2022-09-02 RX ADMIN — CIPROFLOXACIN 400 MG: 2 INJECTION, SOLUTION INTRAVENOUS at 04:18

## 2022-09-02 RX ADMIN — METRONIDAZOLE 500 MG: 500 INJECTION, SOLUTION INTRAVENOUS at 00:50

## 2022-09-02 RX ADMIN — METRONIDAZOLE 500 MG: 500 INJECTION, SOLUTION INTRAVENOUS at 08:03

## 2022-09-02 RX ADMIN — AMLODIPINE BESYLATE 10 MG: 10 TABLET ORAL at 07:57

## 2022-09-02 RX ADMIN — METOPROLOL SUCCINATE 50 MG: 50 TABLET, FILM COATED, EXTENDED RELEASE ORAL at 07:58

## 2022-09-02 RX ADMIN — HYDROCHLOROTHIAZIDE 25 MG: 25 TABLET ORAL at 07:57

## 2022-09-02 RX ADMIN — ENOXAPARIN SODIUM 40 MG: 100 INJECTION SUBCUTANEOUS at 07:57

## 2022-09-02 RX ADMIN — OXYCODONE 5 MG: 5 TABLET ORAL at 05:33

## 2022-09-02 RX ADMIN — LEVETIRACETAM 500 MG: 500 TABLET, FILM COATED ORAL at 07:57

## 2022-09-02 RX ADMIN — LOSARTAN POTASSIUM 100 MG: 100 TABLET, FILM COATED ORAL at 07:58

## 2022-09-02 RX ADMIN — PREDNISONE 40 MG: 20 TABLET ORAL at 07:57

## 2022-09-02 RX ADMIN — DOCUSATE SODIUM 100 MG: 100 CAPSULE, LIQUID FILLED ORAL at 07:57

## 2022-09-02 RX ADMIN — FERROUS SULFATE TAB EC 325 MG (65 MG FE EQUIVALENT) 324 MG: 325 (65 FE) TABLET DELAYED RESPONSE at 07:57

## 2022-09-02 RX ADMIN — SENNOSIDES 8.6 MG: 8.6 TABLET, FILM COATED ORAL at 07:57

## 2022-09-02 RX ADMIN — FUROSEMIDE 40 MG: 40 TABLET ORAL at 07:58

## 2022-09-02 RX ADMIN — PANTOPRAZOLE SODIUM 40 MG: 40 TABLET, DELAYED RELEASE ORAL at 05:29

## 2022-09-02 RX ADMIN — Medication 100 MG: at 07:57

## 2022-09-02 RX ADMIN — POLYETHYLENE GLYCOL 3350 17 G: 17 POWDER, FOR SOLUTION ORAL at 07:57

## 2022-09-02 RX ADMIN — IPRATROPIUM BROMIDE AND ALBUTEROL SULFATE 1 AMPULE: 2.5; .5 SOLUTION RESPIRATORY (INHALATION) at 08:13

## 2022-09-02 RX ADMIN — ASPIRIN 81 MG: 81 TABLET, COATED ORAL at 07:57

## 2022-09-02 RX ADMIN — ROFLUMILAST 500 MCG: 500 TABLET ORAL at 07:57

## 2022-09-02 ASSESSMENT — PAIN SCALES - GENERAL
PAINLEVEL_OUTOF10: 0
PAINLEVEL_OUTOF10: 6

## 2022-09-02 NOTE — PLAN OF CARE
Problem: Respiratory - Adult  Goal: Achieves optimal ventilation and oxygenation  9/2/2022 0824 by Luis Raymundo RCP  Outcome: Progressing  9/2/2022 0159 by Andrew Bojorquez RN  Outcome: Progressing  Goal: Clear lung sounds  Outcome: Progressing  Goal: Able to breathe comfortably  Outcome: Progressing  Goal: Adequate oxygenation  Description: Adequate oxygenation  Outcome: Progressing

## 2022-09-02 NOTE — PLAN OF CARE
Problem: Discharge Planning  Goal: Discharge to home or other facility with appropriate resources  9/2/2022 1216 by Yaquelin Hand RN  Outcome: Completed  Flowsheets (Taken 9/2/2022 0757)  Discharge to home or other facility with appropriate resources: Identify barriers to discharge with patient and caregiver  9/2/2022 0159 by Tatum Bai RN  Outcome: Progressing     Problem: Respiratory - Adult  Goal: Achieves optimal ventilation and oxygenation  9/2/2022 1216 by Yaquelin Hand RN  Outcome: Completed  9/2/2022 0824 by Loly Lynn RCP  Outcome: Progressing  Flowsheets (Taken 9/2/2022 0757 by Yaquelin Hand RN)  Achieves optimal ventilation and oxygenation: Assess for changes in respiratory status  9/2/2022 0159 by Tatum Bai RN  Outcome: Progressing  Goal: Clear lung sounds  9/2/2022 0824 by Loly Lynn RCP  Outcome: Progressing  Goal: Able to breathe comfortably  9/2/2022 0824 by Loly Lynn RCP  Outcome: Progressing  Goal: Adequate oxygenation  Description: Adequate oxygenation  9/2/2022 0824 by Loly Lynn RCP  Outcome: Progressing     Problem: Pain  Goal: Verbalizes/displays adequate comfort level or baseline comfort level  9/2/2022 1216 by Yaquelin Hand RN  Outcome: Completed  9/2/2022 0159 by Tatum Bai RN  Outcome: Progressing     Problem: Safety - Adult  Goal: Free from fall injury  9/2/2022 1216 by Yaquelin Hand RN  Outcome: Completed  9/2/2022 0159 by Tatum Bai RN  Outcome: Progressing  Flowsheets (Taken 9/1/2022 1934)  Free From Fall Injury: Instruct family/caregiver on patient safety     Problem: Chronic Conditions and Co-morbidities  Goal: Patient's chronic conditions and co-morbidity symptoms are monitored and maintained or improved  9/2/2022 1216 by Yaquelin Hand RN  Outcome: Completed  Flowsheets (Taken 9/2/2022 0757)  Care Plan - Patient's Chronic Conditions and Co-Morbidity Symptoms are Monitored and Maintained or Improved: Monitor and assess patient's chronic conditions and comorbid symptoms for stability, deterioration, or improvement  9/2/2022 0159 by Shelton Frost RN  Outcome: Progressing     Problem: ABCDS Injury Assessment  Goal: Absence of physical injury  9/2/2022 1216 by Jason Hamlin RN  Outcome: Completed  9/2/2022 0159 by Shelton Frost RN  Outcome: Progressing  Flowsheets (Taken 9/1/2022 1934)  Absence of Physical Injury: Implement safety measures based on patient assessment     Problem: Musculoskeletal - Adult  Goal: Return mobility to safest level of function  9/2/2022 1216 by Jason Hamlin RN  Outcome: Completed  Flowsheets (Taken 9/2/2022 0757)  Return Mobility to Safest Level of Function: Assess patient stability and activity tolerance for standing, transferring and ambulating with or without assistive devices  9/2/2022 0159 by Shelton Frost RN  Outcome: Progressing     Problem: Gastrointestinal - Adult  Goal: Minimal or absence of nausea and vomiting  9/2/2022 1216 by Jason Hamlin RN  Outcome: Completed  Flowsheets (Taken 9/2/2022 0757)  Minimal or absence of nausea and vomiting: Administer IV fluids as ordered to ensure adequate hydration  9/2/2022 0159 by Shelton Frost RN  Outcome: Progressing     Problem: Nutrition Deficit:  Goal: Optimize nutritional status  9/2/2022 1216 by Jason Hamlin RN  Outcome: Completed  9/2/2022 0159 by Shelton Frost RN  Outcome: Progressing

## 2022-09-02 NOTE — PROGRESS NOTES
CT abd/pelvis 8/27:  \"moderate gaseous distention of the transverse colon without etiology on this exam\"    ASSESSMENT:  Active Hospital Problems    Diagnosis Date Noted    Colon obstruction ([facility] [P90.872] 08/28/2022     Priority: Medium    Abnormal abdominal CT scan [R93.5] 08/27/2022     Priority: Medium    Family history of colon cancer [Z80.0] 08/27/2022     Priority: Medium    Elevated CEA [R97.0] 08/27/2022     Priority: Medium    Acute exacerbation of COPD with asthma ([facility] [J44.1, J45.901] 08/24/2022     Priority: Medium    Abdominal pain [R10.9]     Lesion of liver [K76.9]     Hepatitis C virus infection without hepatic coma [B19.20] 03/09/2019       59 y.o. male with COPD exacerbation, abdominal distention; CT abdomen/pelvis with narrowing at the splenic flexure                      2. S/p colonoscopy-- ulcerated and ischemic trasnverse colon; decompression of cecum and transverse colon    Continue medical management and supportive care per primary team  Continue conservative management at this time. Repeat KUB today  Continue Cipro and Flagyl  Aggressive bowel regimen with MiraLAX, Colace twice daily, senna twice daily  Follow-up colonoscopy biopsies  Repeat CT shows benign liver hemangiomas  Replace electrolytes - keep K >4, Mg >2, Phos >3  Continue to encourage ambulation/activity w/ PT/OT  No surgical intervention planned. OP follow up    Electronically signed by Johnnie Madsen DO  on 9/2/2022 at 6:09 AM      Attending Physician Statement  I have discussed the case, including pertinent history and exam findings with the resident. I agree with the assessment, plan and orders as documented by the resident. Patient seen and examined. Agree with above. Continue antibiotics. KUB today to assess for colonic distention.

## 2022-09-02 NOTE — PLAN OF CARE
Problem: Discharge Planning  Goal: Discharge to home or other facility with appropriate resources  9/2/2022 0159 by Tatum Bai RN  Outcome: Progressing     Problem: Respiratory - Adult  Goal: Achieves optimal ventilation and oxygenation  9/2/2022 0159 by Tatum Bai RN  Outcome: Progressing       Problem: Safety - Adult  Goal: Free from fall injury  9/2/2022 0159 by Tatum Bai RN  Outcome: Progressing  Flowsheets (Taken 9/1/2022 1934)  Free From Fall Injury: Instruct family/caregiver on patient safety

## 2022-09-02 NOTE — PROGRESS NOTES
Pt discharged to home in stable condition with belongings  Discharge instructions given  \"Meds To Beds\" medication at bedside. Two additional scripts for antibiotics were sent to the patient's preferred pharmacy. Pt denies having any further questions at this time  Personal items given to patient at discharge  Patient/family state they have everything they were admitted with.

## 2022-09-02 NOTE — PROGRESS NOTES
Progress note  [facility]    Adult Hospitalist      Name: Lyndsey Reid  MRN: [id number]     Acct: [de-identified]  Room: 2006/2006-02    Admit Date: 8/24/2022 11:08 AM  PCP: Brionna Alexandra MD    Primary Problem  Principal Problem:    Acute exacerbation of COPD with asthma [facility])  Active Problems:    Abnormal abdominal CT scan    Family history of colon cancer    Elevated CEA    Colon obstruction ([address]    Hepatitis C virus infection without hepatic coma    Lesion of liver    Abdominal pain  Resolved Problems:    * No resolved hospital problems. *        Assesment:     Acute COPD exacerbation  Chronic hypoxic respiratory failure  Mild hyponatremia  Mild Leukocytosis  History of alcohol abuse  Liver lesion with history of treated hepatitis C  Essential hypertension  Mixed hyperlipidemia  Coronary artery disease, native vessel  Depression with anxiety  Tobacco abuse  Mild pulmonary hypertension  Suspected obstructive sleep apnea  Paroxysmal SVT status post ablation  Obesity, BMI 33  Positive blood culture 1 out of 2 question contamination  Ischemic colitis        Plan:      Admit to med surge telemetry  O2 maintain oxygen saturation greater than 92%     IV Solu-Medrol- taper per Pulm  Duo neb   Respiratory pathogen better   Sputum culture  1/2 blood culture positive for staph coagulase negative, likely contaminant  Repeat blood culture no growth so far  IV ceftriaxone and p.o. doxycycline  Monitor respiratory status   Pulmonology consult    Patient is complaining of abdominal distention  CT abdomen pelvis of colon at splenic flexure General surgery evaluated the patient, recommended medical management  Triphasic CT ordered per GI  Plan for endoscopy    Continue to monitor/telemetry/CBC with differential daily/BMP daily  DVT and GI prophylaxis.   Continue aspirin  Continue hydrochlorothiazide, metoprolol, amlodipine  Continue Singulair  Continue Roflumilast  Continue inhalers  Continue Keppra  Bentyl as needed for abdominal pain  Advanced diet  Cipro/Flagyl per surgery  Prednisone per pulmonology  DC planning in a.m. if okay with all  Rx  Discussed mgmt in detail  Rev risks, options  Continue medications as below      Scheduled Meds:   ipratropium-albuterol  1 ampule Inhalation BID    ciprofloxacin  400 mg IntraVENous Q12H    metroNIDAZOLE  500 mg IntraVENous Q8H    polyethylene glycol  17 g Oral Daily    docusate sodium  100 mg Oral BID    senna  1 tablet Oral BID    pantoprazole  40 mg Oral QAM AC    predniSONE  40 mg Oral Daily    dicyclomine  10 mg Oral TID AC    aspirin  81 mg Oral Daily    ferrous sulfate  324 mg Oral Daily with breakfast    folic acid  1 mg Oral Daily    hydroCHLOROthiazide  25 mg Oral Daily    losartan  100 mg Oral Daily    metoprolol succinate  50 mg Oral Daily    montelukast  10 mg Oral Nightly    Roflumilast  500 mcg Oral Daily    enoxaparin  40 mg SubCUTAneous Daily    QUEtiapine  200 mg Oral Nightly    vitamin B-1  100 mg Oral Daily    budesonide-formoterol  2 puff Inhalation BID    levETIRAcetam  500 mg Oral BID    furosemide  40 mg Oral Daily    amLODIPine  10 mg Oral Daily     Continuous Infusions:    PRN Meds:  oxyCODONE, 5 mg, TID PRN  calcium carbonate, 500 mg, Q4H PRN  potassium chloride, 40 mEq, PRN   Or  potassium alternative oral replacement, 40 mEq, PRN   Or  potassium chloride, 10 mEq, PRN  magnesium sulfate, 1,000 mg, PRN  ondansetron, 4 mg, Q8H PRN   Or  ondansetron, 4 mg, Q6H PRN  morphine, 2 mg, Q4H PRN  albuterol, 2.5 mg, Q2H PRN      Chief Complaint:     Chief Complaint   Patient presents with    Chest Pain     Midsternal to right feels like pressure, has hx of chf and COPD    Shortness of Breath         History of Present Illness:      Baron Schaefer is a 59 y.o.  male who presents with Chest Pain (Midsternal to right feels like pressure, has hx of chf and COPD) and Shortness of Breath    Patient seen and examined at bedside and reviewed  last 24 hrs events with nursing staff  Patient feels the best today since his admission  Says abdominal distention has improved  Abdominal pain is better  He has been able to eat better  Denies nausea or vomiting    Afebrile  Patient denies any chest pain, palpitation, headache, dizziness, cough, nausea, vomiting, changes in urination or rash. HPI  70-year-old gentleman past medical history of CHF, COPD, coronary disease, liver cirrhosis presented to ER complaining of shortness of breath going on for 2 days. He is also complaining of intermittent right-sided chest pain associated with it. He wears 4 L of oxygen at home. Chest pain is moderate, intermittent, sharp and resolved on its own. Patient denies any  Fever, chills, changes in urination, bowel habit or rash. Patient received Solu-Medrol and IV Bumex in the ER. Sodium was 133. WBC was 12. 2. X-ray chest was negative. I have personally reviewed the past medical history, past surgical history, medications, social history, and family history, and summarized in the note. Review of Systems:     All 10 point system is reviewed and negative otherwise mentioned in HPI.       Past Medical History:     Past Medical History:   Diagnosis Date    Arthritis     CAD (coronary artery disease)     Cancer ([address]     prostate    Cirrhosis with alcoholism ([address]     COPD (chronic obstructive pulmonary disease) ([address]     Depression     BOURGEOIS (dyspnea on exertion)     H/O prostate cancer     Hyperlipidemia     Hypertension     MI (myocardial infarction) ([address]     Seizures ([address] 2016    last one over 2 years ago     SVT (supraventricular tachycardia) ([address]     Tobacco abuse         Past Surgical History:     Past Surgical History:   Procedure Laterality Date    CARDIAC CATHETERIZATION      ablation    COLONOSCOPY N/A 8/29/2022    COLONOSCOPY WITH BIOPSY performed by Stormy Aguirre MD at [facility]  10/03/2018    [address] [place] [address] TONGUE SURGERY      UPPER GASTROINTESTINAL ENDOSCOPY N/A 6/10/2021    EGD BIOPSY performed by Sergey Frances MD at [address]  01/03/2022    UPPER GASTROINTESTINAL ENDOSCOPY N/A 1/3/2022    EGD BIOPSY performed by Rudine Gaucher, MD at [facility]        Medications Prior to Admission:       Prior to Admission medications    Medication Sig Start Date End Date Taking? Authorizing Provider   predniSONE (DELTASONE) 20 MG tablet Take 2 tablets by mouth daily for 4 days Then 30 mg daily for 4 days then 20 mg daily for 4 days then 10 mg daily for 4 days then 5 mg daily for 4 days then 2.5 mg daily for 4 days then stop.  9/1/22 9/5/22 Yes Riky Martins MD   ciprofloxacin (CIPRO) 500 MG tablet Take 1 tablet by mouth 2 times daily for 10 days 8/31/22 9/10/22 Yes Chante Phoenix, DO   metroNIDAZOLE (FLAGYL) 500 MG tablet Take 1 tablet by mouth 3 times daily for 10 days 8/31/22 9/10/22 Yes Chante Phoenix, DO   fluticasone-salmeterol (ADVAIR HFA) 230-21 MCG/ACT inhaler Inhale 2 puffs into the lungs 2 times daily   Yes Historical Provider, MD   vitamin B-12 (CYANOCOBALAMIN) 1000 MCG tablet Take 1,000 mcg by mouth daily   Yes Historical Provider, MD   furosemide (LASIX) 40 MG tablet Take 40 mg by mouth daily   Yes Historical Provider, MD   levETIRAcetam (KEPPRA) 500 MG tablet Take 500 mg by mouth 2 times daily   Yes Historical Provider, MD   losartan-hydroCHLOROthiazide (HYZAAR) 100-25 MG per tablet Take 1 tablet by mouth daily   Yes Historical Provider, MD   Roflumilast (DALIRESP) 500 MCG tablet Take 1 tablet by mouth daily 7/16/21   Lupillo Alexander MD   ferrous gluconate (FERGON) 324 (38 Fe) MG tablet Take 324 mg by mouth daily (with breakfast)    Historical Provider, MD   famotidine (PEPCID) 40 MG tablet Take 40 mg by mouth 2 times daily as needed (heartburn)     Historical Provider, MD   amLODIPine (NORVASC) 10 MG tablet Take 10 mg by mouth daily    Historical Provider, MD   albuterol (PROVENTIL) (2.5 MG/3ML) 0.083% nebulizer solution Take 2.5 mg by nebulization every 6 hours as needed for Wheezing    Historical Provider, MD   albuterol sulfate  (90 Base) MCG/ACT inhaler Inhale 2 puffs into the lungs every 4-6 hours as needed for Wheezing    Historical Provider, MD   aspirin 81 MG tablet Take 81 mg by mouth daily    Historical Provider, MD   folic acid (FOLVITE) 1 MG tablet Take 1 mg by mouth daily    Historical Provider, MD   metoprolol succinate (TOPROL XL) 50 MG extended release tablet Take 50 mg by mouth daily    Historical Provider, MD   pantoprazole (PROTONIX) 40 MG tablet Take 40 mg by mouth daily    Historical Provider, MD   QUEtiapine (SEROQUEL) 400 MG tablet Take 200 mg by mouth nightly Takes 1/2 tab (200mg) nightly    Historical Provider, MD   vitamin B-1 (THIAMINE) 100 MG tablet Take 100 mg by mouth daily    Historical Provider, MD   Cholecalciferol (VITAMIN D3) 2000 units CAPS Take 1 capsule by mouth daily    Historical Provider, MD   tiotropium (SPIRIVA RESPIMAT) 2.5 MCG/ACT AERS inhaler Inhale 2 puffs into the lungs daily    Historical Provider, MD        Allergies:       Patient has no known allergies. Social History:     Tobacco:    reports that he has quit smoking. His smoking use included cigarettes. He smoked an average of .1 packs per day. He quit smokeless tobacco use about 8 months ago. Alcohol:      reports that he does not currently use alcohol. Drug Use:  reports no history of drug use. Family History:     No family history on file.       Physical Exam:     Vitals:  /74   Pulse 63   Temp 97.5 °F (36.4 °C) (Oral)   Resp 19   Ht 5' 5\" (1.651 m)   Wt 216 lb 3.2 oz (98.1 kg)   SpO2 97%   BMI 35.98 kg/m²   Temp (24hrs), Av.4 °F (36.3 °C), Min:97.3 °F (36.3 °C), Max:97.5 °F (36.4 °C)          General appearance - alert, well appearing, and in no acute distress  Mental status - oriented to person, place, and time with normal affect  Head - normocephalic and atraumatic  Eyes - pupils equal and reactive, extraocular eye movements intact, conjunctiva clear  Ears - hearing appears to be intact  Nose - no drainage noted  Mouth - mucous membranes moist  Neck - supple, no carotid bruits, thyroid not palpable  Chest - clear to auscultation, normal effort  Heart - normal rate, regular rhythm, no murmur  Abdomen - soft, nontender, nondistended, bowel sounds present all four quadrants, no masses, hepatomegaly or splenomegaly  Neurological - normal speech, no focal findings or movement disorder noted, cranial nerves II through XII grossly intact  Extremities - peripheral pulses palpable, no pedal edema or calf pain with palpation  Skin - no gross lesions, rashes, or induration noted        Data:     Labs:    Hematology:  Recent Labs     08/31/22  0707 09/02/22  0659   WBC 13.4* 14.4*   RBC 3.74* 4.22   HGB 11.6* 13.0   HCT 36.3* 40.2*   MCV 97.1 95.3   MCH 31.0 30.8   MCHC 32.0 32.3   RDW 12.3 12.4    348   MPV 9.3 9.7       Chemistry:  Recent Labs     09/02/22  0659      K 3.9   CL 99   CO2 29   GLUCOSE 77   BUN 14   CREATININE 0.89   ANIONGAP 9   LABGLOM >60   GFRAA >60   CALCIUM 9.7       No results for input(s): PROT, LABALBU, LABA1C, L9WDDUP, O5HFQSP, FT4, TSH, AST, ALT, LDH, GGT, ALKPHOS, LABGGT, BILITOT, BILIDIR, AMMONIA, AMYLASE, LIPASE, LACTATE, CHOL, HDL, LDLCHOLESTEROL, CHOLHDLRATIO, TRIG, VLDL, ANA20DC, PHENYTOIN, PHENYF, URICACID, POCGLU in the last 72 hours.       Lab Results   Component Value Date    INR 1.0 08/25/2022    INR 1.0 01/01/2022    INR 1.1 07/13/2021    PROTIME 13.4 08/25/2022    PROTIME 13.2 01/01/2022    PROTIME 13.5 07/13/2021       Lab Results   Component Value Date/Time    SPECIAL 10ML LH 08/25/2022 11:45 AM     Lab Results   Component Value Date/Time    CULTURE NORMAL RESPIRATORY GANGA MODERATE GROWTH 08/26/2022 11:49 AM       No results found for: POCPH, PHART, PH, POCPCO2, FCT5UAZ, PCO2, POCPO2, PO2ART, PO2, POCHCO3, DQJ5MII, HCO3, NBEA, PBEA, BEART, BE, THGBART, THB, JZZ5YZT, TUOK0KBD, O6MMSYCC, O2SAT, FIO2    Radiology:    XR CHEST PORTABLE    Result Date: 8/24/2022  No acute cardiopulmonary findings         All radiological studies reviewed                Code Status:  Full Code    Electronically signed by Sonu Guadarrama MD on 9/2/2022 at 11:04 AM     Copy sent to Dr. Cem Chacon MD    This note was created with the assistance of a speech-recognition program.  Although the intention is to generate a document that actually reflects the content of the visit, no guarantees can be provided that every mistake has been identified and corrected by editing. Note was updated later by me after  physical examination and  completion of the assessment.

## 2022-09-02 NOTE — RT PROTOCOL NOTE
RT Inhaler-Nebulizer Bronchodilator Protocol Note    There is a bronchodilator order in the chart from a provider indicating to follow the RT Bronchodilator Protocol and there is an Initiate RT Inhaler-Nebulizer Bronchodilator Protocol order as well (see protocol at bottom of note). CXR Findings:  No results found. The findings from the last RT Protocol Assessment were as follows:   History Pulmonary Disease: Chronic pulmonary disease  Respiratory Pattern: Dyspnea on exertion or RR 21-25 bpm  Breath Sounds: Slightly diminished and/or crackles  Cough: Strong, spontaneous, non-productive  Indication for Bronchodilator Therapy: On home bronchodilators  Bronchodilator Assessment Score: 6    Aerosolized bronchodilator medication orders have been revised according to the RT Inhaler-Nebulizer Bronchodilator Protocol below. Respiratory Therapist to perform RT Therapy Protocol Assessment initially then follow the protocol. Repeat RT Therapy Protocol Assessment PRN for score 0-3 or on second treatment, BID, and PRN for scores above 3. No Indications - adjust the frequency to every 6 hours PRN wheezing or bronchospasm, if no treatments needed after 48 hours then discontinue using Per Protocol order mode. If indication present, adjust the RT bronchodilator orders based on the Bronchodilator Assessment Score as indicated below. Use Inhaler orders unless patient has one or more of the following: on home nebulizer, not able to hold breath for 10 seconds, is not alert and oriented, cannot activate and use MDI correctly, or respiratory rate 25 breaths per minute or more, then use the equivalent nebulizer order(s) with same Frequency and PRN reasons based on the score. If a patient is on this medication at home then do not decrease Frequency below that used at home.     0-3 - enter or revise RT bronchodilator order(s) to equivalent RT Bronchodilator order with Frequency of every 4 hours PRN for wheezing or increased work of breathing using Per Protocol order mode. 4-6 - enter or revise RT Bronchodilator order(s) to two equivalent RT bronchodilator orders with one order with BID Frequency and one order with Frequency of every 4 hours PRN wheezing or increased work of breathing using Per Protocol order mode. 7-10 - enter or revise RT Bronchodilator order(s) to two equivalent RT bronchodilator orders with one order with TID Frequency and one order with Frequency of every 4 hours PRN wheezing or increased work of breathing using Per Protocol order mode. 11-13 - enter or revise RT Bronchodilator order(s) to one equivalent RT bronchodilator order with QID Frequency and an Albuterol order with Frequency of every 4 hours PRN wheezing or increased work of breathing using Per Protocol order mode. Greater than 13 - enter or revise RT Bronchodilator order(s) to one equivalent RT bronchodilator order with every 4 hours Frequency and an Albuterol order with Frequency of every 2 hours PRN wheezing or increased work of breathing using Per Protocol order mode. RT to enter RT Home Evaluation for COPD & MDI Assessment order using Per Protocol order mode.     Electronically signed by Leanna Napier RCP on 9/2/2022 at 8:24 AM

## 2022-09-07 NOTE — ADT AUTH CERT
Utilization Reviews       Chronic Obstructive Pulmonary Disease - Care Day 8 (8/31/2022) by Angie Lagos RN       Review Status Review Entered   Completed 9/2/2022 11:26      Criteria Review      Care Day: 8 Care Date: 8/31/2022 Level of Care: Inpatient Floor    Guideline Day 2    Level Of Care    (X) Floor or intermediate care    Clinical Status    (X) * Hemodynamic stability    9/2/2022 11:26 AM EDT by Alexandre Ocampo      BP 99/53  GA 74    (X) * Mechanical and noninvasive ventilation absent    9/2/2022 11:26 AM EDT by Alexandre Ocampo      on o2 at 4L via nasal cannula    ( ) * Uncompensated acidosis absent    9/2/2022 11:26 AM EDT by Alexandre Ocampo      none noted    Activity    (X) Ambulatory    9/2/2022 11:26 AM EDT by Alexandre Ocampo      up as tolerated    Routes    (X) Diet as tolerated    9/2/2022 11:26 AM EDT by Orville Mg      Regular diet    (X) IV medications    9/2/2022 11:26 AM EDT by Orville Mg      toradol 15 mg every 6 hours prn iv x2    Interventions    (X) Oxygen    (X) Pulse oximetry    9/2/2022 11:26 AM EDT by Alexandre Ocampo      as order    (X) Respiratory therapy    9/2/2022 11:26 AM EDT by Alexandre Ocampo      daily    (X) DVT prophylaxis    9/2/2022 11:26 AM EDT by Orville Mg      lovenox 40mg daily sc    ( ) Smoking cessation counseling    9/2/2022 11:26 AM EDT by Alexandre Ocampo      5/2021 quit    Medications    (X) Systemic corticosteroids    9/2/2022 11:26 AM EDT by Orville Mg      solu-medrol inj 20 daily iv then d/c  deltasone 40mg daily po    (X) Inhaled bronchodilators    9/2/2022 11:26 AM EDT by Orville Mg      symbicort 2 puff BID IN  duoneb 1 ampule bid  IN  duoneb 1 ampule TID IN d/c    (X) Possible IV antibiotics    9/2/2022 11:26 AM EDT by Orville Mg      flagyl 500mg in 0.9 NaCl 100ml IVPB premix every 8 hours iv  cipro 400 mg every 12 hours iv    * Milestone   Additional Notes   DATE: 8/31/2022         PERTINENT UPDATES:    Passing lots of flatus,    Abd distention better than yesterday but still distended. Has had several small non bloody loose bm's. minimal generalized abdominal pain. [facility]       VITALS:   97.7 (36.5)    18   59   99/53   SpO2 93% at 4l via nasal cannula   Pain Scale 7      ABNL/PERTINENT LABS/RADIOLOGY/DIAGNOSTIC STUDIES:   wbc 13.4   rbc 3.74   hgb 11.6   hct 36.3      PHYSICAL EXAM:   Pulmonary/Chest: clear to auscultation bilaterally- no wheezes, rales or rhonchi, normal air movement, no respiratory distress   Cardiovascular: normal rate, regular rhythm, normal S1 and S2, no murmurs, rubs, clicks or gallops, distal pulses intact, no carotid bruits   Abdomen: soft, non-tender, mildly distended, normal bowel sounds, no masses or organomegaly      MD CONSULTS/ASSESSMENT AND PLAN:   General Surgery Note:   Continue bowel regimen   Follow up with PCP and GI, no need for surgical follow up   Janel Tovar to discharge home from surgical standpoint      Family Medicine Note:   Assessment:   native vessel   Plan:   IV Solu-Medrol- taper per Pulm   DC planning once OK w all      Gastroenterology Notes:   ASSESSMENT/plan   will discuss bx results with md   Gi signing off f/u outpt   Biopsy consistent with ischemic colitis      MEDICATIONS:   Norvasc 10mg  daily po   aspirin ec 81 daily po    Bentyl 10mg TID before meals colace 100 mg cap BID po    colace 100mg bid po   Fetabs 325  ec rab 324 mg daily with breakfast po    folvite 1mg daily po   lasix 40mg daily po    hydroxidiuril 25 mg daily po   keppra 500 mg bid po   cozaar 100mg daily po   toprol xl 50mg daily po    singulair 10mg nightly po    protonix 40mg daily before breakfast po   daliresp 500mcg daily po   senokot 1 tab bid po   thiamine mononitrate 100 mg daily po   norco 2 tab every 6 hours prn po x2   roxicodone 5mg tid prn po x1      ORDERS:   HHN Treatment   Discharge patient      PT/OT/SLP/CM ASSESSMENT OR NOTES:   PT Notes: now.  No surgical intervention planned at this time. We will see how patient does with bowel regimen       Gastroenterology Note:   ASSESSMENT/plan   1. Obstruction of colon s/p deflation colonoscopy yesterday   f/u bx results from ulceration of transverse colon   surgery following   ppi   Laxatives prn   2.cirrhosis secondary to etoh abuse   2gm low salt diet   Avoid narcotics   3.treated hepatitis c hemangiomas per imaging       Pulmonology Note   Resume diet   Discontinue Solu-Medrol 20 every 12 hours   Prednisone 40 p.o. daily       Family Medicine Notes:   Advanced diet      MEDICATIONS:   Norvasc 10mg  daily po   colace 100mg bid po   Fetabs 325  ec rab 324 mg daily with breakfast po    folvite 1mg daily po   lasix 40mg daily po    hydroxidiuril 25 mg daily po   keppra 500 mg bid po   cozaar 100mg daily po   toprol xl 50mg daily po    singulair 10mg nightly po    daliresp 500mcg daily po   senokot 1 tab bid po   thiamine mononitrate 100 mg daily po   norco 2 tab every 6 hours prn po x1   toradol 15 mg every 6 hours prn iv x1      ORDERS:   ADULT DIET; Regular; Low Sodium (2 gm)    HHN Treatment      PT/OT/SLP/CM ASSESSMENT OR NOTES:   OT Notes:   Patient not seen this date for Occupational Therapy due to   Refusal by Patient : Pt. Declined treatment stating \"My arms are fine. Ene Remedies Ene Remedies Ene Remedies I don't need therapy\". OT will cont to monitor. PT Notes:   AM-PAC Score: 22   Assessment    Assessment: Pt increased ambulation distance, no AD, modified independent. Pt SOB post ambulation. No additional tx due to abdominal pain. Continue with skilled PT to maximized return to PLOF. Activity Tolerance: Patient limited by endurance; Patient tolerated treatment well;Patient limited by pain     Restrictions/Precautions: Fall Risk, General Precautions, Up as Tolerated, Bed Alarm               Chronic Obstructive Pulmonary Disease - Care Day 6 (8/29/2022) by Walt Padilla RN       Review Status Review Entered Completed 8/31/2022 15:23      Criteria Review      Care Day: 6 Care Date: 8/29/2022 Level of Care: Inpatient Floor    Guideline Day 2    Level Of Care    (X) Floor or intermediate care    Clinical Status    (X) * Hemodynamic stability    8/31/2022 3:23 PM EDT by Asa Chery      BP 99/75  OK 75    (X) * Mechanical and noninvasive ventilation absent    ( ) * Uncompensated acidosis absent    Activity    (X) Ambulatory    8/31/2022 3:23 PM EDT by Asa Chery      Patient ambulates to rest room and then performs 1 lap in room this date    Routes    (X) Diet as tolerated    8/31/2022 3:23 PM EDT by Asa Chery      ADULT DIET; Clear Liquid    Interventions    (X) Oxygen    (X) Pulse oximetry    8/31/2022 3:23 PM EDT by Asa Chery      as ordered    (X) Respiratory therapy    8/31/2022 3:23 PM EDT by Asa Chery      symbicort 2 puff daily    (X) DVT prophylaxis    Medications    (X) Systemic corticosteroids    8/31/2022 3:23 PM EDT by Asa Chery      solu-medrol 20mg daily iv    (X) Inhaled bronchodilators    8/31/2022 3:23 PM EDT by Orville Bains 1 ampule bid IN    (X) Possible IV antibiotics    8/31/2022 3:23 PM EDT by Asa Chery      monodox 100mg bid po    * Milestone   Additional Notes   DATE:8/29/2022      PERTINENT UPDATES:   for colonoscopy today    he still reports having shortness of breath that he attributes to abdominal distention. on  4L via nasal cannula .       VITALS:   98.2( 36.8)   20   73   99/75   SpO2 98% on 4L via nasal cannula   Pain Scale:7      ABNL/PERTINENT LABS/RADIOLOGY/DIAGNOSTIC STUDIES:      Glucose 104   BUN 22   Bun/Cre Ratio 22   Potassium 3.1   CO2 35Anion Gap 8         PHYSICAL EXAM:   Heart: Regular rate and rhythm   Lungs: normal effort with symmetric rise and fall of chest wall   Abdomen: softly distended, TTP in right abdomen without guarding or rebound tenderness    Extremities: No cyanosis, pitting edema, rashes noted. Lungs: Creased breath sound no wheezing   Heart: regular rate and rhythm, S1, S2 normal, no gallop               MD CONSULTS/ASSESSMENT AND PLAN:   General Surgery notes:   ASSESSMENT:    Colon obstruction    Abnormal abdominal CT scan   Family history of colon cancer        Elevated CEA    Acute exacerbation of COPDwithasthma Abdominal pain     Lesion of liver   Hepatitis C virus infection without hepatic coma    PLAN:   Replace electrolytes - keep K >4, Mg >2, Phos >3      PULMONOLOGY NOTES   Impression:   Chronic hypoxic respiratory failure    Acute exacerbation of COPD/active smoking   Liver cirrhosis with ascites/hepatitis C   Paroxysmal SVT status post ablation   Recommendations:   Oxygen via nasal cannula, keep SPO2 90% or greater    Incentive spirometry every hour while awake    Albuterol by nebulizer   Symbicort   N.p.o.   Solu-Medrol 20 every 12 hours   Singulair   Daliresp   Continue Rocephin and doxycycline   Surgery on consult   Colonoscopy today per GI   Outpatient sleep evaluation   DVT prophylaxis with low molecular weight heparin      FAMILY MEDICINE NOTES:   ASSESSMENT:   Chronic hypoxic respiratory failure   Essential hypertensio   Mild pulmonary hypertension   Suspected obstructive sleep apnea   Paroxysmal SVT status post ablation   Positive blood culture 1 out of 2 question contamination   PLAN:   Bentyl as needed for abdominal pain      GASTROENTEROLOGY NOTE:   PROCEDURE NOTE:   DATE OF PROCEDURE: 8/29/2022    PREOPERATIVE DIAGNOSIS:    Obstruction of the colon this is for deflation   POSTOPERATIVE DIAGNOSIS: as described below   OPERATION: Total colonoscopy    ANESTHESIA: Moderate Sedation   ESTIMATED BLOOD LOSS: less than 50    COMPLICATIONS: None. SPECIMENS:  Was Obtained:    Recommendations/Plan:    1. My give laxatives   2. And try liquid diet if no surgical intervention is planned   3. F/U Biopsies   4.  Surgical follow-up       MEDICATIONS:   norvasc 5mg daily po bentyl 10mg capsule tid po   ferrous 324 mg daily with breakfast po   folvite 1mg daily po   lasix 40mg daily po   hydrodiuril 25mg daily po   keppra 500 mg bid po   cozaar 100mg daily po   toprol xl 50 mg daily po   singulair 10mg nightly po   protonix 40mg daily iv   seroquel 200mg nightly po   dialiresp 500mg daily po   thiamine mononitrate 100mg daily po   norco 2 tab every 6 hours prn po x1   toradol 15 mg every 6 hours x1         ORDERS:    Surgical Pathology    Transfer Patient   surgical pathology   [facility] Treatment      PT/OT/SLP/CM ASSESSMENT OR NOTES:   PT NOTES:   Assessment: Patient due to abdominal discomfort with decreased aerobic capacity. requries supervision for ambulation and MIN vc's for safety awareness. Activity Tolerance: Patient limited by endurance;Treatment limited secondary to medical complications    OT NOTES:   Refusal by Patient : Pt. Declined treatment in p.m. stating his stomach didn't feel good and he was waiting for procedure. Anshul Valdivia DIETITIAN NOTES:   Nutrition Recommendations/Plan:    1. Diet NPO   2. Monitor diet advancement, GI function and labs   3.  Consider need for nutrition support if diet can not be advanced in the next 24 hours

## 2022-09-15 ENCOUNTER — HOSPITAL ENCOUNTER (EMERGENCY)
Age: 57
Discharge: HOME | End: 2022-09-15
Attending: EMERGENCY MEDICINE
Payer: MEDICARE

## 2022-09-15 ENCOUNTER — TELEPHONE (OUTPATIENT)
Dept: OTHER | Facility: CLINIC | Age: 57
End: 2022-09-15

## 2022-09-15 ENCOUNTER — APPOINTMENT (OUTPATIENT)
Dept: CT IMAGING | Age: 57
End: 2022-09-15
Payer: MEDICARE

## 2022-09-15 ENCOUNTER — APPOINTMENT (OUTPATIENT)
Dept: GENERAL RADIOLOGY | Age: 57
End: 2022-09-15
Payer: MEDICARE

## 2022-09-15 VITALS
RESPIRATION RATE: 13 BRPM | HEART RATE: 95 BPM | OXYGEN SATURATION: 95 % | WEIGHT: 195 LBS | BODY MASS INDEX: 32.49 KG/M2 | HEIGHT: 65 IN | SYSTOLIC BLOOD PRESSURE: 137 MMHG | DIASTOLIC BLOOD PRESSURE: 88 MMHG | TEMPERATURE: 98.1 F

## 2022-09-15 DIAGNOSIS — E87.6 HYPOKALEMIA: ICD-10-CM

## 2022-09-15 DIAGNOSIS — R10.84 GENERALIZED ABDOMINAL PAIN: Primary | ICD-10-CM

## 2022-09-15 DIAGNOSIS — J44.1 COPD WITH ACUTE EXACERBATION (HCC): ICD-10-CM

## 2022-09-15 LAB
ABSOLUTE EOS #: 0 K/UL (ref 0–0.4)
ABSOLUTE IMMATURE GRANULOCYTE: 0 K/UL (ref 0–0.3)
ABSOLUTE LYMPH #: 1.12 K/UL (ref 1–4.8)
ABSOLUTE MONO #: 0.84 K/UL (ref 0.2–0.8)
ALBUMIN SERPL-MCNC: 4.5 G/DL (ref 3.5–5.2)
ALP BLD-CCNC: 48 U/L (ref 40–129)
ALT SERPL-CCNC: 18 U/L (ref 5–41)
ANION GAP SERPL CALCULATED.3IONS-SCNC: 15 MMOL/L (ref 9–17)
AST SERPL-CCNC: 9 U/L
BASOPHILS # BLD: 0 %
BASOPHILS ABSOLUTE: 0 K/UL (ref 0–0.2)
BILIRUB SERPL-MCNC: 0.5 MG/DL (ref 0.3–1.2)
BUN BLDV-MCNC: 12 MG/DL (ref 6–20)
BUN/CREAT BLD: 17 (ref 9–20)
CALCIUM SERPL-MCNC: 10 MG/DL (ref 8.6–10.4)
CHLORIDE BLD-SCNC: 93 MMOL/L (ref 98–107)
CO2: 30 MMOL/L (ref 20–31)
CREAT SERPL-MCNC: 0.71 MG/DL (ref 0.7–1.2)
EOSINOPHILS RELATIVE PERCENT: 0 % (ref 1–4)
GFR AFRICAN AMERICAN: >60 ML/MIN
GFR NON-AFRICAN AMERICAN: >60 ML/MIN
GFR SERPL CREATININE-BSD FRML MDRD: ABNORMAL ML/MIN/{1.73_M2}
GLUCOSE BLD-MCNC: 135 MG/DL (ref 70–99)
HCT VFR BLD CALC: 42.7 % (ref 40.7–50.3)
HEMOGLOBIN: 14.1 G/DL (ref 13–17)
IMMATURE GRANULOCYTES: 0 %
LIPASE: 27 U/L (ref 13–60)
LYMPHOCYTES # BLD: 8 % (ref 24–44)
MCH RBC QN AUTO: 31 PG (ref 25.2–33.5)
MCHC RBC AUTO-ENTMCNC: 33 G/DL (ref 28.4–34.8)
MCV RBC AUTO: 93.8 FL (ref 82.6–102.9)
MONOCYTES # BLD: 6 % (ref 1–7)
MYOGLOBIN: 41 NG/ML (ref 28–72)
MYOGLOBIN: 41 NG/ML (ref 28–72)
NRBC AUTOMATED: 0 PER 100 WBC
PDW BLD-RTO: 12.3 % (ref 11.8–14.4)
PLATELET # BLD: 229 K/UL (ref 138–453)
PMV BLD AUTO: 9.7 FL (ref 8.1–13.5)
POTASSIUM SERPL-SCNC: 2.9 MMOL/L (ref 3.7–5.3)
RBC # BLD: 4.55 M/UL (ref 4.21–5.77)
SARS-COV-2, RAPID: NOT DETECTED
SEG NEUTROPHILS: 86 % (ref 36–66)
SEGMENTED NEUTROPHILS ABSOLUTE COUNT: 12.04 K/UL (ref 1.8–7.7)
SODIUM BLD-SCNC: 138 MMOL/L (ref 135–144)
SPECIMEN DESCRIPTION: NORMAL
TOTAL PROTEIN: 7.3 G/DL (ref 6.4–8.3)
TROPONIN, HIGH SENSITIVITY: 13 NG/L (ref 0–22)
TROPONIN, HIGH SENSITIVITY: 14 NG/L (ref 0–22)
WBC # BLD: 14 K/UL (ref 3.5–11.3)

## 2022-09-15 PROCEDURE — 99285 EMERGENCY DEPT VISIT HI MDM: CPT

## 2022-09-15 PROCEDURE — 80053 COMPREHEN METABOLIC PANEL: CPT

## 2022-09-15 PROCEDURE — 2580000003 HC RX 258: Performed by: PHYSICIAN ASSISTANT

## 2022-09-15 PROCEDURE — 84484 ASSAY OF TROPONIN QUANT: CPT

## 2022-09-15 PROCEDURE — 6360000004 HC RX CONTRAST MEDICATION: Performed by: PHYSICIAN ASSISTANT

## 2022-09-15 PROCEDURE — 85025 COMPLETE CBC W/AUTO DIFF WBC: CPT

## 2022-09-15 PROCEDURE — 83690 ASSAY OF LIPASE: CPT

## 2022-09-15 PROCEDURE — 96375 TX/PRO/DX INJ NEW DRUG ADDON: CPT

## 2022-09-15 PROCEDURE — 2700000000 HC OXYGEN THERAPY PER DAY

## 2022-09-15 PROCEDURE — 74022 RADEX COMPL AQT ABD SERIES: CPT

## 2022-09-15 PROCEDURE — 6360000002 HC RX W HCPCS: Performed by: PHYSICIAN ASSISTANT

## 2022-09-15 PROCEDURE — 74177 CT ABD & PELVIS W/CONTRAST: CPT

## 2022-09-15 PROCEDURE — 87635 SARS-COV-2 COVID-19 AMP PRB: CPT

## 2022-09-15 PROCEDURE — 94640 AIRWAY INHALATION TREATMENT: CPT

## 2022-09-15 PROCEDURE — 83874 ASSAY OF MYOGLOBIN: CPT

## 2022-09-15 PROCEDURE — 96365 THER/PROPH/DIAG IV INF INIT: CPT

## 2022-09-15 PROCEDURE — 6370000000 HC RX 637 (ALT 250 FOR IP): Performed by: PHYSICIAN ASSISTANT

## 2022-09-15 RX ORDER — OXYCODONE HYDROCHLORIDE AND ACETAMINOPHEN 5; 325 MG/1; MG/1
1-2 TABLET ORAL EVERY 8 HOURS PRN
Qty: 12 TABLET | Refills: 0 | Status: SHIPPED | OUTPATIENT
Start: 2022-09-15 | End: 2022-09-20

## 2022-09-15 RX ORDER — MORPHINE SULFATE 4 MG/ML
4 INJECTION, SOLUTION INTRAMUSCULAR; INTRAVENOUS ONCE
Status: COMPLETED | OUTPATIENT
Start: 2022-09-15 | End: 2022-09-15

## 2022-09-15 RX ORDER — SODIUM CHLORIDE 0.9 % (FLUSH) 0.9 %
10 SYRINGE (ML) INJECTION PRN
Status: DISCONTINUED | OUTPATIENT
Start: 2022-09-15 | End: 2022-09-15 | Stop reason: HOSPADM

## 2022-09-15 RX ORDER — PREDNISONE 20 MG/1
20 TABLET ORAL 2 TIMES DAILY
Qty: 10 TABLET | Refills: 0 | Status: SHIPPED | OUTPATIENT
Start: 2022-09-15 | End: 2022-09-20

## 2022-09-15 RX ORDER — ALBUTEROL SULFATE 2.5 MG/3ML
2.5 SOLUTION RESPIRATORY (INHALATION) ONCE
Status: COMPLETED | OUTPATIENT
Start: 2022-09-15 | End: 2022-09-15

## 2022-09-15 RX ORDER — HYDROMORPHONE HYDROCHLORIDE 1 MG/ML
1 INJECTION, SOLUTION INTRAMUSCULAR; INTRAVENOUS; SUBCUTANEOUS ONCE
Status: COMPLETED | OUTPATIENT
Start: 2022-09-15 | End: 2022-09-15

## 2022-09-15 RX ORDER — 0.9 % SODIUM CHLORIDE 0.9 %
100 INTRAVENOUS SOLUTION INTRAVENOUS ONCE
Status: COMPLETED | OUTPATIENT
Start: 2022-09-15 | End: 2022-09-15

## 2022-09-15 RX ORDER — DOCUSATE SODIUM 100 MG/1
100 CAPSULE, LIQUID FILLED ORAL 3 TIMES DAILY PRN
Qty: 30 CAPSULE | Refills: 0 | Status: SHIPPED | OUTPATIENT
Start: 2022-09-15

## 2022-09-15 RX ORDER — POLYETHYLENE GLYCOL 3350 17 G/17G
17 POWDER, FOR SOLUTION ORAL 2 TIMES DAILY
Qty: 507 G | Refills: 0 | Status: SHIPPED | OUTPATIENT
Start: 2022-09-15 | End: 2022-09-22

## 2022-09-15 RX ORDER — ONDANSETRON 2 MG/ML
4 INJECTION INTRAMUSCULAR; INTRAVENOUS ONCE
Status: COMPLETED | OUTPATIENT
Start: 2022-09-15 | End: 2022-09-15

## 2022-09-15 RX ORDER — POTASSIUM CHLORIDE 7.45 MG/ML
10 INJECTION INTRAVENOUS ONCE
Status: COMPLETED | OUTPATIENT
Start: 2022-09-15 | End: 2022-09-15

## 2022-09-15 RX ORDER — POTASSIUM CHLORIDE 20 MEQ/1
20 TABLET, EXTENDED RELEASE ORAL 2 TIMES DAILY
Qty: 10 TABLET | Refills: 0 | Status: SHIPPED | OUTPATIENT
Start: 2022-09-15 | End: 2022-09-20

## 2022-09-15 RX ADMIN — ALBUTEROL SULFATE 2.5 MG: 2.5 SOLUTION RESPIRATORY (INHALATION) at 12:01

## 2022-09-15 RX ADMIN — SODIUM CHLORIDE, PRESERVATIVE FREE 10 ML: 5 INJECTION INTRAVENOUS at 13:40

## 2022-09-15 RX ADMIN — IOPAMIDOL 75 ML: 755 INJECTION, SOLUTION INTRAVENOUS at 13:40

## 2022-09-15 RX ADMIN — ONDANSETRON 4 MG: 2 INJECTION INTRAMUSCULAR; INTRAVENOUS at 10:41

## 2022-09-15 RX ADMIN — SODIUM CHLORIDE 80 ML: 9 INJECTION, SOLUTION INTRAVENOUS at 13:40

## 2022-09-15 RX ADMIN — HYDROMORPHONE HYDROCHLORIDE 1 MG: 1 INJECTION, SOLUTION INTRAMUSCULAR; INTRAVENOUS; SUBCUTANEOUS at 12:27

## 2022-09-15 RX ADMIN — POTASSIUM BICARBONATE 40 MEQ: 782 TABLET, EFFERVESCENT ORAL at 15:13

## 2022-09-15 RX ADMIN — MORPHINE SULFATE 4 MG: 4 INJECTION, SOLUTION INTRAMUSCULAR; INTRAVENOUS at 10:41

## 2022-09-15 RX ADMIN — POTASSIUM CHLORIDE 10 MEQ: 7.46 INJECTION, SOLUTION INTRAVENOUS at 12:18

## 2022-09-15 ASSESSMENT — PAIN SCALES - GENERAL
PAINLEVEL_OUTOF10: 8
PAINLEVEL_OUTOF10: 4
PAINLEVEL_OUTOF10: 8
PAINLEVEL_OUTOF10: 8

## 2022-09-15 ASSESSMENT — PAIN DESCRIPTION - LOCATION
LOCATION: ABDOMEN
LOCATION_2: CHEST
LOCATION: ABDOMEN
LOCATION: ABDOMEN;CHEST

## 2022-09-15 ASSESSMENT — PAIN - FUNCTIONAL ASSESSMENT: PAIN_FUNCTIONAL_ASSESSMENT: 0-10

## 2022-09-15 ASSESSMENT — PAIN DESCRIPTION - DESCRIPTORS
DESCRIPTORS_2: PRESSURE
DESCRIPTORS: SHARP

## 2022-09-15 ASSESSMENT — PAIN DESCRIPTION - INTENSITY: RATING_2: 8

## 2022-09-15 NOTE — ED PROVIDER NOTES
[address] [place] [address] ED  [facility]      Pt Name: Robert Adkins  MRN: [id number]  [place] 1965  Date ofevaluation: 9/15/2022  Provider: Maria Esther Goldman PA-C    CHIEF COMPLAINT       Chief Complaint   Patient presents with    Chest Pain     Pt reports last few days, intermitten    Abdominal Pain         HISTORY OF PRESENT ILLNESS  (Location/Symptom, Timing/Onset, Context/Setting, Quality, Duration, Modifying Factors, Severity.)   Robert Adkins is a 59 y.o. male who presents to the emergency department with pain abdominal pain. Symptoms have been intermittent over the last few days and short of breath. Reports being constipated. Reports had a BM this morning and passing gas. No vomiting. Also feels short of breath. Reports being admitted for the same problem recently and was told that they had some sort of mass and infection of the colon. Nursing Notes were reviewed. ALLERGIES     Patient has no known allergies.     CURRENT MEDICATIONS       Discharge Medication List as of 9/15/2022  3:06 PM        CONTINUE these medications which have NOT CHANGED    Details   fluticasone-salmeterol (ADVAIR HFA) 230-21 MCG/ACT inhaler Inhale 2 puffs into the lungs 2 times dailyHistorical Med      vitamin B-12 (CYANOCOBALAMIN) 1000 MCG tablet Take 1,000 mcg by mouth dailyHistorical Med      furosemide (LASIX) 40 MG tablet Take 40 mg by mouth dailyHistorical Med      levETIRAcetam (KEPPRA) 500 MG tablet Take 500 mg by mouth 2 times dailyHistorical Med      Roflumilast (DALIRESP) 500 MCG tablet Take 1 tablet by mouth daily, Disp-30 tablet, R-0Normal      ferrous gluconate (FERGON) 324 (38 Fe) MG tablet Take 324 mg by mouth daily (with breakfast)Historical Med      famotidine (PEPCID) 40 MG tablet Take 40 mg by mouth 2 times daily as needed (heartburn) Historical Med      amLODIPine (NORVASC) 10 MG tablet Take 10 mg by mouth dailyHistorical Med      albuterol (PROVENTIL) (2.5 MG/3ML) 0.083% nebulizer solution Take 2.5 mg by nebulization every 6 hours as needed for WheezingHistorical Med      albuterol sulfate  (90 Base) MCG/ACT inhaler Inhale 2 puffs into the lungs every 4-6 hours as needed for WheezingHistorical Med      aspirin 81 MG tablet Take 81 mg by mouth dailyHistorical Med      folic acid (FOLVITE) 1 MG tablet Take 1 mg by mouth dailyHistorical Med      metoprolol succinate (TOPROL XL) 50 MG extended release tablet Take 50 mg by mouth dailyHistorical Med      pantoprazole (PROTONIX) 40 MG tablet Take 40 mg by mouth dailyHistorical Med      QUEtiapine (SEROQUEL) 400 MG tablet Take 200 mg by mouth nightly Takes 1/2 tab (200mg) nightlyHistorical Med      vitamin B-1 (THIAMINE) 100 MG tablet Take 100 mg by mouth dailyHistorical Med      Cholecalciferol (VITAMIN D3) 2000 units CAPS Take 1 capsule by mouth dailyHistorical Med      tiotropium (SPIRIVA RESPIMAT) 2.5 MCG/ACT AERS inhaler Inhale 2 puffs into the lungs dailyHistorical Med             PAST MEDICAL HISTORY         Diagnosis Date    Arthritis     CAD (coronary artery disease)     Cancer (HCC)     prostate    Cirrhosis with alcoholism (HCC)     COPD (chronic obstructive pulmonary disease) (HCC)     Depression     BOURGEOIS (dyspnea on exertion)     H/O prostate cancer     Hyperlipidemia     Hypertension     MI ([facility]ocardial infarction) ([facility]     Seizures ([facility] [facility] 2016    last one over 2 years ago     SVT (supraventricular tachycardia) ([facility]     Tobacco abuse        SURGICAL HISTORY           Procedure Laterality Date    CARDIAC CATHETERIZATION      ablation    COLONOSCOPY N/A 8/29/2022    COLONOSCOPY WITH BIOPSY performed by Sandip Diaz MD at [facility]  10/03/2018    INTERNAL LOOP RECORDER    PROSTATECTOMY      SINUS SURGERY      TONGUE SURGERY      UPPER GASTROINTESTINAL ENDOSCOPY N/A 6/10/2021    EGD BIOPSY performed by Felicia Perry MD at [facility]  01/03/2022    UPPER GASTROINTESTINAL ENDOSCOPY N/A 1/3/2022    EGD BIOPSY performed by Karen Gallegos MD at [place] [address]     History reviewed. No pertinent family history. No family status information on file. SOCIAL HISTORY      reports that he has quit smoking. His smoking use included cigarettes. He smoked an average of .1 packs per day. He quit smokeless tobacco use about 9 months ago. He reports that he does not currently use alcohol. He reports that he does not use drugs. REVIEW OFSYSTEMS    (2-9 systems for level 4, 10 or more for level 5)   Review of Systems    Except as noted above the remainder of the review of systems was reviewed and negative. PHYSICAL EXAM    (up to 7 for level 4, 8 or more for level 5)     ED Triage Vitals [09/15/22 0953]   BP Temp Temp src Heart Rate Resp SpO2 Height Weight   (!) 148/87 98.1 °F (36.7 °C) -- 91 19 94 % 5' 5\" (1.651 m) 195 lb (88.5 kg)      Physical Exam  Constitutional:       Appearance: He is well-developed. HENT:      Head: Normocephalic and atraumatic. Cardiovascular:      Rate and Rhythm: Normal rate and regular rhythm. Pulmonary:      Effort: Pulmonary effort is normal.      Breath sounds: Normal breath sounds. Abdominal:      Palpations: Abdomen is soft. Musculoskeletal:         General: Normal range of motion. Cervical back: Normal range of motion and neck supple. Skin:     General: Skin is warm. Neurological:      Mental Status: He is alert and oriented to person, place, and time.    Psychiatric:         Behavior: Behavior normal.               DIAGNOSTIC RESULTS     EKG: All EKG's are interpreted by the Emergency Department Physician who either signs or Co-signs this chart in the absence of a cardiologist.        RADIOLOGY:   Non-plain film images such as CT, Ultrasound and MRI are read by the radiologist. Plain radiographic images arevisualized and preliminarily interpreted by the emergency physician with the below findings:        Interpretation per the Radiologist below, if available at thetime of this note:          ED BEDSIDE ULTRASOUND:   Performed by ED Physician - none    LABS:  Labs Reviewed   CBC WITH AUTO DIFFERENTIAL - Abnormal; Notable for the following components:       Result Value    WBC 14.0 (*)     Seg Neutrophils 86 (*)     Lymphocytes 8 (*)     Eosinophils % 0 (*)     Segs Absolute 12.04 (*)     Absolute Mono # 0.84 (*)     All other components within normal limits   COMPREHENSIVE METABOLIC PANEL - Abnormal; Notable for the following components:    Glucose 135 (*)     Potassium 2.9 (*)     Chloride 93 (*)     All other components within normal limits   COVID-19, RAPID   LIPASE   TROP/MYOGLOBIN   TROP/MYOGLOBIN   TROP/MYOGLOBIN       All other labs were within normal range or not returned as of this dictation. EMERGENCY DEPARTMENT COURSE and DIFFERENTIAL DIAGNOSIS/MDM:   Vitals:    Vitals:    09/15/22 0953 09/15/22 1202 09/15/22 1300 09/15/22 1400   BP: (!) 148/87  129/85 137/88   Pulse: 91 (!) 106 90 95   Resp: 19 17 15 13   Temp: 98.1 °F (36.7 °C)      SpO2: 94% 95% 95% 95%   Weight: 195 lb (88.5 kg)      Height: 5' 5\" (1.651 m)        CT scan negative for any obstruction. X-ray clear no pneumonia. Patient be treated for COPD exacerbation with prednisone. Also given additional medication to help with stool    Potassium replaced. IV and PO    CONSULTS:  None    PROCEDURES:  Procedures  bowel movements. Requesting pain medication to assist his doctor early next week. Patient be treated symptomatically discharged home. CRITICAL CARE TIME     Due to the high probability of sudden and clinically significant deterioration in the patient's condition he required highest level of my preparedness to intervene urgently.  I provided critical care time including documentation time, medication orders and management, reevaluation, vital sign assessment, ordering and reviewing of of lab tests ordering and reviewing of x-ray studies, and admission orders. Aggregate critical care time is between 35  minutes including only time during which I was engaged in work directly related to his care and did not include time spent treating other patients simultaneously. FINAL IMPRESSION      1. Generalized abdominal pain    2. COPD with acute exacerbation ([address]    3.  Hypokalemia          DISPOSITION/PLAN   DISPOSITION Decision To Discharge 09/15/2022 03:00:36 PM      PATIENTREFERRED TO:   Hamzah Serrato MD  [address] [place], [place] [address]  [phone]    In 3 days      DISCHARGE MEDICATIONS:     Discharge Medication List as of 9/15/2022  3:06 PM        START taking these medications    Details   predniSONE (DELTASONE) 20 MG tablet Take 1 tablet by mouth 2 times daily for 5 days, Disp-10 tablet, R-0Normal      potassium chloride (KLOR-CON M) 20 MEQ extended release tablet Take 1 tablet by mouth 2 times daily for 5 days, Disp-10 tablet, R-0Normal      polyethylene glycol (MIRALAX) 17 GM/SCOOP powder Take 17 g by mouth 2 times daily for 7 days PRN constipation, Disp-507 g, R-0Normal      docusate sodium (COLACE) 100 MG capsule Take 1 capsule by mouth 3 times daily as needed for Constipation, Disp-30 capsule, R-0Normal                 (Please note that portions of this note were completed with a voice recognition program.  Efforts were made to edit thedictations but occasionally words are mis-transcribed.)    NELLIE Tafoya PA-C  09/15/22 1930

## 2022-09-15 NOTE — ED PROVIDER NOTES
eMERGENCY dEPARTMENT eNCOUnter   Independent Attestation     Pt Name: Jay Arreola  MRN: [id number]  [place] 1965  Date of evaluation: 9/15/22     Jay  is a 59 y.o. male with CC: Chest Pain (Pt reports last few days, intermitten) and Abdominal Pain        This visit was performed by both a physician and an APC. I performed all aspects of the MDM as documented.       The care is provided during an unprecedented national emergency due to the novel coronavirus, Jim Carrillo MD  Attending Emergency Physician            Angelita New MD  09/15/22 2329

## 2022-09-15 NOTE — TELEPHONE ENCOUNTER
Writer contacted ED provider to inform of 30 day readmission risk. No Decision on disposition at this time. Waiting on blood work and x-ray results.     Call Back: If you need to call back to inform of disposition you can contact me at [phone]

## 2022-09-15 NOTE — DISCHARGE INSTRUCTIONS
Take meds as prescribed. Follow up with doctor  in 3 -4 days. Return to ER immediately if symptoms worsen or persist.  Ask doctor to recheck your potassium.

## 2022-09-15 NOTE — ED NOTES
Pt has question about prescription and follow-up. Provider aware and will address patient when available.        Terrie Moffett RN  09/15/22 1566

## 2022-09-16 ENCOUNTER — TELEPHONE (OUTPATIENT)
Dept: OTHER | Facility: CLINIC | Age: 57
End: 2022-09-16

## 2022-09-28 NOTE — DISCHARGE SUMMARY
4 [facility].,    Adult Hospitalist      Patient ID: Margy Marquis  MRN: [id number]     Acct:  [de-identified]       Patient's PCP: Darryle Norris, MD    Admit Date: 8/24/2022     Discharge Date: 9/2/2022      Admitting Physician: Sara Persaud MD    Discharge Physician: Riky Martins MD     CONSULTANTS: Patient Care Team:  Darryle Norris, MD as PCP - General (Family Medicine)    PROCEDURES PERFORMED:     Active Discharge Diagnoses:  Acute COPD exacerbation  Chronic hypoxic respiratory failure  Mild hyponatremia  Mild Leukocytosis  History of alcohol abuse  Liver lesion with history of treated hepatitis C  Essential hypertension  Mixed hyperlipidemia  Coronary artery disease, native vessel  Depression with anxiety  Tobacco abuse  Mild pulmonary hypertension  Suspected obstructive sleep apnea  Paroxysmal SVT status post ablation  Obesity, BMI 33  Positive blood culture 1 out of 2 question contamination  Ischemic colitis      Primary Problem  Acute exacerbation of COPD with asthma [facility])    Hospital Course: Pt admitted w acute exacerbation of COPD and started on aerosol treatments. Pt given IV Solumedrol and sputum and blood cultures performed. Started on Rocephin IV and DOxy PO. Pt c/o abdominal pain and distention. GI and surgery consulted. Pt underwent a colonoscopy and biopsy which showed ischemic colitis. Pt started on IV ABx and advised to complete course and see pt as follow up outpatient. The plan was discussed in detail with patient who agreed with the plan and verbalized understanding . The patient was seen and examined on day of discharge and this discharge summary is in conjunction with any daily progress note from day of discharge. HPI  49-year-old gentleman past medical history of CHF, COPD, coronary disease, liver cirrhosis presented to ER complaining of shortness of breath going on for 2 days.   He is also complaining of intermittent right-sided chest pain associated with it. He wears 4 L of oxygen at home. Chest pain is moderate, intermittent, sharp and resolved on its own. Patient denies any  Fever, chills, changes in urination, bowel habit or rash. Patient received Solu-Medrol and IV Bumex in the ER. Sodium was 133. WBC was 12. 2. X-ray chest was negative. I have personally reviewed the past medical history, past surgical history, medications, social history, and family history, and summarized in the note. Hospital Data:    Labs:    Hematology:No results for input(s): WBC, RBC, HGB, HCT, MCV, MCH, MCHC, RDW, PLT, MPV, SEDRATE, CRP, INR, DDIMER, NC9AQEID, LABABSO in the last 72 hours. Invalid input(s): PT  Chemistry:No results for input(s): NA, K, CL, CO2, GLUCOSE, BUN, CREATININE, MG, ANIONGAP, LABGLOM, GFRAA, CALCIUM, CAION, PHOS, PSA, PROBNP, TROPHS, CKTOTAL, CKMB, CKMBINDEX, MYOGLOBIN, DIGOXIN, LACTACIDWB in the last 72 hours. No results for input(s): PROT, LABALBU, LABA1C, Y0DGXFS, R6PDGDE, FT4, TSH, AST, ALT, LDH, GGT, ALKPHOS, LABGGT, BILITOT, BILIDIR, AMMONIA, AMYLASE, LIPASE, LACTATE, CHOL, HDL, LDLCHOLESTEROL, CHOLHDLRATIO, TRIG, VLDL, XNO03BB, PHENYTOIN, PHENYF, URICACID, POCGLU in the last 72 hours. Lab Results   Component Value Date    INR 1.0 08/25/2022    INR 1.0 01/01/2022    INR 1.1 07/13/2021    PROTIME 13.4 08/25/2022    PROTIME 13.2 01/01/2022    PROTIME 13.5 07/13/2021     Lab Results   Component Value Date/Time    SPECIAL 10ML LH 08/25/2022 11:45 AM     Lab Results   Component Value Date/Time    CULTURE NORMAL RESPIRATORY GANGA MODERATE GROWTH 08/26/2022 11:49 AM       No results found for: POCPH, PHART, PH, POCPCO2, INQ3TDK, PCO2, POCPO2, PO2ART, PO2, POCHCO3, RFX5PVI, HCO3, NBEA, PBEA, BEART, BE, THGBART, THB, DUJ8GZG, GBSA2GPP, K4GLTCVJ, O2SAT, FIO2    Radiology:    No results found.       All radiological studies reviewed      Reviews of Symptoms:    A 10 point system is reviewed and  negative except described in hospital course    Physical Exam:    Vitals:  /61   Pulse 75   Temp 97.5 °F (36.4 °C) (Oral)   Resp 19   Ht 5' 5\" (1.651 m)   Wt 216 lb 3.2 oz (98.1 kg)   SpO2 94%   BMI 35.98 kg/m²   No data recorded.       General appearance - alert, well appearing, and in no acute distress  Mental status - oriented to person, place, and time with normal affect  Head - normocephalic and atraumatic  Eyes - pupils equal and reactive, extraocular eye movements intact, conjunctiva clear  Ears - hearing appears to be intact  Nose - no drainage noted  Mouth - mucous membranes moist  Neck - supple, no carotid bruits, thyroid not palpable  Chest - clear to auscultation, normal effort  Heart - normal rate, regular rhythm, no murmur  Abdomen - soft, nontender, nondistended, bowel sounds present all four quadrants, no masses, hepatomegaly or splenomegaly  Neurological - normal speech, no focal findings or movement disorder noted, cranial nerves II through XII grossly intact  Extremities - peripheral pulses palpable, no pedal edema or calf pain with palpation  Skin - no gross lesions, rashes, or induration noted      Consults:  IP CONSULT TO INTERNAL MEDICINE  IP CONSULT TO PULMONOLOGY  IP CONSULT TO SOCIAL WORK  IP CONSULT TO GENERAL SURGERY  IP CONSULT TO GI    Disposition: Home    Discharged Condition: Stable    Follow Up: Nicole Degroot MD  [address] [place] [address]  [phone]    Follow up on 9/13/2022  Appointment time 10 am    Hernesto Paz MD  [address] [place] [address]  [phone]    Follow up in 1 week(s)      Emre Darnell MD  . [facility] [address] [facility]  [phone]    Call  for follow-up      Lab Frequency Next Occurrence         Diet: No diet orders on file    Discharge Medications:      Medication List        CONTINUE taking these medications      * albuterol (2.5 MG/3ML) 0.083% nebulizer solution  Commonly known as: PROVENTIL     * albuterol sulfate  (90 Base) MCG/ACT inhaler  Commonly known as: PROVENTIL;VENTOLIN;PROAIR     amLODIPine 10 MG tablet  Commonly known as: NORVASC     aspirin 81 MG tablet     famotidine 40 MG tablet  Commonly known as: PEPCID     ferrous gluconate 324 (38 Fe) MG tablet  Commonly known as: FERGON     fluticasone-salmeterol 230-21 MCG/ACT inhaler  Commonly known as: ADVAIR HFA     folic acid 1 MG tablet  Commonly known as: FOLVITE     furosemide 40 MG tablet  Commonly known as: LASIX     levETIRAcetam 500 MG tablet  Commonly known as: KEPPRA     metoprolol succinate 50 MG extended release tablet  Commonly known as: TOPROL XL     pantoprazole 40 MG tablet  Commonly known as: PROTONIX     QUEtiapine 400 MG tablet  Commonly known as: SEROQUEL     Roflumilast 500 MCG tablet  Commonly known as: DALIRESP  Take 1 tablet by mouth daily     Spiriva Respimat 2.5 MCG/ACT Aers inhaler  Generic drug: tiotropium     vitamin B-1 100 MG tablet  Commonly known as: THIAMINE     vitamin B-12 1000 MCG tablet  Commonly known as: CYANOCOBALAMIN     Vitamin D3 50 MCG (2000 UT) Caps           * This list has 2 medication(s) that are the same as other medications prescribed for you. Read the directions carefully, and ask your doctor or other care provider to review them with you.                 STOP taking these medications      hydroCHLOROthiazide 25 MG tablet  Commonly known as: HYDRODIURIL     losartan 100 MG tablet  Commonly known as: COZAAR     losartan-hydroCHLOROthiazide 100-25 MG per tablet  Commonly known as: HYZAAR     montelukast 10 MG tablet  Commonly known as: OKSANAIR     predniSONE 10 MG tablet  Commonly known as: Alvaro Terrell            ASK your doctor about these medications      ciprofloxacin 500 MG tablet  Commonly known as: CIPRO  Take 1 tablet by mouth 2 times daily for 10 days  Ask about: Should I take this medication?     metroNIDAZOLE 500 MG tablet  Commonly known as: FLAGYL  Take 1 tablet by mouth 3 times daily for 10 days  Ask about: Should I take this medication?     oxyCODONE 5 MG immediate release tablet  Commonly known as: ROXICODONE  Take 1 tablet by mouth 3 times daily as needed for Pain for up to 5 days. Ask about: Should I take this medication?     predniSONE 20 MG tablet  Commonly known as: DELTASONE  Take 2 tablets by mouth daily for 4 days Then 30 mg daily for 4 days then 20 mg daily for 4 days then 10 mg daily for 4 days then 5 mg daily for 4 days then 2.5 mg daily for 4 days then stop. Ask about: Should I take this medication? Where to Get Your Medications        These medications were sent to [address] [place] [address] [address], [address] [address] [address] [address]      Phone: [phone]   ciprofloxacin 500 MG tablet  metroNIDAZOLE 500 MG tablet       You can get these medications from any pharmacy    Bring a paper prescription for each of these medications  oxyCODONE 5 MG immediate release tablet       Information about where to get these medications is not yet available    Ask your nurse or doctor about these medications  predniSONE 20 MG tablet         Code Status:  Prior    Time Spent on discharge is  30 mins in patient examination, evaluation, counseling as well as medication reconciliation, prescriptions for required medications, discharge plan and follow up. Electronically signed by Dangelo Masters MD on 9/28/2022 at 4:18 PM     Thank you Dr. Lupis Osuna MD for the opportunity to be involved in this patient's care. This note was created with the assistance of a speech-recognition program.  Although the intention is to generate a document that actually reflects the content of the visit, no guarantees can be provided that every mistake has been identified and corrected by editing. Note was updated later by me after  physical examination and  completion of the assessment.

## 2022-11-14 ENCOUNTER — APPOINTMENT (OUTPATIENT)
Dept: GENERAL RADIOLOGY | Facility: CLINIC | Age: 57
End: 2022-11-14
Payer: MEDICARE

## 2022-11-14 ENCOUNTER — HOSPITAL ENCOUNTER (EMERGENCY)
Facility: CLINIC | Age: 57
Discharge: ANOTHER HOSPITAL | End: 2022-11-14
Attending: EMERGENCY MEDICINE
Payer: MEDICARE

## 2022-11-14 VITALS
WEIGHT: 190 LBS | BODY MASS INDEX: 31.62 KG/M2 | SYSTOLIC BLOOD PRESSURE: 118 MMHG | DIASTOLIC BLOOD PRESSURE: 74 MMHG | TEMPERATURE: 97.5 F | OXYGEN SATURATION: 93 % | RESPIRATION RATE: 13 BRPM | HEART RATE: 83 BPM

## 2022-11-14 DIAGNOSIS — J96.22 ACUTE ON CHRONIC RESPIRATORY FAILURE WITH HYPOXIA AND HYPERCAPNIA (HCC): Primary | ICD-10-CM

## 2022-11-14 DIAGNOSIS — Z91.199 NONCOMPLIANCE: ICD-10-CM

## 2022-11-14 DIAGNOSIS — J96.21 ACUTE ON CHRONIC RESPIRATORY FAILURE WITH HYPOXIA AND HYPERCAPNIA (HCC): Primary | ICD-10-CM

## 2022-11-14 DIAGNOSIS — Z72.0 TOBACCO ABUSE: ICD-10-CM

## 2022-11-14 DIAGNOSIS — R07.9 CHRONIC CHEST PAIN: ICD-10-CM

## 2022-11-14 DIAGNOSIS — G89.29 CHRONIC CHEST PAIN: ICD-10-CM

## 2022-11-14 DIAGNOSIS — J11.1 INFLUENZA: ICD-10-CM

## 2022-11-14 PROBLEM — I50.9 CHF (CONGESTIVE HEART FAILURE) (HCC): Status: ACTIVE | Noted: 2022-11-14

## 2022-11-14 PROBLEM — K70.30 CIRRHOSIS WITH ALCOHOLISM (HCC): Status: ACTIVE | Noted: 2022-11-14

## 2022-11-14 PROBLEM — J96.12 CHRONIC HYPERCAPNIC RESPIRATORY FAILURE (HCC): Status: ACTIVE | Noted: 2022-11-14

## 2022-11-14 PROBLEM — I25.10 CAD (CORONARY ARTERY DISEASE): Status: ACTIVE | Noted: 2022-11-14

## 2022-11-14 LAB
ABSOLUTE EOS #: 0 K/UL (ref 0–0.4)
ABSOLUTE LYMPH #: 1.39 K/UL (ref 1–4.8)
ABSOLUTE MONO #: 2.59 K/UL (ref 0.1–0.8)
ALBUMIN SERPL-MCNC: 4.8 G/DL (ref 3.5–5.2)
ALBUMIN/GLOBULIN RATIO: 1.5 (ref 1–2.5)
ALP BLD-CCNC: 67 U/L (ref 40–129)
ALT SERPL-CCNC: 20 U/L (ref 5–41)
ANION GAP: 8 MMOL/L (ref 7–16)
AST SERPL-CCNC: 16 U/L
BASOPHILS # BLD: 0 % (ref 0–2)
BASOPHILS ABSOLUTE: 0 K/UL (ref 0–0.2)
BILIRUB SERPL-MCNC: 0.2 MG/DL (ref 0.3–1.2)
BILIRUBIN DIRECT: <0.1 MG/DL
BILIRUBIN, INDIRECT: ABNORMAL MG/DL (ref 0–1)
EGFR, POC: >60 ML/MIN/1.73M2
EOSINOPHILS RELATIVE PERCENT: 0 % (ref 1–4)
GLUCOSE BLD-MCNC: 146 MG/DL (ref 74–100)
HCO3 VENOUS: 30.8 MMOL/L (ref 22–29)
HCT VFR BLD CALC: 41.5 % (ref 41–53)
HEMOGLOBIN: 13.7 G/DL (ref 13.5–17.5)
INR BLD: 0.9
LYMPHOCYTES # BLD: 7 % (ref 24–44)
MCH RBC QN AUTO: 30.8 PG (ref 26–34)
MCHC RBC AUTO-ENTMCNC: 33 G/DL (ref 31–37)
MCV RBC AUTO: 93.4 FL (ref 80–100)
MONOCYTES # BLD: 13 % (ref 1–7)
MORPHOLOGY: NORMAL
O2 SAT, VEN: 93 % (ref 60–85)
PARTIAL THROMBOPLASTIN TIME: 26.9 SEC (ref 21.3–31.3)
PCO2, VEN: 57.2 MM HG (ref 41–51)
PDW BLD-RTO: 14.2 % (ref 12.5–15.4)
PH VENOUS: 7.34 (ref 7.32–7.43)
PLATELET # BLD: 375 K/UL (ref 140–450)
PMV BLD AUTO: 8.6 FL (ref 6–12)
PO2, VEN: 74.7 MM HG (ref 30–50)
POC BUN: 24 MG/DL (ref 8–26)
POC CHLORIDE: 98 MMOL/L (ref 98–107)
POC CREATININE: 0.87 MG/DL (ref 0.51–1.19)
POC IONIZED CALCIUM: 1.22 MMOL/L (ref 1.15–1.33)
POC LACTIC ACID: 1.08 MMOL/L (ref 0.56–1.39)
POC POTASSIUM: 4.8 MMOL/L (ref 3.5–4.5)
POC SODIUM: 136 MMOL/L (ref 138–146)
POC TCO2: 31 MMOL/L (ref 22–30)
POSITIVE BASE EXCESS, VEN: 3 (ref 0–3)
PRO-BNP: 75 PG/ML
PROTHROMBIN TIME: 10 SEC (ref 9.4–12.6)
RBC # BLD: 4.45 M/UL (ref 4.5–5.9)
SEG NEUTROPHILS: 80 % (ref 36–66)
SEGMENTED NEUTROPHILS ABSOLUTE COUNT: 15.92 K/UL (ref 1.8–7.7)
TOTAL PROTEIN: 8.1 G/DL (ref 6.4–8.3)
TROPONIN, HIGH SENSITIVITY: 13 NG/L (ref 0–22)
TROPONIN, HIGH SENSITIVITY: 15 NG/L (ref 0–22)
WBC # BLD: 19.9 K/UL (ref 3.5–11)

## 2022-11-14 PROCEDURE — 80051 ELECTROLYTE PANEL: CPT

## 2022-11-14 PROCEDURE — 83880 ASSAY OF NATRIURETIC PEPTIDE: CPT

## 2022-11-14 PROCEDURE — 82947 ASSAY GLUCOSE BLOOD QUANT: CPT

## 2022-11-14 PROCEDURE — 71045 X-RAY EXAM CHEST 1 VIEW: CPT

## 2022-11-14 PROCEDURE — 85610 PROTHROMBIN TIME: CPT

## 2022-11-14 PROCEDURE — 83605 ASSAY OF LACTIC ACID: CPT

## 2022-11-14 PROCEDURE — 82330 ASSAY OF CALCIUM: CPT

## 2022-11-14 PROCEDURE — 6370000000 HC RX 637 (ALT 250 FOR IP): Performed by: EMERGENCY MEDICINE

## 2022-11-14 PROCEDURE — 82803 BLOOD GASES ANY COMBINATION: CPT

## 2022-11-14 PROCEDURE — 84484 ASSAY OF TROPONIN QUANT: CPT

## 2022-11-14 PROCEDURE — 80076 HEPATIC FUNCTION PANEL: CPT

## 2022-11-14 PROCEDURE — 96374 THER/PROPH/DIAG INJ IV PUSH: CPT

## 2022-11-14 PROCEDURE — 85730 THROMBOPLASTIN TIME PARTIAL: CPT

## 2022-11-14 PROCEDURE — 6360000002 HC RX W HCPCS: Performed by: EMERGENCY MEDICINE

## 2022-11-14 PROCEDURE — 84520 ASSAY OF UREA NITROGEN: CPT

## 2022-11-14 PROCEDURE — 36415 COLL VENOUS BLD VENIPUNCTURE: CPT

## 2022-11-14 PROCEDURE — 82565 ASSAY OF CREATININE: CPT

## 2022-11-14 PROCEDURE — 96376 TX/PRO/DX INJ SAME DRUG ADON: CPT

## 2022-11-14 PROCEDURE — 99285 EMERGENCY DEPT VISIT HI MDM: CPT

## 2022-11-14 PROCEDURE — 93005 ELECTROCARDIOGRAM TRACING: CPT | Performed by: EMERGENCY MEDICINE

## 2022-11-14 PROCEDURE — 85025 COMPLETE CBC W/AUTO DIFF WBC: CPT

## 2022-11-14 RX ORDER — OXYCODONE HYDROCHLORIDE AND ACETAMINOPHEN 5; 325 MG/1; MG/1
2 TABLET ORAL ONCE
Status: COMPLETED | OUTPATIENT
Start: 2022-11-14 | End: 2022-11-14

## 2022-11-14 RX ORDER — FENTANYL CITRATE 0.05 MG/ML
50 INJECTION, SOLUTION INTRAMUSCULAR; INTRAVENOUS ONCE
Status: COMPLETED | OUTPATIENT
Start: 2022-11-14 | End: 2022-11-14

## 2022-11-14 RX ORDER — ASPIRIN 81 MG/1
324 TABLET, CHEWABLE ORAL ONCE
Status: COMPLETED | OUTPATIENT
Start: 2022-11-14 | End: 2022-11-14

## 2022-11-14 RX ORDER — IPRATROPIUM BROMIDE AND ALBUTEROL SULFATE 2.5; .5 MG/3ML; MG/3ML
1 SOLUTION RESPIRATORY (INHALATION) ONCE
Status: COMPLETED | OUTPATIENT
Start: 2022-11-14 | End: 2022-11-14

## 2022-11-14 RX ADMIN — ASPIRIN 81 MG CHEWABLE TABLET 324 MG: 81 TABLET CHEWABLE at 03:14

## 2022-11-14 RX ADMIN — FENTANYL CITRATE 50 MCG: 0.05 INJECTION, SOLUTION INTRAMUSCULAR; INTRAVENOUS at 05:41

## 2022-11-14 RX ADMIN — APIXABAN 5 MG: 5 TABLET, FILM COATED ORAL at 05:26

## 2022-11-14 RX ADMIN — FENTANYL CITRATE 50 MCG: 50 INJECTION INTRAMUSCULAR; INTRAVENOUS at 03:14

## 2022-11-14 RX ADMIN — IPRATROPIUM BROMIDE AND ALBUTEROL SULFATE 1 AMPULE: .5; 2.5 SOLUTION RESPIRATORY (INHALATION) at 04:34

## 2022-11-14 RX ADMIN — IPRATROPIUM BROMIDE AND ALBUTEROL SULFATE 1 AMPULE: .5; 2.5 SOLUTION RESPIRATORY (INHALATION) at 03:18

## 2022-11-14 RX ADMIN — OXYCODONE AND ACETAMINOPHEN 2 TABLET: 5; 325 TABLET ORAL at 09:10

## 2022-11-14 ASSESSMENT — PAIN SCALES - GENERAL
PAINLEVEL_OUTOF10: 5
PAINLEVEL_OUTOF10: 8
PAINLEVEL_OUTOF10: 5
PAINLEVEL_OUTOF10: 1
PAINLEVEL_OUTOF10: 10

## 2022-11-14 ASSESSMENT — PAIN DESCRIPTION - LOCATION
LOCATION: CHEST

## 2022-11-14 ASSESSMENT — PAIN DESCRIPTION - DESCRIPTORS
DESCRIPTORS: PRESSURE

## 2022-11-14 ASSESSMENT — PAIN - FUNCTIONAL ASSESSMENT
PAIN_FUNCTIONAL_ASSESSMENT: 0-10

## 2022-11-14 ASSESSMENT — PAIN DESCRIPTION - ORIENTATION
ORIENTATION: MID;UPPER

## 2022-11-14 NOTE — ED NOTES
Kathie from [facility] call back pt to room [phone] at [facility].  Uri contacted ETA [address] [facility], [address] [place] [address]  11/14/22 1301

## 2022-11-14 NOTE — ED NOTES
Attempt to send pt to [facility]. States for bed assignment would be over 24 hour wait.       Cristiano Carrion RN  11/14/22 [id number]

## 2022-11-14 NOTE — ED PROVIDER NOTES
[facility] ED  [address] [facility]  Phone: [phone]      Pt Name: Brianne Mauro  IUB:[id number]  [place] 1965  Date of evaluation: 11/14/2022      CHIEF COMPLAINT       Chief Complaint   Patient presents with    Shortness of Breath    Chest Pain       HISTORY OF PRESENT ILLNESS   Brianne Mauro is a 59 y.o. male with history of CAD but no stents, loop recorder, hypertension, hyperlipidemia, SVT, atrial flutter with RVR on Eliquis, hepatitis C, cirrhosis, alcohol abuse, seizures, CHF, COPD On 4 L of oxygen via nasal cannula continuously, chronic respiratory failure with hypoxia and hypercapnia, and noncompliance who presents for evaluation of chest pain and shortness of breath. The patient is a poor historian. History is primarily obtained through chart review. On 10/14/2022 the patient was admitted to [facility] after he was intubated in the ER for acute on chronic respiratory failure with hypoxia and hypercapnia. The patient was found to have COPD exacerbation, CHF exacerbation, possible shock, suspected seizure with negative stroke workup, A. fib with RVR, and small bowel obstruction. He was started on Keppra. Anticoagulation was not started secondary to high risk of bleeding and low chads score. He was subsequently started on metoprolol and discharged home with instructions to follow-up with neurology, cardiology and pulmonology. The patient was admitted to [facility] on 11/3/2022 for shortness of breath and chest pain. He was found to be in a flutter with RVR with acute on chronic CHF and acute on chronic COPD. At that time the patient was started on amiodarone and heparin but the amiodarone was stopped due to severe COPD, liver cirrhosis and hepatitis C. The patient was ultimately switched to Eliquis and was discharged to a nursing facility with plan to stay for 30 days.   The patient was taken to [facility] 2 days ago from the nursing facility with fevers, chills, malaise, myalgias, and shortness of breath. He was influenza positive. The ER staff tried to send him back to his nursing facility but the patient refused and went home against medical advice. He subsequently called for a ride home and did not go to  any of his medications from the nursing facility. The patient has been without any of his medications for the past 2 days and states that he called his PCP today but was told that some of his prescriptions cannot be refilled immediately because they were just filled. The patient states that all day today he has had a constant, dull, achy, nonradiating, midsternal chest pressure with associated shortness of breath. He waited till tonight to call EMS. When EMS arrived the patient was having tachypnea with wheezing but did not have any desaturations below 91%. The patient arrives by EMS and he received 125 mg Solu-Medrol IV, 2 mg magnesium IV, Combivent treatment and a albuterol treatment in route to the hospital.  They placed him on a nonrebreather after the patient refused to be on CPAP. The patient states that when he was admitted to [facility] he was diagnosed with a \"heart attack\". He states that they did not do a cardiac cath during this admission and his last cath was several years ago . He does not think that he has any cardiac stents. The patient states that he has a loop recorder and is supposed to see a cardiology EP outpatient for possible ablation but he is unsure when. Based on chart review he had a EP study by Dr. Britt Eckert in 2019 but did not follow up afterwards. He thinks his current cardiologist is at [facility]. The patient continues to smoke cigarettes. He symptoms are worse with exertion. He states that even though he left all of his medications at the nursing home and has been off of all meds for 2 days he still had Percocet to take which he took earlier tonight with some improvement in pain. He has not taken any aspirin or any other meds today. He does not list any other provoking or palliating factors. He denies headache, vision changes, neck pain, back pain, diaphoresis, lightheadedness, dizziness, syncope, abdominal pain, nausea, vomiting, urinary/bowel symptoms, focal weakness, numbness, tingling, or recent injury. REVIEW OF SYSTEMS     Positive for chest pain and shortness of breath  Ten point review of systems was reviewed and is negative unless otherwise noted in the HPI    [address]    has a past medical history of Arthritis, CAD (coronary artery disease), Cancer ([facility] [address] CHF (congestive heart failure) (HCC), Chronic hypercapnic respiratory failure ([facility] [address] Cirrhosis with alcoholism ([facility] [address] COPD (chronic obstructive pulmonary disease) ([facility] [address] COPD (chronic obstructive pulmonary disease) ([facility] [address] Depression, BOURGEOIS (dyspnea on exertion), H/O prostate cancer, Hyperlipidemia, Hypertension, MI (myocardial infarction) ([facility] [address] Seizures ([facility] [address] SVT (supraventricular tachycardia) ([facility] [address] and Tobacco abuse. SURGICAL HISTORY      has a past surgical history that includes Prostatectomy; Insertable Cardiac Monitor (10/03/2018); sinus surgery; Tongue surgery; Cardiac catheterization; Upper gastrointestinal endoscopy (N/A, 6/10/2021); Upper gastrointestinal endoscopy (01/03/2022); Upper gastrointestinal endoscopy (N/A, 1/3/2022); and Colonoscopy (N/A, 8/29/2022).     CURRENT MEDICATIONS       Previous Medications    ALBUTEROL (PROVENTIL) (2.5 MG/3ML) 0.083% NEBULIZER SOLUTION    Take 2.5 mg by nebulization every 6 hours as needed for Wheezing    ALBUTEROL SULFATE  (90 BASE) MCG/ACT INHALER    Inhale 2 puffs into the lungs every 4-6 hours as needed for Wheezing    AMLODIPINE (NORVASC) 10 MG TABLET    Take 10 mg by mouth daily    ASPIRIN 81 MG TABLET    Take 81 mg by mouth daily    CHOLECALCIFEROL (VITAMIN D3) 2000 UNITS CAPS    Take 1 capsule by mouth daily    DOCUSATE SODIUM (COLACE) 100 MG CAPSULE    Take 1 capsule by mouth 3 times daily as needed for Constipation    FAMOTIDINE (PEPCID) 40 MG TABLET    Take 40 mg by mouth 2 times daily as needed (heartburn)     FERROUS GLUCONATE (FERGON) 324 (38 FE) MG TABLET    Take 324 mg by mouth daily (with breakfast)    FLUTICASONE-SALMETEROL (ADVAIR HFA) 230-21 MCG/ACT INHALER    Inhale 2 puffs into the lungs 2 times daily    FOLIC ACID (FOLVITE) 1 MG TABLET    Take 1 mg by mouth daily    FUROSEMIDE (LASIX) 40 MG TABLET    Take 40 mg by mouth daily    LEVETIRACETAM (KEPPRA) 500 MG TABLET    Take 500 mg by mouth 2 times daily    METOPROLOL SUCCINATE (TOPROL XL) 50 MG EXTENDED RELEASE TABLET    Take 50 mg by mouth daily    PANTOPRAZOLE (PROTONIX) 40 MG TABLET    Take 40 mg by mouth daily    POTASSIUM CHLORIDE (KLOR-CON M) 20 MEQ EXTENDED RELEASE TABLET    Take 1 tablet by mouth 2 times daily for 5 days    QUETIAPINE (SEROQUEL) 400 MG TABLET    Take 200 mg by mouth nightly Takes 1/2 tab (200mg) nightly    ROFLUMILAST (DALIRESP) 500 MCG TABLET    Take 1 tablet by mouth daily    TIOTROPIUM (SPIRIVA RESPIMAT) 2.5 MCG/ACT AERS INHALER    Inhale 2 puffs into the lungs daily    VITAMIN B-1 (THIAMINE) 100 MG TABLET    Take 100 mg by mouth daily    VITAMIN B-12 (CYANOCOBALAMIN) 1000 MCG TABLET    Take 1,000 mcg by mouth daily       ALLERGIES     has No Known Allergies. FAMILY HISTORY     has no family status information on file. family history is not on file. SOCIAL HISTORY      reports that he has been smoking cigarettes. He has been smoking an average of .5 packs per day. He quit smokeless tobacco use about 11 months ago. He reports that he does not currently use alcohol. He reports that he does not use drugs. PHYSICAL EXAM     INITIAL VITALS:  weight is 86.2 kg (190 lb). His oral temperature is 97.5 °F (36.4 °C). His blood pressure is 132/81 and his pulse is 88. His respiration is 16 and oxygen saturation is 91%.      CONSTITUTIONAL: Tachypnea, nontoxic, able to speak in broken sentences  SKIN: warm, dry, no jaundice, hives or petechiae  EYES: clear conjunctiva, non-icteric sclera  HENT: normocephalic, atraumatic, moist mucus membranes, Posterior oropharynx is clear without any erythema, edema, exudate or asymmetry. Uvula midline. No trismus or malocclusion. No pain to palpation over the frontal or maxillary sinuses. No mastoid tenderness. Able to handle secretions. Normal speech. Patent airway. No submental swelling or evidence of cellulitis. NECK: Nontender and supple with no nuchal rigidity, full range of motion  PULMONARY: Shallow respirations, tachypnea, faint expiratory wheezes heard in upper lung fields, diminished at the bases, no rhonchi, or rales, shallow excursion, there is accessory muscle use and no stridor  CARDIOVASCULAR: regular rate, rhythm. Strong radial pulses with intact distal perfusion. Capillary refill <2 seconds. GASTROINTESTINAL: soft, obese abdomen, non-tender, non-distended, no palpable masses, no rebound or guarding   GENITOURINARY: No costovertebral angle tenderness to palpation  MUSCULOSKELETAL: No midline spinal tenderness, step off or deformity. Extremities are otherwise nontender to palpation and nonerythematous. Compartments soft. There is 1+ lower extremity peripheral edema. NEUROLOGIC: alert and oriented x 3, GCS 15, normal speech. Moves all extremities x 4 without motor or sensory deficit  PSYCHIATRIC: Poor historian, he displaces all blame for his current situation on everyone but himself    DIAGNOSTIC RESULTS     EKG:  EKG 3:07 AM difficult to interpret secondary to respiratory motion artifact.   Sinus tachycardia, rate 101 bpm, normal axis, normal SC and QTc intervals, right bundle branch block, QRS interval 140 ms, no obvious ST elevation or depression, T wave inversion in V2 and V3, good R wave progression, Q wave in aVR, no significant change from EKG on 8/24/2022    EKG 5:28 AM sinus rhythm, rate 85 bpm, normal axis, normal SC and QTc intervals, right bundle branch block, QRS interval 136 ms, no ST elevation or depression, T wave inversion in V2 and V3, good R wave progression, Q wave in aVR, no change from EKG in 8/24/2022    RADIOLOGY:   XR CHEST PORTABLE    Result Date: 11/14/2022  Minimal streaky fibrosis in the medial right lung base. Rest of lung fields are clear without any other diagnostic findings. No pleural effusion or pneumothorax. No evidence of cardiac decompensation.         LABS:  Results for orders placed or performed during the hospital encounter of 11/14/22   CBC with Auto Differential   Result Value Ref Range    WBC 19.9 (H) 3.5 - 11.0 k/uL    RBC 4.45 (L) 4.5 - 5.9 m/uL    Hemoglobin 13.7 13.5 - 17.5 g/dL    Hematocrit 41.5 41 - 53 %    MCV 93.4 80 - 100 fL    MCH 30.8 26 - 34 pg    MCHC 33.0 31 - 37 g/dL    RDW 14.2 12.5 - 15.4 %    Platelets 307 774 - 136 k/uL    MPV 8.6 6.0 - 12.0 fL    Seg Neutrophils 80 (H) 36 - 66 %    Lymphocytes 7 (L) 24 - 44 %    Monocytes 13 (H) 1 - 7 %    Eosinophils % 0 (L) 1 - 4 %    Basophils 0 0 - 2 %    Segs Absolute 15.92 (H) 1.8 - 7.7 k/uL    Absolute Lymph # 1.39 1.0 - 4.8 k/uL    Absolute Mono # 2.59 (H) 0.1 - 0.8 k/uL    Absolute Eos # 0.00 0.0 - 0.4 k/uL    Basophils Absolute 0.00 0.0 - 0.2 k/uL    Morphology Normal    Protime-INR   Result Value Ref Range    Protime 10.0 9.4 - 12.6 sec    INR 0.9    APTT   Result Value Ref Range    PTT 26.9 21.3 - 31.3 sec   Troponin   Result Value Ref Range    Troponin, High Sensitivity 13 0 - 22 ng/L   Brain Natriuretic Peptide   Result Value Ref Range    Pro-BNP 75 <300 pg/mL   ELECTROLYTES PLUS   Result Value Ref Range    POC Sodium 136 (L) 138 - 146 mmol/L    POC Potassium 4.8 (H) 3.5 - 4.5 mmol/L    POC Chloride 98 98 - 107 mmol/L    POC TCO2 31 (H) 22 - 30 mmol/L    Anion Gap 8 7 - 16 mmol/L   CALCIUM, IONIC (POC)   Result Value Ref Range    POC Ionized Calcium 1.22 1.15 - 1.33 mmol/L   Hepatic Function Panel   Result Value Ref Range    Albumin 4.8 3.5 - 5.2 g/dL    Alkaline Phosphatase 67 40 - 129 U/L    ALT 20 5 - 41 U/L    AST 16 <40 U/L    Total Bilirubin 0.2 (L) 0.3 - 1.2 mg/dL    Bilirubin, Direct <0.1 <0.31 mg/dL    Bilirubin, Indirect Can not be calculated 0.00 - 1.00 mg/dL    Total Protein 8.1 6.4 - 8.3 g/dL    Albumin/Globulin Ratio 1.5 1.0 - 2.5   Venous Blood Gas, POC   Result Value Ref Range    pH, Jerman 7.339 7.320 - 7.430    pCO2, Jerman 57.2 (H) 41.0 - 51.0 mm Hg    pO2, Jerman 74.7 (H) 30.0 - 50.0 mm Hg    HCO3, Venous 30.8 (H) 22.0 - 29.0 mmol/L    Positive Base Excess, Jerman 3 0.0 - 3.0    O2 Sat, Jerman 93 (H) 60.0 - 85.0 %   Creatinine W/GFR Point of Care   Result Value Ref Range    POC Creatinine 0.87 0.51 - 1.19 mg/dL    eGFR, POC >60 mL/min/1.73m2   POCT urea (BUN)   Result Value Ref Range    POC BUN 24 8 - 26 mg/dL   Lactic Acid, POC   Result Value Ref Range    POC Lactic Acid 1.08 0.56 - 1.39 mmol/L   POCT Glucose   Result Value Ref Range    POC Glucose 146 (H) 74 - 100 mg/dL   EKG 12 Lead   Result Value Ref Range    Ventricular Rate 85 BPM    Atrial Rate 85 BPM    P-R Interval 148 ms    QRS Duration 136 ms    Q-T Interval 402 ms    QTc Calculation (Bazett) 478 ms    P Axis 58 degrees    R Axis 82 degrees    T Axis 45 degrees       EMERGENCY DEPARTMENT COURSE:        The patient was given the following medications:  Orders Placed This Encounter   Medications    fentaNYL (SUBLIMAZE) injection 50 mcg    aspirin chewable tablet 324 mg    ipratropium-albuterol (DUONEB) nebulizer solution 1 ampule     Order Specific Question:   Initiate RT Bronchodilator Protocol     Answer:   Yes - ED protocol    ipratropium-albuterol (DUONEB) nebulizer solution 1 ampule     Order Specific Question:   Initiate RT Bronchodilator Protocol     Answer:   Yes - ED protocol    apixaban (ELIQUIS) tablet 5 mg     Order Specific Question:   Indication of Use     Answer:   A Fib/A Flutter    fentaNYL (SUBLIMAZE) injection 50 mcg        Vitals:    Vitals:    11/14/22 0327 11/14/22 0345 11/14/22 0359 11/14/22 4578 BP:  (!) 138/104  132/81   Pulse: 93   88   Resp: 19   16   Temp:       TempSrc:       SpO2: 100%  94% 91%   Weight:         -------------------------  BP: 132/81, Temp: 97.5 °F (36.4 °C), Heart Rate: 88, Resp: 16    CONSULTS:  None    CRITICAL CARE:   None    PROCEDURES:  None    DIAGNOSIS/ MDM:   Sanchez Lutz is a 59 y.o. male who presents with acute on chronic respiratory failure. Vital signs are grossly stable on his normal rate of oxygen via nasal cannula. The patient had extremely complicated past medical history with multiple studies done at different hospitals. It is difficult to differentiate different work-ups done at [facility] [place], [facility] and [facility]. Based on my chart review, the patient has recurrent CHF and COPD and is currently positive for influenza. He left his nursing home and refused to go back so he has been off of all of his meds, except for Percocet, for the past 2 days. I restarted his Eliquis. VBG shows chronic PCO2 elevation of 57.2 with bicarb of 30.8 and pH of 7.339. CBC shows leukocytosis of 19.9 but is otherwise unremarkable. Coags, troponin, BNP, and LFTs are unremarkable. EKG is difficult to interpret secondary to artifact from his breathing but appears to be unchanged from baseline. Chest x-ray shows minimal streaky fibrosis in the medial right lung base but otherwise clear. The patient will need to be transferred for admission to the hospital and will likely need placed again. He initially wanted to go to [facility] but there is a excessive wait list.  He subsequently wanted to be transferred to [facility] and they will likely have a bed after 7 AM.  The patient prefers to wait for [facility]. I spoke with the hospitalist, Dr. Charisse Alvares, at 5:09am who agrees to accept the patient for transfer. We will await bed assignment. The patient will be transferred via ground transport. His pain has been controlled with fentanyl.   He has been given intermittent duo nebs with improvement. He already received Solu-Medrol and magnesium per EMS. FINAL IMPRESSION      1. Acute on chronic respiratory failure with hypoxia and hypercapnia (HCC)    2. Influenza    3. Noncompliance    4. Chronic chest pain    5.  Tobacco abuse          DISPOSITION/PLAN   DISPOSITION Decision To Transfer 11/14/2022 03:30:01 AM          (Please note that portions of this note were completed with a voice recognitionprogram.  Efforts were made to edit the dictations but occasionally words are mis-transcribed.)    Carlos Rizzo DO, [facility]  Emergency Physician Attending         Carlos Rizzo DO  11/14/22 6771

## 2022-11-14 NOTE — ED NOTES
Pt taken off the 4L at this time.  Per Dr. Marciano Gowers only place on o2 if under 90%     [place] RN  11/14/22 3740

## 2022-11-14 NOTE — ED NOTES
Pt presents to ED via ems. Pt is a&o x4. Pt states yesterday morning he started with CP and now became sob, which was not tolerable at home. Pt states he was recently admitted for similar symptoms. Hx of a-fib. Pt is supposed to be on medication but left rehab facility early so has not been taking anything. Pt states he usually wears 4L of NC at home. Pt arrives on NRB per EMS, pt refused cpap. Also given 2g of mag IV. Pt taken off NRB on arrival. Pt placed on full cardiac monitor.  Dr. Cohn at bedside     [facility]  11/14/22 4212

## 2022-11-14 NOTE — ED NOTES
Page out to [facility] for admission. They are discharge dependent but states they should be able to get him a bed sometime later today. Pt states he would like to wait for [facility] bed rather than go to another [facility] facility .        Darylene Hamilton, RN  11/14/22 5055

## 2022-11-14 NOTE — ED NOTES
Spoke with Kathie from admitting at [facility] and inquired about pt bed status. Informed still awaiting bed, and will receive a call from [facility] when their is a bed ready.      April Vargas RN  11/14/22 0646

## 2022-11-14 NOTE — ED NOTES
Dr. Kvng Cortez calls back to speak with Dr. Leola Macdonald for admission to [facility], [address] [place] [address]  11/14/22 3136

## 2022-11-15 LAB
EKG ATRIAL RATE: 85 BPM
EKG P AXIS: 58 DEGREES
EKG P-R INTERVAL: 148 MS
EKG Q-T INTERVAL: 402 MS
EKG QRS DURATION: 136 MS
EKG QTC CALCULATION (BAZETT): 478 MS
EKG R AXIS: 82 DEGREES
EKG T AXIS: 45 DEGREES
EKG VENTRICULAR RATE: 85 BPM

## 2024-04-24 ENCOUNTER — HOSPITAL ENCOUNTER (OUTPATIENT)
Dept: ICU | Age: 59
Discharge: HOME | End: 2024-04-24
Attending: INTERNAL MEDICINE | Admitting: INTERNAL MEDICINE

## 2024-04-25 LAB
25(OH)D3 SERPL-MCNC: 38.1 NG/ML (ref 30–100)
ALBUMIN SERPL-MCNC: 3.5 G/DL (ref 3.5–5.2)
ALP SERPL-CCNC: 60 U/L (ref 40–129)
ALT SERPL-CCNC: 12 U/L (ref 5–41)
ANION GAP SERPL CALCULATED.3IONS-SCNC: 14 MMOL/L (ref 9–17)
AST SERPL-CCNC: 12 U/L
BILIRUB SERPL-MCNC: 0.2 MG/DL (ref 0.3–1.2)
BUN SERPL-MCNC: 7 MG/DL (ref 8–23)
BUN/CREAT SERPL: 14 (ref 9–20)
CALCIUM SERPL-MCNC: 9.2 MG/DL (ref 8.6–10.4)
CHLORIDE SERPL-SCNC: 105 MMOL/L (ref 98–107)
CO2 SERPL-SCNC: 24 MMOL/L (ref 20–31)
CREAT SERPL-MCNC: 0.5 MG/DL (ref 0.7–1.2)
ERYTHROCYTE [DISTWIDTH] IN BLOOD BY AUTOMATED COUNT: 13.2 % (ref 11.8–14.4)
EST. AVERAGE GLUCOSE BLD GHB EST-MCNC: 111 MG/DL
GFR SERPL CREATININE-BSD FRML MDRD: >90 ML/MIN/1.73M2
GLUCOSE SERPL-MCNC: 74 MG/DL (ref 70–99)
HBA1C MFR BLD: 5.5 % (ref 4–6)
HCT VFR BLD AUTO: 35 % (ref 40.7–50.3)
HGB BLD-MCNC: 11 G/DL (ref 13–17)
MAGNESIUM SERPL-MCNC: 1.9 MG/DL (ref 1.6–2.6)
MCH RBC QN AUTO: 29.4 PG (ref 25.2–33.5)
MCHC RBC AUTO-ENTMCNC: 31.4 G/DL (ref 28.4–34.8)
MCV RBC AUTO: 93.6 FL (ref 82.6–102.9)
NRBC BLD-RTO: 0 PER 100 WBC
PHOSPHATE SERPL-MCNC: 3.2 MG/DL (ref 2.5–4.5)
PLATELET # BLD AUTO: 373 K/UL (ref 138–453)
PMV BLD AUTO: 11.6 FL (ref 8.1–13.5)
POTASSIUM SERPL-SCNC: 3.4 MMOL/L (ref 3.7–5.3)
PROT SERPL-MCNC: 6.5 G/DL (ref 6.4–8.3)
RBC # BLD AUTO: 3.74 M/UL (ref 4.21–5.77)
SODIUM SERPL-SCNC: 143 MMOL/L (ref 135–144)
TSH SERPL DL<=0.05 MIU/L-ACNC: 0.98 UIU/ML (ref 0.3–5)
WBC OTHER # BLD: 14.4 K/UL (ref 3.5–11.3)

## 2024-04-25 PROCEDURE — 85027 COMPLETE CBC AUTOMATED: CPT

## 2024-04-25 PROCEDURE — 84443 ASSAY THYROID STIM HORMONE: CPT

## 2024-04-25 PROCEDURE — 80053 COMPREHEN METABOLIC PANEL: CPT

## 2024-04-25 PROCEDURE — 83036 HEMOGLOBIN GLYCOSYLATED A1C: CPT

## 2024-04-25 PROCEDURE — 83735 ASSAY OF MAGNESIUM: CPT

## 2024-04-25 PROCEDURE — 82306 VITAMIN D 25 HYDROXY: CPT

## 2024-04-25 PROCEDURE — 84100 ASSAY OF PHOSPHORUS: CPT

## 2024-04-26 LAB
ERYTHROCYTE [DISTWIDTH] IN BLOOD BY AUTOMATED COUNT: 13.2 % (ref 11.8–14.4)
HCT VFR BLD AUTO: 33.3 % (ref 40.7–50.3)
HGB BLD-MCNC: 10.5 G/DL (ref 13–17)
MCH RBC QN AUTO: 29.6 PG (ref 25.2–33.5)
MCHC RBC AUTO-ENTMCNC: 31.5 G/DL (ref 28.4–34.8)
MCV RBC AUTO: 93.8 FL (ref 82.6–102.9)
NRBC BLD-RTO: 0 PER 100 WBC
PLATELET # BLD AUTO: 367 K/UL (ref 138–453)
PMV BLD AUTO: 11.6 FL (ref 8.1–13.5)
POTASSIUM SERPL-SCNC: 3.1 MMOL/L (ref 3.7–5.3)
RBC # BLD AUTO: 3.55 M/UL (ref 4.21–5.77)
WBC OTHER # BLD: 10.4 K/UL (ref 3.5–11.3)

## 2024-04-26 PROCEDURE — 84132 ASSAY OF SERUM POTASSIUM: CPT

## 2024-04-26 PROCEDURE — 85027 COMPLETE CBC AUTOMATED: CPT

## 2024-04-27 LAB
ERYTHROCYTE [DISTWIDTH] IN BLOOD BY AUTOMATED COUNT: 13.6 % (ref 11.8–14.4)
HCT VFR BLD AUTO: 36 % (ref 40.7–50.3)
HGB BLD-MCNC: 11.2 G/DL (ref 13–17)
MCH RBC QN AUTO: 29.3 PG (ref 25.2–33.5)
MCHC RBC AUTO-ENTMCNC: 31.1 G/DL (ref 28.4–34.8)
MCV RBC AUTO: 94.2 FL (ref 82.6–102.9)
NRBC BLD-RTO: 0 PER 100 WBC
PLATELET # BLD AUTO: 494 K/UL (ref 138–453)
PMV BLD AUTO: 11.4 FL (ref 8.1–13.5)
POTASSIUM SERPL-SCNC: 3.2 MMOL/L (ref 3.7–5.3)
RBC # BLD AUTO: 3.82 M/UL (ref 4.21–5.77)
WBC OTHER # BLD: 12.8 K/UL (ref 3.5–11.3)

## 2024-04-27 PROCEDURE — 84132 ASSAY OF SERUM POTASSIUM: CPT

## 2024-04-27 PROCEDURE — 85027 COMPLETE CBC AUTOMATED: CPT

## 2024-04-28 LAB
ERYTHROCYTE [DISTWIDTH] IN BLOOD BY AUTOMATED COUNT: 13.7 % (ref 11.8–14.4)
HCT VFR BLD AUTO: 34.9 % (ref 40.7–50.3)
HGB BLD-MCNC: 10.9 G/DL (ref 13–17)
MCH RBC QN AUTO: 29.5 PG (ref 25.2–33.5)
MCHC RBC AUTO-ENTMCNC: 31.2 G/DL (ref 28.4–34.8)
MCV RBC AUTO: 94.3 FL (ref 82.6–102.9)
NRBC BLD-RTO: 0 PER 100 WBC
PLATELET # BLD AUTO: 443 K/UL (ref 138–453)
PMV BLD AUTO: 11.5 FL (ref 8.1–13.5)
POTASSIUM SERPL-SCNC: 3.3 MMOL/L (ref 3.7–5.3)
RBC # BLD AUTO: 3.7 M/UL (ref 4.21–5.77)
WBC OTHER # BLD: 11.2 K/UL (ref 3.5–11.3)

## 2024-04-28 PROCEDURE — 85027 COMPLETE CBC AUTOMATED: CPT

## 2024-04-28 PROCEDURE — 84132 ASSAY OF SERUM POTASSIUM: CPT

## 2024-04-29 LAB
ALBUMIN SERPL-MCNC: 3.4 G/DL (ref 3.5–5.2)
ALP SERPL-CCNC: 61 U/L (ref 40–129)
ALT SERPL-CCNC: 10 U/L (ref 5–41)
ANION GAP SERPL CALCULATED.3IONS-SCNC: 12 MMOL/L (ref 9–17)
AST SERPL-CCNC: 10 U/L
BILIRUB SERPL-MCNC: 0.1 MG/DL (ref 0.3–1.2)
BUN SERPL-MCNC: 15 MG/DL (ref 8–23)
BUN/CREAT SERPL: 30 (ref 9–20)
CALCIUM SERPL-MCNC: 9.5 MG/DL (ref 8.6–10.4)
CHLORIDE SERPL-SCNC: 108 MMOL/L (ref 98–107)
CO2 SERPL-SCNC: 26 MMOL/L (ref 20–31)
CREAT SERPL-MCNC: 0.5 MG/DL (ref 0.7–1.2)
ERYTHROCYTE [DISTWIDTH] IN BLOOD BY AUTOMATED COUNT: 14 % (ref 11.8–14.4)
GFR SERPL CREATININE-BSD FRML MDRD: >90 ML/MIN/1.73M2
GLUCOSE SERPL-MCNC: 115 MG/DL (ref 70–99)
HCT VFR BLD AUTO: 35.2 % (ref 40.7–50.3)
HGB BLD-MCNC: 10.8 G/DL (ref 13–17)
LEVETIRACETAM SERPL-MCNC: 28 UG/ML
MAGNESIUM SERPL-MCNC: 2 MG/DL (ref 1.6–2.6)
MCH RBC QN AUTO: 29.4 PG (ref 25.2–33.5)
MCHC RBC AUTO-ENTMCNC: 30.7 G/DL (ref 28.4–34.8)
MCV RBC AUTO: 95.9 FL (ref 82.6–102.9)
NRBC BLD-RTO: 0 PER 100 WBC
PHOSPHATE SERPL-MCNC: 4.2 MG/DL (ref 2.5–4.5)
PLATELET # BLD AUTO: 417 K/UL (ref 138–453)
PMV BLD AUTO: 12 FL (ref 8.1–13.5)
POTASSIUM SERPL-SCNC: 3.3 MMOL/L (ref 3.7–5.3)
PROT SERPL-MCNC: 6.7 G/DL (ref 6.4–8.3)
RBC # BLD AUTO: 3.67 M/UL (ref 4.21–5.77)
SODIUM SERPL-SCNC: 146 MMOL/L (ref 135–144)
WBC OTHER # BLD: 10.2 K/UL (ref 3.5–11.3)

## 2024-04-30 LAB — POTASSIUM SERPL-SCNC: 3.8 MMOL/L (ref 3.7–5.3)

## 2024-05-01 LAB
ANION GAP SERPL CALCULATED.3IONS-SCNC: 8 MMOL/L (ref 9–17)
BUN SERPL-MCNC: 17 MG/DL (ref 8–23)
BUN/CREAT SERPL: 34 (ref 9–20)
CALCIUM SERPL-MCNC: 9 MG/DL (ref 8.6–10.4)
CHLORIDE SERPL-SCNC: 106 MMOL/L (ref 98–107)
CO2 SERPL-SCNC: 28 MMOL/L (ref 20–31)
CREAT SERPL-MCNC: 0.5 MG/DL (ref 0.7–1.2)
GFR, ESTIMATED: >90 ML/MIN/1.73M2
GLUCOSE SERPL-MCNC: 101 MG/DL (ref 70–99)
POTASSIUM SERPL-SCNC: 4 MMOL/L (ref 3.7–5.3)
SODIUM SERPL-SCNC: 142 MMOL/L (ref 135–144)

## 2024-05-02 LAB
ALBUMIN SERPL-MCNC: 3.8 G/DL (ref 3.5–5.2)
ALP SERPL-CCNC: 73 U/L (ref 40–129)
ALT SERPL-CCNC: 13 U/L (ref 5–41)
ANION GAP SERPL CALCULATED.3IONS-SCNC: 11 MMOL/L (ref 9–17)
AST SERPL-CCNC: 13 U/L
BACTERIA URNS QL MICRO: ABNORMAL
BILIRUB SERPL-MCNC: 0.2 MG/DL (ref 0.3–1.2)
BILIRUB UR QL STRIP: NEGATIVE
BUN SERPL-MCNC: 13 MG/DL (ref 8–23)
CALCIUM SERPL-MCNC: 9.8 MG/DL (ref 8.6–10.4)
CHLORIDE SERPL-SCNC: 101 MMOL/L (ref 98–107)
CLARITY UR: ABNORMAL
CO2 SERPL-SCNC: 28 MMOL/L (ref 20–31)
COLOR UR: YELLOW
CREAT SERPL-MCNC: 0.5 MG/DL (ref 0.7–1.2)
EPI CELLS #/AREA URNS HPF: ABNORMAL /HPF (ref 0–5)
ERYTHROCYTE [DISTWIDTH] IN BLOOD BY AUTOMATED COUNT: 13.7 % (ref 11.8–14.4)
GFR, ESTIMATED: >90 ML/MIN/1.73M2
GLUCOSE SERPL-MCNC: 93 MG/DL (ref 70–99)
GLUCOSE UR STRIP-MCNC: NEGATIVE MG/DL
HCT VFR BLD AUTO: 40.4 % (ref 40.7–50.3)
HGB BLD-MCNC: 12.8 G/DL (ref 13–17)
HGB UR QL STRIP.AUTO: ABNORMAL
KETONES UR STRIP-MCNC: NEGATIVE MG/DL
LEUKOCYTE ESTERASE UR QL STRIP: ABNORMAL
MCH RBC QN AUTO: 29.4 PG (ref 25.2–33.5)
MCHC RBC AUTO-ENTMCNC: 31.7 G/DL (ref 28.4–34.8)
MCV RBC AUTO: 92.7 FL (ref 82.6–102.9)
NITRITE UR QL STRIP: NEGATIVE
NRBC BLD-RTO: 0 PER 100 WBC
PH UR STRIP: 6 [PH] (ref 5–8)
PLATELET # BLD AUTO: 601 K/UL (ref 138–453)
PMV BLD AUTO: 12 FL (ref 8.1–13.5)
POTASSIUM SERPL-SCNC: 3.7 MMOL/L (ref 3.7–5.3)
PROT SERPL-MCNC: 7.6 G/DL (ref 6.4–8.3)
PROT UR STRIP-MCNC: ABNORMAL MG/DL
RBC # BLD AUTO: 4.36 M/UL (ref 4.21–5.77)
RBC #/AREA URNS HPF: ABNORMAL /HPF (ref 0–2)
SODIUM SERPL-SCNC: 140 MMOL/L (ref 135–144)
SP GR UR STRIP: 1.04 (ref 1–1.03)
UROBILINOGEN UR STRIP-ACNC: NORMAL EU/DL (ref 0–1)
WBC #/AREA URNS HPF: ABNORMAL /HPF (ref 0–5)
WBC OTHER # BLD: 16.5 K/UL (ref 3.5–11.3)

## 2024-05-03 LAB
ANION GAP SERPL CALCULATED.3IONS-SCNC: 12 MMOL/L (ref 9–17)
BUN SERPL-MCNC: 13 MG/DL (ref 8–23)
BUN/CREAT SERPL: 26 (ref 9–20)
CALCIUM SERPL-MCNC: 9.8 MG/DL (ref 8.6–10.4)
CHLORIDE SERPL-SCNC: 104 MMOL/L (ref 98–107)
CO2 SERPL-SCNC: 26 MMOL/L (ref 20–31)
CREAT SERPL-MCNC: 0.5 MG/DL (ref 0.7–1.2)
ERYTHROCYTE [DISTWIDTH] IN BLOOD BY AUTOMATED COUNT: 13.8 % (ref 11.8–14.4)
GFR, ESTIMATED: >90 ML/MIN/1.73M2
GLUCOSE SERPL-MCNC: 108 MG/DL (ref 70–99)
HCT VFR BLD AUTO: 37.7 % (ref 40.7–50.3)
HGB BLD-MCNC: 12.1 G/DL (ref 13–17)
MCH RBC QN AUTO: 29.2 PG (ref 25.2–33.5)
MCHC RBC AUTO-ENTMCNC: 32.1 G/DL (ref 28.4–34.8)
MCV RBC AUTO: 91.1 FL (ref 82.6–102.9)
NRBC BLD-RTO: 0 PER 100 WBC
PLATELET # BLD AUTO: 594 K/UL (ref 138–453)
PMV BLD AUTO: 11.8 FL (ref 8.1–13.5)
POTASSIUM SERPL-SCNC: 3.5 MMOL/L (ref 3.7–5.3)
RBC # BLD AUTO: 4.14 M/UL (ref 4.21–5.77)
SODIUM SERPL-SCNC: 142 MMOL/L (ref 135–144)
WBC OTHER # BLD: 18 K/UL (ref 3.5–11.3)

## 2024-05-04 LAB
MICROORGANISM SPEC CULT: ABNORMAL
MICROORGANISM/AGENT SPEC: ABNORMAL
POTASSIUM SERPL-SCNC: 4.3 MMOL/L (ref 3.7–5.3)
SPECIMEN DESCRIPTION: ABNORMAL

## 2024-05-05 LAB
MICROORGANISM SPEC CULT: NORMAL
SERVICE CMNT-IMP: NORMAL
SPECIMEN DESCRIPTION: NORMAL

## 2024-05-06 LAB
ALBUMIN SERPL-MCNC: 3.9 G/DL (ref 3.5–5.2)
ALP SERPL-CCNC: 75 U/L (ref 40–129)
ALT SERPL-CCNC: 19 U/L (ref 5–41)
ANION GAP SERPL CALCULATED.3IONS-SCNC: 11 MMOL/L (ref 9–17)
AST SERPL-CCNC: 17 U/L
BILIRUB SERPL-MCNC: 0.2 MG/DL (ref 0.3–1.2)
BUN SERPL-MCNC: 15 MG/DL (ref 8–23)
BUN/CREAT SERPL: 30 (ref 9–20)
CALCIUM SERPL-MCNC: 10.2 MG/DL (ref 8.6–10.4)
CHLORIDE SERPL-SCNC: 99 MMOL/L (ref 98–107)
CO2 SERPL-SCNC: 27 MMOL/L (ref 20–31)
CREAT SERPL-MCNC: 0.5 MG/DL (ref 0.7–1.2)
ERYTHROCYTE [DISTWIDTH] IN BLOOD BY AUTOMATED COUNT: 13.6 % (ref 11.8–14.4)
GFR, ESTIMATED: >90 ML/MIN/1.73M2
GLUCOSE SERPL-MCNC: 96 MG/DL (ref 70–99)
HCT VFR BLD AUTO: 40.6 % (ref 40.7–50.3)
HGB BLD-MCNC: 12.8 G/DL (ref 13–17)
LEVETIRACETAM SERPL-MCNC: 32 UG/ML
MAGNESIUM SERPL-MCNC: 2.3 MG/DL (ref 1.6–2.6)
MCH RBC QN AUTO: 28.8 PG (ref 25.2–33.5)
MCHC RBC AUTO-ENTMCNC: 31.5 G/DL (ref 28.4–34.8)
MCV RBC AUTO: 91.4 FL (ref 82.6–102.9)
MICROORGANISM SPEC CULT: ABNORMAL
MICROORGANISM/AGENT SPEC: ABNORMAL
NRBC BLD-RTO: 0 PER 100 WBC
PHOSPHATE SERPL-MCNC: 4.1 MG/DL (ref 2.5–4.5)
PLATELET # BLD AUTO: 445 K/UL (ref 138–453)
PMV BLD AUTO: 11.7 FL (ref 8.1–13.5)
POTASSIUM SERPL-SCNC: 4.2 MMOL/L (ref 3.7–5.3)
PROT SERPL-MCNC: 7.4 G/DL (ref 6.4–8.3)
RBC # BLD AUTO: 4.44 M/UL (ref 4.21–5.77)
SERVICE CMNT-IMP: ABNORMAL
SODIUM SERPL-SCNC: 137 MMOL/L (ref 135–144)
SPECIMEN DESCRIPTION: ABNORMAL
SPECIMEN DESCRIPTION: ABNORMAL
WBC OTHER # BLD: 12.1 K/UL (ref 3.5–11.3)

## 2024-05-09 LAB
ALBUMIN SERPL-MCNC: 3.9 G/DL (ref 3.5–5.2)
ALP SERPL-CCNC: 71 U/L (ref 40–129)
ALT SERPL-CCNC: 15 U/L (ref 5–41)
ANION GAP SERPL CALCULATED.3IONS-SCNC: 10 MMOL/L (ref 9–17)
AST SERPL-CCNC: 14 U/L
BILIRUB SERPL-MCNC: 0.2 MG/DL (ref 0.3–1.2)
BUN SERPL-MCNC: 19 MG/DL (ref 8–23)
BUN/CREAT SERPL: 32 (ref 9–20)
CALCIUM SERPL-MCNC: 9.9 MG/DL (ref 8.6–10.4)
CHLORIDE SERPL-SCNC: 101 MMOL/L (ref 98–107)
CO2 SERPL-SCNC: 28 MMOL/L (ref 20–31)
CREAT SERPL-MCNC: 0.6 MG/DL (ref 0.7–1.2)
ERYTHROCYTE [DISTWIDTH] IN BLOOD BY AUTOMATED COUNT: 14 % (ref 11.8–14.4)
GFR, ESTIMATED: >90 ML/MIN/1.73M2
GLUCOSE SERPL-MCNC: 109 MG/DL (ref 70–99)
HCT VFR BLD AUTO: 37.1 % (ref 40.7–50.3)
HGB BLD-MCNC: 11.9 G/DL (ref 13–17)
MCH RBC QN AUTO: 29.5 PG (ref 25.2–33.5)
MCHC RBC AUTO-ENTMCNC: 32.1 G/DL (ref 28.4–34.8)
MCV RBC AUTO: 91.8 FL (ref 82.6–102.9)
NRBC BLD-RTO: 0 PER 100 WBC
PLATELET # BLD AUTO: 401 K/UL (ref 138–453)
PMV BLD AUTO: 11.4 FL (ref 8.1–13.5)
POTASSIUM SERPL-SCNC: 4.1 MMOL/L (ref 3.7–5.3)
PROT SERPL-MCNC: 7.1 G/DL (ref 6.4–8.3)
RBC # BLD AUTO: 4.04 M/UL (ref 4.21–5.77)
SODIUM SERPL-SCNC: 139 MMOL/L (ref 135–144)
WBC OTHER # BLD: 11.1 K/UL (ref 3.5–11.3)

## 2024-05-13 LAB
ALBUMIN SERPL-MCNC: 4 G/DL (ref 3.5–5.2)
ALP SERPL-CCNC: 74 U/L (ref 40–129)
ALT SERPL-CCNC: 15 U/L (ref 5–41)
ANION GAP SERPL CALCULATED.3IONS-SCNC: 10 MMOL/L (ref 9–17)
AST SERPL-CCNC: 14 U/L
BILIRUB SERPL-MCNC: 0.2 MG/DL (ref 0.3–1.2)
BUN SERPL-MCNC: 21 MG/DL (ref 8–23)
BUN/CREAT SERPL: 35 (ref 9–20)
CALCIUM SERPL-MCNC: 10 MG/DL (ref 8.6–10.4)
CHLORIDE SERPL-SCNC: 101 MMOL/L (ref 98–107)
CO2 SERPL-SCNC: 28 MMOL/L (ref 20–31)
CREAT SERPL-MCNC: 0.6 MG/DL (ref 0.7–1.2)
ERYTHROCYTE [DISTWIDTH] IN BLOOD BY AUTOMATED COUNT: 14 % (ref 11.8–14.4)
GFR, ESTIMATED: >90 ML/MIN/1.73M2
GLUCOSE SERPL-MCNC: 85 MG/DL (ref 70–99)
HCT VFR BLD AUTO: 35.6 % (ref 40.7–50.3)
HGB BLD-MCNC: 11.2 G/DL (ref 13–17)
LEVETIRACETAM SERPL-MCNC: 46 UG/ML
MAGNESIUM SERPL-MCNC: 2.2 MG/DL (ref 1.6–2.6)
MCH RBC QN AUTO: 29.6 PG (ref 25.2–33.5)
MCHC RBC AUTO-ENTMCNC: 31.5 G/DL (ref 28.4–34.8)
MCV RBC AUTO: 93.9 FL (ref 82.6–102.9)
NRBC BLD-RTO: 0 PER 100 WBC
PHOSPHATE SERPL-MCNC: 4 MG/DL (ref 2.5–4.5)
PLATELET # BLD AUTO: 289 K/UL (ref 138–453)
PMV BLD AUTO: 11.4 FL (ref 8.1–13.5)
POTASSIUM SERPL-SCNC: 4.3 MMOL/L (ref 3.7–5.3)
PROT SERPL-MCNC: 7.2 G/DL (ref 6.4–8.3)
RBC # BLD AUTO: 3.79 M/UL (ref 4.21–5.77)
SODIUM SERPL-SCNC: 139 MMOL/L (ref 135–144)
WBC OTHER # BLD: 8.9 K/UL (ref 3.5–11.3)

## 2024-05-16 LAB
ALBUMIN SERPL-MCNC: 3.7 G/DL (ref 3.5–5.2)
ALP SERPL-CCNC: 65 U/L (ref 40–129)
ALT SERPL-CCNC: 14 U/L (ref 5–41)
ANION GAP SERPL CALCULATED.3IONS-SCNC: 11 MMOL/L (ref 9–17)
AST SERPL-CCNC: 11 U/L
BILIRUB SERPL-MCNC: 0.2 MG/DL (ref 0.3–1.2)
BUN SERPL-MCNC: 21 MG/DL (ref 8–23)
BUN/CREAT SERPL: 30 (ref 9–20)
CALCIUM SERPL-MCNC: 9.7 MG/DL (ref 8.6–10.4)
CHLORIDE SERPL-SCNC: 102 MMOL/L (ref 98–107)
CO2 SERPL-SCNC: 27 MMOL/L (ref 20–31)
CREAT SERPL-MCNC: 0.7 MG/DL (ref 0.7–1.2)
ERYTHROCYTE [DISTWIDTH] IN BLOOD BY AUTOMATED COUNT: 13.5 % (ref 11.8–14.4)
GFR, ESTIMATED: >90 ML/MIN/1.73M2
GLUCOSE SERPL-MCNC: 101 MG/DL (ref 70–99)
HCT VFR BLD AUTO: 34 % (ref 40.7–50.3)
HGB BLD-MCNC: 10.9 G/DL (ref 13–17)
MCH RBC QN AUTO: 29.4 PG (ref 25.2–33.5)
MCHC RBC AUTO-ENTMCNC: 32.1 G/DL (ref 28.4–34.8)
MCV RBC AUTO: 91.6 FL (ref 82.6–102.9)
NRBC BLD-RTO: 0 PER 100 WBC
PLATELET # BLD AUTO: 205 K/UL (ref 138–453)
PMV BLD AUTO: 11.6 FL (ref 8.1–13.5)
POTASSIUM SERPL-SCNC: 3.9 MMOL/L (ref 3.7–5.3)
PROT SERPL-MCNC: 6.6 G/DL (ref 6.4–8.3)
RBC # BLD AUTO: 3.71 M/UL (ref 4.21–5.77)
SODIUM SERPL-SCNC: 140 MMOL/L (ref 135–144)
WBC OTHER # BLD: 7.7 K/UL (ref 3.5–11.3)

## 2024-05-17 ENCOUNTER — APPOINTMENT (OUTPATIENT)
Dept: CT IMAGING | Age: 59
End: 2024-05-17
Payer: MEDICAID

## 2024-05-17 ENCOUNTER — HOSPITAL ENCOUNTER (EMERGENCY)
Age: 59
Discharge: HOME | End: 2024-05-17
Attending: SPECIALIST
Payer: MEDICAID

## 2024-05-17 VITALS
OXYGEN SATURATION: 94 % | WEIGHT: 165 LBS | SYSTOLIC BLOOD PRESSURE: 123 MMHG | HEART RATE: 85 BPM | TEMPERATURE: 98.1 F | HEIGHT: 65 IN | DIASTOLIC BLOOD PRESSURE: 90 MMHG | BODY MASS INDEX: 27.49 KG/M2 | RESPIRATION RATE: 14 BRPM

## 2024-05-17 DIAGNOSIS — R10.10 PAIN OF UPPER ABDOMEN: Primary | ICD-10-CM

## 2024-05-17 LAB
ALBUMIN SERPL-MCNC: 4.5 G/DL (ref 3.5–5.2)
ALBUMIN/GLOB SERPL: 1.2 {RATIO} (ref 1–2.5)
ALP SERPL-CCNC: 81 U/L (ref 40–129)
ALT SERPL-CCNC: 18 U/L (ref 5–41)
ANION GAP SERPL CALCULATED.3IONS-SCNC: 10 MMOL/L (ref 9–17)
AST SERPL-CCNC: 15 U/L
BASOPHILS # BLD: 0.1 K/UL (ref 0–0.2)
BASOPHILS NFR BLD: 1 % (ref 0–2)
BILIRUB SERPL-MCNC: 0.3 MG/DL (ref 0.3–1.2)
BILIRUB UR QL STRIP: NEGATIVE
BUN SERPL-MCNC: 18 MG/DL (ref 8–23)
CALCIUM SERPL-MCNC: 10.5 MG/DL (ref 8.6–10.4)
CHLORIDE SERPL-SCNC: 103 MMOL/L (ref 98–107)
CLARITY UR: CLEAR
CO2 SERPL-SCNC: 29 MMOL/L (ref 20–31)
COLOR UR: YELLOW
COMMENT: NORMAL
CREAT SERPL-MCNC: 0.7 MG/DL (ref 0.7–1.2)
EKG ATRIAL RATE: 96 BPM
EKG P AXIS: 63 DEGREES
EKG P-R INTERVAL: 178 MS
EKG Q-T INTERVAL: 374 MS
EKG QRS DURATION: 144 MS
EKG QTC CALCULATION (BAZETT): 472 MS
EKG R AXIS: 78 DEGREES
EKG T AXIS: 38 DEGREES
EKG VENTRICULAR RATE: 96 BPM
EOSINOPHIL # BLD: 0.2 K/UL (ref 0–0.4)
EOSINOPHILS RELATIVE PERCENT: 3 % (ref 1–4)
ERYTHROCYTE [DISTWIDTH] IN BLOOD BY AUTOMATED COUNT: 14.7 % (ref 12.5–15.4)
GFR, ESTIMATED: >90 ML/MIN/1.73M2
GLUCOSE SERPL-MCNC: 117 MG/DL (ref 70–99)
GLUCOSE UR STRIP-MCNC: NEGATIVE MG/DL
HCT VFR BLD AUTO: 37.7 % (ref 41–53)
HGB BLD-MCNC: 12.6 G/DL (ref 13.5–17.5)
HGB UR QL STRIP.AUTO: NEGATIVE
KETONES UR STRIP-MCNC: NEGATIVE MG/DL
LACTATE BLDV-SCNC: 0.6 MMOL/L (ref 0.5–2.2)
LEUKOCYTE ESTERASE UR QL STRIP: NEGATIVE
LIPASE SERPL-CCNC: 23 U/L (ref 13–60)
LYMPHOCYTES NFR BLD: 1.9 K/UL (ref 1–4.8)
LYMPHOCYTES RELATIVE PERCENT: 20 % (ref 24–44)
MCH RBC QN AUTO: 29.9 PG (ref 26–34)
MCHC RBC AUTO-ENTMCNC: 33.6 G/DL (ref 31–37)
MCV RBC AUTO: 89.1 FL (ref 80–100)
MONOCYTES NFR BLD: 0.7 K/UL (ref 0.1–1.2)
MONOCYTES NFR BLD: 7 % (ref 2–11)
NEUTROPHILS NFR BLD: 69 % (ref 36–66)
NEUTS SEG NFR BLD: 6.9 K/UL (ref 1.8–7.7)
NITRITE UR QL STRIP: NEGATIVE
PH UR STRIP: 5.5 [PH] (ref 5–8)
PLATELET # BLD AUTO: 226 K/UL (ref 140–450)
PMV BLD AUTO: 9.4 FL (ref 6–12)
POTASSIUM SERPL-SCNC: 4.1 MMOL/L (ref 3.7–5.3)
PROT SERPL-MCNC: 8.2 G/DL (ref 6.4–8.3)
PROT UR STRIP-MCNC: NEGATIVE MG/DL
RBC # BLD AUTO: 4.23 M/UL (ref 4.5–5.9)
SODIUM SERPL-SCNC: 142 MMOL/L (ref 135–144)
SP GR UR STRIP: 1.02 (ref 1–1.03)
TROPONIN I SERPL HS-MCNC: 19 NG/L (ref 0–22)
UROBILINOGEN UR STRIP-ACNC: NORMAL EU/DL (ref 0–1)
WBC OTHER # BLD: 9.8 K/UL (ref 3.5–11)

## 2024-05-17 PROCEDURE — 6360000004 HC RX CONTRAST MEDICATION: Performed by: SPECIALIST

## 2024-05-17 PROCEDURE — 6360000002 HC RX W HCPCS: Performed by: SPECIALIST

## 2024-05-17 PROCEDURE — 83690 ASSAY OF LIPASE: CPT

## 2024-05-17 PROCEDURE — 85025 COMPLETE CBC W/AUTO DIFF WBC: CPT

## 2024-05-17 PROCEDURE — 83605 ASSAY OF LACTIC ACID: CPT

## 2024-05-17 PROCEDURE — 84484 ASSAY OF TROPONIN QUANT: CPT

## 2024-05-17 PROCEDURE — 2580000003 HC RX 258: Performed by: SPECIALIST

## 2024-05-17 PROCEDURE — 96374 THER/PROPH/DIAG INJ IV PUSH: CPT

## 2024-05-17 PROCEDURE — 80053 COMPREHEN METABOLIC PANEL: CPT

## 2024-05-17 PROCEDURE — 96375 TX/PRO/DX INJ NEW DRUG ADDON: CPT

## 2024-05-17 PROCEDURE — 81003 URINALYSIS AUTO W/O SCOPE: CPT

## 2024-05-17 PROCEDURE — 74177 CT ABD & PELVIS W/CONTRAST: CPT

## 2024-05-17 PROCEDURE — 99285 EMERGENCY DEPT VISIT HI MDM: CPT

## 2024-05-17 PROCEDURE — 93005 ELECTROCARDIOGRAM TRACING: CPT | Performed by: SPECIALIST

## 2024-05-17 PROCEDURE — 36415 COLL VENOUS BLD VENIPUNCTURE: CPT

## 2024-05-17 RX ORDER — LEVALBUTEROL 1.25 MG/.5ML
1 SOLUTION, CONCENTRATE RESPIRATORY (INHALATION) EVERY 6 HOURS PRN
COMMUNITY

## 2024-05-17 RX ORDER — SODIUM CHLORIDE 0.9 % (FLUSH) 0.9 %
10 SYRINGE (ML) INJECTION ONCE
Status: COMPLETED | OUTPATIENT
Start: 2024-05-17 | End: 2024-05-17

## 2024-05-17 RX ORDER — 0.9 % SODIUM CHLORIDE 0.9 %
80 INTRAVENOUS SOLUTION INTRAVENOUS ONCE
Status: DISCONTINUED | OUTPATIENT
Start: 2024-05-17 | End: 2024-05-17 | Stop reason: HOSPADM

## 2024-05-17 RX ORDER — 0.9 % SODIUM CHLORIDE 0.9 %
1000 INTRAVENOUS SOLUTION INTRAVENOUS ONCE
Status: COMPLETED | OUTPATIENT
Start: 2024-05-17 | End: 2024-05-17

## 2024-05-17 RX ORDER — BUDESONIDE AND FORMOTEROL FUMARATE DIHYDRATE 160; 4.5 UG/1; UG/1
2 AEROSOL RESPIRATORY (INHALATION) 2 TIMES DAILY
COMMUNITY

## 2024-05-17 RX ORDER — ONDANSETRON 2 MG/ML
4 INJECTION INTRAMUSCULAR; INTRAVENOUS ONCE
Status: COMPLETED | OUTPATIENT
Start: 2024-05-17 | End: 2024-05-17

## 2024-05-17 RX ORDER — UMECLIDINIUM 62.5 UG/1
1 AEROSOL, POWDER ORAL DAILY
COMMUNITY

## 2024-05-17 RX ORDER — MULTIVIT-MIN/IRON/FOLIC ACID/K 18-600-40
CAPSULE ORAL DAILY
COMMUNITY

## 2024-05-17 RX ORDER — KETOROLAC TROMETHAMINE 15 MG/ML
15 INJECTION, SOLUTION INTRAMUSCULAR; INTRAVENOUS ONCE
Status: COMPLETED | OUTPATIENT
Start: 2024-05-17 | End: 2024-05-17

## 2024-05-17 RX ORDER — ALBUTEROL SULFATE 90 UG/1
2 AEROSOL, METERED RESPIRATORY (INHALATION) EVERY 6 HOURS PRN
COMMUNITY

## 2024-05-17 RX ORDER — LEVALBUTEROL TARTRATE 45 UG/1
2 AEROSOL, METERED ORAL EVERY 4 HOURS PRN
COMMUNITY

## 2024-05-17 RX ORDER — LORAZEPAM 2 MG/1
2 TABLET ORAL EVERY 6 HOURS PRN
COMMUNITY

## 2024-05-17 RX ORDER — OXYCODONE HYDROCHLORIDE 5 MG/1
5 CAPSULE ORAL EVERY 4 HOURS PRN
COMMUNITY

## 2024-05-17 RX ORDER — AZITHROMYCIN 250 MG/1
250 TABLET, FILM COATED ORAL DAILY
COMMUNITY

## 2024-05-17 RX ORDER — ONDANSETRON 2 MG/ML
4 INJECTION INTRAMUSCULAR; INTRAVENOUS ONCE
Status: DISCONTINUED | OUTPATIENT
Start: 2024-05-17 | End: 2024-05-17

## 2024-05-17 RX ADMIN — ONDANSETRON 4 MG: 2 INJECTION INTRAMUSCULAR; INTRAVENOUS at 05:17

## 2024-05-17 RX ADMIN — KETOROLAC TROMETHAMINE 15 MG: 15 INJECTION, SOLUTION INTRAMUSCULAR; INTRAVENOUS at 05:17

## 2024-05-17 RX ADMIN — SODIUM CHLORIDE, PRESERVATIVE FREE 10 ML: 5 INJECTION INTRAVENOUS at 05:40

## 2024-05-17 RX ADMIN — SODIUM CHLORIDE 1000 ML: 9 INJECTION, SOLUTION INTRAVENOUS at 05:17

## 2024-05-17 RX ADMIN — IOPAMIDOL 75 ML: 755 INJECTION, SOLUTION INTRAVENOUS at 05:40

## 2024-05-17 RX ADMIN — Medication 80 ML: at 05:40

## 2024-05-17 ASSESSMENT — ENCOUNTER SYMPTOMS
COUGH: 0
ABDOMINAL PAIN: 1
VOMITING: 1
SHORTNESS OF BREATH: 0
SORE THROAT: 0
NAUSEA: 1

## 2024-05-17 ASSESSMENT — PAIN SCALES - GENERAL: PAINLEVEL_OUTOF10: 8

## 2024-05-17 ASSESSMENT — PAIN DESCRIPTION - LOCATION: LOCATION: ABDOMEN

## 2024-05-17 ASSESSMENT — PAIN - FUNCTIONAL ASSESSMENT: PAIN_FUNCTIONAL_ASSESSMENT: 0-10

## 2024-05-17 ASSESSMENT — PAIN DESCRIPTION - ORIENTATION: ORIENTATION: LEFT

## 2024-05-17 NOTE — ED PROVIDER NOTES
pneumatosis or mass effect. Oral contrast is seen in the stomach which is suggestive that the tip of the PEG tube is in the stomach. There are no radiopaque densities noted. There are no clinically significant osseous abnormalities noted. IMPRESSION: Generalized ileus. Electronically Signed By: Dr. Henrry Morton M.D. 04/24/2024 22:13:43 EDT    XR CHEST 1 VIEW    Result Date: 4/24/2024  XR CHEST 1 VW: CXR 1 VIEW: The lungs are clear. The cardiomediastinal silhouette is unremarkable. No acute osseous abnormality. Ileostomy 2. Left-sided loop recorder. Right sided central venous catheter tip in the distal SVC. IMPRESSION: No acute cardiopulmonary disease process. Electronically Signed By: Dr. Raegan Dennis M.D. 04/24/2024 21:29:53 EDT    X-ray abdomen ap 1 view    Result Date: 4/23/2024  XR ABDOMEN AP 1 VW HISTORY: Ileus.  Abdominal pain. COMPARISON: 4/21/2024 TECHNIQUE: AP portable supine film FINDINGS/IMPRESSION: 1.  Persistently dilated loops of small and large bowel throughout the abdomen, not significantly changed from prior exam.  Findings could relate to ileus or obstruction.  Recommend continued radiologic follow-up to ensure resolution. 2.  Unchanged PEG tube, [place] catheter, and sacral stimulator. Approved by Resident Chai Menendez MD  on 4/23/2024 11:21 AM IDwight MD have personally reviewed the image(s) and agree with and/or edited the report Workstation:[id number] Finalized by Dwight Contreras MD on 4/23/2024 11:39 AM    X-ray abdomen ap 1 view    Result Date: 4/21/2024   Abdomen single view Clinical history:abd distention  abdominal pain Comparison: 4/17/2024 Findings: AP supine view of the abdomen. Numerous gas-filled distended bowel throughout the abdomen which may represent evolving adynamic ileus. There is a presacral stimulator is noted. Impression: Numerous gas-filled distended bowels which could represent evolving adynamic ileus. Continued clinical correlation radiographic surveillance and follow-up  NEGATIVE NEGATIVE    Ketones, Urine NEGATIVE NEGATIVE mg/dL    Specific Gravity, UA 1.025 1.005 - 1.030    Urine Hgb NEGATIVE NEGATIVE    pH, Urine 5.5 5.0 - 8.0    Protein, UA NEGATIVE NEGATIVE mg/dL    Urobilinogen, Urine Normal 0.0 - 1.0 EU/dL    Nitrite, Urine NEGATIVE NEGATIVE    Leukocyte Esterase, Urine NEGATIVE NEGATIVE    Comment       Microscopic exam not performed based on chemical results unless requested in original order.    Comment          Comment       Utilizing a urinalysis as the only screening method to exclude a potential uropathogen can be unreliable in many patient populations.  Rapid screening tests are less sensitive than culture and if UTI is a clinical possibility, culture should be considered despite a negative urinalysis.     Troponin   Result Value Ref Range    Troponin, High Sensitivity 19 0 - 22 ng/L   EKG 12 Lead (Select if Upper Abd Pain, or SOB, Diaphoresis or Tachy)   Result Value Ref Range    Ventricular Rate 96 BPM    Atrial Rate 96 BPM    P-R Interval 178 ms    QRS Duration 144 ms    Q-T Interval 374 ms    QTc Calculation (Bazett) 472 ms    P Axis 63 degrees    R Axis 78 degrees    T Axis 38 degrees       EMERGENCY DEPARTMENT COURSE:     Thepatient was given the following medications:  Orders Placed This Encounter   Medications    sodium chloride 0.9 % bolus 1,000 mL    ketorolac (TORADOL) injection 15 mg    ondansetron (ZOFRAN) injection 4 mg    DISCONTD: ondansetron (ZOFRAN) injection 4 mg    sodium chloride 0.9 % bolus 80 mL    sodium chloride flush 0.9 % injection 10 mL    iopamidol (ISOVUE-370) 76 % injection 75 mL        Vitals:    Vitals:    05/17/24 0620 05/17/24 0700 05/17/24 0715 05/17/24 0721   BP:  136/86 (!) 123/90    Pulse: 95 100 97 85   Resp: 18 27 23 14   Temp:       TempSrc:       SpO2:    94%   Weight:       Height:         -------------------------  BP: (!) 123/90, Temp: 98.1 °F (36.7 °C), Pulse: 85, Respirations: 14      Re-evaluation Notes  7:43 AM

## 2024-05-17 NOTE — ED NOTES
Marsha (family member) is talking with pt now about returning to [facility].  Pt reports he doesn't want to go back there but family will investigate options at a later time.

## 2024-05-17 NOTE — ED PROVIDER NOTES
[facility]  EMERGENCY DEPARTMENT ENCOUNTER      Pt Name: Terry Lockett  MRN: [id number]  Birthdate 1965  Date of evaluation: 5/17/24      CHIEF COMPLAINT       Chief Complaint   Patient presents with    Abdominal Pain     Pt from [facility], reports left abd pain for one month, near PEG tube insertion site         HISTORY OF PRESENT ILLNESS    Terry Lockett is a 59 y.o. male who presents to the emergency department brought in via EMS from [facility] nursing facility for evaluation of left upper quadrant abdominal pain off and on for last 3 weeks, worse tonight.  Patient has associated nausea and admits to having intermittent vomiting.  He denies any constipation or diarrhea and denies any fever or chills.  The pain has been mostly around the PEG tube insertion site which appears clean.  Patient has history of cirrhosis with alcoholism, CAD, COPD, CHF has history of prostate cancer.  He also has history of hypertension hyperlipidemia and seizure disorder.  Patient grades pain as 8 out of 10 in intensity, there are no exacerbating or relieving factors and patient has not been given any medications for the pain during the transportation prior to arrival.      REVIEW OF SYSTEMS       Review of Systems   Constitutional:  Negative for chills and fever.   HENT:  Negative for congestion and sore throat.    Respiratory:  Negative for cough and shortness of breath.    Cardiovascular:  Negative for chest pain and palpitations.   Gastrointestinal:  Positive for abdominal pain, nausea and vomiting.   Genitourinary:  Negative for dysuria and frequency.   Neurological:  Negative for dizziness and light-headedness.   All other systems reviewed and are negative.        PAST MEDICAL HISTORY    has a past medical history of Arthritis, CAD (coronary artery disease), Cancer (HCC), CHF (congestive heart failure) (HCC), Chronic hypercapnic respiratory failure (HCC), Cirrhosis with alcoholism (HCC), COPD (chronic obstructive pulmonary    Pulse: 98 100 95 100   Resp: 20 19 18 27   Temp:       TempSrc:       SpO2:  99%     Weight:       Height:         -------------------------  BP: 136/86, Temp: 98.1 °F (36.7 °C), Pulse: 100, Respirations: 27    Orders Placed This Encounter   Medications    sodium chloride 0.9 % bolus 1,000 mL    ketorolac (TORADOL) injection 15 mg    ondansetron (ZOFRAN) injection 4 mg    DISCONTD: ondansetron (ZOFRAN) injection 4 mg    sodium chloride 0.9 % bolus 80 mL    sodium chloride flush 0.9 % injection 10 mL    iopamidol (ISOVUE-370) 76 % injection 75 mL         During the ED course, patient was given normal saline bolus followed by infusion and Toradol 15 mg and Zofran 4 mg IV push.  Patient is resting comfortably and does not appear to be in any pain or distress at this time  Case is turned over to Dr. Calvo at 7 AM due to shift change      PROCEDURES:  None    FINAL IMPRESSION      1. Pain of upper abdomen          DISPOSITION/PLAN       PATIENT REFERRED TO:  No follow-up provider specified.    DISCHARGE MEDICATIONS:  New Prescriptions    No medications on file       (Please note that portions of this note were completed with a voice recognition program.  Efforts were made to edit the dictations but occasionally words are mis-transcribed.)    Lobito Mclean MD,, MD, F.A.C.E.P.  Attending Emergency Medicine Physician       Lobito Mclean MD  05/17/24 0715

## 2024-12-06 NOTE — TELEPHONE ENCOUNTER
PROGRESS NOTE        Patient Presents with/Seen in Consultation For      Reason for Consult: upper GI bleed, family requested Dr. Pompa   CHIEF COMPLAINT:  weakness and dark stool   Subjective:     Patient seen laying in bed. EGD reviewed with patient. CT advised, education given, pt now agreeable. No BM. No abdominal or epigastric pain.     Review of Systems  Aside from what was mentioned in the PMH and HPI, essentially unremarkable, all others negative.    Objective:     /67   Pulse 85   Temp 97.6 °F (36.4 °C)   Resp 20   Ht 1.6 m (5' 2.99\")   Wt 57.6 kg (127 lb)   SpO2 95%   BMI 22.50 kg/m²     General appearance: alert, awake, laying in bed, and cooperative  Eyes: conjunctiva pale, sclera anicteric. PERRL.  Lungs: clear to auscultation bilaterally, O2 NC  Heart: regular rate and rhythm, no murmur, 2+ pulses; no edema  Abdomen: soft, non-tender; bowel sounds normal; no masses,  no organomegaly  Extremities: extremities without edema  Pulses: 2+ and symmetric  Skin: Skin color, texture, turgor normal.   Neurologic: Grossly normal    ALPRAZolam (XANAX) tablet 0.5 mg, TID PRN  valsartan (DIOVAN) tablet 80 mg, BID  sodium chloride flush 0.9 % injection 5-40 mL, 2 times per day  sodium chloride flush 0.9 % injection 5-40 mL, PRN  0.9 % sodium chloride infusion, PRN  potassium chloride (KLOR-CON M) extended release tablet 40 mEq, PRN   Or  potassium bicarb-citric acid (EFFER-K) effervescent tablet 40 mEq, PRN   Or  potassium chloride 10 mEq/100 mL IVPB (Peripheral Line), PRN  magnesium sulfate 2000 mg in 50 mL IVPB premix, PRN  ondansetron (ZOFRAN-ODT) disintegrating tablet 4 mg, Q8H PRN   Or  ondansetron (ZOFRAN) injection 4 mg, Q6H PRN  polyethylene glycol (GLYCOLAX) packet 17 g, Daily PRN  acetaminophen (TYLENOL) tablet 650 mg, Q6H PRN   Or  acetaminophen (TYLENOL) suppository 650 mg, Q6H PRN  pantoprazole (PROTONIX) 40 mg in sodium chloride (PF) 0.9 % 10 mL injection, Q12H  0.9 % sodium chloride  RN access attempted to contact patient regarding need for ED follow-up appointment. Attempt was successful. Patient able to contact PCP to schedule. No further help needed at this time.     Pt scheduled follow up for next Thursday at 0900 with pcp infusion, PRN         Data Review  CBC:   Lab Results   Component Value Date/Time    WBC 14.7 12/05/2024 06:12 AM    RBC 2.63 12/05/2024 06:12 AM    HGB 7.9 12/06/2024 06:12 AM    HCT 24.9 12/06/2024 06:12 AM    .0 12/05/2024 06:12 AM    MCH 31.9 12/05/2024 06:12 AM    MCHC 31.9 12/05/2024 06:12 AM    RDW 19.2 12/05/2024 06:12 AM     12/05/2024 06:12 AM    MPV 10.5 12/05/2024 06:12 AM     CMP:    Lab Results   Component Value Date/Time     12/04/2024 09:00 PM    K 4.4 12/04/2024 09:00 PM    K 3.0 07/07/2020 12:09 PM     12/04/2024 09:00 PM    CO2 28 12/04/2024 09:00 PM    BUN 37 12/04/2024 09:00 PM    CREATININE 0.8 12/04/2024 09:00 PM    GFRAA >60 08/06/2020 12:53 PM    LABGLOM 72 12/04/2024 09:00 PM    GLUCOSE 115 12/04/2024 09:00 PM    CALCIUM 8.9 12/04/2024 09:00 PM    BILITOT 0.2 12/04/2024 09:00 PM    ALKPHOS 45 12/04/2024 09:00 PM    AST 22 12/04/2024 09:00 PM    ALT 11 12/04/2024 09:00 PM     Hepatic Function Panel:    Lab Results   Component Value Date/Time    ALKPHOS 45 12/04/2024 09:00 PM    ALT 11 12/04/2024 09:00 PM    AST 22 12/04/2024 09:00 PM    BILITOT 0.2 12/04/2024 09:00 PM    BILIDIR <0.2 07/07/2019 09:40 AM    IBILI see below 07/07/2019 09:40 AM     No components found for: \"CHLPL\"  No results found for: \"TRIG\"  No results found for: \"HDL\"  No components found for: \"LDLCALC\"  No components found for: \"LABVLDL\"   PT/INR:    Lab Results   Component Value Date/Time    PROTIME 12.6 12/06/2024 06:12 AM    INR 1.2 12/06/2024 06:12 AM     IRON:  No results found for: \"IRON\"  Iron Saturation:  No components found for: \"PERCENTFE\"  FERRITIN:  No results found for: \"FERRITIN\"      Assessment:   Anemia, GIB/melena   EGD 12/5/24- 5mm vessel like lesion with active oozing/pulsing at cardiac/aortic compression point (30cm from denture line) - questionable sentinel arterial bleed/early formation of Aortic enteric fistula - 3 hemostatic clips placed  Diverticulosis   Chronic use of  NSAIDS     Plan:   CTA chest ordered, pt refused- education given, now agreeable  Vascular following  Serial H&H ordered  STAT hgb   Keep NPO  Hold any anticoagulants  Pantoprazole 40 mg q 12  Supportive care  Will follow       Note: This report was completed utilizing computer voice recognition software. Every effort has been made to ensure accuracy, however; inadvertent computerized transcription errors may be present.     Discussed with Dr. Pompa  Plan per Dr. Peña Bone APRN-NP-C 12/6/2024  7:59 AM For Dr. Pompa      GI attending addendum:  The patient case was reviewed and discussed with the GI midlevel team.     Noah Pompa, DO

## 2024-12-23 ENCOUNTER — APPOINTMENT (OUTPATIENT)
Dept: GENERAL RADIOLOGY | Age: 59
DRG: 140 | End: 2024-12-23
Payer: MEDICAID

## 2024-12-23 ENCOUNTER — APPOINTMENT (OUTPATIENT)
Dept: CT IMAGING | Age: 59
DRG: 140 | End: 2024-12-23
Payer: MEDICAID

## 2024-12-23 ENCOUNTER — HOSPITAL ENCOUNTER (INPATIENT)
Age: 59
LOS: 3 days | Discharge: HOME | DRG: 140 | End: 2024-12-26
Attending: EMERGENCY MEDICINE | Admitting: INTERNAL MEDICINE
Payer: MEDICAID

## 2024-12-23 DIAGNOSIS — R33.9 URINARY RETENTION: ICD-10-CM

## 2024-12-23 DIAGNOSIS — R07.9 CHEST PAIN, UNSPECIFIED TYPE: Primary | ICD-10-CM

## 2024-12-23 PROBLEM — R09.02 HYPOXIA: Status: ACTIVE | Noted: 2024-12-23

## 2024-12-23 LAB
ALBUMIN SERPL-MCNC: 4.2 G/DL (ref 3.5–5.2)
ALBUMIN/GLOB SERPL: 1.5 {RATIO} (ref 1–2.5)
ALP SERPL-CCNC: 59 U/L (ref 40–129)
ALT SERPL-CCNC: 15 U/L (ref 10–50)
ANION GAP SERPL CALCULATED.3IONS-SCNC: 12 MMOL/L (ref 9–16)
AST SERPL-CCNC: 13 U/L (ref 10–50)
BACTERIA URNS QL MICRO: ABNORMAL
BASOPHILS # BLD: <0.03 K/UL (ref 0–0.2)
BASOPHILS NFR BLD: 0 % (ref 0–2)
BILIRUB SERPL-MCNC: <0.2 MG/DL (ref 0–1.2)
BILIRUB UR QL STRIP: NEGATIVE
BUN SERPL-MCNC: 14 MG/DL (ref 8–23)
CALCIUM SERPL-MCNC: 9.5 MG/DL (ref 8.8–10.2)
CHLORIDE SERPL-SCNC: 92 MMOL/L (ref 98–107)
CLARITY UR: CLEAR
CO2 SERPL-SCNC: 29 MMOL/L (ref 20–31)
COLOR UR: YELLOW
CREAT SERPL-MCNC: 0.6 MG/DL (ref 0.7–1.2)
EOSINOPHIL # BLD: 0.04 K/UL (ref 0–0.44)
EOSINOPHILS RELATIVE PERCENT: 0 % (ref 1–4)
EPI CELLS #/AREA URNS HPF: ABNORMAL /HPF (ref 0–5)
ERYTHROCYTE [DISTWIDTH] IN BLOOD BY AUTOMATED COUNT: 12.1 % (ref 11.8–14.4)
GFR, ESTIMATED: >90 ML/MIN/1.73M2
GLUCOSE SERPL-MCNC: 112 MG/DL (ref 82–115)
GLUCOSE UR STRIP-MCNC: NEGATIVE MG/DL
HCT VFR BLD AUTO: 43.3 % (ref 40.7–50.3)
HGB BLD-MCNC: 14.5 G/DL (ref 13–17)
HGB UR QL STRIP.AUTO: NEGATIVE
IMM GRANULOCYTES # BLD AUTO: 0.08 K/UL (ref 0–0.3)
IMM GRANULOCYTES NFR BLD: 1 %
KETONES UR STRIP-MCNC: NEGATIVE MG/DL
LEUKOCYTE ESTERASE UR QL STRIP: ABNORMAL
LIPASE SERPL-CCNC: 20 U/L (ref 13–60)
LYMPHOCYTES NFR BLD: 1.89 K/UL (ref 1.1–3.7)
LYMPHOCYTES RELATIVE PERCENT: 20 % (ref 24–43)
MCH RBC QN AUTO: 31.3 PG (ref 25.2–33.5)
MCHC RBC AUTO-ENTMCNC: 33.5 G/DL (ref 28.4–34.8)
MCV RBC AUTO: 93.3 FL (ref 82.6–102.9)
MONOCYTES NFR BLD: 0.99 K/UL (ref 0.1–1.2)
MONOCYTES NFR BLD: 10 % (ref 3–12)
NEUTROPHILS NFR BLD: 69 % (ref 36–65)
NEUTS SEG NFR BLD: 6.57 K/UL (ref 1.5–8.1)
NITRITE UR QL STRIP: NEGATIVE
NRBC BLD-RTO: 0 PER 100 WBC
PH UR STRIP: 7.5 [PH] (ref 5–8)
PLATELET # BLD AUTO: 279 K/UL (ref 138–453)
PMV BLD AUTO: 10.2 FL (ref 8.1–13.5)
POTASSIUM SERPL-SCNC: 4.2 MMOL/L (ref 3.7–5.3)
PROT SERPL-MCNC: 7.1 G/DL (ref 6.6–8.7)
PROT UR STRIP-MCNC: NEGATIVE MG/DL
RBC # BLD AUTO: 4.64 M/UL (ref 4.21–5.77)
RBC #/AREA URNS HPF: ABNORMAL /HPF (ref 0–2)
SODIUM SERPL-SCNC: 134 MMOL/L (ref 136–145)
SP GR UR STRIP: 1.01 (ref 1–1.03)
TROPONIN I SERPL HS-MCNC: 14 NG/L (ref 0–22)
TROPONIN I SERPL HS-MCNC: 15 NG/L (ref 0–22)
UROBILINOGEN UR STRIP-ACNC: NORMAL EU/DL (ref 0–1)
WBC #/AREA URNS HPF: ABNORMAL /HPF (ref 0–5)
WBC OTHER # BLD: 9.6 K/UL (ref 3.5–11.3)

## 2024-12-23 PROCEDURE — 71045 X-RAY EXAM CHEST 1 VIEW: CPT

## 2024-12-23 PROCEDURE — 96375 TX/PRO/DX INJ NEW DRUG ADDON: CPT

## 2024-12-23 PROCEDURE — 6360000002 HC RX W HCPCS: Performed by: EMERGENCY MEDICINE

## 2024-12-23 PROCEDURE — 84484 ASSAY OF TROPONIN QUANT: CPT

## 2024-12-23 PROCEDURE — 96376 TX/PRO/DX INJ SAME DRUG ADON: CPT

## 2024-12-23 PROCEDURE — 6360000004 HC RX CONTRAST MEDICATION: Performed by: EMERGENCY MEDICINE

## 2024-12-23 PROCEDURE — 85025 COMPLETE CBC W/AUTO DIFF WBC: CPT

## 2024-12-23 PROCEDURE — 71260 CT THORAX DX C+: CPT

## 2024-12-23 PROCEDURE — 6370000000 HC RX 637 (ALT 250 FOR IP): Performed by: EMERGENCY MEDICINE

## 2024-12-23 PROCEDURE — 2500000003 HC RX 250 WO HCPCS

## 2024-12-23 PROCEDURE — 99285 EMERGENCY DEPT VISIT HI MDM: CPT

## 2024-12-23 PROCEDURE — 2700000000 HC OXYGEN THERAPY PER DAY

## 2024-12-23 PROCEDURE — 6370000000 HC RX 637 (ALT 250 FOR IP)

## 2024-12-23 PROCEDURE — 96374 THER/PROPH/DIAG INJ IV PUSH: CPT

## 2024-12-23 PROCEDURE — 6360000002 HC RX W HCPCS: Performed by: NURSE PRACTITIONER

## 2024-12-23 PROCEDURE — 2060000000 HC ICU INTERMEDIATE R&B

## 2024-12-23 PROCEDURE — 83690 ASSAY OF LIPASE: CPT

## 2024-12-23 PROCEDURE — 0T7D8ZZ DILATION OF URETHRA, VIA NATURAL OR ARTIFICIAL OPENING ENDOSCOPIC: ICD-10-PCS | Performed by: UROLOGY

## 2024-12-23 PROCEDURE — 6360000002 HC RX W HCPCS

## 2024-12-23 PROCEDURE — 99222 1ST HOSP IP/OBS MODERATE 55: CPT

## 2024-12-23 PROCEDURE — 94761 N-INVAS EAR/PLS OXIMETRY MLT: CPT

## 2024-12-23 PROCEDURE — 80053 COMPREHEN METABOLIC PANEL: CPT

## 2024-12-23 PROCEDURE — 74177 CT ABD & PELVIS W/CONTRAST: CPT

## 2024-12-23 PROCEDURE — 2500000003 HC RX 250 WO HCPCS: Performed by: EMERGENCY MEDICINE

## 2024-12-23 PROCEDURE — 94640 AIRWAY INHALATION TREATMENT: CPT

## 2024-12-23 PROCEDURE — 81001 URINALYSIS AUTO W/SCOPE: CPT

## 2024-12-23 PROCEDURE — 2000000000 HC ICU R&B

## 2024-12-23 RX ORDER — INSULIN GLARGINE 100 [IU]/ML
INJECTION, SOLUTION SUBCUTANEOUS
COMMUNITY

## 2024-12-23 RX ORDER — CARVEDILOL 12.5 MG/1
12.5 TABLET ORAL 2 TIMES DAILY WITH MEALS
COMMUNITY

## 2024-12-23 RX ORDER — GUAIFENESIN 600 MG/1
600 TABLET, EXTENDED RELEASE ORAL 2 TIMES DAILY
Status: DISCONTINUED | OUTPATIENT
Start: 2024-12-23 | End: 2024-12-26 | Stop reason: HOSPADM

## 2024-12-23 RX ORDER — 0.9 % SODIUM CHLORIDE 0.9 %
80 INTRAVENOUS SOLUTION INTRAVENOUS ONCE
Status: DISCONTINUED | OUTPATIENT
Start: 2024-12-23 | End: 2024-12-26 | Stop reason: HOSPADM

## 2024-12-23 RX ORDER — FERROUS SULFATE 325(65) MG
325 TABLET ORAL
Status: ON HOLD | COMMUNITY
End: 2024-12-26 | Stop reason: HOSPADM

## 2024-12-23 RX ORDER — IPRATROPIUM BROMIDE AND ALBUTEROL SULFATE 2.5; .5 MG/3ML; MG/3ML
1 SOLUTION RESPIRATORY (INHALATION) EVERY 6 HOURS
COMMUNITY

## 2024-12-23 RX ORDER — PREDNISONE 20 MG/1
40 TABLET ORAL DAILY
Status: DISCONTINUED | OUTPATIENT
Start: 2024-12-26 | End: 2024-12-26

## 2024-12-23 RX ORDER — SODIUM CHLORIDE 9 MG/ML
INJECTION, SOLUTION INTRAVENOUS PRN
Status: DISCONTINUED | OUTPATIENT
Start: 2024-12-23 | End: 2024-12-26 | Stop reason: HOSPADM

## 2024-12-23 RX ORDER — SPIRONOLACTONE 25 MG/1
25 TABLET ORAL DAILY
Status: ON HOLD | COMMUNITY
End: 2024-12-26 | Stop reason: HOSPADM

## 2024-12-23 RX ORDER — LEVALBUTEROL TARTRATE 45 UG/1
1-2 AEROSOL, METERED ORAL EVERY 4 HOURS PRN
COMMUNITY

## 2024-12-23 RX ORDER — PANTOPRAZOLE SODIUM 40 MG/1
40 TABLET, DELAYED RELEASE ORAL DAILY
Status: ON HOLD | COMMUNITY
End: 2024-12-26 | Stop reason: HOSPADM

## 2024-12-23 RX ORDER — ONDANSETRON 2 MG/ML
4 INJECTION INTRAMUSCULAR; INTRAVENOUS ONCE
Status: COMPLETED | OUTPATIENT
Start: 2024-12-23 | End: 2024-12-23

## 2024-12-23 RX ORDER — UMECLIDINIUM 62.5 UG/1
1 AEROSOL, POWDER ORAL DAILY
COMMUNITY

## 2024-12-23 RX ORDER — INSULIN ASPART 100 [IU]/ML
INJECTION, SOLUTION INTRAVENOUS; SUBCUTANEOUS
COMMUNITY

## 2024-12-23 RX ORDER — SODIUM CHLORIDE 0.9 % (FLUSH) 0.9 %
5-40 SYRINGE (ML) INJECTION PRN
Status: DISCONTINUED | OUTPATIENT
Start: 2024-12-23 | End: 2024-12-26 | Stop reason: HOSPADM

## 2024-12-23 RX ORDER — BENZONATATE 100 MG/1
100 CAPSULE ORAL 3 TIMES DAILY PRN
Status: DISCONTINUED | OUTPATIENT
Start: 2024-12-23 | End: 2024-12-26 | Stop reason: HOSPADM

## 2024-12-23 RX ORDER — AMIODARONE HYDROCHLORIDE 200 MG/1
200 TABLET ORAL 2 TIMES DAILY
Status: ON HOLD | COMMUNITY
End: 2024-12-26 | Stop reason: HOSPADM

## 2024-12-23 RX ORDER — ASCORBIC ACID 500 MG
500 TABLET ORAL DAILY
Status: ON HOLD | COMMUNITY
End: 2024-12-26 | Stop reason: HOSPADM

## 2024-12-23 RX ORDER — POLYETHYLENE GLYCOL 3350 17 G/17G
17 POWDER, FOR SOLUTION ORAL DAILY PRN
Status: DISCONTINUED | OUTPATIENT
Start: 2024-12-23 | End: 2024-12-26 | Stop reason: HOSPADM

## 2024-12-23 RX ORDER — GUAIFENESIN/DEXTROMETHORPHAN 100-10MG/5
5 SYRUP ORAL EVERY 4 HOURS PRN
Status: DISCONTINUED | OUTPATIENT
Start: 2024-12-23 | End: 2024-12-26 | Stop reason: HOSPADM

## 2024-12-23 RX ORDER — AZITHROMYCIN 250 MG/1
500 TABLET, FILM COATED ORAL DAILY
Status: DISCONTINUED | OUTPATIENT
Start: 2024-12-24 | End: 2024-12-26 | Stop reason: HOSPADM

## 2024-12-23 RX ORDER — ACETAMINOPHEN 325 MG/1
650 TABLET ORAL EVERY 6 HOURS PRN
Status: DISCONTINUED | OUTPATIENT
Start: 2024-12-23 | End: 2024-12-26 | Stop reason: HOSPADM

## 2024-12-23 RX ORDER — ROFLUMILAST 500 UG/1
500 TABLET ORAL DAILY
COMMUNITY

## 2024-12-23 RX ORDER — IPRATROPIUM BROMIDE AND ALBUTEROL SULFATE 2.5; .5 MG/3ML; MG/3ML
1 SOLUTION RESPIRATORY (INHALATION)
Status: DISCONTINUED | OUTPATIENT
Start: 2024-12-24 | End: 2024-12-24

## 2024-12-23 RX ORDER — LEVETIRACETAM 750 MG/1
750 TABLET ORAL 2 TIMES DAILY
COMMUNITY

## 2024-12-23 RX ORDER — IOPAMIDOL 755 MG/ML
75 INJECTION, SOLUTION INTRAVASCULAR
Status: COMPLETED | OUTPATIENT
Start: 2024-12-23 | End: 2024-12-23

## 2024-12-23 RX ORDER — SODIUM CHLORIDE 0.9 % (FLUSH) 0.9 %
10 SYRINGE (ML) INJECTION ONCE
Status: COMPLETED | OUTPATIENT
Start: 2024-12-23 | End: 2024-12-23

## 2024-12-23 RX ORDER — DICYCLOMINE HYDROCHLORIDE 10 MG/1
10 CAPSULE ORAL
COMMUNITY

## 2024-12-23 RX ORDER — ACETAMINOPHEN 650 MG/1
650 SUPPOSITORY RECTAL EVERY 6 HOURS PRN
Status: DISCONTINUED | OUTPATIENT
Start: 2024-12-23 | End: 2024-12-26 | Stop reason: HOSPADM

## 2024-12-23 RX ORDER — MORPHINE SULFATE 2 MG/ML
2 INJECTION, SOLUTION INTRAMUSCULAR; INTRAVENOUS ONCE
Status: COMPLETED | OUTPATIENT
Start: 2024-12-23 | End: 2024-12-23

## 2024-12-23 RX ORDER — ENOXAPARIN SODIUM 100 MG/ML
40 INJECTION SUBCUTANEOUS DAILY
Status: DISCONTINUED | OUTPATIENT
Start: 2024-12-24 | End: 2024-12-24

## 2024-12-23 RX ORDER — VERAPAMIL HYDROCHLORIDE 40 MG/1
40 TABLET ORAL 3 TIMES DAILY
Status: ON HOLD | COMMUNITY
End: 2024-12-26 | Stop reason: HOSPADM

## 2024-12-23 RX ORDER — DOXYCYCLINE HYCLATE 100 MG
100 TABLET ORAL 2 TIMES DAILY
Status: ON HOLD | COMMUNITY
End: 2024-12-26 | Stop reason: HOSPADM

## 2024-12-23 RX ORDER — SODIUM CHLORIDE 0.9 % (FLUSH) 0.9 %
5-40 SYRINGE (ML) INJECTION EVERY 12 HOURS SCHEDULED
Status: DISCONTINUED | OUTPATIENT
Start: 2024-12-23 | End: 2024-12-26 | Stop reason: HOSPADM

## 2024-12-23 RX ORDER — MIDODRINE HYDROCHLORIDE 5 MG/1
5 TABLET ORAL 3 TIMES DAILY
COMMUNITY

## 2024-12-23 RX ORDER — BUDESONIDE AND FORMOTEROL FUMARATE DIHYDRATE 160; 4.5 UG/1; UG/1
2 AEROSOL RESPIRATORY (INHALATION) 2 TIMES DAILY
COMMUNITY

## 2024-12-23 RX ORDER — PREDNISONE 20 MG/1
20 TABLET ORAL DAILY
COMMUNITY

## 2024-12-23 RX ORDER — METOPROLOL SUCCINATE 50 MG/1
50 TABLET, EXTENDED RELEASE ORAL DAILY
Status: ON HOLD | COMMUNITY
End: 2024-12-26 | Stop reason: HOSPADM

## 2024-12-23 RX ORDER — ACETAMINOPHEN 160 MG
2000 TABLET,DISINTEGRATING ORAL DAILY
Status: ON HOLD | COMMUNITY
End: 2024-12-26 | Stop reason: HOSPADM

## 2024-12-23 RX ORDER — DIPHENHYDRAMINE HCL 25 MG
25 CAPSULE ORAL
COMMUNITY

## 2024-12-23 RX ORDER — FAMOTIDINE 20 MG/1
20 TABLET, FILM COATED ORAL 2 TIMES DAILY
Status: DISCONTINUED | OUTPATIENT
Start: 2024-12-23 | End: 2024-12-26 | Stop reason: HOSPADM

## 2024-12-23 RX ORDER — ASPIRIN 81 MG/1
324 TABLET, CHEWABLE ORAL ONCE
Status: COMPLETED | OUTPATIENT
Start: 2024-12-23 | End: 2024-12-23

## 2024-12-23 RX ORDER — FUROSEMIDE 20 MG/1
20 TABLET ORAL 2 TIMES DAILY
COMMUNITY

## 2024-12-23 RX ORDER — MORPHINE SULFATE 4 MG/ML
4 INJECTION, SOLUTION INTRAMUSCULAR; INTRAVENOUS ONCE
Status: COMPLETED | OUTPATIENT
Start: 2024-12-23 | End: 2024-12-23

## 2024-12-23 RX ORDER — ACETAMINOPHEN 160 MG/5ML
15 LIQUID ORAL EVERY 4 HOURS PRN
Status: ON HOLD | COMMUNITY
End: 2024-12-26 | Stop reason: HOSPADM

## 2024-12-23 RX ORDER — ONDANSETRON 4 MG/1
4 TABLET, ORALLY DISINTEGRATING ORAL EVERY 8 HOURS PRN
Status: DISCONTINUED | OUTPATIENT
Start: 2024-12-23 | End: 2024-12-26 | Stop reason: HOSPADM

## 2024-12-23 RX ORDER — ONDANSETRON 2 MG/ML
4 INJECTION INTRAMUSCULAR; INTRAVENOUS EVERY 6 HOURS PRN
Status: DISCONTINUED | OUTPATIENT
Start: 2024-12-23 | End: 2024-12-26 | Stop reason: HOSPADM

## 2024-12-23 RX ADMIN — MORPHINE SULFATE 2 MG: 2 INJECTION, SOLUTION INTRAMUSCULAR; INTRAVENOUS at 17:03

## 2024-12-23 RX ADMIN — Medication 80 ML: at 17:44

## 2024-12-23 RX ADMIN — WATER 1000 MG: 1 INJECTION INTRAMUSCULAR; INTRAVENOUS; SUBCUTANEOUS at 23:31

## 2024-12-23 RX ADMIN — SODIUM CHLORIDE, PRESERVATIVE FREE 10 ML: 5 INJECTION INTRAVENOUS at 23:24

## 2024-12-23 RX ADMIN — ASPIRIN 324 MG: 81 TABLET, CHEWABLE ORAL at 15:44

## 2024-12-23 RX ADMIN — IOPAMIDOL 75 ML: 755 INJECTION, SOLUTION INTRAVENOUS at 17:44

## 2024-12-23 RX ADMIN — HYDROMORPHONE HYDROCHLORIDE 0.5 MG: 1 INJECTION, SOLUTION INTRAMUSCULAR; INTRAVENOUS; SUBCUTANEOUS at 20:11

## 2024-12-23 RX ADMIN — ONDANSETRON 4 MG: 2 INJECTION, SOLUTION INTRAMUSCULAR; INTRAVENOUS at 17:03

## 2024-12-23 RX ADMIN — WATER 40 MG: 1 INJECTION INTRAMUSCULAR; INTRAVENOUS; SUBCUTANEOUS at 23:21

## 2024-12-23 RX ADMIN — SODIUM CHLORIDE, PRESERVATIVE FREE 10 ML: 5 INJECTION INTRAVENOUS at 17:44

## 2024-12-23 RX ADMIN — ONDANSETRON 4 MG: 2 INJECTION, SOLUTION INTRAMUSCULAR; INTRAVENOUS at 15:45

## 2024-12-23 RX ADMIN — MORPHINE SULFATE 4 MG: 4 INJECTION, SOLUTION INTRAMUSCULAR; INTRAVENOUS at 15:45

## 2024-12-23 RX ADMIN — HYDROMORPHONE HYDROCHLORIDE 0.5 MG: 1 INJECTION, SOLUTION INTRAMUSCULAR; INTRAVENOUS; SUBCUTANEOUS at 23:15

## 2024-12-23 RX ADMIN — IPRATROPIUM BROMIDE AND ALBUTEROL SULFATE 1 DOSE: 2.5; .5 SOLUTION RESPIRATORY (INHALATION) at 23:42

## 2024-12-23 ASSESSMENT — ENCOUNTER SYMPTOMS
EYE PAIN: 0
ABDOMINAL DISTENTION: 0
EYE DISCHARGE: 0
ABDOMINAL PAIN: 1
FACIAL SWELLING: 0
CHEST TIGHTNESS: 0
SHORTNESS OF BREATH: 1
BACK PAIN: 0

## 2024-12-23 ASSESSMENT — PAIN SCALES - GENERAL
PAINLEVEL_OUTOF10: 7
PAINLEVEL_OUTOF10: 9
PAINLEVEL_OUTOF10: 5
PAINLEVEL_OUTOF10: 8
PAINLEVEL_OUTOF10: 9
PAINLEVEL_OUTOF10: 9

## 2024-12-23 ASSESSMENT — PAIN DESCRIPTION - DESCRIPTORS: DESCRIPTORS: BURNING;TIGHTNESS

## 2024-12-23 ASSESSMENT — PAIN DESCRIPTION - ORIENTATION: ORIENTATION: LOWER

## 2024-12-23 ASSESSMENT — PAIN DESCRIPTION - FREQUENCY: FREQUENCY: CONTINUOUS

## 2024-12-23 ASSESSMENT — PAIN DESCRIPTION - PAIN TYPE: TYPE: ACUTE PAIN

## 2024-12-23 ASSESSMENT — PAIN DESCRIPTION - LOCATION
LOCATION: ABDOMEN;CHEST
LOCATION: ABDOMEN

## 2024-12-23 ASSESSMENT — HEART SCORE: ECG: NORMAL

## 2024-12-23 ASSESSMENT — PAIN DESCRIPTION - ONSET: ONSET: ON-GOING

## 2024-12-23 ASSESSMENT — PAIN - FUNCTIONAL ASSESSMENT: PAIN_FUNCTIONAL_ASSESSMENT: PREVENTS OR INTERFERES SOME ACTIVE ACTIVITIES AND ADLS

## 2024-12-23 NOTE — ED PROVIDER NOTES
EMERGENCY DEPARTMENT ENCOUNTER    Pt Name: Rene Melgoza  MRN: [id number]  Birthdate 1965  Date of evaluation: 12/23/24  CHIEF COMPLAINT       Chief Complaint   Patient presents with    Abdominal Pain    Chest Pain    Urinary Frequency    Shortness of Breath     Was at [facility] for symptoms of urinary frequency and retention, they were unable to cath patient due to prostrate enlargement. Patient states history of lung CA, state chest pain and abdominal pain      HISTORY OF PRESENT ILLNESS   HPI   The patient is a 59-year-old male with a history of lung cancer presented to the emergency department secondary to chest pain urinary frequency abdominal pain and shortness of breath.  Patient was at [facility] urology for urinary frequency alternating with retention secondary to prostate enlargement.  However difficulty placing Gomez and patient had sudden onset of chest pain shortness of breath as well as abdominal pain was advised to come to the emergency department for further evaluation and treatment.  Patient said he has had intermittent episodes where he has urinary retention and has been urinating frequently today.  Not currently on antibiotics not on a diuretic.  Patient has a history of lung cancer.  Patient denied a previous history of COPD but he is on oxygen at all times.  Patient hemoptysis.  No hematuria dysuria.  Patient denied nausea, vomiting, fevers or chills      REVIEW OF SYSTEMS     Review of Systems   Constitutional:  Negative for chills, diaphoresis and fever.   HENT:  Negative for congestion, ear pain and facial swelling.    Eyes:  Negative for pain, discharge and visual disturbance.   Respiratory:  Positive for shortness of breath. Negative for chest tightness.    Cardiovascular:  Positive for chest pain. Negative for palpitations.   Gastrointestinal:  Positive for abdominal pain. Negative for abdominal distention.   Genitourinary:  Negative for difficulty urinating and flank pain.  0.9 % bolus 80 mL    iopamidol (ISOVUE-370) 76 % injection 75 mL    sodium chloride flush 0.9 % injection 10 mL     DISCHARGE PRESCRIPTIONS:  New Prescriptions    No medications on file     PHYSICIAN CONSULTS ORDERED THIS ENCOUNTER:  None  FINAL IMPRESSION      1. Chest pain, unspecified type          DISPOSITION/PLAN   DISPOSITION                 OUTPATIENT FOLLOW UP THE PATIENT:  No follow-up provider specified.    Crystal Thomas MD        This note was created with the assistance of a speech-recognition program. While intending to generate a document that actually reflects the content of the visit, no guarantees can be provided that every mistake has been identified and corrected by editing.        Crystal Thomas MD  12/23/24 1523

## 2024-12-24 PROBLEM — R32 URINARY INCONTINENCE: Status: ACTIVE | Noted: 2024-12-24

## 2024-12-24 PROBLEM — J96.11 CHRONIC HYPOXEMIC RESPIRATORY FAILURE: Status: ACTIVE | Noted: 2024-12-23

## 2024-12-24 PROBLEM — J44.1 ACUTE EXACERBATION OF CHRONIC OBSTRUCTIVE PULMONARY DISEASE (COPD) (HCC): Status: ACTIVE | Noted: 2024-12-24

## 2024-12-24 LAB
ANION GAP SERPL CALCULATED.3IONS-SCNC: 10 MMOL/L (ref 9–16)
BASOPHILS # BLD: <0.03 K/UL (ref 0–0.2)
BASOPHILS NFR BLD: 0 % (ref 0–2)
BUN SERPL-MCNC: 15 MG/DL (ref 8–23)
CALCIUM SERPL-MCNC: 9.8 MG/DL (ref 8.8–10.2)
CHLORIDE SERPL-SCNC: 96 MMOL/L (ref 98–107)
CO2 SERPL-SCNC: 31 MMOL/L (ref 20–31)
CREAT SERPL-MCNC: 0.8 MG/DL (ref 0.7–1.2)
EOSINOPHIL # BLD: <0.03 K/UL (ref 0–0.44)
EOSINOPHILS RELATIVE PERCENT: 0 % (ref 1–4)
ERYTHROCYTE [DISTWIDTH] IN BLOOD BY AUTOMATED COUNT: 12.1 % (ref 11.8–14.4)
GFR, ESTIMATED: >90 ML/MIN/1.73M2
GLUCOSE BLD-MCNC: 124 MG/DL (ref 75–110)
GLUCOSE BLD-MCNC: 129 MG/DL (ref 75–110)
GLUCOSE BLD-MCNC: 149 MG/DL (ref 75–110)
GLUCOSE BLD-MCNC: 158 MG/DL (ref 75–110)
GLUCOSE SERPL-MCNC: 142 MG/DL (ref 82–115)
HCT VFR BLD AUTO: 44.1 % (ref 40.7–50.3)
HGB BLD-MCNC: 14.3 G/DL (ref 13–17)
IMM GRANULOCYTES # BLD AUTO: 0.05 K/UL (ref 0–0.3)
IMM GRANULOCYTES NFR BLD: 1 %
LACTATE BLDV-SCNC: 2.1 MMOL/L (ref 0.5–1.9)
LACTATE BLDV-SCNC: 2.8 MMOL/L (ref 0.5–1.9)
LYMPHOCYTES NFR BLD: 0.99 K/UL (ref 1.1–3.7)
LYMPHOCYTES RELATIVE PERCENT: 13 % (ref 24–43)
MCH RBC QN AUTO: 30.9 PG (ref 25.2–33.5)
MCHC RBC AUTO-ENTMCNC: 32.4 G/DL (ref 28.4–34.8)
MCV RBC AUTO: 95.2 FL (ref 82.6–102.9)
MONOCYTES NFR BLD: 0.27 K/UL (ref 0.1–1.2)
MONOCYTES NFR BLD: 3 % (ref 3–12)
NEUTROPHILS NFR BLD: 83 % (ref 36–65)
NEUTS SEG NFR BLD: 6.53 K/UL (ref 1.5–8.1)
NRBC BLD-RTO: 0 PER 100 WBC
PLATELET # BLD AUTO: 252 K/UL (ref 138–453)
PMV BLD AUTO: 9.9 FL (ref 8.1–13.5)
POTASSIUM SERPL-SCNC: 4.1 MMOL/L (ref 3.7–5.3)
PROCALCITONIN SERPL-MCNC: 0.05 NG/ML (ref 0–0.09)
RBC # BLD AUTO: 4.63 M/UL (ref 4.21–5.77)
SODIUM SERPL-SCNC: 137 MMOL/L (ref 136–145)
WBC OTHER # BLD: 7.9 K/UL (ref 3.5–11.3)

## 2024-12-24 PROCEDURE — 6360000002 HC RX W HCPCS: Performed by: UROLOGY

## 2024-12-24 PROCEDURE — 6370000000 HC RX 637 (ALT 250 FOR IP)

## 2024-12-24 PROCEDURE — 82947 ASSAY GLUCOSE BLOOD QUANT: CPT

## 2024-12-24 PROCEDURE — 2700000000 HC OXYGEN THERAPY PER DAY

## 2024-12-24 PROCEDURE — 93005 ELECTROCARDIOGRAM TRACING: CPT | Performed by: EMERGENCY MEDICINE

## 2024-12-24 PROCEDURE — 87040 BLOOD CULTURE FOR BACTERIA: CPT

## 2024-12-24 PROCEDURE — 2500000003 HC RX 250 WO HCPCS

## 2024-12-24 PROCEDURE — 2580000003 HC RX 258: Performed by: INTERNAL MEDICINE

## 2024-12-24 PROCEDURE — 85025 COMPLETE CBC W/AUTO DIFF WBC: CPT

## 2024-12-24 PROCEDURE — 80048 BASIC METABOLIC PNL TOTAL CA: CPT

## 2024-12-24 PROCEDURE — 84145 PROCALCITONIN (PCT): CPT

## 2024-12-24 PROCEDURE — 94761 N-INVAS EAR/PLS OXIMETRY MLT: CPT

## 2024-12-24 PROCEDURE — 6370000000 HC RX 637 (ALT 250 FOR IP): Performed by: INTERNAL MEDICINE

## 2024-12-24 PROCEDURE — 2060000000 HC ICU INTERMEDIATE R&B

## 2024-12-24 PROCEDURE — 83605 ASSAY OF LACTIC ACID: CPT

## 2024-12-24 PROCEDURE — 6360000002 HC RX W HCPCS

## 2024-12-24 PROCEDURE — 36415 COLL VENOUS BLD VENIPUNCTURE: CPT

## 2024-12-24 PROCEDURE — 99233 SBSQ HOSP IP/OBS HIGH 50: CPT | Performed by: INTERNAL MEDICINE

## 2024-12-24 PROCEDURE — 94640 AIRWAY INHALATION TREATMENT: CPT

## 2024-12-24 RX ORDER — OXYCODONE HYDROCHLORIDE 5 MG/1
5 TABLET ORAL EVERY 6 HOURS PRN
Status: DISCONTINUED | OUTPATIENT
Start: 2024-12-24 | End: 2024-12-26 | Stop reason: HOSPADM

## 2024-12-24 RX ORDER — DIPHENHYDRAMINE HCL 25 MG
25 TABLET ORAL 4 TIMES DAILY
Status: DISCONTINUED | OUTPATIENT
Start: 2024-12-24 | End: 2024-12-26 | Stop reason: HOSPADM

## 2024-12-24 RX ORDER — INSULIN LISPRO 100 [IU]/ML
0-8 INJECTION, SOLUTION INTRAVENOUS; SUBCUTANEOUS
Status: DISCONTINUED | OUTPATIENT
Start: 2024-12-24 | End: 2024-12-26 | Stop reason: HOSPADM

## 2024-12-24 RX ORDER — KETOROLAC TROMETHAMINE 15 MG/ML
15 INJECTION, SOLUTION INTRAMUSCULAR; INTRAVENOUS ONCE
Status: COMPLETED | OUTPATIENT
Start: 2024-12-24 | End: 2024-12-24

## 2024-12-24 RX ORDER — DEXTROSE MONOHYDRATE 100 MG/ML
INJECTION, SOLUTION INTRAVENOUS CONTINUOUS PRN
Status: DISCONTINUED | OUTPATIENT
Start: 2024-12-24 | End: 2024-12-26 | Stop reason: HOSPADM

## 2024-12-24 RX ORDER — LEVALBUTEROL 1.25 MG/.5ML
1.25 SOLUTION, CONCENTRATE RESPIRATORY (INHALATION) EVERY 4 HOURS PRN
Status: DISCONTINUED | OUTPATIENT
Start: 2024-12-24 | End: 2024-12-24

## 2024-12-24 RX ORDER — DICYCLOMINE HYDROCHLORIDE 10 MG/1
10 CAPSULE ORAL
Status: DISCONTINUED | OUTPATIENT
Start: 2024-12-24 | End: 2024-12-26 | Stop reason: HOSPADM

## 2024-12-24 RX ORDER — ALBUTEROL SULFATE 0.83 MG/ML
2.5 SOLUTION RESPIRATORY (INHALATION) EVERY 4 HOURS PRN
Status: DISCONTINUED | OUTPATIENT
Start: 2024-12-24 | End: 2024-12-24

## 2024-12-24 RX ORDER — HEPARIN SODIUM 5000 [USP'U]/ML
5000 INJECTION, SOLUTION INTRAVENOUS; SUBCUTANEOUS EVERY 8 HOURS SCHEDULED
Status: DISCONTINUED | OUTPATIENT
Start: 2024-12-24 | End: 2024-12-26 | Stop reason: HOSPADM

## 2024-12-24 RX ORDER — LEVETIRACETAM 500 MG/1
750 TABLET ORAL 2 TIMES DAILY
Status: DISCONTINUED | OUTPATIENT
Start: 2024-12-24 | End: 2024-12-26 | Stop reason: HOSPADM

## 2024-12-24 RX ORDER — BUDESONIDE AND FORMOTEROL FUMARATE DIHYDRATE 160; 4.5 UG/1; UG/1
2 AEROSOL RESPIRATORY (INHALATION) 2 TIMES DAILY
Status: DISCONTINUED | OUTPATIENT
Start: 2024-12-24 | End: 2024-12-26 | Stop reason: HOSPADM

## 2024-12-24 RX ORDER — SODIUM CHLORIDE FOR INHALATION 0.9 %
3 VIAL, NEBULIZER (ML) INHALATION EVERY 8 HOURS PRN
Status: DISCONTINUED | OUTPATIENT
Start: 2024-12-24 | End: 2024-12-24

## 2024-12-24 RX ORDER — SODIUM CHLORIDE FOR INHALATION 0.9 %
3 VIAL, NEBULIZER (ML) INHALATION EVERY 4 HOURS PRN
Status: DISCONTINUED | OUTPATIENT
Start: 2024-12-24 | End: 2024-12-26 | Stop reason: HOSPADM

## 2024-12-24 RX ORDER — HYDROXYZINE HYDROCHLORIDE 10 MG/1
10 TABLET, FILM COATED ORAL 3 TIMES DAILY PRN
Status: DISCONTINUED | OUTPATIENT
Start: 2024-12-24 | End: 2024-12-26 | Stop reason: HOSPADM

## 2024-12-24 RX ORDER — HYDROMORPHONE HYDROCHLORIDE 1 MG/ML
1 INJECTION, SOLUTION INTRAMUSCULAR; INTRAVENOUS; SUBCUTANEOUS
Status: DISCONTINUED | OUTPATIENT
Start: 2024-12-24 | End: 2024-12-24

## 2024-12-24 RX ORDER — ROFLUMILAST 500 UG/1
500 TABLET ORAL DAILY
Status: DISCONTINUED | OUTPATIENT
Start: 2024-12-24 | End: 2024-12-25

## 2024-12-24 RX ORDER — OXYCODONE HYDROCHLORIDE 5 MG/1
10 TABLET ORAL
Status: DISCONTINUED | OUTPATIENT
Start: 2024-12-24 | End: 2024-12-24

## 2024-12-24 RX ORDER — 0.9 % SODIUM CHLORIDE 0.9 %
1000 INTRAVENOUS SOLUTION INTRAVENOUS ONCE
Status: COMPLETED | OUTPATIENT
Start: 2024-12-24 | End: 2024-12-25

## 2024-12-24 RX ORDER — FUROSEMIDE 20 MG/1
20 TABLET ORAL 2 TIMES DAILY
Status: DISCONTINUED | OUTPATIENT
Start: 2024-12-24 | End: 2024-12-26 | Stop reason: HOSPADM

## 2024-12-24 RX ORDER — LEVALBUTEROL 1.25 MG/.5ML
1.25 SOLUTION, CONCENTRATE RESPIRATORY (INHALATION)
Status: DISCONTINUED | OUTPATIENT
Start: 2024-12-24 | End: 2024-12-26 | Stop reason: HOSPADM

## 2024-12-24 RX ORDER — CARVEDILOL 12.5 MG/1
12.5 TABLET ORAL 2 TIMES DAILY WITH MEALS
Status: DISCONTINUED | OUTPATIENT
Start: 2024-12-24 | End: 2024-12-26 | Stop reason: HOSPADM

## 2024-12-24 RX ADMIN — LEVALBUTEROL 1.25 MG: 1.25 SOLUTION, CONCENTRATE RESPIRATORY (INHALATION) at 15:33

## 2024-12-24 RX ADMIN — HYDROMORPHONE HYDROCHLORIDE 1 MG: 1 INJECTION, SOLUTION INTRAMUSCULAR; INTRAVENOUS; SUBCUTANEOUS at 01:58

## 2024-12-24 RX ADMIN — WATER 40 MG: 1 INJECTION INTRAMUSCULAR; INTRAVENOUS; SUBCUTANEOUS at 12:26

## 2024-12-24 RX ADMIN — SODIUM CHLORIDE 1000 ML: 9 INJECTION, SOLUTION INTRAVENOUS at 18:08

## 2024-12-24 RX ADMIN — LEVETIRACETAM 750 MG: 500 TABLET, FILM COATED ORAL at 21:03

## 2024-12-24 RX ADMIN — OXYCODONE HYDROCHLORIDE 10 MG: 5 TABLET ORAL at 11:38

## 2024-12-24 RX ADMIN — LEVALBUTEROL 1.25 MG: 1.25 SOLUTION, CONCENTRATE RESPIRATORY (INHALATION) at 07:53

## 2024-12-24 RX ADMIN — OXYCODONE HYDROCHLORIDE 5 MG: 5 TABLET ORAL at 23:58

## 2024-12-24 RX ADMIN — DICYCLOMINE HYDROCHLORIDE 10 MG: 10 CAPSULE ORAL at 11:38

## 2024-12-24 RX ADMIN — LEVALBUTEROL 1.25 MG: 1.25 SOLUTION, CONCENTRATE RESPIRATORY (INHALATION) at 04:14

## 2024-12-24 RX ADMIN — LEVETIRACETAM 750 MG: 500 TABLET, FILM COATED ORAL at 09:41

## 2024-12-24 RX ADMIN — DICYCLOMINE HYDROCHLORIDE 10 MG: 10 CAPSULE ORAL at 17:56

## 2024-12-24 RX ADMIN — ISODIUM CHLORIDE 3 ML: 0.03 SOLUTION RESPIRATORY (INHALATION) at 11:39

## 2024-12-24 RX ADMIN — ISODIUM CHLORIDE 3 ML: 0.03 SOLUTION RESPIRATORY (INHALATION) at 22:52

## 2024-12-24 RX ADMIN — DIPHENHYDRAMINE HCL 25 MG: 25 TABLET ORAL at 21:58

## 2024-12-24 RX ADMIN — HYDROMORPHONE HYDROCHLORIDE 1 MG: 1 INJECTION, SOLUTION INTRAMUSCULAR; INTRAVENOUS; SUBCUTANEOUS at 06:19

## 2024-12-24 RX ADMIN — ISODIUM CHLORIDE 3 ML: 0.03 SOLUTION RESPIRATORY (INHALATION) at 07:53

## 2024-12-24 RX ADMIN — LEVALBUTEROL 1.25 MG: 1.25 SOLUTION, CONCENTRATE RESPIRATORY (INHALATION) at 11:39

## 2024-12-24 RX ADMIN — OXYCODONE HYDROCHLORIDE 10 MG: 5 TABLET ORAL at 00:45

## 2024-12-24 RX ADMIN — WATER 40 MG: 1 INJECTION INTRAMUSCULAR; INTRAVENOUS; SUBCUTANEOUS at 17:50

## 2024-12-24 RX ADMIN — BUDESONIDE AND FORMOTEROL FUMARATE DIHYDRATE 2 PUFF: 160; 4.5 AEROSOL RESPIRATORY (INHALATION) at 19:42

## 2024-12-24 RX ADMIN — WATER 40 MG: 1 INJECTION INTRAMUSCULAR; INTRAVENOUS; SUBCUTANEOUS at 21:59

## 2024-12-24 RX ADMIN — DICYCLOMINE HYDROCHLORIDE 10 MG: 10 CAPSULE ORAL at 06:11

## 2024-12-24 RX ADMIN — KETOROLAC TROMETHAMINE 15 MG: 15 INJECTION, SOLUTION INTRAMUSCULAR; INTRAVENOUS at 18:01

## 2024-12-24 RX ADMIN — ISODIUM CHLORIDE 3 ML: 0.03 SOLUTION RESPIRATORY (INHALATION) at 19:38

## 2024-12-24 RX ADMIN — TIZANIDINE 4 MG: 4 TABLET ORAL at 21:59

## 2024-12-24 RX ADMIN — OXYCODONE HYDROCHLORIDE 5 MG: 5 TABLET ORAL at 17:55

## 2024-12-24 RX ADMIN — DIPHENHYDRAMINE HCL 25 MG: 25 TABLET ORAL at 12:26

## 2024-12-24 RX ADMIN — HEPARIN SODIUM 5000 UNITS: 5000 INJECTION INTRAVENOUS; SUBCUTANEOUS at 22:02

## 2024-12-24 RX ADMIN — LEVALBUTEROL 1.25 MG: 1.25 SOLUTION, CONCENTRATE RESPIRATORY (INHALATION) at 22:52

## 2024-12-24 RX ADMIN — LEVALBUTEROL 1.25 MG: 1.25 SOLUTION, CONCENTRATE RESPIRATORY (INHALATION) at 19:38

## 2024-12-24 RX ADMIN — OXYCODONE HYDROCHLORIDE 10 MG: 5 TABLET ORAL at 07:48

## 2024-12-24 RX ADMIN — HYDROMORPHONE HYDROCHLORIDE 1 MG: 1 INJECTION, SOLUTION INTRAMUSCULAR; INTRAVENOUS; SUBCUTANEOUS at 09:32

## 2024-12-24 RX ADMIN — SERTRALINE HYDROCHLORIDE 100 MG: 50 TABLET ORAL at 09:31

## 2024-12-24 RX ADMIN — HEPARIN SODIUM 5000 UNITS: 5000 INJECTION INTRAVENOUS; SUBCUTANEOUS at 06:10

## 2024-12-24 RX ADMIN — GUAIFENESIN 600 MG: 600 TABLET ORAL at 09:32

## 2024-12-24 RX ADMIN — FUROSEMIDE 20 MG: 20 TABLET ORAL at 09:32

## 2024-12-24 RX ADMIN — AZITHROMYCIN DIHYDRATE 500 MG: 250 TABLET ORAL at 21:03

## 2024-12-24 RX ADMIN — WATER 40 MG: 1 INJECTION INTRAMUSCULAR; INTRAVENOUS; SUBCUTANEOUS at 04:08

## 2024-12-24 RX ADMIN — FAMOTIDINE 20 MG: 20 TABLET, FILM COATED ORAL at 09:32

## 2024-12-24 RX ADMIN — OXYCODONE HYDROCHLORIDE 10 MG: 5 TABLET ORAL at 05:10

## 2024-12-24 RX ADMIN — FAMOTIDINE 20 MG: 20 TABLET, FILM COATED ORAL at 21:03

## 2024-12-24 RX ADMIN — GUAIFENESIN 600 MG: 600 TABLET ORAL at 21:03

## 2024-12-24 RX ADMIN — DIPHENHYDRAMINE HCL 25 MG: 25 TABLET ORAL at 17:57

## 2024-12-24 RX ADMIN — SODIUM CHLORIDE, PRESERVATIVE FREE 10 ML: 5 INJECTION INTRAVENOUS at 09:39

## 2024-12-24 RX ADMIN — SODIUM CHLORIDE, PRESERVATIVE FREE 10 ML: 5 INJECTION INTRAVENOUS at 21:06

## 2024-12-24 RX ADMIN — DICYCLOMINE HYDROCHLORIDE 10 MG: 10 CAPSULE ORAL at 21:04

## 2024-12-24 RX ADMIN — HYDROMORPHONE HYDROCHLORIDE 1 MG: 1 INJECTION, SOLUTION INTRAMUSCULAR; INTRAVENOUS; SUBCUTANEOUS at 04:08

## 2024-12-24 RX ADMIN — CARVEDILOL 12.5 MG: 12.5 TABLET, FILM COATED ORAL at 09:32

## 2024-12-24 RX ADMIN — HEPARIN SODIUM 5000 UNITS: 5000 INJECTION INTRAVENOUS; SUBCUTANEOUS at 12:26

## 2024-12-24 RX ADMIN — ISODIUM CHLORIDE 3 ML: 0.03 SOLUTION RESPIRATORY (INHALATION) at 04:14

## 2024-12-24 RX ADMIN — CARVEDILOL 12.5 MG: 12.5 TABLET, FILM COATED ORAL at 17:55

## 2024-12-24 RX ADMIN — AZITHROMYCIN DIHYDRATE 500 MG: 250 TABLET ORAL at 00:30

## 2024-12-24 RX ADMIN — ISODIUM CHLORIDE 3 ML: 0.03 SOLUTION RESPIRATORY (INHALATION) at 15:33

## 2024-12-24 RX ADMIN — ROFLUMILAST 500 MCG: 500 TABLET ORAL at 09:42

## 2024-12-24 RX ADMIN — BUDESONIDE AND FORMOTEROL FUMARATE DIHYDRATE 2 PUFF: 160; 4.5 AEROSOL RESPIRATORY (INHALATION) at 07:53

## 2024-12-24 RX ADMIN — DIPHENHYDRAMINE HCL 25 MG: 25 TABLET ORAL at 09:32

## 2024-12-24 ASSESSMENT — PAIN SCALES - GENERAL
PAINLEVEL_OUTOF10: 5
PAINLEVEL_OUTOF10: 8
PAINLEVEL_OUTOF10: 9
PAINLEVEL_OUTOF10: 6
PAINLEVEL_OUTOF10: 8
PAINLEVEL_OUTOF10: 7
PAINLEVEL_OUTOF10: 6
PAINLEVEL_OUTOF10: 7
PAINLEVEL_OUTOF10: 7
PAINLEVEL_OUTOF10: 9
PAINLEVEL_OUTOF10: 6
PAINLEVEL_OUTOF10: 7
PAINLEVEL_OUTOF10: 8
PAINLEVEL_OUTOF10: 7
PAINLEVEL_OUTOF10: 3
PAINLEVEL_OUTOF10: 7
PAINLEVEL_OUTOF10: 9
PAINLEVEL_OUTOF10: 8
PAINLEVEL_OUTOF10: 8
PAINLEVEL_OUTOF10: 4
PAINLEVEL_OUTOF10: 8
PAINLEVEL_OUTOF10: 6
PAINLEVEL_OUTOF10: 7
PAINLEVEL_OUTOF10: 10
PAINLEVEL_OUTOF10: 6
PAINLEVEL_OUTOF10: 7

## 2024-12-24 ASSESSMENT — PAIN DESCRIPTION - ORIENTATION
ORIENTATION: LOWER

## 2024-12-24 ASSESSMENT — PAIN DESCRIPTION - LOCATION
LOCATION: ABDOMEN
LOCATION: ABDOMEN;CHEST
LOCATION: CHEST;ABDOMEN
LOCATION: ABDOMEN

## 2024-12-24 ASSESSMENT — PAIN DESCRIPTION - DESCRIPTORS
DESCRIPTORS: TENDER
DESCRIPTORS: BURNING;OTHER (COMMENT)
DESCRIPTORS: BURNING
DESCRIPTORS: TENDER
DESCRIPTORS: BURNING
DESCRIPTORS: TENDER
DESCRIPTORS: BURNING
DESCRIPTORS: ACHING
DESCRIPTORS: BURNING

## 2024-12-24 ASSESSMENT — PAIN - FUNCTIONAL ASSESSMENT
PAIN_FUNCTIONAL_ASSESSMENT: PREVENTS OR INTERFERES SOME ACTIVE ACTIVITIES AND ADLS
PAIN_FUNCTIONAL_ASSESSMENT: ACTIVITIES ARE NOT PREVENTED
PAIN_FUNCTIONAL_ASSESSMENT: PREVENTS OR INTERFERES SOME ACTIVE ACTIVITIES AND ADLS

## 2024-12-24 ASSESSMENT — PAIN DESCRIPTION - PAIN TYPE
TYPE: ACUTE PAIN

## 2024-12-24 ASSESSMENT — PAIN DESCRIPTION - FREQUENCY
FREQUENCY: INTERMITTENT
FREQUENCY: CONTINUOUS
FREQUENCY: INTERMITTENT
FREQUENCY: CONTINUOUS
FREQUENCY: INTERMITTENT
FREQUENCY: CONTINUOUS
FREQUENCY: CONTINUOUS

## 2024-12-24 ASSESSMENT — PAIN DESCRIPTION - ONSET
ONSET: ON-GOING

## 2024-12-24 NOTE — H&P
..  [facility]  Office: [phone]  Wander Schrader DO, Clive Garcia DO, Jw Manriquez DO, Seng Munoz DO, Georgina Padilla MD, Jerri Augustin MD, Jose Santiago MD, Lilian Frias MD,  Oleg Baltazar MD, Jorge Rodas MD, Yvrose Stout MD,  Berry Vega DO, Jacqueline Batista MD, Federico Dawson MD, Ricci Schrader DO, Silvana Hicks MD,  Hermelindo Emery DO, Jing Marie MD, Elmira Gilbert MD, Leana Levy MD, Bethany Pineda MD,  Manuel Clements MD, Belem Yeager MD, Yasmany Shea MD, Rosetta Berry MD, Enzo Escalante MD, Josh Felix MD, Stas Moody DO, Honorio Buitrago MD, Shirley Waterhouse, CNP,  Marge Barahona, CNP, Stas Waters, CNP,  Elissa Salmeron, NICKI, Orly Correia, CNP, Fina Hanks, CNP, Erika Gallo, CNP, Kimberly Rojo, CNP, Soila Valle PA-C, Laurie Garza PA-C, Joyce Giron, CNP, Ryan Ferguson, CNP,  Jami Calderón, CNP, Radha Ceron, CNP,  Elisa Thomason, CNP, Liliane Bee, CNP       [facility]   IN-PATIENT SERVICE   [facility]    HISTORY AND PHYSICAL EXAMINATION            Date:   12/24/2024  Patient name:  Rene Melgoza  Date of admission:  12/23/2024  3:08 PM  MRN:   [id number]  Account:  [id number]  YOB: 1965  PCP:    Edgar Castellanos MD  Room:   2035/2035-01  Code Status:    Full Code    Chief Complaint:     Chief Complaint   Patient presents with    Abdominal Pain    Chest Pain    Urinary Frequency    Shortness of Breath     Was at [facility] for symptoms of urinary frequency and retention, they were unable to cath patient due to prostrate enlargement. Patient states history of lung CA, state chest pain and abdominal pain        History Obtained From:     patient, electronic medical record    History of Present Illness:     Rene Melgoza is a 59 y.o. Unavailable / unknown male who presents with Abdominal Pain, Chest Pain, Urinary Frequency, and Shortness of Breath (Was at [facility] for symptoms of  atelectasis.  No acute pulmonary infiltrate. 3. Multiple indeterminate hepatic lesions.  The largest right hepatic lesion measures 3.1 cm in diameter.  Given the patient's history of cancer, correlate of imaging may exist and would be helpful if available to evaluate for interval change.  If there is no comparison imaging, recommend MRI follow-up for more complete assessment. 4. No other acute findings within the abdomen or pelvis.  No acute bowel pathology. 5. Status post cholecystectomy and prostatectomy.     XR CHEST (SINGLE VIEW FRONTAL)    Result Date: 12/23/2024  No acute cardiopulmonary process.       Assessment :      Hospital Problems             Last Modified POA    * (Principal) Hypoxia 12/23/2024 Yes    Acute exacerbation of chronic obstructive pulmonary disease (COPD) (HCC) 12/24/2024 Yes       Plan:     Patient status inpatient in the  Progressive Unit/Step down    Acute COPD exacerbation with h/o tobacco use and lung CA  PRN xopenex due to tachycardia history  Continuous O2 for SpO2 goal>88, continuous SpO2 monitoring, RT eval, IS  Needed Zithromax and Rocephin empirically  Consult pulmonology  DVT prophylaxis and PPI prophylaxis  As needed pain and nausea medications  Continue steroids, home CPAP  Daily BMP, CBC  As needed bladder scan, monitor I's/O- may require urology consult if retention is present  PT/OT  Resume home medications as appropriate-it is noted he is not taking many of them   Pt has lantus and humalog in home med list, but I do not see a diabetic diagnosis and he denies one. I will add ISS for coverage while on steroids    Consultations:   IP CONSULT TO INTERNAL MEDICINE  IP CONSULT TO PULMONOLOGY    Patient is admitted as inpatient status because of co-morbidities listed above, severity of signs and symptoms as outlined, requirement for current medical therapies and most importantly because of direct risk to patient if care not provided in a hospital setting.  Expected length of stay

## 2024-12-24 NOTE — PROGRESS NOTES
[facility]  Office: [phone]  Wander Schrader DO, Clive Garcia DO, Jw Manriquez DO, Seng Munoz DO, Georgina Padilla MD, Jerri Augustin MD, Jose Santiago MD, Lilian Frias MD,  Oleg Baltazar MD, Jorge Rodas MD, Yvrose Stout MD,  Berry Vega DO, Jacqueline Batista MD, Federico Dawson MD, Ricci Schrader DO, Silvana Hicks MD,  Hermelindo Emery DO, Jing Marie MD, Elmira Gilbert MD, Leana Levy MD, Bethany Pineda MD,  Manuel Clements MD, Belem Yeager MD, Yasmany Shea MD, Rosetta Berry MD, Enzo Escalante MD, Josh Felix MD, Stas Moody DO, Honorio Buitrago MD, Shirley Waterhouse, CNP,  Marge Barahona, CNP, Stas Waters, CNP,  Elissa Salmeron, NICKI, Orly Correia, CNP, Fina Hanks, CNP, Erika Gallo, CNP, Kimberly Rojo, CNP, GWYN WhiteC, GWYN JohnsonC, Joyce Giron, CNP, Ryan Ferguson, CNP,  Jami Calderón, CNP, Radha Ceron, CNP,  Elisa Thomason, CNP, Liliane Bee, CNP         [facility]   IN-PATIENT SERVICE   [facility]    Progress Note    12/24/2024    1:14 PM    Name:   Rene Melgoza  MRN:     [id number]     Acct:      [id number]   Room:   2035/2035-01  IP Day:  1  Admit Date:  12/23/2024  3:08 PM    PCP:   Edgar Castellanos MD  Code Status:  DNR-CCA    Subjective:     C/C:   Chief Complaint   Patient presents with    Abdominal Pain    Chest Pain    Urinary Frequency    Shortness of Breath     Was at [facility] for symptoms of urinary frequency and retention, they were unable to cath patient due to prostrate enlargement. Patient states history of lung CA, state chest pain and abdominal pain      Interval History Status: not changed.     Very poor historian-his history is all over the place.  Numerous complaints-sob: yet on his home dose of 5L o2 24/7, cp, palpitations, abd pain, sweats, weakness, falls, urinary incontinence (not new-has had for long time, but he says is worsening) that he states makes his sob  worse    Brief History:     Per my VALORIE:  \"  Patient reports over the past couple days having increased shortness of breath with minimal exertion, far above his baseline. He was seen by [facility] urology earlier today for symptoms of urinary frequency, retention, suprapubic pain.  While attempting Gomez placement patient had sudden onset of chest pain and shortness of breath.  He was sent to the emergency department.  He denies hematuria, nausea, vomiting, fevers, chills, recent travel or sick contact, hemoptysis.  Chest pain was reproducible and currently subsided.  He reports having increased dry cough.  Negative nausea, vomiting, diarrhea. \"    Review of Systems:     Constitutional:  negative for chills, fevers, sweats  Respiratory:  positive for dyspnea on exertion, shortness of breath, wheezing  Cardiovascular:  positive for chest pain, chest pressure/discomfort, palpitations  Gastrointestinal:  negative for constipation, diarrhea, nausea, vomiting  Neurological:  negative for dizziness, headache    Medications:     Allergies:  No Known Allergies    Current Meds:   Scheduled Meds:    heparin (porcine)  5,000 Units SubCUTAneous 3 times per day    budesonide-formoterol  2 puff Inhalation BID    carvedilol  12.5 mg Oral BID WC    dicyclomine  10 mg Oral 4x Daily AC & HS    diphenhydrAMINE  25 mg Oral 4x Daily    furosemide  20 mg Oral BID    levETIRAcetam  750 mg Oral BID    Roflumilast  500 mcg Oral Daily    sertraline  100 mg Oral Daily    insulin lispro  0-8 Units SubCUTAneous 4x Daily AC & HS    levalbuterol  1.25 mg Nebulization Q4H RT    sodium chloride  80 mL IntraVENous Once    sodium chloride flush  5-40 mL IntraVENous 2 times per day    methylPREDNISolone  40 mg IntraVENous Q6H    Followed by    [START ON 12/26/2024] predniSONE  40 mg Oral Daily    cefTRIAXone (ROCEPHIN) IV  1,000 mg IntraVENous Q24H    azithromycin  500 mg Oral Daily    famotidine  20 mg Oral BID    guaiFENesin  600 mg Oral BID     Continuous  Infusions:    dextrose      sodium chloride       PRN Meds: HYDROmorphone, oxyCODONE, hydrOXYzine HCl, glucose, dextrose bolus **OR** dextrose bolus, glucagon (rDNA), dextrose, sodium chloride nebulizer, sodium chloride flush, sodium chloride, ondansetron **OR** ondansetron, polyethylene glycol, acetaminophen **OR** acetaminophen, guaiFENesin-dextromethorphan, benzonatate    Data:     Past Medical History:   has no past medical history on file.    Social History:   reports that he quit smoking about 50 years ago. His smoking use included cigarettes. He has been exposed to tobacco smoke. His smokeless tobacco use includes chew.     Family History: No family history on file.    Vitals:  BP (!) 158/104   Pulse 85   Temp 96.9 °F (36.1 °C) (Temporal)   Resp 16   Ht 1.651 m (5' 5\")   Wt 73.9 kg (163 lb)   SpO2 (!) 88%   BMI 27.12 kg/m²   Temp (24hrs), Av.8 °F (36.6 °C), Min:96.9 °F (36.1 °C), Max:99.7 °F (37.6 °C)    Recent Labs     24  0742 24  1151   POCGLU 129* 149*       I/O (24Hr):    Intake/Output Summary (Last 24 hours) at 2024 1314  Last data filed at 2024 1140  Gross per 24 hour   Intake 740 ml   Output 865 ml   Net -125 ml       Labs:  Hematology:  Recent Labs     24  1519 24  0442   WBC 9.6 7.9   RBC 4.64 4.63   HGB 14.5 14.3   HCT 43.3 44.1   MCV 93.3 95.2   MCH 31.3 30.9   MCHC 33.5 32.4   RDW 12.1 12.1    252   MPV 10.2 9.9     Chemistry:  Recent Labs     24  1519 24  1846 24  0442   *  --  137   K 4.2  --  4.1   CL 92*  --  96*   CO2 29  --  31   GLUCOSE 112  --  142*   BUN 14  --  15   CREATININE 0.6*  --  0.8   ANIONGAP 12  --  10   LABGLOM >90  --  >90   CALCIUM 9.5  --  9.8   TROPHS 14 15  --      Recent Labs     24  1519 24  0742 24  1151   AST 13  --   --    ALT 15  --   --    ALKPHOS 59  --   --    BILITOT <0.2  --   --    LIPASE 20  --   --    POCGLU  --  129* 149*     ABG:No results found for: \"POCPH\",

## 2024-12-24 NOTE — PROGRESS NOTES
[facility]  Occupational Therapy Not Seen Note    Patient not available for Occupational Therapy due to:    [] Testing:    [] Hemodialysis    [] Cancelled by RN:    [x]Refusal by Patient:Pt adamantly refused eval due to pain issues even with edu and encouragement and RN was informed and aware     [] Surgery:     [] Intubation:     [] Pain Medication:    [] Sedation:     [] Spine Precautions :    [] Medical Instability:    [] Other:

## 2024-12-24 NOTE — PLAN OF CARE
Problem: Respiratory - Adult  Goal: Achieves optimal ventilation and oxygenation  12/24/2024 0756 by Georgia Schaefer RCP  Outcome: Progressing  12/24/2024 0020 by Vivian Bueno RCP  Outcome: Progressing  Flowsheets (Taken 12/23/2024 2228 by Shanika Morales, RN)  Achieves optimal ventilation and oxygenation:   Assess for changes in respiratory status   Assess for changes in mentation and behavior   Position to facilitate oxygenation and minimize respiratory effort   Assess and instruct to report shortness of breath or any respiratory difficulty   Respiratory therapy support as indicated  Goal: Able to breathe comfortably  12/24/2024 0756 by Georgia Schaefer RCP  Outcome: Progressing  12/24/2024 0020 by Vivian Bueno RCP  Outcome: Progressing

## 2024-12-24 NOTE — ED PROVIDER NOTES
EMERGENCY DEPARTMENT ENCOUNTER    Pt Name: Rene Melgoza  MRN: [id number]  Birthdate 1965  Date of evaluation: 12/23/24  CHIEF COMPLAINT       Chief Complaint   Patient presents with    Abdominal Pain    Chest Pain    Urinary Frequency    Shortness of Breath     Was at [facility] for symptoms of urinary frequency and retention, they were unable to cath patient due to prostrate enlargement. Patient states history of lung CA, state chest pain and abdominal pain      HISTORY OF PRESENT ILLNESS         REVIEW OF SYSTEMS     Review of Systems  PASTMEDICAL HISTORY   No past medical history on file.  Past Problem List  There is no problem list on file for this patient.    SURGICAL HISTORY     No past surgical history on file.  CURRENT MEDICATIONS       Previous Medications    ACETAMINOPHEN (TYLENOL) 160 MG/5ML SOLUTION    Take 15 mg/kg by mouth every 4 hours as needed for Fever    AMIODARONE (CORDARONE) 200 MG TABLET    Take 1 tablet by mouth 2 times daily    APIXABAN (ELIQUIS) 5 MG TABS TABLET    Take by mouth 2 times daily    BUDESONIDE-FORMOTEROL (SYMBICORT) 160-4.5 MCG/ACT AERO    Inhale 2 puffs into the lungs 2 times daily    CARVEDILOL (COREG) 12.5 MG TABLET    Take 1 tablet by mouth 2 times daily (with meals)    CYANOCOBALAMIN 1000 MCG TABLET    Take 1 tablet by mouth daily    DICYCLOMINE (BENTYL) 10 MG CAPSULE    Take 1 capsule by mouth 4 times daily (before meals and nightly)    DIPHENHYDRAMINE (BENADRYL) 25 MG CAPSULE    Take 1 capsule by mouth every 6 hours as needed for Itching    DOXYCYCLINE HYCLATE (VIBRA-TABS) 100 MG TABLET    Take 1 tablet by mouth 2 times daily    FERROUS SULFATE (IRON 325) 325 (65 FE) MG TABLET    Take 1 tablet by mouth daily (with breakfast)    FUROSEMIDE (LASIX) 20 MG TABLET    Take 1 tablet by mouth 2 times daily    INSULIN ASPART (NOVOLOG) 100 UNIT/ML INJECTION VIAL    Inject into the skin    INSULIN GLARGINE (LANTUS) 100 UNIT/ML INJECTION VIAL    Inject into the skin

## 2024-12-24 NOTE — PLAN OF CARE
Problem: Discharge Planning  Goal: Discharge to home or other facility with appropriate resources  Outcome: Progressing     Problem: Pain  Goal: Verbalizes/displays adequate comfort level or baseline comfort level  Outcome: Progressing     Problem: Safety - Adult  Goal: Free from fall injury  Outcome: Progressing     Problem: Respiratory - Adult  Goal: Achieves optimal ventilation and oxygenation  12/24/2024 1243 by Salma Cabrera, RN  Outcome: Progressing  12/24/2024 0756 by Georgia Schaefer RCP  Outcome: Progressing  12/24/2024 0020 by Vivian Bueno RCP  Outcome: Progressing  Flowsheets (Taken 12/23/2024 2228 by Shanika Morales, RN)  Achieves optimal ventilation and oxygenation:   Assess for changes in respiratory status   Assess for changes in mentation and behavior   Position to facilitate oxygenation and minimize respiratory effort   Assess and instruct to report shortness of breath or any respiratory difficulty   Respiratory therapy support as indicated  Goal: Able to breathe comfortably  12/24/2024 0756 by Georgia Schaefer RCP  Outcome: Progressing  12/24/2024 0020 by Vivian Bueno RCP  Outcome: Progressing

## 2024-12-24 NOTE — CONSULTS
Pulmonary Medicine and Critical Care Consult    Patient - Rene Melgoza   MRN -  [id number]   Acct # - [id number]   - 1965      Date of Admission -  2024  3:08 PM  Date of evaluation -  2024  Room - -   Hospital Day - 1  Consulting - Seng Munoz DO Primary Care Physician - Edgar Castellanos MD     Reason for Consult      COPD exacerbation/ BiPAP management  Assessment/recommendations     Acute on chronic hypoxic respiratory failure  Oxygen with BiPAP support   Home O2 eval before discharge  Incentive spirometer q.1h awake    COPD exacerbation/atelectasis/history of lung nodule  DuoNeb by nebulizer  Symbicort 160 daily  Solu-Medrol IV 40 q.6 hours/off prednisone 20 mg daily chronic dose  Mucinex  Zithromax 500 daily empirically  Resume Daliresp      Ex Smoker  Advised to abstain    CAD status post MI/paroxysmal AFib  Cardiology on consult    Liver lesions/cirrhosis, GERD, history of EtOH abuse  Consider MRI of the liver/liver biopsy/GI consultation    Anxiety, bipolar disorder/chronic pain status post sacral stimulator  Management per primary    History of seizure    Urinary retention/BPH  Urology follow-up    Peptic ulcer disease prophylaxis  DVT prophylaxis   Patient is correct medical record #[id number] please merge record      Problem List      Patient Active Problem List   Diagnosis    Chronic hypoxemic respiratory failure    Acute exacerbation of chronic obstructive pulmonary disease (COPD) (HCC)    Urinary incontinence       HPI     Rene Melgoza is 59 y.o.,  male, previous medical history of chronic respiratory failure on home oxygen at 5 L 24 hours a day secondary advanced COPD on chronic prednisone 20 mg daily not able to wean, liver cirrhosis with history of alcohol abuse, hep C, coronary artery disease status post MI, paroxysmal AFib status post ablation, seizures, chronic pain status post pain stimulator, anxiety/bipolar disorder, history of seizures with  on file   Social Connections: Not on file   Intimate Partner Violence: Not on file   Housing Stability: Low Risk  (12/23/2024)    Housing Stability Vital Sign     Unable to Pay for Housing in the Last Year: No     Number of Times Moved in the Last Year: 1     Homeless in the Last Year: No     Family History    No family history on file.  ROS - 11 systems   General Denies any fever or chills  HEENT Denies any diplopia, tinnitus or vertigo  Resp as above  Cardiac Denies any chest pain, palpitations, claudication or edema  GI Denies any melena, hematochezia, hematemesis or pyrosis   Denies any frequency, urgency, hesitancy or incontinence  Heme Denies bruising or bleeding easily  Endocrine Denies any history of diabetes or thyroid disease  Neuro Denies any focal motor or sensory deficits  Psychiatric Denies anxiety, depression, suicidal ideation  Skin Denies rashes, itching, open sores    Vitals     height is 1.651 m (5' 5\") and weight is 73.9 kg (163 lb). His oral temperature is 98 °F (36.7 °C). His blood pressure is 158/104 (abnormal) and his pulse is 85. His respiration is 16 and oxygen saturation is 88% (abnormal).   Body mass index is 27.12 kg/m².  I/O      Intake/Output Summary (Last 24 hours) at 12/24/2024 1503  Last data filed at 12/24/2024 1140  Gross per 24 hour   Intake 740 ml   Output 865 ml   Net -125 ml     I/O last 3 completed shifts:  In: -   Out: 425 [Urine:425]   Patient Vitals for the past 96 hrs (Last 3 readings):   Weight   12/24/24 0408 73.9 kg (163 lb)   12/23/24 1511 73.5 kg (162 lb)     Exam   General Appearance  Awake, alert, oriented, in no acute distress  HEENT - Head is normocephalic, atraumatic. Pupil reactive to light  Neck - Supple, symmetrical, trachea midline and Soft, trachea midline and straight  Lungs -decreased breath sound no crackles or wheeze  Cardiovascular - Heart sounds are normal.  Regular rhythm normal rate without murmur, gallop or rub.  Abdomen - Soft, nontender,

## 2024-12-24 NOTE — FLOWSHEET NOTE
Writer contacted NP Calfee for pain medication per pt request. No pain medications in MAR upon admission to unit. After first dose, pt pain not well controlled. Reached out to NP Calfee and additional orders received.      12/24/24 0000   Treatment Team Notification   Reason for Communication Medication concern   Name of Team Member Notified J Katelin   Treatment Team Role Advanced Practice Nurse   Method of Communication Secure Message   Response See orders   Notification Time [id number]

## 2024-12-24 NOTE — PROGRESS NOTES
Physical Therapy  DATE: 2024    NAME: Rene Melgoza  MRN: [id number]   : 1965    Patient not seen this date for Physical Therapy due to:      [] Cancel by RN or physician due to:    [x] Refusal by Patient:Pt adamantly refused eval due to pain issues even with [facility] and encouragement. RN was informed and aware           WU LEONG, PT

## 2024-12-24 NOTE — CARE COORDINATION
Case Management Assessment  Initial Evaluation    Date/Time of Evaluation: 12/24/2024 10:12 AM  Assessment Completed by: RAFAL OTOOLE RN    If patient is discharged prior to next notation, then this note serves as note for discharge by case management.    Patient Name: Rene Melgoza                   YOB: 1965  Diagnosis: Hypoxia [R09.02]  Chest pain, unspecified type [R07.9]                   Date / Time: 12/23/2024  3:08 PM    Patient Admission Status: Inpatient   Readmission Risk (Low < 19, Mod (19-27), High > 27): Readmission Risk Score: 8.6    Current PCP: Edgar Castellanos MD  PCP verified by CM? Yes    Chart Reviewed: Yes      History Provided by: Patient  Patient Orientation: Alert and Oriented, Person, Place, Situation, Self    Patient Cognition: Alert    Hospitalization in the last 30 days (Readmission):  No    If yes, Readmission Assessment in  Navigator will be completed.    Advance Directives:      Code Status: DNR-CC   Patient's Primary Decision Maker is: Legal Next of Kin      Discharge Planning:    Patient lives with: Alone Type of Home: Apartment  Primary Care Giver: Self  Patient Support Systems include: Family Members   Current Financial resources: None  Current community resources: None  Current services prior to admission: Durable Medical Equipment            Current DME: Walker, Shower Chair            Type of Home Care services:  PT, OT, Skilled Therapy    ADLS  Prior functional level: Independent in ADLs/IADLs  Current functional level: Assistance with the following:, Toileting, Cooking, Housework, Shopping, Mobility    PT AM-PAC:   /24  OT AM-PAC:   /24    Family can provide assistance at DC: No  Would you like Case Management to discuss the discharge plan with any other family members/significant others, and if so, who? Yes (sister and niece morgan [phone])  Plans to Return to Present Housing: Unknown at present  Other Identified Issues/Barriers to RETURNING to

## 2024-12-24 NOTE — PROGRESS NOTES
End Of Shift Note  [facility]  Summary of shift: Pt arrived to unit with ABD pain that continued throughout shift. Orders updated. Pt got multiple doses of PRN dilaudid and Roxicodone. Pt pain decreased from 9 to 6. Pain has continued to fluctuate up and down. Pt calls out appropriately for pain medication. Pt on 6 L NC upon arrival. Able to wean down to 5 L, which is home dose. SPO2 remained in the 90's overnight. Pt did have complaint of chest pain which he stated felt like muscle pain and was sweaty. EKG showed NSR with right BBB. Plan is for pt to see intermed and pulmonology today.    Vitals:    Vitals:    12/24/24 0408 12/24/24 0748 12/24/24 0754 12/24/24 0756   BP:  (!) 149/93     Pulse:   90 90   Resp:       Temp:  97.7 °F (36.5 °C)     TempSrc:  Oral     SpO2:    93%   Weight: 73.9 kg (163 lb)      Height:            I&O:   Intake/Output Summary (Last 24 hours) at 12/24/2024 0808  Last data filed at 12/24/2024 0626  Gross per 24 hour   Intake --   Output 425 ml   Net -425 ml       Resp Status: 5-6 L NC    Ventilator Settings:     / / /     Critical Care IV infusions:   dextrose      sodium chloride          LDA:   Peripheral IV 12/23/24 Left;Posterior Wrist (Active)   Number of days: 0       Peripheral IV 12/23/24 Left;Anterior Forearm (Active)   Number of days: 0

## 2024-12-24 NOTE — RT PROTOCOL NOTE
RT Inhaler-Nebulizer Bronchodilator Protocol Note    There is a bronchodilator order in the chart from a provider indicating to follow the RT Bronchodilator Protocol and there is an “Initiate RT Inhaler-Nebulizer Bronchodilator Protocol” order as well (see protocol at bottom of note).    CXR Findings:  No results found.    The findings from the last RT Protocol Assessment were as follows:   History Pulmonary Disease: Chronic pulmonary disease  Respiratory Pattern: Mild dyspnea at rest, irregular pattern, or RR 21-25 bpm  Breath Sounds: Severe inspiratory and expiratory wheezing or severely diminished  Cough: Strong, spontaneous, non-productive  Indication for Bronchodilator Therapy:    Bronchodilator Assessment Score: 14    Aerosolized bronchodilator medication orders have been revised according to the RT Inhaler-Nebulizer Bronchodilator Protocol below.    Respiratory Therapist to perform RT Therapy Protocol Assessment initially then follow the protocol.  Repeat RT Therapy Protocol Assessment PRN for score 0-3 or on second treatment, BID, and PRN for scores above 3.    No Indications - adjust the frequency to every 6 hours PRN wheezing or bronchospasm, if no treatments needed after 48 hours then discontinue using Per Protocol order mode.     If indication present, adjust the RT bronchodilator orders based on the Bronchodilator Assessment Score as indicated below.  Use Inhaler orders unless patient has one or more of the following: on home nebulizer, not able to hold breath for 10 seconds, is not alert and oriented, cannot activate and use MDI correctly, or respiratory rate 25 breaths per minute or more, then use the equivalent nebulizer order(s) with same Frequency and PRN reasons based on the score.  If a patient is on this medication at home then do not decrease Frequency below that used at home.    0-3 - enter or revise RT bronchodilator order(s) to equivalent RT Bronchodilator order with Frequency of every 4  hours PRN for wheezing or increased work of breathing using Per Protocol order mode.        4-6 - enter or revise RT Bronchodilator order(s) to two equivalent RT bronchodilator orders with one order with BID Frequency and one order with Frequency of every 4 hours PRN wheezing or increased work of breathing using Per Protocol order mode.        7-10 - enter or revise RT Bronchodilator order(s) to two equivalent RT bronchodilator orders with one order with TID Frequency and one order with Frequency of every 4 hours PRN wheezing or increased work of breathing using Per Protocol order mode.       11-13 - enter or revise RT Bronchodilator order(s) to one equivalent RT bronchodilator order with QID Frequency and an Albuterol order with Frequency of every 4 hours PRN wheezing or increased work of breathing using Per Protocol order mode.      Greater than 13 - enter or revise RT Bronchodilator order(s) to one equivalent RT bronchodilator order with every 4 hours Frequency and an Albuterol order with Frequency of every 2 hours PRN wheezing or increased work of breathing using Per Protocol order mode.     RT to enter RT Home Evaluation for COPD & MDI Assessment order using Per Protocol order mode.    Electronically signed by Vivian Bueno RCP on 12/23/2024 at 11:45 PM

## 2024-12-25 ENCOUNTER — ANESTHESIA EVENT (OUTPATIENT)
Dept: OPERATING ROOM | Age: 59
DRG: 140 | End: 2024-12-25
Payer: MEDICAID

## 2024-12-25 ENCOUNTER — ANESTHESIA (OUTPATIENT)
Dept: OPERATING ROOM | Age: 59
DRG: 140 | End: 2024-12-25
Payer: MEDICAID

## 2024-12-25 LAB
BILIRUB UR QL STRIP: NEGATIVE
CLARITY UR: CLEAR
COLOR UR: YELLOW
GLUCOSE BLD-MCNC: 128 MG/DL (ref 75–110)
GLUCOSE BLD-MCNC: 131 MG/DL (ref 75–110)
GLUCOSE BLD-MCNC: 134 MG/DL (ref 75–110)
GLUCOSE BLD-MCNC: 152 MG/DL (ref 75–110)
GLUCOSE BLD-MCNC: 162 MG/DL (ref 75–110)
GLUCOSE UR STRIP-MCNC: NEGATIVE MG/DL
HGB UR QL STRIP.AUTO: NEGATIVE
KETONES UR STRIP-MCNC: NEGATIVE MG/DL
LEUKOCYTE ESTERASE UR QL STRIP: NEGATIVE
NITRITE UR QL STRIP: NEGATIVE
PH UR STRIP: 5.5 [PH] (ref 5–8)
PROT UR STRIP-MCNC: NEGATIVE MG/DL
PSA SERPL-MCNC: 0.23 NG/ML (ref 0–4)
SP GR UR STRIP: 1.02 (ref 1–1.03)
UROBILINOGEN UR STRIP-ACNC: NORMAL EU/DL (ref 0–1)

## 2024-12-25 PROCEDURE — 6360000002 HC RX W HCPCS: Performed by: ANESTHESIOLOGY

## 2024-12-25 PROCEDURE — 6370000000 HC RX 637 (ALT 250 FOR IP): Performed by: UROLOGY

## 2024-12-25 PROCEDURE — 6370000000 HC RX 637 (ALT 250 FOR IP)

## 2024-12-25 PROCEDURE — 6360000002 HC RX W HCPCS

## 2024-12-25 PROCEDURE — 6360000002 HC RX W HCPCS: Performed by: NURSE PRACTITIONER

## 2024-12-25 PROCEDURE — 3600000002 HC SURGERY LEVEL 2 BASE: Performed by: UROLOGY

## 2024-12-25 PROCEDURE — 84153 ASSAY OF PSA TOTAL: CPT

## 2024-12-25 PROCEDURE — 7100000001 HC PACU RECOVERY - ADDTL 15 MIN: Performed by: UROLOGY

## 2024-12-25 PROCEDURE — 7100000000 HC PACU RECOVERY - FIRST 15 MIN: Performed by: UROLOGY

## 2024-12-25 PROCEDURE — 3700000001 HC ADD 15 MINUTES (ANESTHESIA): Performed by: UROLOGY

## 2024-12-25 PROCEDURE — 81003 URINALYSIS AUTO W/O SCOPE: CPT

## 2024-12-25 PROCEDURE — 36415 COLL VENOUS BLD VENIPUNCTURE: CPT

## 2024-12-25 PROCEDURE — 2500000003 HC RX 250 WO HCPCS: Performed by: UROLOGY

## 2024-12-25 PROCEDURE — 2000000000 HC ICU R&B

## 2024-12-25 PROCEDURE — 94640 AIRWAY INHALATION TREATMENT: CPT

## 2024-12-25 PROCEDURE — 2700000000 HC OXYGEN THERAPY PER DAY

## 2024-12-25 PROCEDURE — 2709999900 HC NON-CHARGEABLE SUPPLY: Performed by: UROLOGY

## 2024-12-25 PROCEDURE — 3700000000 HC ANESTHESIA ATTENDED CARE: Performed by: UROLOGY

## 2024-12-25 PROCEDURE — 6360000002 HC RX W HCPCS: Performed by: UROLOGY

## 2024-12-25 PROCEDURE — 94761 N-INVAS EAR/PLS OXIMETRY MLT: CPT

## 2024-12-25 PROCEDURE — 6370000000 HC RX 637 (ALT 250 FOR IP): Performed by: INTERNAL MEDICINE

## 2024-12-25 PROCEDURE — 2500000003 HC RX 250 WO HCPCS

## 2024-12-25 PROCEDURE — C1769 GUIDE WIRE: HCPCS | Performed by: UROLOGY

## 2024-12-25 PROCEDURE — 82947 ASSAY GLUCOSE BLOOD QUANT: CPT

## 2024-12-25 PROCEDURE — 2580000003 HC RX 258

## 2024-12-25 PROCEDURE — 3600000012 HC SURGERY LEVEL 2 ADDTL 15MIN: Performed by: UROLOGY

## 2024-12-25 PROCEDURE — 99232 SBSQ HOSP IP/OBS MODERATE 35: CPT | Performed by: INTERNAL MEDICINE

## 2024-12-25 PROCEDURE — 83036 HEMOGLOBIN GLYCOSYLATED A1C: CPT

## 2024-12-25 RX ORDER — HYDROMORPHONE HYDROCHLORIDE 1 MG/ML
0.5 INJECTION, SOLUTION INTRAMUSCULAR; INTRAVENOUS; SUBCUTANEOUS EVERY 5 MIN PRN
Status: DISCONTINUED | OUTPATIENT
Start: 2024-12-25 | End: 2024-12-25 | Stop reason: HOSPADM

## 2024-12-25 RX ORDER — PROCHLORPERAZINE EDISYLATE 5 MG/ML
10 INJECTION INTRAMUSCULAR; INTRAVENOUS
Status: DISCONTINUED | OUTPATIENT
Start: 2024-12-25 | End: 2024-12-25 | Stop reason: HOSPADM

## 2024-12-25 RX ORDER — KETOROLAC TROMETHAMINE 15 MG/ML
15 INJECTION, SOLUTION INTRAMUSCULAR; INTRAVENOUS ONCE
Status: COMPLETED | OUTPATIENT
Start: 2024-12-25 | End: 2024-12-25

## 2024-12-25 RX ORDER — OXYCODONE HYDROCHLORIDE 5 MG/1
5 TABLET ORAL
Status: DISCONTINUED | OUTPATIENT
Start: 2024-12-25 | End: 2024-12-25 | Stop reason: HOSPADM

## 2024-12-25 RX ORDER — CEFAZOLIN SODIUM 1 G/3ML
INJECTION, POWDER, FOR SOLUTION INTRAMUSCULAR; INTRAVENOUS
Status: DISCONTINUED | OUTPATIENT
Start: 2024-12-25 | End: 2024-12-25 | Stop reason: SDUPTHER

## 2024-12-25 RX ORDER — LABETALOL HYDROCHLORIDE 5 MG/ML
10 INJECTION, SOLUTION INTRAVENOUS
Status: DISCONTINUED | OUTPATIENT
Start: 2024-12-25 | End: 2024-12-26 | Stop reason: HOSPADM

## 2024-12-25 RX ORDER — SODIUM CHLORIDE 0.9 % (FLUSH) 0.9 %
5-40 SYRINGE (ML) INJECTION PRN
Status: DISCONTINUED | OUTPATIENT
Start: 2024-12-25 | End: 2024-12-25 | Stop reason: HOSPADM

## 2024-12-25 RX ORDER — PROPOFOL 10 MG/ML
INJECTION, EMULSION INTRAVENOUS
Status: DISCONTINUED | OUTPATIENT
Start: 2024-12-25 | End: 2024-12-25 | Stop reason: SDUPTHER

## 2024-12-25 RX ORDER — MEPERIDINE HYDROCHLORIDE 50 MG/ML
12.5 INJECTION INTRAMUSCULAR; INTRAVENOUS; SUBCUTANEOUS EVERY 5 MIN PRN
Status: DISCONTINUED | OUTPATIENT
Start: 2024-12-25 | End: 2024-12-25 | Stop reason: HOSPADM

## 2024-12-25 RX ORDER — METOCLOPRAMIDE HYDROCHLORIDE 5 MG/ML
10 INJECTION INTRAMUSCULAR; INTRAVENOUS
Status: DISCONTINUED | OUTPATIENT
Start: 2024-12-25 | End: 2024-12-25 | Stop reason: HOSPADM

## 2024-12-25 RX ORDER — LIDOCAINE HYDROCHLORIDE 20 MG/ML
JELLY TOPICAL PRN
Status: DISCONTINUED | OUTPATIENT
Start: 2024-12-25 | End: 2024-12-25 | Stop reason: ALTCHOICE

## 2024-12-25 RX ORDER — FENTANYL CITRATE 50 UG/ML
25 INJECTION, SOLUTION INTRAMUSCULAR; INTRAVENOUS EVERY 5 MIN PRN
Status: DISCONTINUED | OUTPATIENT
Start: 2024-12-25 | End: 2024-12-25 | Stop reason: HOSPADM

## 2024-12-25 RX ORDER — KETOROLAC TROMETHAMINE 15 MG/ML
15 INJECTION, SOLUTION INTRAMUSCULAR; INTRAVENOUS EVERY 6 HOURS PRN
Status: DISCONTINUED | OUTPATIENT
Start: 2024-12-25 | End: 2024-12-26 | Stop reason: HOSPADM

## 2024-12-25 RX ORDER — DIPHENHYDRAMINE HYDROCHLORIDE 50 MG/ML
12.5 INJECTION INTRAMUSCULAR; INTRAVENOUS
Status: DISCONTINUED | OUTPATIENT
Start: 2024-12-25 | End: 2024-12-25 | Stop reason: HOSPADM

## 2024-12-25 RX ORDER — NALOXONE HYDROCHLORIDE 0.4 MG/ML
INJECTION, SOLUTION INTRAMUSCULAR; INTRAVENOUS; SUBCUTANEOUS PRN
Status: DISCONTINUED | OUTPATIENT
Start: 2024-12-25 | End: 2024-12-25 | Stop reason: HOSPADM

## 2024-12-25 RX ORDER — SODIUM CHLORIDE 0.9 % (FLUSH) 0.9 %
5-40 SYRINGE (ML) INJECTION EVERY 12 HOURS SCHEDULED
Status: DISCONTINUED | OUTPATIENT
Start: 2024-12-25 | End: 2024-12-25 | Stop reason: HOSPADM

## 2024-12-25 RX ORDER — LIDOCAINE HYDROCHLORIDE 20 MG/ML
INJECTION, SOLUTION EPIDURAL; INFILTRATION; INTRACAUDAL; PERINEURAL
Status: DISCONTINUED | OUTPATIENT
Start: 2024-12-25 | End: 2024-12-25 | Stop reason: SDUPTHER

## 2024-12-25 RX ORDER — SODIUM CHLORIDE 9 MG/ML
INJECTION, SOLUTION INTRAVENOUS PRN
Status: DISCONTINUED | OUTPATIENT
Start: 2024-12-25 | End: 2024-12-25 | Stop reason: HOSPADM

## 2024-12-25 RX ADMIN — ISODIUM CHLORIDE 3 ML: 0.03 SOLUTION RESPIRATORY (INHALATION) at 11:41

## 2024-12-25 RX ADMIN — BUDESONIDE AND FORMOTEROL FUMARATE DIHYDRATE 2 PUFF: 160; 4.5 AEROSOL RESPIRATORY (INHALATION) at 17:20

## 2024-12-25 RX ADMIN — TIZANIDINE 4 MG: 4 TABLET ORAL at 20:40

## 2024-12-25 RX ADMIN — BUDESONIDE AND FORMOTEROL FUMARATE DIHYDRATE 2 PUFF: 160; 4.5 AEROSOL RESPIRATORY (INHALATION) at 08:04

## 2024-12-25 RX ADMIN — OXYCODONE HYDROCHLORIDE 5 MG: 5 TABLET ORAL at 14:30

## 2024-12-25 RX ADMIN — WATER 40 MG: 1 INJECTION INTRAMUSCULAR; INTRAVENOUS; SUBCUTANEOUS at 06:18

## 2024-12-25 RX ADMIN — DIPHENHYDRAMINE HCL 25 MG: 25 TABLET ORAL at 16:15

## 2024-12-25 RX ADMIN — CARVEDILOL 12.5 MG: 12.5 TABLET, FILM COATED ORAL at 16:15

## 2024-12-25 RX ADMIN — LEVALBUTEROL 1.25 MG: 1.25 SOLUTION, CONCENTRATE RESPIRATORY (INHALATION) at 11:41

## 2024-12-25 RX ADMIN — HEPARIN SODIUM 5000 UNITS: 5000 INJECTION INTRAVENOUS; SUBCUTANEOUS at 20:41

## 2024-12-25 RX ADMIN — SERTRALINE HYDROCHLORIDE 100 MG: 50 TABLET ORAL at 08:42

## 2024-12-25 RX ADMIN — ISODIUM CHLORIDE 3 ML: 0.03 SOLUTION RESPIRATORY (INHALATION) at 08:04

## 2024-12-25 RX ADMIN — SODIUM CHLORIDE, PRESERVATIVE FREE 10 ML: 5 INJECTION INTRAVENOUS at 08:49

## 2024-12-25 RX ADMIN — ISODIUM CHLORIDE 3 ML: 0.03 SOLUTION RESPIRATORY (INHALATION) at 03:50

## 2024-12-25 RX ADMIN — WATER 40 MG: 1 INJECTION INTRAMUSCULAR; INTRAVENOUS; SUBCUTANEOUS at 21:12

## 2024-12-25 RX ADMIN — LEVALBUTEROL 1.25 MG: 1.25 SOLUTION, CONCENTRATE RESPIRATORY (INHALATION) at 23:09

## 2024-12-25 RX ADMIN — DIPHENHYDRAMINE HCL 25 MG: 25 TABLET ORAL at 08:43

## 2024-12-25 RX ADMIN — LABETALOL HYDROCHLORIDE 10 MG: 5 INJECTION, SOLUTION INTRAVENOUS at 21:16

## 2024-12-25 RX ADMIN — ISODIUM CHLORIDE 3 ML: 0.03 SOLUTION RESPIRATORY (INHALATION) at 23:09

## 2024-12-25 RX ADMIN — FAMOTIDINE 20 MG: 20 TABLET, FILM COATED ORAL at 08:43

## 2024-12-25 RX ADMIN — DICYCLOMINE HYDROCHLORIDE 10 MG: 10 CAPSULE ORAL at 10:49

## 2024-12-25 RX ADMIN — KETOROLAC TROMETHAMINE 15 MG: 15 INJECTION, SOLUTION INTRAMUSCULAR; INTRAVENOUS at 16:12

## 2024-12-25 RX ADMIN — PROPOFOL 25 MCG/KG/MIN: 10 INJECTION, EMULSION INTRAVENOUS at 12:57

## 2024-12-25 RX ADMIN — GUAIFENESIN 600 MG: 600 TABLET ORAL at 20:39

## 2024-12-25 RX ADMIN — DIPHENHYDRAMINE HCL 25 MG: 25 TABLET ORAL at 20:40

## 2024-12-25 RX ADMIN — LEVETIRACETAM 750 MG: 500 TABLET, FILM COATED ORAL at 08:42

## 2024-12-25 RX ADMIN — PROPOFOL 40 MG: 10 INJECTION, EMULSION INTRAVENOUS at 12:52

## 2024-12-25 RX ADMIN — LEVALBUTEROL 1.25 MG: 1.25 SOLUTION, CONCENTRATE RESPIRATORY (INHALATION) at 17:18

## 2024-12-25 RX ADMIN — PROPOFOL 40 MG: 10 INJECTION, EMULSION INTRAVENOUS at 12:56

## 2024-12-25 RX ADMIN — LEVETIRACETAM 750 MG: 500 TABLET, FILM COATED ORAL at 20:40

## 2024-12-25 RX ADMIN — CARVEDILOL 12.5 MG: 12.5 TABLET, FILM COATED ORAL at 08:43

## 2024-12-25 RX ADMIN — DICYCLOMINE HYDROCHLORIDE 10 MG: 10 CAPSULE ORAL at 20:40

## 2024-12-25 RX ADMIN — DICYCLOMINE HYDROCHLORIDE 10 MG: 10 CAPSULE ORAL at 06:13

## 2024-12-25 RX ADMIN — OXYCODONE HYDROCHLORIDE 5 MG: 5 TABLET ORAL at 06:13

## 2024-12-25 RX ADMIN — SODIUM CHLORIDE: 9 INJECTION, SOLUTION INTRAVENOUS at 12:49

## 2024-12-25 RX ADMIN — SODIUM CHLORIDE: 9 INJECTION, SOLUTION INTRAVENOUS at 13:05

## 2024-12-25 RX ADMIN — PROPOFOL 40 MG: 10 INJECTION, EMULSION INTRAVENOUS at 12:51

## 2024-12-25 RX ADMIN — ROFLUMILAST 500 MCG: 500 TABLET ORAL at 08:43

## 2024-12-25 RX ADMIN — KETOROLAC TROMETHAMINE 15 MG: 15 INJECTION, SOLUTION INTRAMUSCULAR; INTRAVENOUS at 10:49

## 2024-12-25 RX ADMIN — CEFAZOLIN 2 G: 1 INJECTION, POWDER, FOR SOLUTION INTRAMUSCULAR; INTRAVENOUS at 13:02

## 2024-12-25 RX ADMIN — OXYCODONE HYDROCHLORIDE 5 MG: 5 TABLET ORAL at 21:09

## 2024-12-25 RX ADMIN — GUAIFENESIN 600 MG: 600 TABLET ORAL at 08:42

## 2024-12-25 RX ADMIN — LEVALBUTEROL 1.25 MG: 1.25 SOLUTION, CONCENTRATE RESPIRATORY (INHALATION) at 08:04

## 2024-12-25 RX ADMIN — PROPOFOL 40 MG: 10 INJECTION, EMULSION INTRAVENOUS at 12:53

## 2024-12-25 RX ADMIN — WATER 40 MG: 1 INJECTION INTRAMUSCULAR; INTRAVENOUS; SUBCUTANEOUS at 10:49

## 2024-12-25 RX ADMIN — ISODIUM CHLORIDE 3 ML: 0.03 SOLUTION RESPIRATORY (INHALATION) at 17:18

## 2024-12-25 RX ADMIN — LEVALBUTEROL 1.25 MG: 1.25 SOLUTION, CONCENTRATE RESPIRATORY (INHALATION) at 03:49

## 2024-12-25 RX ADMIN — FAMOTIDINE 20 MG: 20 TABLET, FILM COATED ORAL at 20:39

## 2024-12-25 RX ADMIN — FUROSEMIDE 20 MG: 20 TABLET ORAL at 20:37

## 2024-12-25 RX ADMIN — SODIUM CHLORIDE, PRESERVATIVE FREE 10 ML: 5 INJECTION INTRAVENOUS at 20:41

## 2024-12-25 RX ADMIN — TIZANIDINE 4 MG: 4 TABLET ORAL at 08:42

## 2024-12-25 RX ADMIN — LIDOCAINE HYDROCHLORIDE 60 MG: 20 INJECTION, SOLUTION EPIDURAL; INFILTRATION; INTRACAUDAL; PERINEURAL at 12:51

## 2024-12-25 RX ADMIN — HEPARIN SODIUM 5000 UNITS: 5000 INJECTION INTRAVENOUS; SUBCUTANEOUS at 06:13

## 2024-12-25 ASSESSMENT — PAIN DESCRIPTION - LOCATION
LOCATION: ABDOMEN
LOCATION: ABDOMEN;PERINEUM
LOCATION: ABDOMEN
LOCATION: ABDOMEN

## 2024-12-25 ASSESSMENT — PAIN DESCRIPTION - DESCRIPTORS
DESCRIPTORS: PRESSURE
DESCRIPTORS: ACHING
DESCRIPTORS: PRESSURE

## 2024-12-25 ASSESSMENT — PAIN SCALES - GENERAL
PAINLEVEL_OUTOF10: 3
PAINLEVEL_OUTOF10: 7
PAINLEVEL_OUTOF10: 7
PAINLEVEL_OUTOF10: 8
PAINLEVEL_OUTOF10: 8
PAINLEVEL_OUTOF10: 3
PAINLEVEL_OUTOF10: 7
PAINLEVEL_OUTOF10: 8
PAINLEVEL_OUTOF10: 8

## 2024-12-25 ASSESSMENT — PAIN DESCRIPTION - ONSET: ONSET: ON-GOING

## 2024-12-25 ASSESSMENT — PAIN DESCRIPTION - FREQUENCY: FREQUENCY: CONTINUOUS

## 2024-12-25 ASSESSMENT — PAIN DESCRIPTION - ORIENTATION
ORIENTATION: MID
ORIENTATION: LOWER

## 2024-12-25 ASSESSMENT — PAIN DESCRIPTION - PAIN TYPE: TYPE: ACUTE PAIN

## 2024-12-25 NOTE — CONSULTS
Jovany Araya MD  Urology Consultation    Patient:  Rene Melgoza  MRN: [id number]  YOB: 1965    CHIEF COMPLAINT: Bladder pain, high postvoid residual urinary incontinence    HISTORY OF PRESENT ILLNESS:   The patient is a 59 y.o. male who presents with shortness of breath, the patient was seen apparently by urology as outpatient and attempts at catheterization were not successful at the present time the patient is incontinent of urine, bladder scan revealed 300 mL in the bladder, he keeps complaining of bladder pain, bladder spasms with with every urge to urinate but he cannot empty the bladder well   cumulatively the patient has a good urinary output but the bladder spasms and the high postvoid residual seem to be the source of significant symptoms  Previous  surgery: No procedures are listed in the epic record system nonetheless the patient states that he has had a greenlight prostatectomy and has been followed up by his urologist with plans for possible Botox injections.    He had placement of an InterStim device which apparently has not worked   Patient is concerned since this has prevented him from having MRIs   Patient requesting removal of InterStim device which I have deferred to his urologist  Patient's old records, notes and chart reviewed and summarized above.    Past Medical History:    No past medical history on file.    Past Surgical History:    No past surgical history on file.  Previous  surgery: No procedures are listed in the epic record system nonetheless the patient states that he has had a greenlight prostatectomy and has been followed up by his urologist with plans for possible Botox injections.    He had placement of an InterStim device which apparently has not worked   Patient is concerned since this has prevented him from having MRIs   Patient requesting removal of InterStim device which I have deferred to his urologist       Medications:    Scheduled Meds:   [Transfer    BACTERIA RARE*   LEUKOCYTESUR TRACE*   UROBILINOGEN Normal   BILIRUBINUR NEGATIVE        -----------------------------------------------------------------  Imaging Results:  Reason for Exam: SOB, mid chest pain, urinary frequency and unable to empty  bladder. H/O prostate CA, pt does not know what surgery he had for prostate.;  ORDERING SYSTEM PROVIDED HISTORY: sob, history of cancer  TECHNOLOGIST PROVIDED HISTORY:  sob, history of cancer  Additional Contrast?->1  Reason for Exam: SOB, mid chest pain, urinary frequency and unable to empty  bladder.  H/O prostate CA, pt does not know what surgery he had for prostate.     FINDINGS:     CTA CHEST:     Pulmonary Arteries: Pulmonary arteries are adequately opacified for  evaluation.  No evidence of intraluminal filling defect to suggest pulmonary  embolism.  Mildly enlarged main pulmonary artery at 3.4 cm.     Mediastinum: The thoracic aorta is normal in caliber.  Mild atherosclerotic  plaque.  The heart is not enlarged with coronary atherosclerosis and no  pericardial effusion.  The esophagus is unremarkable.  No pathologic hilar or  mediastinal adenopathy.     Lungs/pleura: Mild dependent atelectasis in the lower lobes bilaterally.  No  acute pulmonary infiltrate, consolidation or edema.  No pleural fluid or  pneumothorax.  The central airways are patent.     Soft Tissues/Bones: No acute osseous or soft tissue abnormality.  Mild  osteoarthritic spurring in the thoracic spine.        CT ABDOMEN AND PELVIS     Organs: There is several poorly defined low-attenuation hepatic lesions which  are indeterminate.  The largest right hepatic lesion posteriorly measures 3.0  x 3.1 cm with attenuation values of 42.  Additional right hepatic lesions  measure 1.7 cm in diameter and 1.9 x 2.5 cm in size.  Status post  cholecystectomy with mild prominence of the intrahepatic and extrahepatic  biliary tree, likely reservoir effect.  The liver, pancreas and adrenal  glands are

## 2024-12-25 NOTE — PROGRESS NOTES
End Of Shift Note  [facility]  Summary of shift: The patient had an uneventful shift. He was in bed most of the day and then in the chair after supper. He did c/o pain and was given PRN medication which helped. He had a Kudae placed today with Dr. Araya and the procedure went well. The plan is for him to have a stress test tomorrow and if all is well plan to d/c. At the time  of this note the patient is in chair with call light within reach and all needs have been met.     Vitals:    Vitals:    12/25/24 1316 12/25/24 1330 12/25/24 1345 12/25/24 1615   BP: 131/80 (!) 149/91 (!) 160/94 (!) 161/96   Pulse: 80 76 74 88   Resp: 15 19 21    Temp:   98.4 °F (36.9 °C) 97.8 °F (36.6 °C)   TempSrc: Skin  Temporal Temporal   SpO2: 98% 100% 99%    Weight:       Height:            I&O:   Intake/Output Summary (Last 24 hours) at 12/25/2024 1849  Last data filed at 12/25/2024 1311  Gross per 24 hour   Intake 400 ml   Output 400 ml   Net 0 ml       Resp Status: 5L NC    Ventilator Settings:     / / /     Critical Care IV infusions:   dextrose      sodium chloride 100 mL/hr at 12/25/24 1305        LDA:   Peripheral IV 12/23/24 Left;Anterior Forearm (Active)   Number of days: 2       Urinary Catheter 12/25/24 Coude (Active)   Number of days: 0       External Urinary Catheter (Active)   Number of days: 1

## 2024-12-25 NOTE — PROGRESS NOTES
[facility]  Office: [phone]  Wander Schrader DO, Clive Garcia DO, Jw Manriquez DO, Seng Munoz DO, Georgina Padilla MD, Jerri Augustin MD, Jose Santiago MD, Lilian Frias MD,  Oleg Baltazar MD, Jorge Rodas MD, Yvrose Stout MD,  Berry Vega DO, Jacqueline Batista MD, Federico Dawson MD, Ricci Schrader DO, Silvana Hicks MD,  Hermelindo Emery DO, Jing Marie MD, Elmira Gilbert MD, Leana Levy MD, Bethany Pineda MD,  Manuel Clements MD, Belem Yeager MD, Yasmany Shea MD, Rosetta Berry MD, Enzo Escalante MD, Josh Felix MD, Stas Moody DO, Honorio Buitrago MD, Shirley Waterhouse, CNP,  Marge Barahona, CNP, Stas Waters, CNP,  Elissa Salmeron, NICKI, Orly Correia, CNP, Fina Hanks, CNP, Erika Gallo, CNP, Kimberly Rojo, CNP, GWYN WhiteC, GWYN JohnsonC, Joyce Giron, CNP, Ryan Ferguson, CNP,  Jami Calderón, CNP, Radha Ceron, CNP,  Elisa Thomason, CNP, Liliane Bee, CNP         [facility]   IN-PATIENT SERVICE   [facility]    Progress Note    12/25/2024    11:12 AM    Name:   Rene Melgoza  MRN:     [id number]     Acct:      [id number]   Room:   2035/2035-01  IP Day:  2  Admit Date:  12/23/2024  3:08 PM    PCP:   Edgar Castellanos MD  Code Status:  DNR-CCA    Subjective:     C/C:   Chief Complaint   Patient presents with    Abdominal Pain    Chest Pain    Urinary Frequency    Shortness of Breath     Was at [facility] for symptoms of urinary frequency and retention, they were unable to cath patient due to prostrate enlargement. Patient states history of lung CA, state chest pain and abdominal pain      Interval History Status: not changed.     Still c/o tremors, sob, abd pain and \"muscle spasms\".  Also has somewhat vague cp he also describes as a spasm    Requesting pain meds for pain and valium for the tremors    Toradol seemed to help his pain yesterday more than anything    Brief History:     Per my VALORIE:  \"  Patient reports

## 2024-12-25 NOTE — ANESTHESIA PRE PROCEDURE
rhythm  Right bundle branch block  Abnormal ECG  When compared with ECG of 23-DEC-2024 15:11, (unconfirmed)  No significant change was found       Neuro/Psych:   Negative Neuro/Psych ROS              GI/Hepatic/Renal:   (+) liver disease (Hepatic lesions on CT imaging):         ROS comment: CT A/P 12/23/2024:  IMPRESSION:  1. No evidence of pulmonary embolic disease.  2. Mild bibasilar atelectasis.  No acute pulmonary infiltrate.  3. Multiple indeterminate hepatic lesions.  The largest right hepatic lesion  measures 3.1 cm in diameter.  Findings are concerning for metastatic disease.  Given the patient's history of cancer, correlative imaging may exist and  would be helpful if available to evaluate for interval change.  If there is  no comparison imaging, recommend MRI follow-up for more complete assessment.  4. No other acute findings within the abdomen or pelvis.  No acute bowel  pathology.  5. Status post cholecystectomy and prostatectomy.  .   Endo/Other:    (+) Diabetesusing insulin, blood dyscrasia (On Eliquis): anticoagulation therapy:..                  ROS comment: On home steroid therapy Abdominal:             Vascular: negative vascular ROS.          ROS comment: CT PE 12/23/2024:  IMPRESSION:  1. No evidence of pulmonary embolic disease.  2. Mild bibasilar atelectasis.  No acute pulmonary infiltrate.  3. Multiple indeterminate hepatic lesions.  The largest right hepatic lesion  measures 3.1 cm in diameter.  Findings are concerning for metastatic disease.  Given the patient's history of cancer, correlative imaging may exist and  would be helpful if available to evaluate for interval change.  If there is  no comparison imaging, recommend MRI follow-up for more complete assessment.  4. No other acute findings within the abdomen or pelvis.  No acute bowel  pathology.  5. Status post cholecystectomy and prostatectomy.  . Other Findings:             Anesthesia Plan      MAC     ASA 4       Induction:

## 2024-12-25 NOTE — PLAN OF CARE
Problem: Respiratory - Adult  Goal: Achieves optimal ventilation and oxygenation  12/24/2024 1942 by Landen Edmond RCP  Outcome: Progressing     Problem: Respiratory - Adult  Goal: Able to breathe comfortably  12/24/2024 1942 by Landen Edmond RCP  Outcome: Progressing

## 2024-12-25 NOTE — PLAN OF CARE
Problem: Respiratory - Adult  Goal: Achieves optimal ventilation and oxygenation  12/25/2024 0814 by Connie Lopez RCP  Outcome: Progressing  12/24/2024 2119 by Ruddy Iniguez RN  Outcome: Progressing  12/24/2024 1942 by Landen Edmond RCP  Outcome: Progressing  Goal: Able to breathe comfortably  12/25/2024 0814 by Connie Lopez RCP  Outcome: Progressing  12/24/2024 1942 by Landen Edmond RCP  Outcome: Progressing

## 2024-12-25 NOTE — OP NOTE
Operative Note      Patient: Rene Melgoza  YOB: 1965  MRN: [id number]    Date of Procedure: 12/25/2024    Pre-Op Diagnosis Codes:      * Urinary retention [R33.9]  Urethral stricture  Difficult catheterization  Post-Op Diagnosis: Same and bulbous urethral stricture       Procedure(s):  CYSTOSCOPY URETHRAL DILATATION WITH WINTERS PLACEMENT    Surgeon(s):  Jovayn Araya MD    Assistant:   * No surgical staff found *    Anesthesia: Monitor Anesthesia Care    Estimated Blood Loss (mL): Minimal    Complications: None    Specimens:   ID Type Source Tests Collected by Time Destination   1 : URINE FROM CYSTO Urine Urine, Cystoscopic URINALYSIS WITH REFLEX TO CULTURE Jovany Araya MD 12/25/2024 1302        Implants:  * No implants in log *      Drains:   Urinary Catheter 12/25/24 Coude (Active)       External Urinary Catheter (Active)   Site Assessment Clean,dry & intact 12/25/24 0800   Placement Repositioned 12/25/24 0800   Securement Method Securing device (Describe) 12/25/24 0800   Suction 40 mmgHg continuous 12/25/24 0800   Urine Color Radha 12/25/24 0800   Urine Appearance Clear 12/25/24 0800   Output (mL) 400 mL 12/25/24 0400       Findings:  Infection Present At Time Of Surgery (PATOS) (choose all levels that have infection present):  No infection present  Other Findings: Indications: This is a 59-year-old male, he was seen in consultation for difficulty upon urination frequency and high postvoid residual as well as bladder spasms the patient has overflow incontinence  He had difficulty emptying the bladder and nursing staff unable to insert Winters    Patient states that his urologist had difficulty inserting the catheter    Detailed Description of Procedure:   Patient was brought to the operating room positioned in dorsolithotomy proper patient identification procedure identification prepping and draping.    We entered the bladder with the cystoscope and a 30 degree lens, we identified an area of

## 2024-12-25 NOTE — PLAN OF CARE
Pt currently resting in bed A&OX4. ABD tenderness remains, pt does state 1x dose of toradol helped with lower ABD pain. All scheduled meds given, see MAR. DVT prophylaxis- heparin. Mild tremors remain present, pt tolerated ambulating to restroom well with standby assist. NPO at midnight for cysto with urology in AM.     Standard safety measures in place. Call light within reach. Bed in locked and lowest position.         Problem: Discharge Planning  Goal: Discharge to home or other facility with appropriate resources  12/24/2024 2119 by Ruddy Iniguez, RN  Outcome: Progressing     Problem: Pain  Goal: Verbalizes/displays adequate comfort level or baseline comfort level  12/24/2024 2119 by Ruddy Iniguez, RN  Outcome: Progressing     Problem: Safety - Adult  Goal: Free from fall injury  12/24/2024 2119 by Ruddy Iniguez, RN  Outcome: Progressing     Problem: Respiratory - Adult  Goal: Achieves optimal ventilation and oxygenation  12/24/2024 2119 by Ruddy Iniguez, RN  Outcome: Progressing

## 2024-12-25 NOTE — ANESTHESIA POSTPROCEDURE EVALUATION
Department of Anesthesiology  Postprocedure Note    Patient: Rene Melgoza  MRN: [id number]  YOB: 1965  Date of evaluation: 12/25/2024    Procedure Summary       Date: 12/25/24 Room / Location: [facility]    Anesthesia Start: 1247 Anesthesia Stop: 1318    Procedure: CYSTOSCOPY URETHRAL DILATATION WITH WINTERS PLACEMENT Diagnosis:       Urinary retention      (Urinary retention [R33.9])    Surgeons: Jovany Araya MD Responsible Provider: Bob Zhang MD    Anesthesia Type: MAC ASA Status: 4            Anesthesia Type: No value filed.    Elly Phase I:      Elly Phase II:      Anesthesia Post Evaluation    Patient location during evaluation: PACU  Patient participation: complete - patient participated  Level of consciousness: awake and alert  Airway patency: patent  Nausea & Vomiting: no nausea and no vomiting  Cardiovascular status: hemodynamically stable  Respiratory status: acceptable and nasal cannula  Hydration status: euvolemic  Pain management: adequate    No notable events documented.

## 2024-12-25 NOTE — PROGRESS NOTES
Pulmonary Critical Care Progress Note    Patient seen for the follow up of Acute exacerbation of chronic obstructive pulmonary disease (COPD) (HCC)     Subjective:    He had Gomez insertion by urology.  He feels better.  He has less abdominal/pelvic pain.  He has been on chronic prednisone 20 mg daily at home.  He is still at 4 L oxygen at baseline.  He has occasional dry cough.  Denies chest pain.  He reports chronic back pain.  He had intolerance to Daliresp with diarrhea  Examination:    Vitals: BP (!) 161/96   Pulse 88   Temp 98.4 °F (36.9 °C) (Temporal)   Resp 21   Ht 1.651 m (5' 5\")   Wt 75.4 kg (166 lb 3.2 oz)   SpO2 99%   BMI 27.66 kg/m²   SpO2  Av %  Min: 96 %  Max: 100 %  General appearance: alert and cooperative with exam  Neck: No JVD  Lungs: Decreased breath sound no crackles or wheeze  Heart: regular rate and rhythm, S1, S2 normal, no gallop  Abdomen: Soft, non tender, + BS  Extremities: no cyanosis or clubbing. No significant edema    LABs:    CBC:   Recent Labs     24  1519 24  0442   WBC 9.6 7.9   HGB 14.5 14.3   HCT 43.3 44.1    252     BMP:   Recent Labs     24  1519 24  0442   * 137   K 4.2 4.1   CO2 29 31   BUN 14 15   CREATININE 0.6* 0.8   LABGLOM >90 >90   GLUCOSE 112 142*     PT/INR: No results for input(s): \"PROTIME\", \"INR\" in the last 72 hours.  APTT:No results for input(s): \"APTT\" in the last 72 hours.  LIVER PROFILE:  Recent Labs     24  1519   AST 13   ALT 15       Radiology:    CT chest abdomen pelvis     1. No evidence of pulmonary embolic disease.  2. Mild bibasilar atelectasis.  No acute pulmonary infiltrate.  3. Multiple indeterminate hepatic lesions.  The largest right hepatic lesion  measures 3.1 cm in diameter.  Findings are concerning for metastatic disease.  Given the patient's history of cancer, correlative imaging may exist and  would be helpful if available to evaluate for interval change.  If there is  no comparison  imaging, recommend MRI follow-up for more complete assessment.  4. No other acute findings within the abdomen or pelvis.  No acute bowel  pathology.  5. Status post cholecystectomy and prostatectomy.    Impression/recommendations:    Acute on chronic hypoxic respiratory failure  Oxygen with BiPAP support   Home O2 eval before discharge  Incentive spirometer q.1h awake     COPD exacerbation/atelectasis/history of lung nodule  DuoNeb by nebulizer  Symbicort 160 daily  Solu-Medrol IV 40 q.6 hours/off prednisone 20 mg daily chronic dose  Mucinex  Zithromax 500 daily empirically  Discontinue Daliresp      Ex Smoker  Advised to abstain    CAD status post MI/paroxysmal AFib  Cardiology on consult  Check stress test    Liver lesions/cirrhosis, GERD, history of EtOH abuse  Consider MRI of the liver/liver biopsy/GI consultation    Anxiety, bipolar disorder/chronic pain status post sacral stimulator  Management per primary    History of seizure     Urinary retention/BPH status post Gomez insertion  Urology on-call     Peptic ulcer disease prophylaxis  DVT prophylaxis   Discussed with OSMAR Fowler MD, MD, [facility]  Pulmonary Critical Care and Sleep Medicine,  [facility]  Cell: [phone]  Office: [phone]

## 2024-12-26 ENCOUNTER — APPOINTMENT (OUTPATIENT)
Age: 59
DRG: 140 | End: 2024-12-26
Payer: MEDICAID

## 2024-12-26 ENCOUNTER — APPOINTMENT (OUTPATIENT)
Dept: NUCLEAR MEDICINE | Age: 59
DRG: 140 | End: 2024-12-26
Payer: MEDICAID

## 2024-12-26 VITALS
HEIGHT: 65 IN | BODY MASS INDEX: 28.32 KG/M2 | HEART RATE: 93 BPM | OXYGEN SATURATION: 98 % | WEIGHT: 170 LBS | SYSTOLIC BLOOD PRESSURE: 144 MMHG | RESPIRATION RATE: 18 BRPM | DIASTOLIC BLOOD PRESSURE: 94 MMHG | TEMPERATURE: 96.9 F

## 2024-12-26 LAB
ECHO BSA: 1.84 M2
EST. AVERAGE GLUCOSE BLD GHB EST-MCNC: 123 MG/DL
GLUCOSE BLD-MCNC: 113 MG/DL (ref 75–110)
HBA1C MFR BLD: 5.9 % (ref 4–6)
NUC STRESS EJECTION FRACTION: 61 %
STRESS BASELINE DIAS BP: 102 MMHG
STRESS BASELINE HR: 81 BPM
STRESS BASELINE SYS BP: 185 MMHG
STRESS ESTIMATED WORKLOAD: 1 METS
STRESS EXERCISE DUR MIN: 1 MIN
STRESS EXERCISE DUR SEC: 0 SEC
STRESS PEAK DIAS BP: 100 MMHG
STRESS PEAK SYS BP: 190 MMHG
STRESS PERCENT HR ACHIEVED: 55 %
STRESS POST PEAK HR: 88 BPM
STRESS RATE PRESSURE PRODUCT: NORMAL BPM*MMHG
STRESS ST DEPRESSION: 0 MM
STRESS TARGET HR: 159 BPM
TID: 1.05

## 2024-12-26 PROCEDURE — 99239 HOSP IP/OBS DSCHRG MGMT >30: CPT | Performed by: INTERNAL MEDICINE

## 2024-12-26 PROCEDURE — 93017 CV STRESS TEST TRACING ONLY: CPT

## 2024-12-26 PROCEDURE — 94761 N-INVAS EAR/PLS OXIMETRY MLT: CPT

## 2024-12-26 PROCEDURE — 6370000000 HC RX 637 (ALT 250 FOR IP): Performed by: UROLOGY

## 2024-12-26 PROCEDURE — 3430000000 HC RX DIAGNOSTIC RADIOPHARMACEUTICAL: Performed by: UROLOGY

## 2024-12-26 PROCEDURE — A9500 TC99M SESTAMIBI: HCPCS | Performed by: UROLOGY

## 2024-12-26 PROCEDURE — 2700000000 HC OXYGEN THERAPY PER DAY

## 2024-12-26 PROCEDURE — 6360000002 HC RX W HCPCS: Performed by: UROLOGY

## 2024-12-26 PROCEDURE — 6360000002 HC RX W HCPCS: Performed by: INTERNAL MEDICINE

## 2024-12-26 PROCEDURE — 6360000002 HC RX W HCPCS: Performed by: NURSE PRACTITIONER

## 2024-12-26 PROCEDURE — 78452 HT MUSCLE IMAGE SPECT MULT: CPT

## 2024-12-26 PROCEDURE — 82947 ASSAY GLUCOSE BLOOD QUANT: CPT

## 2024-12-26 PROCEDURE — 2500000003 HC RX 250 WO HCPCS: Performed by: INTERNAL MEDICINE

## 2024-12-26 PROCEDURE — 2500000003 HC RX 250 WO HCPCS: Performed by: UROLOGY

## 2024-12-26 PROCEDURE — 94640 AIRWAY INHALATION TREATMENT: CPT

## 2024-12-26 RX ORDER — AMINOPHYLLINE 25 MG/ML
50 INJECTION, SOLUTION INTRAVENOUS PRN
Status: ACTIVE | OUTPATIENT
Start: 2024-12-26 | End: 2024-12-26

## 2024-12-26 RX ORDER — NITROGLYCERIN 0.4 MG/1
0.4 TABLET SUBLINGUAL EVERY 5 MIN PRN
Status: ACTIVE | OUTPATIENT
Start: 2024-12-26 | End: 2024-12-26

## 2024-12-26 RX ORDER — TETRAKIS(2-METHOXYISOBUTYLISOCYANIDE)COPPER(I) TETRAFLUOROBORATE 1 MG/ML
43.5 INJECTION, POWDER, LYOPHILIZED, FOR SOLUTION INTRAVENOUS
Status: COMPLETED | OUTPATIENT
Start: 2024-12-26 | End: 2024-12-26

## 2024-12-26 RX ORDER — IBUPROFEN 600 MG/1
600 TABLET, FILM COATED ORAL 3 TIMES DAILY PRN
Qty: 30 TABLET | Refills: 0 | Status: SHIPPED | OUTPATIENT
Start: 2024-12-26

## 2024-12-26 RX ORDER — PREDNISONE 10 MG/1
TABLET ORAL
Qty: 17 TABLET | Refills: 0 | Status: SHIPPED | OUTPATIENT
Start: 2024-12-27

## 2024-12-26 RX ORDER — SODIUM CHLORIDE 0.9 % (FLUSH) 0.9 %
5-40 SYRINGE (ML) INJECTION PRN
Status: ACTIVE | OUTPATIENT
Start: 2024-12-26 | End: 2024-12-26

## 2024-12-26 RX ORDER — SODIUM CHLORIDE 9 MG/ML
500 INJECTION, SOLUTION INTRAVENOUS CONTINUOUS PRN
Status: ACTIVE | OUTPATIENT
Start: 2024-12-26 | End: 2024-12-26

## 2024-12-26 RX ORDER — GUAIFENESIN 600 MG/1
600 TABLET, EXTENDED RELEASE ORAL 2 TIMES DAILY
Qty: 60 TABLET | Refills: 0 | Status: SHIPPED | OUTPATIENT
Start: 2024-12-26

## 2024-12-26 RX ORDER — TETRAKIS(2-METHOXYISOBUTYLISOCYANIDE)COPPER(I) TETRAFLUOROBORATE 1 MG/ML
16.3 INJECTION, POWDER, LYOPHILIZED, FOR SOLUTION INTRAVENOUS
Status: COMPLETED | OUTPATIENT
Start: 2024-12-26 | End: 2024-12-26

## 2024-12-26 RX ORDER — METOPROLOL TARTRATE 1 MG/ML
5 INJECTION, SOLUTION INTRAVENOUS EVERY 5 MIN PRN
Status: ACTIVE | OUTPATIENT
Start: 2024-12-26 | End: 2024-12-26

## 2024-12-26 RX ORDER — PREDNISONE 20 MG/1
40 TABLET ORAL DAILY
Status: DISCONTINUED | OUTPATIENT
Start: 2024-12-26 | End: 2024-12-26 | Stop reason: HOSPADM

## 2024-12-26 RX ORDER — ALBUTEROL SULFATE 90 UG/1
2 INHALANT RESPIRATORY (INHALATION) PRN
Status: ACTIVE | OUTPATIENT
Start: 2024-12-26 | End: 2024-12-26

## 2024-12-26 RX ORDER — ATROPINE SULFATE 0.1 MG/ML
0.5 INJECTION INTRAVENOUS EVERY 5 MIN PRN
Status: ACTIVE | OUTPATIENT
Start: 2024-12-26 | End: 2024-12-26

## 2024-12-26 RX ORDER — REGADENOSON 0.08 MG/ML
0.4 INJECTION, SOLUTION INTRAVENOUS
Status: COMPLETED | OUTPATIENT
Start: 2024-12-26 | End: 2024-12-26

## 2024-12-26 RX ADMIN — KETOROLAC TROMETHAMINE 15 MG: 15 INJECTION, SOLUTION INTRAMUSCULAR; INTRAVENOUS at 05:33

## 2024-12-26 RX ADMIN — ISODIUM CHLORIDE 3 ML: 0.03 SOLUTION RESPIRATORY (INHALATION) at 06:20

## 2024-12-26 RX ADMIN — Medication 43.5 MILLICURIE: at 08:52

## 2024-12-26 RX ADMIN — LABETALOL HYDROCHLORIDE 10 MG: 5 INJECTION, SOLUTION INTRAVENOUS at 05:20

## 2024-12-26 RX ADMIN — LEVETIRACETAM 750 MG: 500 TABLET, FILM COATED ORAL at 10:28

## 2024-12-26 RX ADMIN — FUROSEMIDE 20 MG: 20 TABLET ORAL at 10:28

## 2024-12-26 RX ADMIN — LEVALBUTEROL 1.25 MG: 1.25 SOLUTION, CONCENTRATE RESPIRATORY (INHALATION) at 03:14

## 2024-12-26 RX ADMIN — ISODIUM CHLORIDE 3 ML: 0.03 SOLUTION RESPIRATORY (INHALATION) at 03:14

## 2024-12-26 RX ADMIN — HEPARIN SODIUM 5000 UNITS: 5000 INJECTION INTRAVENOUS; SUBCUTANEOUS at 05:20

## 2024-12-26 RX ADMIN — SODIUM CHLORIDE, PRESERVATIVE FREE 10 ML: 5 INJECTION INTRAVENOUS at 08:50

## 2024-12-26 RX ADMIN — ISODIUM CHLORIDE 3 ML: 0.03 SOLUTION RESPIRATORY (INHALATION) at 10:21

## 2024-12-26 RX ADMIN — DICYCLOMINE HYDROCHLORIDE 10 MG: 10 CAPSULE ORAL at 05:20

## 2024-12-26 RX ADMIN — BUDESONIDE AND FORMOTEROL FUMARATE DIHYDRATE 2 PUFF: 160; 4.5 AEROSOL RESPIRATORY (INHALATION) at 06:21

## 2024-12-26 RX ADMIN — FAMOTIDINE 20 MG: 20 TABLET, FILM COATED ORAL at 10:28

## 2024-12-26 RX ADMIN — SODIUM CHLORIDE, PRESERVATIVE FREE 10 ML: 5 INJECTION INTRAVENOUS at 10:29

## 2024-12-26 RX ADMIN — GUAIFENESIN 600 MG: 600 TABLET ORAL at 10:28

## 2024-12-26 RX ADMIN — LEVALBUTEROL 1.25 MG: 1.25 SOLUTION, CONCENTRATE RESPIRATORY (INHALATION) at 10:21

## 2024-12-26 RX ADMIN — LEVALBUTEROL 1.25 MG: 1.25 SOLUTION, CONCENTRATE RESPIRATORY (INHALATION) at 06:19

## 2024-12-26 RX ADMIN — PREDNISONE 40 MG: 20 TABLET ORAL at 11:16

## 2024-12-26 RX ADMIN — SERTRALINE HYDROCHLORIDE 100 MG: 50 TABLET ORAL at 10:28

## 2024-12-26 RX ADMIN — OXYCODONE HYDROCHLORIDE 5 MG: 5 TABLET ORAL at 03:34

## 2024-12-26 RX ADMIN — CARVEDILOL 12.5 MG: 12.5 TABLET, FILM COATED ORAL at 10:37

## 2024-12-26 RX ADMIN — OXYCODONE HYDROCHLORIDE 5 MG: 5 TABLET ORAL at 12:25

## 2024-12-26 RX ADMIN — DIPHENHYDRAMINE HCL 25 MG: 25 TABLET ORAL at 10:33

## 2024-12-26 RX ADMIN — DICYCLOMINE HYDROCHLORIDE 10 MG: 10 CAPSULE ORAL at 11:16

## 2024-12-26 RX ADMIN — REGADENOSON 0.4 MG: 0.08 INJECTION, SOLUTION INTRAVENOUS at 08:50

## 2024-12-26 RX ADMIN — Medication 16.3 MILLICURIE: at 07:03

## 2024-12-26 RX ADMIN — TIZANIDINE 4 MG: 4 TABLET ORAL at 10:32

## 2024-12-26 ASSESSMENT — PAIN DESCRIPTION - ORIENTATION
ORIENTATION: MID
ORIENTATION: MID

## 2024-12-26 ASSESSMENT — PAIN DESCRIPTION - LOCATION
LOCATION: ABDOMEN
LOCATION: ABDOMEN;PERINEUM
LOCATION: ABDOMEN;PERINEUM
LOCATION: ABDOMEN

## 2024-12-26 ASSESSMENT — PAIN SCALES - GENERAL
PAINLEVEL_OUTOF10: 8
PAINLEVEL_OUTOF10: 7
PAINLEVEL_OUTOF10: 8
PAINLEVEL_OUTOF10: 8

## 2024-12-26 NOTE — PROGRESS NOTES
Occupational Therapy    DATE: 2024    NAME: Rene Melgoza  MRN: [id number]   : 1965    Patient not seen this date for Occupational Therapy due to:      [] Cancel by RN or physician due to:    [] Hemodialysis    [] Critical Lab Value Level     [] Blood transfusion in progress    [] Acute or unstable cardiovascular status   _MAP < 55 or more than >115  _HR < 40 or > 130    [] Acute or unstable pulmonary status   -FiO2 > 60%   _RR < 5 or >40    _O2 sats < 85%    [] Strict Bedrest    [] Off Unit for surgery or procedure    [x] Off Unit for testing - stress test      [] Pending imaging to R/O fracture    [] Refusal by Patient      [] Intubated    [] Other      [] OT being discontinued at this time. Patient independent. No further needs.     [] OT being discontinued at this time as the patient has been transferred to hospice care. No further needs.    Romina Vicente OTR/L

## 2024-12-26 NOTE — PROGRESS NOTES
CLINICAL PHARMACY NOTE: MEDS TO BEDS    Total # of Prescriptions Filled: 4   The following medications were delivered to the patient:  Ibuprofen 600mg  Prednisone 10mg  Tizanidine 4mg  Guaifenesn er 600mg    Additional Documentation:

## 2024-12-26 NOTE — DISCHARGE SUMMARY
[facility]  Office: [phone]  Wander Schrader DO, Clive Garcia DO, Jw Manriquez DO, Seng Munoz DO, Georgina Padilla MD, Jerri Augustin MD, Jose Santiago MD, Lilian Frias MD,  Oleg Baltazar MD, Jorge Rodas MD, Yvrose Stout MD,  Berry Vega DO, Jacqueline Batista MD, Federico Dawson MD, Ricci Schrader DO, Silvana Hicks MD,  Hermelindo Emery DO, Jing Marie MD, Elmira Gilbert MD, Leana Levy MD, Bethany Pineda MD,  Manuel Clements MD, Belem Yeager MD, Yasmany Shea MD, Rosetta Berry MD, Enzo Escalnate MD, Josh Felix MD, Stas Moody DO, Honorio Buitrago MD, Shirley Waterhouse, CNP,  Marge Barahona, CNP, Stas Waters, CNP,  Elissa Salmeron, NICKI, Orly Correia, CNP, Fina Hanks, CNP, Erika Gallo, CNP, Kimberly Rojo, CNP, Soila Valle PA-C, Laurie Garza PA-C, Joyce Giron, CNP, Ryan Ferguson, CNP,  Jami Calderón, CNP, Fariba Ruth, CNP, Radha Ceron, CNP,  Elisa Thomason, CNP, Liliane Bee, CNP         [facility]   IN-PATIENT SERVICE   [facility]    Discharge Summary     Patient ID: Rene Melgoza  :  1965   MRN: [id number]     ACCOUNT:  [id number]   Patient's PCP: Edgar Castellanos MD  Admit Date: 2024   Discharge Date: 2024     Length of Stay: 3  Code Status:  DNR-CCA  Admitting Physician: Seng Munoz DO  Discharge Physician: Seng Munoz DO     Active Discharge Diagnoses:     Hospital Problem Lists:  Principal Problem:    Acute exacerbation of chronic obstructive pulmonary disease (COPD) (HCC)  Active Problems:    Chronic hypoxemic respiratory failure    Urinary incontinence  Resolved Problems:    * No resolved hospital problems. *      Admission Condition:  fair     Discharged Condition: stable    Hospital Stay:     Hospital Course:  Rene Melgoza is a 59 y.o. male who was admitted for the management of  Acute exacerbation of chronic obstructive pulmonary disease (COPD) ([facility]) , presented to ER  [address]  [facility] [address]  [phone]    Follow up in 1 week(s)      urologist    Call in 3 week(s)         Requiring Further Evaluation/Follow Up POST HOSPITALIZATION/Incidental Findings: none    Diet: diabetic diet    Activity: As tolerated    Instructions to Patient: take medications as prescribed      Discharge Medications:      Medication List        START taking these medications      guaiFENesin 600 MG extended release tablet  Commonly known as: MUCINEX  Take 1 tablet by mouth 2 times daily     ibuprofen 600 MG tablet  Commonly known as: ADVIL;MOTRIN  Take 1 tablet by mouth 3 times daily as needed for Pain     tiZANidine 4 MG tablet  Commonly known as: ZANAFLEX  Take 1 tablet by mouth in the morning and at bedtime            CHANGE how you take these medications      * predniSONE 20 MG tablet  Commonly known as: DELTASONE  What changed: Another medication with the same name was added. Make sure you understand how and when to take each.     * predniSONE 10 MG tablet  Commonly known as: DELTASONE  4 daily for 2 days then 3 daily for 3 days then resume your usual daily dose  Start taking on: December 27, 2024  What changed: You were already taking a medication with the same name, and this prescription was added. Make sure you understand how and when to take each.           * This list has 2 medication(s) that are the same as other medications prescribed for you. Read the directions carefully, and ask your doctor or other care provider to review them with you.                CONTINUE taking these medications      budesonide-formoterol 160-4.5 MCG/ACT Aero  Commonly known as: SYMBICORT     carvedilol 12.5 MG tablet  Commonly known as: COREG     dicyclomine 10 MG capsule  Commonly known as: BENTYL     diphenhydrAMINE 25 MG capsule  Commonly known as: BENADRYL     furosemide 20 MG tablet  Commonly known as: LASIX     Incruse Ellipta 62.5 MCG/ACT inhaler  Generic drug: umeclidinium bromide     insulin

## 2024-12-26 NOTE — FLOWSHEET NOTE
Pt tolerated lexiscan without experiencing side effect, recovered on cart and taken to [facility] for further testing in nad

## 2024-12-26 NOTE — RT PROTOCOL NOTE
RT Inhaler-Nebulizer Bronchodilator Protocol Note    There is a bronchodilator order in the chart from a provider indicating to follow the RT Bronchodilator Protocol and there is an “Initiate RT Inhaler-Nebulizer Bronchodilator Protocol” order as well (see protocol at bottom of note).    CXR Findings:  No results found.    The findings from the last RT Protocol Assessment were as follows:   History Pulmonary Disease: Chronic pulmonary disease  Respiratory Pattern: Dyspnea on exertion or RR 21-25 bpm  Breath Sounds: Severe inspiratory and expiratory wheezing or severely diminished (Very diminished. No wheezing.)  Cough: Strong, spontaneous, non-productive  Indication for Bronchodilator Therapy: Decreased or absent breath sounds, On home bronchodilators  Bronchodilator Assessment Score: 12    Aerosolized bronchodilator medication orders have been revised according to the RT Inhaler-Nebulizer Bronchodilator Protocol below.    Respiratory Therapist to perform RT Therapy Protocol Assessment initially then follow the protocol.  Repeat RT Therapy Protocol Assessment PRN for score 0-3 or on second treatment, BID, and PRN for scores above 3.    No Indications - adjust the frequency to every 6 hours PRN wheezing or bronchospasm, if no treatments needed after 48 hours then discontinue using Per Protocol order mode.     If indication present, adjust the RT bronchodilator orders based on the Bronchodilator Assessment Score as indicated below.  Use Inhaler orders unless patient has one or more of the following: on home nebulizer, not able to hold breath for 10 seconds, is not alert and oriented, cannot activate and use MDI correctly, or respiratory rate 25 breaths per minute or more, then use the equivalent nebulizer order(s) with same Frequency and PRN reasons based on the score.  If a patient is on this medication at home then do not decrease Frequency below that used at home.    0-3 - enter or revise RT bronchodilator order(s)

## 2024-12-26 NOTE — PLAN OF CARE
Problem: Respiratory - Adult  Goal: Able to breathe comfortably  12/26/2024 0626 by Mirna Gould RCP  Outcome: Progressing     Problem: Respiratory - Adult  Goal: Patient's breath sounds will be clear and equal  12/26/2024 0626 by Mirna Gould RCP  Outcome: Progressing     Problem: Respiratory - Adult  Goal: Adequate oxygenation  Description: Adequate oxygenation  12/26/2024 0626 by Mirna Gould RCP  Outcome: Progressing           BRONCHOSPASM/BRONCHOCONSTRICTION    IMPROVE  AERATION/BREATHSOUNDS  ADMINISTER BRONCHODILATOR THERAPY AS APPROPRIATE  ASSESS BREATH SOUNDS  INITIATE AEROSOL PROTOCOL IF ORDERED TO DO SO  PATIENT EDUCATION AS NEEDED      PROVIDE ADEQUATE OXYGENATION WITH ACCEPTABLE SP02/ABG'S    [x]  IDENTIFY APPROPRIATE OXYGEN THERAPY  [x]   MONITOR SP02/ABG'S AS NEEDED   [x]   PATIENT EDUCATION AS NEEDED     acetaminophen 325 mg oral tablet: 3 tab(s) orally every 6 hours as needed for pain, alternate with ibuprofen  ibuprofen 200 mg oral tablet: 2 orally every 6 hours as needed for pain, alternate with acetaminophen  Januvia 100 mg oral tablet: 1 orally once a day  Jardiance 10 mg oral tablet: 1 tablet orally once a day DO NOT RESTART UNTIL MONDAY, 8/21  lidocaine 5% topical ointment: 1 Apply topically to affected area every 3 hours  metFORMIN 500 mg oral tablet: 1 orally 3 times a day  polyethylene glycol 3350 oral powder for reconstitution: 17 gram(s) orally once a day  simvastatin 20 mg oral tablet: 1 orally once a day

## 2024-12-26 NOTE — PROGRESS NOTES
[facility]  Office: [phone]  Wander Schrader DO, Clive Garcia DO, Jw Manriquez DO, Seng Munoz DO, Georgina Padilla MD, Jerri Augustin MD, Jose Santiago MD, Lilian Frias MD,  Oleg Baltazar MD, Jorge Rodas MD, Yvrose Stout MD,  Berry Vega DO, Jacqueline Batista MD, Federico Dawson MD, Ricci Schrader DO, Silvana Hicks MD,  Hermelindo Emery DO, Jing Marie MD, Elmira Gilbert MD, Leana Levy MD, Bethany Pineda MD,  Manuel Clements MD, Belem Yeager MD, Yasmany Shea MD, Rosetta Berry MD, Enzo Escalante MD, Josh Felix MD, Stas Moody DO, Honorio Buitrago MD, Shirley Waterhouse, CNP,  Marge Barahona, CNP, Stas Waters, CNP,  Elissa Salmeron, NICKI, Orly Correia, CNP, Fina Hanks, CNP, Erika Gallo, CNP, Kimberly Rojo, CNP, GWYN WhiteC, GWYN JohnsonC, Joyce Giron, CNP, Ryan Ferguson, CNP,  Jami Calderón, CNP, Radha Ceron, CNP,  Elisa Thomason, CNP, Liliane Bee, CNP         [facility]   IN-PATIENT SERVICE   [facility]    Progress Note    12/26/2024    11:36 AM    Name:   Rene Melgoza  MRN:     [id number]     Acct:      [id number]   Room:   2035/2035-01  IP Day:  3  Admit Date:  12/23/2024  3:08 PM    PCP:   Edgar Castellanos MD  Code Status:  DNR-CCA    Subjective:     C/C:   Chief Complaint   Patient presents with    Abdominal Pain    Chest Pain    Urinary Frequency    Shortness of Breath     Was at [facility] for symptoms of urinary frequency and retention, they were unable to cath patient due to prostrate enlargement. Patient states history of lung CA, state chest pain and abdominal pain      Interval History Status: improved.     Overall feels better.  The muscle spasms are improved    Toradol seemed to help his pain more than anything    Brief History:     Per my VALORIE:  \"  Patient reports over the past couple days having increased shortness of breath with minimal exertion, far above his baseline. He was seen by [facility]  --    ALT 15  --   --   --   --   --   --   --   --    ALKPHOS 59  --   --   --   --   --   --   --   --    BILITOT <0.2  --   --   --   --   --   --   --   --    LIPASE 20  --   --   --   --   --   --   --   --    POCGLU  --    < > 124*  --  131* 128* 134* 162* 152*    < > = values in this interval not displayed.     ABG:No results found for: \"POCPH\", \"PHART\", \"PH\", \"POCPCO2\", \"FDS7XRB\", \"PCO2\", \"POCPO2\", \"PO2ART\", \"PO2\", \"POCHCO3\", \"NTC8GYT\", \"HCO3\", \"NBEA\", \"PBEA\", \"BEART\", \"BE\", \"THGBART\", \"THB\", \"AER2FTD\", \"ERED4WRX\", \"N0RLVNZL\", \"O2SAT\", \"FIO2\"  Lab Results   Component Value Date/Time    SPECIAL LAC 8 ML 12/24/2024 12:38 PM    SPECIAL R HAND 8ML 12/24/2024 12:38 PM     Lab Results   Component Value Date/Time    CULTURE NO GROWTH 1 DAY 12/24/2024 12:38 PM    CULTURE NO GROWTH 1 DAY 12/24/2024 12:38 PM       Radiology:  CT ABDOMEN PELVIS W IV CONTRAST Additional Contrast? None    Result Date: 12/23/2024  1. No evidence of pulmonary embolic disease. 2. Mild bibasilar atelectasis.  No acute pulmonary infiltrate. 3. Multiple indeterminate hepatic lesions.  The largest right hepatic lesion measures 3.1 cm in diameter.  Findings are concerning for metastatic disease. Given the patient's history of cancer, correlative imaging may exist and would be helpful if available to evaluate for interval change.  If there is no comparison imaging, recommend MRI follow-up for more complete assessment. 4. No other acute findings within the abdomen or pelvis.  No acute bowel pathology. 5. Status post cholecystectomy and prostatectomy.     CT CHEST PULMONARY EMBOLISM W CONTRAST    Result Date: 12/23/2024  1. No evidence of pulmonary embolic disease. 2. Mild bibasilar atelectasis.  No acute pulmonary infiltrate. 3. Multiple indeterminate hepatic lesions.  The largest right hepatic lesion measures 3.1 cm in diameter.  Findings are concerning for metastatic disease. Given the patient's history of cancer, correlative imaging may exist

## 2024-12-26 NOTE — PROGRESS NOTES
End Of Shift Note  [facility]  Summary of shift: Patient had uneventful shift. BP was elevated throughout shift, he received x2 doses of Labetolol. Patient complained of pain all night and received Toradol and Oxycodone. Patient has been NPO since midnight and plan is for him to have a stress test this AM. If negative, plan is to discharge. At this time, patient is in bed, call light with in reach, all needs have been met.     Vitals:    Vitals:    12/26/24 0312 12/26/24 0334 12/26/24 0404 12/26/24 0445   BP:    (!) 170/101   Pulse: 70   93   Resp:  16 16 16   Temp:    97.3 °F (36.3 °C)   TempSrc:    Temporal   SpO2: 100%   100%   Weight:    77.1 kg (170 lb)   Height:            I&O:   Intake/Output Summary (Last 24 hours) at 12/26/2024 0545  Last data filed at 12/26/2024 0518  Gross per 24 hour   Intake 400 ml   Output 1550 ml   Net -1150 ml       Resp Status: 5L    Ventilator Settings:     / / /     Critical Care IV infusions:   dextrose      sodium chloride 100 mL/hr at 12/25/24 1305        LDA:   Peripheral IV 12/23/24 Left;Anterior Forearm (Active)   Number of days: 2       Urinary Catheter 12/25/24 Coude (Active)   Number of days: 1

## 2024-12-26 NOTE — PROGRESS NOTES
Pulmonary Critical Care Progress Note    Patient seen for the follow up of Acute exacerbation of chronic obstructive pulmonary disease (COPD) (HCC)     Subjective:    He seems to be anxious.  Had been at 4 to 5 L oxygen by nasal cannula at baseline.  He he has less abdominal/pelvic pain.  He has been on chronic prednisone 20 mg daily at home.  .  He has occasional dry cough.  Denies chest pain.  He reports chronic back pain.  Stress test was done.      Examination:    Vitals: BP (!) 144/94   Pulse 93   Temp 96.9 °F (36.1 °C) (Temporal)   Resp 18   Ht 1.651 m (5' 5\")   Wt 77.1 kg (170 lb)   SpO2 98%   BMI 28.29 kg/m²   SpO2  Av.4 %  Min: 91 %  Max: 100 %  General appearance: alert and cooperative with exam  Neck: No JVD  Lungs: Decreased breath sound no crackles or wheeze  Heart: regular rate and rhythm, S1, S2 normal, no gallop  Abdomen: Soft, non tender, + BS  Extremities: no cyanosis or clubbing. No significant edema    LABs:    CBC:   Recent Labs     24  1519 24  0442   WBC 9.6 7.9   HGB 14.5 14.3   HCT 43.3 44.1    252     BMP:   Recent Labs     24  1519 24  0442   * 137   K 4.2 4.1   CO2 29 31   BUN 14 15   CREATININE 0.6* 0.8   LABGLOM >90 >90   GLUCOSE 112 142*     PT/INR: No results for input(s): \"PROTIME\", \"INR\" in the last 72 hours.  APTT:No results for input(s): \"APTT\" in the last 72 hours.  LIVER PROFILE:  Recent Labs     24  1519   AST 13   ALT 15       Radiology:    CT chest abdomen pelvis     1. No evidence of pulmonary embolic disease.  2. Mild bibasilar atelectasis.  No acute pulmonary infiltrate.  3. Multiple indeterminate hepatic lesions.  The largest right hepatic lesion  measures 3.1 cm in diameter.  Findings are concerning for metastatic disease.  Given the patient's history of cancer, correlative imaging may exist and  would be helpful if available to evaluate for interval change.  If there is  no comparison imaging, recommend MRI  follow-up for more complete assessment.  4. No other acute findings within the abdomen or pelvis.  No acute bowel  pathology.  5. Status post cholecystectomy and prostatectomy.    Impression/recommendations:    Acute on chronic hypoxic respiratory failure  Oxygen with BiPAP support   Home O2 eval before discharge  Incentive spirometer q.1h awake     COPD exacerbation/atelectasis/history of lung nodule  DuoNeb by nebulizer  Symbicort 160 daily  Off Solu-Medrol/prednisone 40 mg daily to taper to prednisone 20 mg daily chronic dose  Mucinex  Zithromax 500 daily empirically discontinue in a.m.  Of Daliresp poor tolerance      Ex Smoker  Advised to abstain    CAD status post MI/paroxysmal AFib  Cardiology on consult  Check stress test result    Liver lesions/cirrhosis, GERD, history of EtOH abuse  Consider MRI of the liver/liver biopsy/GI consultation    Anxiety, bipolar disorder/chronic pain status post sacral stimulator  Management per primary    History of seizure     Urinary retention/BPH status post Gomez insertion  Urology on-call     Peptic ulcer disease prophylaxis  DVT prophylaxis   Physical therapy/ambulate  Discussed with primary  Discussed with RN      Manuel Fowler MD, MD, [facility]  Pulmonary Critical Care and Sleep Medicine,  [facility]  Cell: [phone]  Office: [phone]

## 2024-12-26 NOTE — RT PROTOCOL NOTE
RT Inhaler-Nebulizer Bronchodilator Protocol Note    There is a bronchodilator order in the chart from a provider indicating to follow the RT Bronchodilator Protocol and there is an “Initiate RT Inhaler-Nebulizer Bronchodilator Protocol” order as well (see protocol at bottom of note).    CXR Findings:  No results found.    The findings from the last RT Protocol Assessment were as follows:   History Pulmonary Disease: Chronic pulmonary disease  Respiratory Pattern: Mild dyspnea at rest, irregular pattern, or RR 21-25 bpm  Breath Sounds: Severe inspiratory and expiratory wheezing or severely diminished  Cough: Strong, spontaneous, non-productive  Indication for Bronchodilator Therapy: Decreased or absent breath sounds  Bronchodilator Assessment Score: 14    Aerosolized bronchodilator medication orders have been revised according to the RT Inhaler-Nebulizer Bronchodilator Protocol below.    Respiratory Therapist to perform RT Therapy Protocol Assessment initially then follow the protocol.  Repeat RT Therapy Protocol Assessment PRN for score 0-3 or on second treatment, BID, and PRN for scores above 3.    No Indications - adjust the frequency to every 6 hours PRN wheezing or bronchospasm, if no treatments needed after 48 hours then discontinue using Per Protocol order mode.     If indication present, adjust the RT bronchodilator orders based on the Bronchodilator Assessment Score as indicated below.  Use Inhaler orders unless patient has one or more of the following: on home nebulizer, not able to hold breath for 10 seconds, is not alert and oriented, cannot activate and use MDI correctly, or respiratory rate 25 breaths per minute or more, then use the equivalent nebulizer order(s) with same Frequency and PRN reasons based on the score.  If a patient is on this medication at home then do not decrease Frequency below that used at home.    0-3 - enter or revise RT bronchodilator order(s) to equivalent RT Bronchodilator

## 2024-12-26 NOTE — PLAN OF CARE
Patient is A&Ox4 is able to ambulate independently. Patient is being discharged home with dacosta, per Urology. Peripheral IV taken out. Belongings gathered and sent with patient. Discharge instructions reviewed, questions answered.   Problem: Discharge Planning  Goal: Discharge to home or other facility with appropriate resources  12/26/2024 1400 by Meghan Antonio RN  Outcome: Completed

## 2024-12-26 NOTE — PROGRESS NOTES
Physical Therapy  DATE: 2024    NAME: Rene Melgoza  MRN: [id number]   : 1965    Patient not seen this date for Physical Therapy due to:      [] Cancel by RN or physician due to:    [] Hemodialysis    [] Critical Lab Value Level     [] Blood transfusion in progress    [] Acute or unstable cardiovascular status   _MAP < 55 or more than >115  _HR < 40 or > 130    [] Acute or unstable pulmonary status   -FiO2 > 60%   _RR < 5 or >40    _O2 sats < 85%    [] Strict Bedrest    [] Off Unit for surgery or procedure    [x] Off Unit for testing       [] Pending imaging to R/O fracture    [] Refusal by Patient      [] Other      [] PT being discontinued at this time. Patient independent. No further needs.     [] PT being discontinued at this time as the patient has been transferred to hospice care. No further needs.      Vivian Santillan, PT

## 2024-12-28 LAB
EKG ATRIAL RATE: 94 BPM
EKG P AXIS: 61 DEGREES
EKG P-R INTERVAL: 142 MS
EKG Q-T INTERVAL: 384 MS
EKG QRS DURATION: 138 MS
EKG QTC CALCULATION (BAZETT): 480 MS
EKG R AXIS: 69 DEGREES
EKG T AXIS: 24 DEGREES
EKG VENTRICULAR RATE: 94 BPM

## 2024-12-29 LAB
MICROORGANISM SPEC CULT: NORMAL
MICROORGANISM SPEC CULT: NORMAL
SERVICE CMNT-IMP: NORMAL
SERVICE CMNT-IMP: NORMAL
SPECIMEN DESCRIPTION: NORMAL
SPECIMEN DESCRIPTION: NORMAL

## 2025-01-10 NOTE — PROGRESS NOTES
Physician Progress Note      PATIENT:               MARY BECERRA  [facility] #:                  [id number]  :                       1965  ADMIT DATE:       2024 3:08 PM  DISCH DATE:        2024 2:07 PM  RESPONDING  PROVIDER #:        Manuel Fowler MD          QUERY TEXT:    Patient admitted with COPD exacerbation. Noted documentation of acute on   chronic respiratory failure in pulmonary consult note on 24. In order to   support the diagnosis of acute respiratory failure, please include additional   clinical indicators in your documentation.  Or please document if the   diagnosis of acute respiratory failure has been ruled out after further study.    The medical record reflects the following:  Risk Factors: lung cancer, COPD, continuous home oxygen 5LNC  Clinical Indicators: per H&P \" Pulmonary effort is normal when at rest,   significant increased work of breathing with slight movement\" RR 16-22, per   pulmonary PN on  \" Had been at 4 to 5 L oxygen by nasal cannula at   baseline\"  Treatment: oxygen 6LNC,  continuous SpO2 monitoring,  pulmonology consult,   DuoNeb by nebulizer  Options provided:  -- Acute Respiratory Failure as evidenced by, Please document evidence.  -- Acute Respiratory Failure ruled out after study and Chronic Respiratory   Failure confirmed  -- Other - I will add my own diagnosis  -- Disagree - Not applicable / Not valid  -- Disagree - Clinically unable to determine / Unknown  -- Refer to Clinical Documentation Reviewer    PROVIDER RESPONSE TEXT:    Acute Respiratory Failure ruled out after study and Chronic Respiratory   Failure confirmed.    Query created by: BECCA RICARDO on 2025 8:14 AM      Electronically signed by:  Manuel Fowler MD 1/10/2025 9:47 AM

## (undated) DEVICE — GAUZE,SPONGE,4"X4",16PLY,STRL,LF,10/TRAY: Brand: MEDLINE

## (undated) DEVICE — STAZ CYSTO: Brand: MEDLINE INDUSTRIES, INC.

## (undated) DEVICE — CATHETER,FOLEY,COUDE,LATEX,20FR,10ML: Brand: MEDLINE

## (undated) DEVICE — Device: Brand: DEFENDO VALVE AND CONNECTOR KIT

## (undated) DEVICE — JELLY,LUBE,STERILE,FLIP TOP,TUBE,2-OZ: Brand: MEDLINE

## (undated) DEVICE — MEDICINE CUP, GRADUATED, STER: Brand: MEDLINE

## (undated) DEVICE — ADAPTER TBNG LUER STUB 15 GA INTMED

## (undated) DEVICE — FORCEPS BX L240CM WRK CHN 2.8MM STD CAP W/ NDL MIC MESH

## (undated) DEVICE — BLOCK BITE 60FR RUBBER ADLT DENTAL

## (undated) DEVICE — RADIFOCUS GLIDEWIRE: Brand: GLIDEWIRE

## (undated) DEVICE — GLOVE SURG 8 11.7IN BEAD CUF LIGHT BRN SENSICARE LTX FREE

## (undated) DEVICE — TUBING, SUCTION, 1/4" X 12', STRAIGHT: Brand: MEDLINE

## (undated) DEVICE — DRAINBAG,ANTI-REFLUX TOWER,L/F,2000ML,LL: Brand: MEDLINE

## (undated) DEVICE — YANKAUER,FLEXIBLE HANDLE,REGLR CAPACITY: Brand: MEDLINE INDUSTRIES, INC.

## (undated) DEVICE — CUP MED 1OZ CLR POLYPR FEED GRAD W/O LID

## (undated) DEVICE — BASIN EMSIS 700ML GRAPHITE PLAS KID SHP GRAD

## (undated) DEVICE — CO2 CANNULA,SUPERSOFT, ADLT,7'O2,7'CO2: Brand: MEDLINE

## (undated) DEVICE — STRAP,CATHETER,ELASTIC,HOOK&LOOP: Brand: MEDLINE

## (undated) DEVICE — CONMED DISPOSABLE GASTROINTESTINAL CYTOLOGY BRUSH, STRAIGHT HANDLE, 2.5 MM X 160 CM: Brand: CONMED

## (undated) DEVICE — GLOVE SURG SZ 7 CRM LTX FREE POLYISOPRENE POLYMER BEAD ANTI

## (undated) DEVICE — GOWN,AURORA,NONREINFORCED,LARGE: Brand: MEDLINE

## (undated) DEVICE — SOLUTION IV 1000ML 0.9% SOD CHL PH 5 INJ USP VIAFLX PLAS

## (undated) DEVICE — SINGLE-USE BIOPSY FORCEPS: Brand: RADIAL JAW 4

## (undated) DEVICE — SYRINGE MED 50ML LUERLOCK TIP